# Patient Record
Sex: FEMALE | Race: WHITE | NOT HISPANIC OR LATINO | Employment: OTHER | URBAN - METROPOLITAN AREA
[De-identification: names, ages, dates, MRNs, and addresses within clinical notes are randomized per-mention and may not be internally consistent; named-entity substitution may affect disease eponyms.]

---

## 2017-06-05 ENCOUNTER — PATIENT OUTREACH (OUTPATIENT)
Dept: HEALTH INFORMATION MANAGEMENT | Facility: OTHER | Age: 82
End: 2017-06-05

## 2017-06-05 NOTE — PROGRESS NOTES
Outcome: Left Message    WebIZ Checked & Epic Updated:  yes    [facility] Verified: yes    Attempt # 1ST

## 2017-06-20 NOTE — PROGRESS NOTES
Outcome: Left Message    WebIZ Checked & Epic Updated:  yes    [facility] Verified: yes    Attempt # 2ND

## 2017-06-23 NOTE — PROGRESS NOTES
Outcome: Left Message    WebIZ Checked & Epic Updated:  yes    [facility] Verified: yes    Attempt # 3RD

## 2017-06-29 NOTE — PROGRESS NOTES
Outcome: Left Message    WebIZ Checked & Epic Updated:  yes    [facility] Verified: yes    Attempt # FINAL

## 2017-09-01 ENCOUNTER — APPOINTMENT (OUTPATIENT)
Dept: RADIOLOGY | Facility: MEDICAL CENTER | Age: 82
DRG: 378 | End: 2017-09-01
Payer: MEDICARE

## 2017-09-01 ENCOUNTER — APPOINTMENT (OUTPATIENT)
Dept: RADIOLOGY | Facility: MEDICAL CENTER | Age: 82
DRG: 378 | End: 2017-09-01
Attending: EMERGENCY MEDICINE
Payer: MEDICARE

## 2017-09-01 ENCOUNTER — HOSPITAL ENCOUNTER (INPATIENT)
Facility: MEDICAL CENTER | Age: 82
LOS: 4 days | DRG: 378 | End: 2017-09-05
Attending: EMERGENCY MEDICINE | Admitting: HOSPITALIST
Payer: MEDICARE

## 2017-09-01 ENCOUNTER — RESOLUTE PROFESSIONAL BILLING HOSPITAL PROF FEE (OUTPATIENT)
Dept: HOSPITALIST | Facility: MEDICAL CENTER | Age: 82
End: 2017-09-01
Payer: MEDICARE

## 2017-09-01 DIAGNOSIS — R07.89 OTHER CHEST PAIN: ICD-10-CM

## 2017-09-01 DIAGNOSIS — R10.9 ABDOMINAL PAIN, UNSPECIFIED LOCATION: ICD-10-CM

## 2017-09-01 DIAGNOSIS — M54.9 PAIN, UPPER BACK: ICD-10-CM

## 2017-09-01 DIAGNOSIS — D64.9 ANEMIA, UNSPECIFIED TYPE: ICD-10-CM

## 2017-09-01 PROBLEM — E87.6 HYPOKALEMIA: Status: ACTIVE | Noted: 2017-09-01

## 2017-09-01 LAB
ABO GROUP BLD: NORMAL
ABO GROUP BLD: NORMAL
ALBUMIN SERPL BCP-MCNC: 4.4 G/DL (ref 3.2–4.9)
ALBUMIN/GLOB SERPL: 1.6 G/DL
ALP SERPL-CCNC: 56 U/L (ref 30–99)
ALT SERPL-CCNC: 6 U/L (ref 2–50)
ANION GAP SERPL CALC-SCNC: 13 MMOL/L (ref 0–11.9)
ANISOCYTOSIS BLD QL SMEAR: ABNORMAL
APTT PPP: 28.7 SEC (ref 24.7–36)
AST SERPL-CCNC: 12 U/L (ref 12–45)
BARCODED ABORH UBTYP: 600
BARCODED ABORH UBTYP: 6200
BARCODED PRD CODE UBPRD: NORMAL
BARCODED UNIT NUM UBUNT: NORMAL
BASOPHILS # BLD AUTO: 0.2 % (ref 0–1.8)
BASOPHILS # BLD: 0.01 K/UL (ref 0–0.12)
BILIRUB SERPL-MCNC: 0.3 MG/DL (ref 0.1–1.5)
BLD GP AB SCN SERPL QL: NORMAL
BNP SERPL-MCNC: 187 PG/ML (ref 0–100)
BUN SERPL-MCNC: 16 MG/DL (ref 8–22)
CALCIUM SERPL-MCNC: 9.4 MG/DL (ref 8.5–10.5)
CHLORIDE SERPL-SCNC: 106 MMOL/L (ref 96–112)
CO2 SERPL-SCNC: 19 MMOL/L (ref 20–33)
COMMENT 1642: NORMAL
COMPONENT R 8504R: NORMAL
CREAT SERPL-MCNC: 0.76 MG/DL (ref 0.5–1.4)
DACRYOCYTES BLD QL SMEAR: NORMAL
EKG IMPRESSION: NORMAL
EOSINOPHIL # BLD AUTO: 0.02 K/UL (ref 0–0.51)
EOSINOPHIL NFR BLD: 0.4 % (ref 0–6.9)
ERYTHROCYTE [DISTWIDTH] IN BLOOD BY AUTOMATED COUNT: 46.1 FL (ref 35.9–50)
ERYTHROCYTE [DISTWIDTH] IN BLOOD BY AUTOMATED COUNT: 47.2 FL (ref 35.9–50)
GFR SERPL CREATININE-BSD FRML MDRD: >60 ML/MIN/1.73 M 2
GLOBULIN SER CALC-MCNC: 2.7 G/DL (ref 1.9–3.5)
GLUCOSE SERPL-MCNC: 91 MG/DL (ref 65–99)
HCT VFR BLD AUTO: 16.7 % (ref 37–47)
HCT VFR BLD AUTO: 16.8 % (ref 37–47)
HGB BLD-MCNC: 4.4 G/DL (ref 12–16)
HGB BLD-MCNC: 4.7 G/DL (ref 12–16)
HYPOCHROMIA BLD QL SMEAR: ABNORMAL
IMM GRANULOCYTES # BLD AUTO: 0.03 K/UL (ref 0–0.11)
IMM GRANULOCYTES NFR BLD AUTO: 0.6 % (ref 0–0.9)
INR PPP: 1.09 (ref 0.87–1.13)
LIPASE SERPL-CCNC: 3 U/L (ref 11–82)
LYMPHOCYTES # BLD AUTO: 0.74 K/UL (ref 1–4.8)
LYMPHOCYTES NFR BLD: 15.1 % (ref 22–41)
MCH RBC QN AUTO: 17.3 PG (ref 27–33)
MCH RBC QN AUTO: 18.5 PG (ref 27–33)
MCHC RBC AUTO-ENTMCNC: 26.3 G/DL (ref 33.6–35)
MCHC RBC AUTO-ENTMCNC: 28 G/DL (ref 33.6–35)
MCV RBC AUTO: 65.5 FL (ref 81.4–97.8)
MCV RBC AUTO: 66.1 FL (ref 81.4–97.8)
MICROCYTES BLD QL SMEAR: ABNORMAL
MONOCYTES # BLD AUTO: 0.37 K/UL (ref 0–0.85)
MONOCYTES NFR BLD AUTO: 7.5 % (ref 0–13.4)
MORPHOLOGY BLD-IMP: NORMAL
MORPHOLOGY BLD-IMP: NORMAL
NEUTROPHILS # BLD AUTO: 3.74 K/UL (ref 2–7.15)
NEUTROPHILS NFR BLD: 76.2 % (ref 44–72)
NRBC # BLD AUTO: 0 K/UL
NRBC BLD AUTO-RTO: 0 /100 WBC
OVALOCYTES BLD QL SMEAR: NORMAL
PLATELET # BLD AUTO: 380 K/UL (ref 164–446)
PLATELET # BLD AUTO: 463 K/UL (ref 164–446)
PLATELET BLD QL SMEAR: NORMAL
PMV BLD AUTO: 9 FL (ref 9–12.9)
PMV BLD AUTO: 9.2 FL (ref 9–12.9)
POIKILOCYTOSIS BLD QL SMEAR: NORMAL
POLYCHROMASIA BLD QL SMEAR: NORMAL
POTASSIUM SERPL-SCNC: 3.3 MMOL/L (ref 3.6–5.5)
PRODUCT TYPE UPROD: NORMAL
PROT SERPL-MCNC: 7.1 G/DL (ref 6–8.2)
PROTHROMBIN TIME: 14.4 SEC (ref 12–14.6)
RBC # BLD AUTO: 2.54 M/UL (ref 4.2–5.4)
RBC # BLD AUTO: 2.55 M/UL (ref 4.2–5.4)
RBC BLD AUTO: PRESENT
RH BLD: NORMAL
SCHISTOCYTES BLD QL SMEAR: NORMAL
SODIUM SERPL-SCNC: 138 MMOL/L (ref 135–145)
TROPONIN I SERPL-MCNC: 0.01 NG/ML (ref 0–0.04)
UNIT STATUS USTAT: NORMAL
WBC # BLD AUTO: 4.9 K/UL (ref 4.8–10.8)
WBC # BLD AUTO: 5.9 K/UL (ref 4.8–10.8)

## 2017-09-01 PROCEDURE — 86900 BLOOD TYPING SEROLOGIC ABO: CPT

## 2017-09-01 PROCEDURE — 71010 DX-CHEST-LIMITED (1 VIEW): CPT

## 2017-09-01 PROCEDURE — 93005 ELECTROCARDIOGRAM TRACING: CPT

## 2017-09-01 PROCEDURE — 36430 TRANSFUSION BLD/BLD COMPNT: CPT

## 2017-09-01 PROCEDURE — 74175 CTA ABDOMEN W/CONTRAST: CPT

## 2017-09-01 PROCEDURE — 86923 COMPATIBILITY TEST ELECTRIC: CPT | Mod: 91

## 2017-09-01 PROCEDURE — P9016 RBC LEUKOCYTES REDUCED: HCPCS | Mod: 91

## 2017-09-01 PROCEDURE — 770020 HCHG ROOM/CARE - TELE (206)

## 2017-09-01 PROCEDURE — 86901 BLOOD TYPING SEROLOGIC RH(D): CPT

## 2017-09-01 PROCEDURE — 700105 HCHG RX REV CODE 258: Performed by: HOSPITALIST

## 2017-09-01 PROCEDURE — 85027 COMPLETE CBC AUTOMATED: CPT

## 2017-09-01 PROCEDURE — 86850 RBC ANTIBODY SCREEN: CPT

## 2017-09-01 PROCEDURE — 85730 THROMBOPLASTIN TIME PARTIAL: CPT

## 2017-09-01 PROCEDURE — 83690 ASSAY OF LIPASE: CPT

## 2017-09-01 PROCEDURE — 700105 HCHG RX REV CODE 258: Performed by: EMERGENCY MEDICINE

## 2017-09-01 PROCEDURE — 96374 THER/PROPH/DIAG INJ IV PUSH: CPT

## 2017-09-01 PROCEDURE — 700111 HCHG RX REV CODE 636 W/ 250 OVERRIDE (IP): Performed by: HOSPITALIST

## 2017-09-01 PROCEDURE — 99285 EMERGENCY DEPT VISIT HI MDM: CPT

## 2017-09-01 PROCEDURE — 93005 ELECTROCARDIOGRAM TRACING: CPT | Performed by: EMERGENCY MEDICINE

## 2017-09-01 PROCEDURE — 700117 HCHG RX CONTRAST REV CODE 255: Performed by: EMERGENCY MEDICINE

## 2017-09-01 PROCEDURE — 85610 PROTHROMBIN TIME: CPT

## 2017-09-01 PROCEDURE — 84484 ASSAY OF TROPONIN QUANT: CPT

## 2017-09-01 PROCEDURE — 99223 1ST HOSP IP/OBS HIGH 75: CPT | Performed by: HOSPITALIST

## 2017-09-01 PROCEDURE — 83880 ASSAY OF NATRIURETIC PEPTIDE: CPT

## 2017-09-01 PROCEDURE — C9113 INJ PANTOPRAZOLE SODIUM, VIA: HCPCS | Performed by: HOSPITALIST

## 2017-09-01 PROCEDURE — 85025 COMPLETE CBC W/AUTO DIFF WBC: CPT

## 2017-09-01 PROCEDURE — 80053 COMPREHEN METABOLIC PANEL: CPT

## 2017-09-01 RX ORDER — POTASSIUM CHLORIDE 7.45 MG/ML
10 INJECTION INTRAVENOUS
Status: COMPLETED | OUTPATIENT
Start: 2017-09-01 | End: 2017-09-02

## 2017-09-01 RX ORDER — SODIUM CHLORIDE 9 MG/ML
INJECTION, SOLUTION INTRAVENOUS CONTINUOUS
Status: DISCONTINUED | OUTPATIENT
Start: 2017-09-01 | End: 2017-09-02

## 2017-09-01 RX ORDER — PANTOPRAZOLE SODIUM 40 MG/10ML
40 INJECTION, POWDER, LYOPHILIZED, FOR SOLUTION INTRAVENOUS 2 TIMES DAILY
Status: DISCONTINUED | OUTPATIENT
Start: 2017-09-01 | End: 2017-09-03

## 2017-09-01 RX ORDER — POLYETHYLENE GLYCOL 3350 17 G/17G
1 POWDER, FOR SOLUTION ORAL
Status: DISCONTINUED | OUTPATIENT
Start: 2017-09-01 | End: 2017-09-05 | Stop reason: HOSPADM

## 2017-09-01 RX ORDER — BISACODYL 10 MG
10 SUPPOSITORY, RECTAL RECTAL
Status: DISCONTINUED | OUTPATIENT
Start: 2017-09-01 | End: 2017-09-05 | Stop reason: HOSPADM

## 2017-09-01 RX ORDER — ASPIRIN 81 MG/1
81 TABLET, CHEWABLE ORAL DAILY
COMMUNITY
End: 2017-09-01

## 2017-09-01 RX ORDER — NAPROXEN SODIUM 220 MG
220 TABLET ORAL EVERY 8 HOURS PRN
Status: ON HOLD | COMMUNITY
End: 2017-09-05

## 2017-09-01 RX ORDER — AMOXICILLIN 250 MG
2 CAPSULE ORAL 2 TIMES DAILY
Status: DISCONTINUED | OUTPATIENT
Start: 2017-09-01 | End: 2017-09-05 | Stop reason: HOSPADM

## 2017-09-01 RX ADMIN — SODIUM CHLORIDE: 9 INJECTION, SOLUTION INTRAVENOUS at 23:31

## 2017-09-01 RX ADMIN — IOHEXOL 100 ML: 350 INJECTION, SOLUTION INTRAVENOUS at 15:45

## 2017-09-01 RX ADMIN — SODIUM CHLORIDE 1000 ML: 9 INJECTION, SOLUTION INTRAVENOUS at 15:35

## 2017-09-01 RX ADMIN — POTASSIUM CHLORIDE 10 MEQ: 7.46 INJECTION, SOLUTION INTRAVENOUS at 23:31

## 2017-09-01 RX ADMIN — PANTOPRAZOLE SODIUM 40 MG: 40 INJECTION, POWDER, FOR SOLUTION INTRAVENOUS at 22:04

## 2017-09-01 ASSESSMENT — COGNITIVE AND FUNCTIONAL STATUS - GENERAL
SUGGESTED CMS G CODE MODIFIER MOBILITY: CJ
SUGGESTED CMS G CODE MODIFIER DAILY ACTIVITY: CJ
DAILY ACTIVITIY SCORE: 21
MOVING FROM LYING ON BACK TO SITTING ON SIDE OF FLAT BED: A LITTLE
MOBILITY SCORE: 20
WALKING IN HOSPITAL ROOM: A LITTLE
CLIMB 3 TO 5 STEPS WITH RAILING: A LITTLE
HELP NEEDED FOR BATHING: A LITTLE
STANDING UP FROM CHAIR USING ARMS: A LITTLE
DRESSING REGULAR UPPER BODY CLOTHING: A LITTLE
TOILETING: A LITTLE

## 2017-09-01 ASSESSMENT — ENCOUNTER SYMPTOMS
COUGH: 0
NAUSEA: 0
WEAKNESS: 1
ABDOMINAL PAIN: 0
SHORTNESS OF BREATH: 0
BACK PAIN: 1
VOMITING: 0
HEADACHES: 1
WEIGHT LOSS: 0
PALPITATIONS: 0
CHILLS: 0
BLURRED VISION: 0
BLOOD IN STOOL: 0
FEVER: 0

## 2017-09-01 ASSESSMENT — PATIENT HEALTH QUESTIONNAIRE - PHQ9
SUM OF ALL RESPONSES TO PHQ QUESTIONS 1-9: 0
1. LITTLE INTEREST OR PLEASURE IN DOING THINGS: NOT AT ALL
SUM OF ALL RESPONSES TO PHQ9 QUESTIONS 1 AND 2: 0
2. FEELING DOWN, DEPRESSED, IRRITABLE, OR HOPELESS: NOT AT ALL

## 2017-09-01 ASSESSMENT — LIFESTYLE VARIABLES
ALCOHOL_USE: NO
DO YOU DRINK ALCOHOL: NO
EVER_SMOKED: NEVER

## 2017-09-01 ASSESSMENT — PAIN SCALES - GENERAL
PAINLEVEL_OUTOF10: 2
PAINLEVEL_OUTOF10: 10
PAINLEVEL_OUTOF10: 0

## 2017-09-01 NOTE — ED NOTES
Pt to triage .  Chief Complaint   Patient presents with   • Abdominal Pain   • Weight Loss   • Body Aches   • Back Pain

## 2017-09-01 NOTE — ED NOTES
Med rec complete per pt and family at bedside  Pt does not take any medications regularly, no prescription medications  Allergies reviewed - NKDA  No ABX in last month

## 2017-09-01 NOTE — ED PROVIDER NOTES
ED Provider Note    CHIEF COMPLAINT  Chief Complaint   Patient presents with   • Abdominal Pain   • Weight Loss   • Body Aches   • Back Pain       HPI  Lamar Dangelo is a 88 y.o. female who presentsFor evaluation of chest pain abdominal pain and back pain, she is here with a sister and a friend, they report that she is a history of dementia and is not an accurate historian. The patient told me over the past week or so she's been experiencing chest pain and back pain and upper abdominal pain, she felt nauseated at times but has not vomited, has been no blood in her stool. The patient apparently has not seen a physician in many years, she does not take any medicine on a regular basis. She tells me at this time her pain has resolved. History is limited by apparent baseline dementia and confusion. Triage blood work reveals a hemoglobin of 4.4 so the patient was brought back and I evaluated her immediately upon her arrival in the room    REVIEW OF SYSTEMS  Negative for fever, rash, vomiting, diarrhea, headache, focal weakness, focal numbness, focal tingling. All other systems are negative.     PAST MEDICAL HISTORY  Past Medical History:   Diagnosis Date   • Hypothyroid 7/2/2010   • Hyperlipidemia 7/2/2010       FAMILY HISTORY  Family History   Problem Relation Age of Onset   • Heart Disease Father    • Hypertension Sister        SOCIAL HISTORY  Social History   Substance Use Topics   • Smoking status: Never Smoker   • Smokeless tobacco: Never Used   • Alcohol use No       SURGICAL HISTORY  Past Surgical History:   Procedure Laterality Date   • APPENDECTOMY     • TONSILLECTOMY         CURRENT MEDICATIONS  I personally reviewed the medication list in the charting documentation.     ALLERGIES  Allergies   Allergen Reactions   • Nkda [No Known Drug Allergy]        MEDICAL RECORD  I have reviewed patient's medical record and pertinent results are listed above.      PHYSICAL EXAM  VITAL SIGNS: /60   Pulse 82   Temp  "37.1 °C (98.8 °F) (Temporal)   Resp 15   Ht 1.6 m (5' 3\")   Wt 48 kg (105 lb 13.1 oz)   SpO2 100%   BMI 18.75 kg/m²    Constitutional:Elderly and frail but in no acute distress.  HENT: Mucus membranes moist.    Eyes: No scleral icterus. Normal conjunctiva   Neck: Supple, comfortable, nonpainful range of motion.   Cardiovascular: Regular heart rate and rhythm.   Thorax & Lungs: Chest is nontender.  Lungs are clear to auscultation with good air movement bilaterally.  No wheeze, rhonchi, nor rales.   Abdomen: Soft, no appreciable tenderness, there is no pulsatile mass  Rectal: No hemorrhoids, brown heme-negative stool.  Skin: Warm, dry. No rash appreciated  Extremities/Musculoskeletal: No sign of trauma. No asymmetric calf tenderness, erythema or edema. Normal range of motion   Neurologic: Alert & confused requiring frequent reorientation, and normal symmetric upper and lower extremity motor and sensory function bilaterally  Psychiatric: Normal affect appropriate for the clinical situation.    DIAGNOSTIC STUDIES / PROCEDURES    EKG  12 Lead EKG interpreted by me to show:    Rate 83  Rhythm: Normal sinus rhythm  Axis: Normal  ID and QRS Intervals: Normal  T waves: No acute changes  ST segments: Nonspecific morphology V3 V4 and V5  Ectopy: None.  QTc: 532    My impression of this EKG: Concerning for ischemia with a prolonged QT interval      LABS  Results for orders placed or performed during the hospital encounter of 09/01/17   Troponin   Result Value Ref Range    Troponin I 0.01 0.00 - 0.04 ng/mL   Btype Natriuretic Peptide   Result Value Ref Range    B Natriuretic Peptide 187 (H) 0 - 100 pg/mL   CBC with Differential   Result Value Ref Range    WBC 4.9 4.8 - 10.8 K/uL    RBC 2.55 (L) 4.20 - 5.40 M/uL    Hemoglobin 4.4 (LL) 12.0 - 16.0 g/dL    Hematocrit 16.7 (LL) 37.0 - 47.0 %    MCV 65.5 (L) 81.4 - 97.8 fL    MCH 17.3 (L) 27.0 - 33.0 pg    MCHC 26.3 (L) 33.6 - 35.0 g/dL    RDW 47.2 35.9 - 50.0 fL    Platelet " Count 463 (H) 164 - 446 K/uL    MPV 9.0 9.0 - 12.9 fL    Nucleated RBC 0.00 /100 WBC    NRBC (Absolute) 0.00 K/uL    Neutrophils-Polys 76.20 (H) 44.00 - 72.00 %    Lymphocytes 15.10 (L) 22.00 - 41.00 %    Monocytes 7.50 0.00 - 13.40 %    Eosinophils 0.40 0.00 - 6.90 %    Basophils 0.20 0.00 - 1.80 %    Immature Granulocytes 0.60 0.00 - 0.90 %    Lymphs (Absolute) 0.74 (L) 1.00 - 4.80 K/uL    Monos (Absolute) 0.37 0.00 - 0.85 K/uL    Eos (Absolute) 0.02 0.00 - 0.51 K/uL    Baso (Absolute) 0.01 0.00 - 0.12 K/uL    Immature Granulocytes (abs) 0.03 0.00 - 0.11 K/uL    Neutrophils (Absolute) 3.74 2.00 - 7.15 K/uL    Hypochromia 3+     Anisocytosis 3+     Microcytosis 3+    Complete Metabolic Panel (CMP)   Result Value Ref Range    Sodium 138 135 - 145 mmol/L    Potassium 3.3 (L) 3.6 - 5.5 mmol/L    Chloride 106 96 - 112 mmol/L    Co2 19 (L) 20 - 33 mmol/L    Anion Gap 13.0 (H) 0.0 - 11.9    Glucose 91 65 - 99 mg/dL    Bun 16 8 - 22 mg/dL    Creatinine 0.76 0.50 - 1.40 mg/dL    Calcium 9.4 8.5 - 10.5 mg/dL    AST(SGOT) 12 12 - 45 U/L    ALT(SGPT) 6 2 - 50 U/L    Alkaline Phosphatase 56 30 - 99 U/L    Total Bilirubin 0.3 0.1 - 1.5 mg/dL    Albumin 4.4 3.2 - 4.9 g/dL    Total Protein 7.1 6.0 - 8.2 g/dL    Globulin 2.7 1.9 - 3.5 g/dL    A-G Ratio 1.6 g/dL   Prothrombin Time   Result Value Ref Range    PT 14.4 12.0 - 14.6 sec    INR 1.09 0.87 - 1.13   APTT   Result Value Ref Range    APTT 28.7 24.7 - 36.0 sec   Lipase   Result Value Ref Range    Lipase 3 (L) 11 - 82 U/L   ESTIMATED GFR   Result Value Ref Range    GFR If African American >60 >60 mL/min/1.73 m 2    GFR If Non African American >60 >60 mL/min/1.73 m 2   PERIPHERAL SMEAR REVIEW   Result Value Ref Range    Peripheral Smear Review see below    PLATELET ESTIMATE   Result Value Ref Range    Plt Estimation Increased    MORPHOLOGY   Result Value Ref Range    RBC Morphology Present     Polychromia 1+     Poikilocytosis 2+     Ovalocytes 1+     Schistocytes 1+     Tear  Drop Cells 1+    DIFFERENTIAL COMMENT   Result Value Ref Range    Comments-Diff see below    EKG (ER)   Result Value Ref Range    Report       [facility]    Test Date:  2017  Pt Name:    NIYAH BANUELOS              Department: ER  MRN:        [id number]                      Room:  Gender:     F                            Technician: [id number]  :        [id number]                   Requested By:ER TRIAGE PROTOCOL  Order #:    [id number]                    Reading MD:    Measurements  Intervals                                Axis  Rate:       83                           P:          69  NC:         204                          QRS:        -2  QRSD:       84                           T:          20  QT:         452  QTc:        532    Interpretive Statements  SINUS RHYTHM  BORDERLINE REPOLARIZATION ABNORMALITY  PROLONGED QT INTERVAL  No previous ECG available for comparison           RADIOLOGY  CT-CTA COMPLETE THORACOABDOMINAL AORTA   Final Result         Limited evaluation due to motion artifact.      1. No evidence of aortic dissection or aneurysm. Moderate atherosclerotic disease.      2. Low-attenuation lesion in the right hepatic lobe is incompletely evaluated but could be a cyst or hemangioma.      3. Mild thickening of the pylorus could relate to gastritis.      DX-CHEST-LIMITED (1 VIEW)   Final Result      No consolidation.            COURSE & MEDICAL DECISION MAKING  I have reviewed any medical record information, laboratory studies and radiographic results as noted above.  Differential diagnoses includes: ACS, AAA, aortic dissection, retroperitoneal hematoma, GI bleeding     Encounter Summary: This is a 88 y.o. female with, abdominal and back pain, found to have a hemoglobin of 4, hemodynamically stable however and has a history of dementia which really complicates the history here. Most of the history is provided by family at the bedside. She has a benign abdomen without  evidence of peritonitis, heme-negative stool. Will initiate blood transfusion, CT scan of the aorta, was closely here in the emergency department as she is at high chance of acute decompensation. We'll continue to monitor vital signs, troponin negative, EKG is suggestive of ischemia. ------- CT scan is largely unremarkable except for some mild thickening of the pylorus which could be related to gastritis, this could be a GI bleed but she was heme-negative which is suspect. I I admitted the patient to Dr. Cardoso, the internal medicine specialist who requested I consult GI so I did speak to Dr. Murcia regarding the case. Admitted in guarded condition after blood transfusion has been initiated    CRITICAL CARE  The very real possibilty of a deterioration of this patient's condition required the highest level of my preparedness for sudden, emergent intervention.  I provided critical care services, which included medication orders, frequent reevaluations of the patient's condition and response to treatment, ordering and reviewing test results, and discussing the case with various consultants.  The critical care time associated with the care of the patient was 42 minutes. Review chart for interventions. This time is exclusive of any other billable procedures.       DISPOSITION: Admitted in guarded condition      FINAL IMPRESSION  1. Anemia, unspecified type    2. Other chest pain    3. Abdominal pain, unspecified location    4. Pain, upper back           This dictation was created using voice recognition software. The accuracy of the dictation is limited to the abilities of the software. I expect there may be some errors of grammar and possibly content. The nursing notes were reviewed and certain aspects of this information were incorporated into this note.    Electronically signed by: Humberto Connell, 9/1/2017 2:11 PM

## 2017-09-02 PROBLEM — D62 ANEMIA ASSOCIATED WITH ACUTE BLOOD LOSS: Status: ACTIVE | Noted: 2017-09-02

## 2017-09-02 PROBLEM — K92.2 ACUTE GI BLEEDING: Status: ACTIVE | Noted: 2017-09-02

## 2017-09-02 LAB
ANION GAP SERPL CALC-SCNC: 9 MMOL/L (ref 0–11.9)
ANISOCYTOSIS BLD QL SMEAR: ABNORMAL
BASOPHILS # BLD AUTO: 0 % (ref 0–1.8)
BASOPHILS # BLD: 0 K/UL (ref 0–0.12)
BUN SERPL-MCNC: 11 MG/DL (ref 8–22)
CALCIUM SERPL-MCNC: 9.3 MG/DL (ref 8.5–10.5)
CFT BLD TEG: 3.5 MIN (ref 5–10)
CHLORIDE SERPL-SCNC: 108 MMOL/L (ref 96–112)
CLOT ANGLE BLD TEG: 76.3 DEGREES (ref 53–72)
CLOT LYSIS 30M P MA LENFR BLD TEG: 0.7 % (ref 0–8)
CO2 SERPL-SCNC: 19 MMOL/L (ref 20–33)
CREAT SERPL-MCNC: 0.62 MG/DL (ref 0.5–1.4)
CT.EXTRINSIC BLD ROTEM: 0.9 MIN (ref 1–3)
EOSINOPHIL # BLD AUTO: 0 K/UL (ref 0–0.51)
EOSINOPHIL NFR BLD: 0 % (ref 0–6.9)
ERYTHROCYTE [DISTWIDTH] IN BLOOD BY AUTOMATED COUNT: 54.7 FL (ref 35.9–50)
FERRITIN SERPL-MCNC: 7.9 NG/ML (ref 10–291)
FOLATE SERPL-MCNC: 19.3 NG/ML
GFR SERPL CREATININE-BSD FRML MDRD: >60 ML/MIN/1.73 M 2
GLUCOSE SERPL-MCNC: 80 MG/DL (ref 65–99)
HCT VFR BLD AUTO: 20.7 % (ref 37–47)
HCT VFR BLD AUTO: 29.1 % (ref 37–47)
HCT VFR BLD AUTO: 31.3 % (ref 37–47)
HGB BLD-MCNC: 6.2 G/DL (ref 12–16)
HGB BLD-MCNC: 9 G/DL (ref 12–16)
HGB BLD-MCNC: 9.5 G/DL (ref 12–16)
HGB RETIC QN AUTO: 20.8 PG/CELL (ref 29–35)
HYPOCHROMIA BLD QL SMEAR: ABNORMAL
IMM RETICS NFR: 39.8 % (ref 9.3–17.4)
IRON SATN MFR SERPL: 55 % (ref 15–55)
IRON SERPL-MCNC: 290 UG/DL (ref 40–170)
LYMPHOCYTES # BLD AUTO: 0.18 K/UL (ref 1–4.8)
LYMPHOCYTES NFR BLD: 3.7 % (ref 22–41)
MACROCYTES BLD QL SMEAR: ABNORMAL
MAGNESIUM SERPL-MCNC: 2 MG/DL (ref 1.5–2.5)
MANUAL DIFF BLD: NORMAL
MCF BLD TEG: 72.1 MM (ref 50–70)
MCH RBC QN AUTO: 20.7 PG (ref 27–33)
MCHC RBC AUTO-ENTMCNC: 29.7 G/DL (ref 33.6–35)
MCV RBC AUTO: 69.9 FL (ref 81.4–97.8)
MICROCYTES BLD QL SMEAR: ABNORMAL
MONOCYTES # BLD AUTO: 0.18 K/UL (ref 0–0.85)
MONOCYTES NFR BLD AUTO: 3.6 % (ref 0–13.4)
MORPHOLOGY BLD-IMP: NORMAL
NEUTROPHILS # BLD AUTO: 4.54 K/UL (ref 2–7.15)
NEUTROPHILS NFR BLD: 92.7 % (ref 44–72)
NRBC # BLD AUTO: 0.03 K/UL
NRBC BLD AUTO-RTO: 0.6 /100 WBC
OVALOCYTES BLD QL SMEAR: NORMAL
PLATELET # BLD AUTO: 311 K/UL (ref 164–446)
PLATELET BLD QL SMEAR: NORMAL
PMV BLD AUTO: 8.5 FL (ref 9–12.9)
POIKILOCYTOSIS BLD QL SMEAR: NORMAL
POLYCHROMASIA BLD QL SMEAR: NORMAL
POTASSIUM SERPL-SCNC: 3.5 MMOL/L (ref 3.6–5.5)
RBC # BLD AUTO: 2.99 M/UL (ref 4.2–5.4)
RBC BLD AUTO: PRESENT
RETICS # AUTO: 0.05 M/UL (ref 0.04–0.06)
RETICS/RBC NFR: 1.2 % (ref 0.8–2.1)
SODIUM SERPL-SCNC: 136 MMOL/L (ref 135–145)
TEG ALGORITHM TGALG: ABNORMAL
TIBC SERPL-MCNC: 531 UG/DL (ref 250–450)
VIT B12 SERPL-MCNC: 211 PG/ML (ref 211–911)
WBC # BLD AUTO: 4.9 K/UL (ref 4.8–10.8)

## 2017-09-02 PROCEDURE — 86923 COMPATIBILITY TEST ELECTRIC: CPT

## 2017-09-02 PROCEDURE — 85046 RETICYTE/HGB CONCENTRATE: CPT

## 2017-09-02 PROCEDURE — 85384 FIBRINOGEN ACTIVITY: CPT

## 2017-09-02 PROCEDURE — A9270 NON-COVERED ITEM OR SERVICE: HCPCS | Performed by: INTERNAL MEDICINE

## 2017-09-02 PROCEDURE — 80048 BASIC METABOLIC PNL TOTAL CA: CPT

## 2017-09-02 PROCEDURE — 700105 HCHG RX REV CODE 258

## 2017-09-02 PROCEDURE — 85007 BL SMEAR W/DIFF WBC COUNT: CPT

## 2017-09-02 PROCEDURE — 36430 TRANSFUSION BLD/BLD COMPNT: CPT

## 2017-09-02 PROCEDURE — 82607 VITAMIN B-12: CPT

## 2017-09-02 PROCEDURE — 770020 HCHG ROOM/CARE - TELE (206)

## 2017-09-02 PROCEDURE — 700102 HCHG RX REV CODE 250 W/ 637 OVERRIDE(OP): Performed by: INTERNAL MEDICINE

## 2017-09-02 PROCEDURE — 82746 ASSAY OF FOLIC ACID SERUM: CPT

## 2017-09-02 PROCEDURE — P9016 RBC LEUKOCYTES REDUCED: HCPCS

## 2017-09-02 PROCEDURE — 82728 ASSAY OF FERRITIN: CPT

## 2017-09-02 PROCEDURE — C9113 INJ PANTOPRAZOLE SODIUM, VIA: HCPCS | Performed by: HOSPITALIST

## 2017-09-02 PROCEDURE — 99233 SBSQ HOSP IP/OBS HIGH 50: CPT | Performed by: INTERNAL MEDICINE

## 2017-09-02 PROCEDURE — 700111 HCHG RX REV CODE 636 W/ 250 OVERRIDE (IP): Performed by: HOSPITALIST

## 2017-09-02 PROCEDURE — 85014 HEMATOCRIT: CPT

## 2017-09-02 PROCEDURE — 85018 HEMOGLOBIN: CPT | Mod: 91

## 2017-09-02 PROCEDURE — 85027 COMPLETE CBC AUTOMATED: CPT

## 2017-09-02 PROCEDURE — 700102 HCHG RX REV CODE 250 W/ 637 OVERRIDE(OP): Performed by: HOSPITALIST

## 2017-09-02 PROCEDURE — 700101 HCHG RX REV CODE 250: Performed by: INTERNAL MEDICINE

## 2017-09-02 PROCEDURE — 85576 BLOOD PLATELET AGGREGATION: CPT

## 2017-09-02 PROCEDURE — 83550 IRON BINDING TEST: CPT

## 2017-09-02 PROCEDURE — 85347 COAGULATION TIME ACTIVATED: CPT

## 2017-09-02 PROCEDURE — A9270 NON-COVERED ITEM OR SERVICE: HCPCS | Performed by: HOSPITALIST

## 2017-09-02 PROCEDURE — 36415 COLL VENOUS BLD VENIPUNCTURE: CPT

## 2017-09-02 PROCEDURE — 83735 ASSAY OF MAGNESIUM: CPT

## 2017-09-02 PROCEDURE — 83540 ASSAY OF IRON: CPT

## 2017-09-02 PROCEDURE — 700101 HCHG RX REV CODE 250: Performed by: NURSE PRACTITIONER

## 2017-09-02 RX ORDER — LABETALOL HYDROCHLORIDE 5 MG/ML
10 INJECTION, SOLUTION INTRAVENOUS EVERY 4 HOURS PRN
Status: DISCONTINUED | OUTPATIENT
Start: 2017-09-02 | End: 2017-09-05 | Stop reason: HOSPADM

## 2017-09-02 RX ORDER — PEG-3350, SODIUM SULFATE, SODIUM CHLORIDE, POTASSIUM CHLORIDE, SODIUM ASCORBATE AND ASCORBIC ACID 7.5-2.691G
100 KIT ORAL 2 TIMES DAILY
Status: COMPLETED | OUTPATIENT
Start: 2017-09-02 | End: 2017-09-03

## 2017-09-02 RX ORDER — HALOPERIDOL 5 MG/ML
.5-1 INJECTION INTRAMUSCULAR
Status: DISCONTINUED | OUTPATIENT
Start: 2017-09-02 | End: 2017-09-02

## 2017-09-02 RX ORDER — SODIUM CHLORIDE 9 MG/ML
INJECTION, SOLUTION INTRAVENOUS
Status: COMPLETED
Start: 2017-09-02 | End: 2017-09-02

## 2017-09-02 RX ORDER — SODIUM CHLORIDE AND POTASSIUM CHLORIDE 150; 900 MG/100ML; MG/100ML
INJECTION, SOLUTION INTRAVENOUS CONTINUOUS
Status: DISCONTINUED | OUTPATIENT
Start: 2017-09-02 | End: 2017-09-05 | Stop reason: HOSPADM

## 2017-09-02 RX ADMIN — PANTOPRAZOLE SODIUM 40 MG: 40 INJECTION, POWDER, FOR SOLUTION INTRAVENOUS at 11:23

## 2017-09-02 RX ADMIN — PANTOPRAZOLE SODIUM 40 MG: 40 INJECTION, POWDER, FOR SOLUTION INTRAVENOUS at 22:48

## 2017-09-02 RX ADMIN — SODIUM CHLORIDE: 9 INJECTION, SOLUTION INTRAVENOUS at 03:30

## 2017-09-02 RX ADMIN — LABETALOL HYDROCHLORIDE 10 MG: 5 INJECTION INTRAVENOUS at 20:55

## 2017-09-02 RX ADMIN — POTASSIUM CHLORIDE 10 MEQ: 7.46 INJECTION, SOLUTION INTRAVENOUS at 00:36

## 2017-09-02 RX ADMIN — POTASSIUM CHLORIDE AND SODIUM CHLORIDE 1 ML: 900; 150 INJECTION, SOLUTION INTRAVENOUS at 12:54

## 2017-09-02 RX ADMIN — STANDARDIZED SENNA CONCENTRATE AND DOCUSATE SODIUM 2 TABLET: 8.6; 5 TABLET, FILM COATED ORAL at 08:24

## 2017-09-02 RX ADMIN — POLYETHYLENE GLYCOL 3350, SODIUM SULFATE, SODIUM CHLORIDE, POTASSIUM CHLORIDE, ASCORBIC ACID, SODIUM ASCORBATE 100 G: KIT at 18:01

## 2017-09-02 ASSESSMENT — PAIN SCALES - GENERAL
PAINLEVEL_OUTOF10: 0

## 2017-09-02 NOTE — ASSESSMENT & PLAN NOTE
- GI bleeder treated.   - Hgb still dropped, s/p 1 unit pRBC today. Will continue to monitor. H&H q8 H, tranfuse for Hgb<7.

## 2017-09-02 NOTE — CARE PLAN
Problem: Safety  Goal: Will remain free from injury    Intervention: Collaborate with Interdisciplinary Team for safe transfer and mobilization techniques  Call for assistance, call light within reach, bed alarm in use

## 2017-09-02 NOTE — H&P
" Hospital Medicine History and Physical    Date of Service  9/1/2017    Chief Complaint  Chief Complaint   Patient presents with   • Abdominal Pain   • Weight Loss   • Body Aches   • Back Pain       History of Presenting Illness  88 y.o. female who presented 9/1/2017 with chest, back and head pain who was found to have hemoglobin of 4.4. She states that the pain has been progressively worsening for the last 3 days, she is unable to describe the pain and just says it's \"just pain\". During this 3 day. She also endorses malaise and decreased energy. She said the only thing that made the pain better was naproxen and moving around. She is never extends anything like this before. No recent trauma, recent illnesses, changes in bowel movement or urination.    She describes herself as very active and spends time gardening. She has no complaints of changes in her appetite or weight.    Primary Care Physician  RAH Pino.    Consultants  [facility]    Code Status  Full Code    Review of Systems  Review of Systems   Constitutional: Positive for malaise/fatigue. Negative for chills, fever and weight loss.   HENT: Negative for congestion.    Eyes: Negative for blurred vision.   Respiratory: Negative for cough and shortness of breath.    Cardiovascular: Negative for chest pain, palpitations and leg swelling.   Gastrointestinal: Negative for abdominal pain, blood in stool, melena, nausea and vomiting.   Genitourinary: Negative for dysuria.   Musculoskeletal: Positive for back pain.   Neurological: Positive for weakness and headaches.        Past Medical History  Past Medical History:   Diagnosis Date   • Hypothyroid 7/2/2010   • Hyperlipidemia 7/2/2010       Surgical History  Past Surgical History:   Procedure Laterality Date   • APPENDECTOMY     • TONSILLECTOMY         Medications  No current facility-administered medications on file prior to encounter.      No current outpatient prescriptions on " file prior to encounter.   Naproxen 200mg PRN pain    Family History  Family History   Problem Relation Age of Onset   • Heart Disease Father    • Hypertension Sister        Social History  Social History   Substance Use Topics   • Smoking status: Never Smoker   • Smokeless tobacco: Never Used   • Alcohol use No   She is a retired  and currently lives with her sister.    Allergies  Allergies   Allergen Reactions   • Nkda [No Known Drug Allergy]         Physical Exam  Laboratory   Hemodynamics  Temp (24hrs), Av.1 °C (98.8 °F), Min:36.9 °C (98.4 °F), Max:37.3 °C (99.1 °F)   Temperature: 36.9 °C (98.4 °F)  Pulse  Av.5  Min: 78  Max: 100 Heart Rate (Monitored): 87  Blood Pressure : 159/52, NIBP: (!) 173/67      Respiratory      Respiration: (!) 21, Pulse Oximetry: 97 %             Physical Exam   Constitutional: She is oriented to person, place, and time. She appears well-developed. No distress.   HENT:   Head: Normocephalic and atraumatic.   Mucosa tachy   Eyes: Pupils are equal, round, and reactive to light.   Pale conjunctiva   Neck: Neck supple.   Cardiovascular: Normal rate and regular rhythm.    Pulmonary/Chest: Breath sounds normal. No respiratory distress.   Abdominal: Soft. Bowel sounds are normal. She exhibits no distension. There is no tenderness.   Musculoskeletal: She exhibits no edema.   Lymphadenopathy:     She has no cervical adenopathy.   Neurological: She is alert and oriented to person, place, and time.   Skin: Skin is warm and dry.   Psychiatric: She has a normal mood and affect.       Recent Labs      17   1246  17   1946   WBC  4.9  5.9   RBC  2.55*  2.54*   HEMOGLOBIN  4.4*  4.7*   HEMATOCRIT  16.7*  16.8*   MCV  65.5*  66.1*   MCH  17.3*  18.5*   MCHC  26.3*  28.0*   RDW  47.2  46.1   PLATELETCT  463*  380   MPV  9.0  9.2     Recent Labs      17   1246   SODIUM  138   POTASSIUM  3.3*   CHLORIDE  106   CO2  19*   GLUCOSE  91   BUN  16   CREATININE  0.76   CALCIUM   9.4     Recent Labs      09/01/17   1246   ALTSGPT  6   ASTSGOT  12   ALKPHOSPHAT  56   TBILIRUBIN  0.3   LIPASE  3*   GLUCOSE  91     Recent Labs      09/01/17   1246   APTT  28.7   INR  1.09     Recent Labs      09/01/17   1246   BNPBTYPENAT  187*         Lab Results   Component Value Date    TROPONINI 0.01 09/01/2017     Urinalysis:  No results found for: SPECGRAVITY, GLUCOSEUR, KETONES, NITRITE, WBCURINE, RBCURINE, BACTERIA, EPITHELCELL     Imaging  CT-CTA COMPLETE THORACOABDOMINAL AORTA   Final Result         Limited evaluation due to motion artifact.      1. No evidence of aortic dissection or aneurysm. Moderate atherosclerotic disease.      2. Low-attenuation lesion in the right hepatic lobe is incompletely evaluated but could be a cyst or hemangioma.      3. Mild thickening of the pylorus could relate to gastritis.      DX-CHEST-LIMITED (1 VIEW)   Final Result      No consolidation.      EKG: Sinus rhythm    Assessment/Plan     I anticipate this patient will require at least two midnights for appropriate medical management, necessitating inpatient admission.    * Symptomatic anemia- (present on admission)   Assessment & Plan    Found to have hemoglobin 4.4 on presentation. Stool guaiac was negative and there is no clear source of bleeding. CT abdomen shows possible evidence of gastritis. Given the low blood volume and her very stable appearance/vital signs this must have been a chronic slow bleed.  - telemetry  - transfuse 2 U PRBCs  - unable to do anemia work up at this time since she has already received blood products and that will skew iron results  - GI consulted, f/u with recs in the AM        Pain, upper back- (present on admission)   Assessment & Plan    Patient with upper back pain, chest pain, abdominal pain intermittently for 3 days. She said it mostly happens in the mornings and evenings. It is not associated with any exertion, diaphoresis, or radiation. No significant findings on EKG. Therefore,  this seems to be more likely secondary to stress response from low blood volume and oxygen carrying capacity.  - CTA negative for dissection  - trop negative  - Continue to monitor clinically        Hypokalemia- (present on admission)   Assessment & Plan    K 3.3 on admission.  - replete as needed        Hypothyroid- (present on admission)   Assessment & Plan    Patient with history of hypothyroidism but not currently on any medications.  - TSH pending            VTE prophylaxis: SCDs only.

## 2017-09-02 NOTE — PROGRESS NOTES
GI consults  Re; severe anemia    Impression:  1.  Dementia.  Poor historian and suspect she has been taking NSAIDs but does not remember  2.  Severe microcytic anemia  3.  Epigastric pain.  Unable to get more details      Recs;  1.  Cl liq diet  2.  EGD/colon in am  3.  Agree with PPI for now  4.  For sedation purposes, would like to see her hgb >7.  Please transfuse.

## 2017-09-02 NOTE — PROGRESS NOTES
Patient arrived on floor via gurney with transport. Patient placed on monitor. Monitor room notified. Patient is SR on the monitor. Patient A&O X 3 - patient stated did not know the date because she is retired and no longer pays attention to the date. Patient declines any pain at this time. Strip alarm in place. Bed locked and in lowest position with call light within reach.

## 2017-09-02 NOTE — CARE PLAN
Problem: Communication  Goal: The ability to communicate needs accurately and effectively will improve    Intervention: Educate patient and significant other/support system about the plan of care, procedures, treatments, medications and allow for questions  Patient verbalized understanding of plan of care, medications, and orient to nursing staff

## 2017-09-02 NOTE — CARE PLAN
Problem: Safety  Goal: Will remain free from injury  Outcome: PROGRESSING AS EXPECTED  Discussed with patient the importance of calling for assistance prior to getting out of bed in order to help prevent falls. Patient very accepting of the information but reinforcement may be needed. All questions answered at this time.     Problem: Infection  Goal: Will remain free from infection  Outcome: PROGRESSING AS EXPECTED  Discussed with patient the importance of washing hands before and after eating or using the restroom in order to help prevent infection. Patient very accepting of the information but reinforcement may be needed. All questions answered at this time.

## 2017-09-02 NOTE — CONSULTS
"DATE OF SERVICE:  09/02/2017    REFERRING PHYSICIAN:  Dr. Connell.    REASON FOR THE CONSULT:  Anemia.    HISTORY OF PRESENT ILLNESS:  Patient is an 88-year-old female who lives with   her sister.  She has family friends here at the bedside.  Patient spends most   of her day gardening even in 100-degree weather.  She is either be stooped   over or sit on the ground weeding.  She has some back pain, but truly cannot   remember how often she takes a big large pill like \"aspirin.\"  She cannot   recall the last time she had epigastric pain, but she thinks she has had 2   episodes.  She cannot recall any further details regarding that.  She denies   dysphagia and denies symptoms of reflux.  She does not take any Tums, Rolaids,   histamine blockers or proton pump inhibitors.  She has never been told she   had peptic ulcer disease.  She has a good appetite and she is not aware of any   weight loss.  She denies any change in her bowel habits.  She denies melena   and hematochezia.  She has never had a colonoscopy.    PAST MEDICAL HISTORY:  ALLERGIES:  None.    MEDICATIONS:  She states she does not take any except for an occasional large   pill like aspirin.    SURGERIES:  Appendectomy, tonsillectomy.    MEDICAL ILLNESSES:  Hypothyroidism, hyperlipidemia.    SOCIAL HISTORY:  She does not smoke, does not drink.    FAMILY HISTORY:  Noncontributory.    REVIEW OF SYSTEMS:  Unable to obtain accurately.    PHYSICAL EXAMINATION:  VITAL SIGNS:  Temperature is 37.1, pulse 75, respirations 16, blood pressure   144/51.  GENERAL:  This appears to be a healthy 88-year-old female.  HEENT:  Pupils are round.  Sclerae are anicteric.  I did not appreciate oral   lesions.  NECK:  Soft.  No adenopathy.  LUNGS:  Clear.  CARDIOVASCULAR:  S1, S2, without murmur, gallop or rub.  ABDOMEN:  Bowel sounds are positive, soft, nontender.  No palpable masses.  EXTREMITIES:  No clubbing, cyanosis or peripheral edema.  SKIN:  Smooth, moist, and " warm.  NEUROLOGIC:  She does not know the name of the hospital, does not know the   month, does not know the name of the President.    LABORATORY DATA:  On presentation, hemoglobin 4.7, hematocrit 16.8.  On   September 2nd, hemoglobin 6.2, hematocrit 20.7, MCV 69.9.  WBC 4.9, platelets   311.  Ferritin 7.9, TIBC 531.  Albumin 4.4, potassium 3.3, otherwise normal   CMP.  INR 1.09.    IMPRESSION:  1.  Dementia.  2.  Severe microcytic anemia.  3.  Epigastric pain.  4.  Hyperlipidemia per chart.  5.  Hypothyroidism per chart.  6.  Incomplete database.    RECOMMENDATIONS:  1.  She may have a clear liquid diet today.  2.  I will start colonoscopy prep this afternoon.  3.  She will have upper endoscopy and colonoscopy with moderate sedation   tomorrow.  4.  I agree with proton pump inhibitor until endoscopy completed.  5.  For sedation purposes, this 88-year-old lady would tolerate sedation   better if her hemoglobin was 7, so I would appreciate hospitalist's service   assisting us with transfusion.    Thank you for involving us in the care of the patient.       ____________________________________     MD JOAQUIN ORO / RAEGAN    DD:  09/02/2017 09:56:04  DT:  09/02/2017 10:20:33    D#:  [id number]  Job#:  [id number]

## 2017-09-02 NOTE — ASSESSMENT & PLAN NOTE
- likely slow bleed over time, with ASA and NSAIDs intake.   - pending ulcer biopsy. AVM treated with APC.   - continue PO prilosec BID x 4 weeks then daily thereafter.   - continue to hold antiplatelets, NSAIDs and pharmacologic DVT prophylaxis. Continue to monitor for recurrent bleed. Continue to monitor H/H closely.

## 2017-09-02 NOTE — ASSESSMENT & PLAN NOTE
Found to have hemoglobin 4.4 on presentation. Stool guaiac was negative and there is no clear source of bleeding. CT abdomen shows possible evidence of gastritis. Given the low blood volume and her very stable appearance/vital signs this must have been a chronic slow bleed.  - telemetry  - transfuse 2 U PRBCs  - unable to do anemia work up at this time since she has already received blood products and that will skew iron results  - GI consulted, f/u with recs in the AM

## 2017-09-02 NOTE — ED NOTES
Repeat hgb 4.7 after 1 unit PRBC's, admitting MD Ruiz aware. Per bed control pt will have to be admitted to ICU unless hgb is 6.0 or greater. Pt remains stable at this time, VS stable, no signs of active bleeding. Discussed with admitting MD and bed control, pt will receive 2nd unit PRBC's in ED, re-check hgb/hct and re-assess. Blood bank called for 2nd unit of blood.

## 2017-09-02 NOTE — ASSESSMENT & PLAN NOTE
Patient with upper back pain, chest pain, abdominal pain intermittently for 3 days. She said it mostly happens in the mornings and evenings. It is not associated with any exertion, diaphoresis, or radiation. No significant findings on EKG. Therefore, this seems to be more likely secondary to stress response from low blood volume and oxygen carrying capacity.  - CTA negative for dissection  - trop negative  - Continue to monitor clinically

## 2017-09-02 NOTE — PROGRESS NOTES
0700 Upon rounds, No acute distress. Aaox4. Resp even and nonlabored. Bed in lowest and locked position. Side rails up x2. Call light within reach.     1100 No acute distress. Aaox4. Resp even and nonlabored. Bed in lowest and locked position. Side rails up x2. Call light within reach.     1600 Patient out of bed w/o calling for assistance. Bed alarm in use and active. Reinforced to pt to call for assistance before getting out of bed several times. Will continue to monitor patient.     1800 Educated pt to drink moviprep for procedure tomorrow. No acute distress. Aaox4. Resp even and nonlabored. Bed in lowest and locked position. Side rails up x2. Call light within reach.

## 2017-09-02 NOTE — PROGRESS NOTES
"[facility] Hospitalist Progress Note    Date of Service: 2017    Chief Complaint  88/F with HLD and hypothyroidism, admitted for abd pain, weight loss, malaise/fatigue, and diffuse body pains and aches. Has been taking naproxen. Found to have Hgb 4.4. K 3.3. LFT WNL. Crea 0.76. INR 1.09. Troponin negative. CTA A/P showed mild thickening of pylorus, no other signs of bleeding, no dissection. CXR showed no consolidation. Stool guiac was negative. EKG NSR. GIC consulted. Transfused 3 units pRBC. K repleted. Started on IV protonix.        Interval Problem Update  2017 - no overnight events. Remains hemodynamically stable and afebrile. Stable on RA.  Hgb 6.2 K 3.5.      > Seen and examined. In good spirits. No complaints. Feeling better. No CP, SOB, nausea, vomiting. States she takes ASA and NSAIDs but \"not too much\".     Consultants/Specialty  GIC    Disposition  Monitor on telemetry        ROS     Pertinent positives/negatives as mentioned above.     A complete review of systems was done. All other systems were negative.      Physical Exam  Laboratory/Imaging   Hemodynamics  Temp (24hrs), Av.9 °C (98.5 °F), Min:36.7 °C (98.1 °F), Max:37.3 °C (99.1 °F)   Temperature: 37.1 °C (98.7 °F)  Pulse  Av.3  Min: 67  Max: 100 Heart Rate (Monitored): 87  Blood Pressure : 144/51, NIBP: (!) 173/67      Respiratory      Respiration: 16, Pulse Oximetry: 97 %             Fluids    Intake/Output Summary (Last 24 hours) at 17 1139  Last data filed at 17 0644   Gross per 24 hour   Intake             1014 ml   Output                0 ml   Net             1014 ml       Nutrition  Orders Placed This Encounter   Procedures   • DIET ORDER     Standing Status:   Standing     Number of Occurrences:   1     Order Specific Question:   Diet:     Answer:   Clear Liquid [10]     Physical Exam   Constitutional: She is oriented to person, place, and time. She appears well-developed and well-nourished. No distress.   HENT: "   Head: Normocephalic and atraumatic.   Mouth/Throat: No oropharyngeal exudate.   Sl pale conjuctivae   Eyes: Conjunctivae are normal. Pupils are equal, round, and reactive to light. No scleral icterus.   Neck: Normal range of motion. Neck supple.   Cardiovascular: Normal rate and regular rhythm.  Exam reveals no gallop and no friction rub.    No murmur heard.  Pulmonary/Chest: Effort normal and breath sounds normal. No respiratory distress. She has no wheezes. She has no rales. She exhibits no tenderness.   Abdominal: Soft. Bowel sounds are normal. She exhibits no distension. There is no tenderness. There is no rebound and no guarding.   Musculoskeletal: Normal range of motion. She exhibits no tenderness.   Lymphadenopathy:     She has no cervical adenopathy.   Neurological: She is alert and oriented to person, place, and time. No cranial nerve deficit. Coordination normal.   Skin: Skin is warm and dry. No rash noted. No erythema. No pallor.   Psychiatric: She has a normal mood and affect. Her behavior is normal. Judgment and thought content normal.   Nursing note and vitals reviewed.      Recent Labs      09/01/17   1246  09/01/17   1946  09/02/17   0013  09/02/17   0818   WBC  4.9  5.9  4.9   --    RBC  2.55*  2.54*  2.99*   --    HEMOGLOBIN  4.4*  4.7*  6.2*  9.0*   HEMATOCRIT  16.7*  16.8*  20.7*  29.1*   MCV  65.5*  66.1*  69.9*   --    MCH  17.3*  18.5*  20.7*   --    MCHC  26.3*  28.0*  29.7*   --    RDW  47.2  46.1  54.7*   --    PLATELETCT  463*  380  311   --    MPV  9.0  9.2  8.5*   --      Recent Labs      09/01/17   1246  09/02/17   0818   SODIUM  138  136   POTASSIUM  3.3*  3.5*   CHLORIDE  106  108   CO2  19*  19*   GLUCOSE  91  80   BUN  16  11   CREATININE  0.76  0.62   CALCIUM  9.4  9.3     Recent Labs      09/01/17   1246   APTT  28.7   INR  1.09     Recent Labs      09/01/17   1246   BNPBTYPENAT  187*              Assessment/Plan     Acute on possible GI bleeding- (present on admission)    Assessment & Plan    - likely slow bleed over time. Pt has been taking ASA and NSAIDs as well.   - GI on-board, plan for EGD and colonoscopy tomorrow. CLD for now.   - continue IV protonix BID. Continue to monitor Hgb closely, and transfuse as necessary if <7.   - hold antiplatelets, NSAIDs and pharmacologic DVT prophylaxis.         Anemia associated with acute blood loss from GI bleed- (present on admission)   Assessment & Plan    - Hgb much improved after 3 units pRBC transfusion.   - continue to monitor. Transfuse for Hgb<7.    - check iron panel, folat, B12, and retic count.         Hypokalemia- (present on admission)   Assessment & Plan    - replaced. Start IVF NS+20Meq KCl at 83cc/hr. Check Mg level.         Hyperlipidemia- (present on admission)   Assessment & Plan    - not on meds. Diet controlled. Check lipid profile.         Hypothyroidism- (present on admission)   Assessment & Plan    - check TSH. Continue synthroid.             Reviewed items::  Labs reviewed, Radiology images reviewed and Medications reviewed  Smith catheter::  No Smith  DVT prophylaxis pharmacological::  Contraindicated - Anemia requiring blood transfusion  DVT prophylaxis - mechanical:  SCDs  Ulcer Prophylaxis::  Yes

## 2017-09-03 PROBLEM — F05 SUNDOWNING: Status: ACTIVE | Noted: 2017-09-03

## 2017-09-03 LAB
ANION GAP SERPL CALC-SCNC: 11 MMOL/L (ref 0–11.9)
BUN SERPL-MCNC: 11 MG/DL (ref 8–22)
CALCIUM SERPL-MCNC: 8.7 MG/DL (ref 8.5–10.5)
CHLORIDE SERPL-SCNC: 112 MMOL/L (ref 96–112)
CHOLEST SERPL-MCNC: 119 MG/DL (ref 100–199)
CO2 SERPL-SCNC: 17 MMOL/L (ref 20–33)
CREAT SERPL-MCNC: 0.55 MG/DL (ref 0.5–1.4)
GFR SERPL CREATININE-BSD FRML MDRD: >60 ML/MIN/1.73 M 2
GLUCOSE SERPL-MCNC: 87 MG/DL (ref 65–99)
HCT VFR BLD AUTO: 24.9 % (ref 37–47)
HCT VFR BLD AUTO: 25.3 % (ref 37–47)
HCT VFR BLD AUTO: 25.5 % (ref 37–47)
HDLC SERPL-MCNC: 46 MG/DL
HGB BLD-MCNC: 7.5 G/DL (ref 12–16)
HGB BLD-MCNC: 7.5 G/DL (ref 12–16)
HGB BLD-MCNC: 7.6 G/DL (ref 12–16)
LDLC SERPL CALC-MCNC: 59 MG/DL
POTASSIUM SERPL-SCNC: 3.4 MMOL/L (ref 3.6–5.5)
SODIUM SERPL-SCNC: 140 MMOL/L (ref 135–145)
TRIGL SERPL-MCNC: 72 MG/DL (ref 0–149)

## 2017-09-03 PROCEDURE — 0DB68ZX EXCISION OF STOMACH, VIA NATURAL OR ARTIFICIAL OPENING ENDOSCOPIC, DIAGNOSTIC: ICD-10-PCS | Performed by: INTERNAL MEDICINE

## 2017-09-03 PROCEDURE — 36415 COLL VENOUS BLD VENIPUNCTURE: CPT

## 2017-09-03 PROCEDURE — 770020 HCHG ROOM/CARE - TELE (206)

## 2017-09-03 PROCEDURE — 700111 HCHG RX REV CODE 636 W/ 250 OVERRIDE (IP): Performed by: HOSPITALIST

## 2017-09-03 PROCEDURE — 160002 HCHG RECOVERY MINUTES (STAT): Performed by: INTERNAL MEDICINE

## 2017-09-03 PROCEDURE — 80061 LIPID PANEL: CPT

## 2017-09-03 PROCEDURE — 99233 SBSQ HOSP IP/OBS HIGH 50: CPT | Performed by: INTERNAL MEDICINE

## 2017-09-03 PROCEDURE — 0DJD8ZZ INSPECTION OF LOWER INTESTINAL TRACT, VIA NATURAL OR ARTIFICIAL OPENING ENDOSCOPIC: ICD-10-PCS | Performed by: INTERNAL MEDICINE

## 2017-09-03 PROCEDURE — 88305 TISSUE EXAM BY PATHOLOGIST: CPT

## 2017-09-03 PROCEDURE — 502240 HCHG MISC OR SUPPLY RC 0272: Performed by: INTERNAL MEDICINE

## 2017-09-03 PROCEDURE — A9270 NON-COVERED ITEM OR SERVICE: HCPCS | Performed by: INTERNAL MEDICINE

## 2017-09-03 PROCEDURE — 80048 BASIC METABOLIC PNL TOTAL CA: CPT

## 2017-09-03 PROCEDURE — 700111 HCHG RX REV CODE 636 W/ 250 OVERRIDE (IP)

## 2017-09-03 PROCEDURE — 85018 HEMOGLOBIN: CPT | Mod: 91

## 2017-09-03 PROCEDURE — 700105 HCHG RX REV CODE 258: Performed by: INTERNAL MEDICINE

## 2017-09-03 PROCEDURE — 160035 HCHG PACU - 1ST 60 MINS PHASE I: Performed by: INTERNAL MEDICINE

## 2017-09-03 PROCEDURE — C9113 INJ PANTOPRAZOLE SODIUM, VIA: HCPCS | Performed by: HOSPITALIST

## 2017-09-03 PROCEDURE — 700101 HCHG RX REV CODE 250: Performed by: INTERNAL MEDICINE

## 2017-09-03 PROCEDURE — 500066 HCHG BITE BLOCK, ECT: Performed by: INTERNAL MEDICINE

## 2017-09-03 PROCEDURE — 99153 MOD SED SAME PHYS/QHP EA: CPT | Performed by: INTERNAL MEDICINE

## 2017-09-03 PROCEDURE — 160208 HCHG ENDO MINUTES - EA ADDL 1 MIN LEVEL 4: Performed by: INTERNAL MEDICINE

## 2017-09-03 PROCEDURE — 85014 HEMATOCRIT: CPT

## 2017-09-03 PROCEDURE — 501159 HCHG PROBE, LAP: Performed by: INTERNAL MEDICINE

## 2017-09-03 PROCEDURE — 700102 HCHG RX REV CODE 250 W/ 637 OVERRIDE(OP): Performed by: INTERNAL MEDICINE

## 2017-09-03 PROCEDURE — 160203 HCHG ENDO MINUTES - 1ST 30 MINS LEVEL 4: Performed by: INTERNAL MEDICINE

## 2017-09-03 PROCEDURE — 99152 MOD SED SAME PHYS/QHP 5/>YRS: CPT | Performed by: INTERNAL MEDICINE

## 2017-09-03 PROCEDURE — 160048 HCHG OR STATISTICAL LEVEL 1-5: Performed by: INTERNAL MEDICINE

## 2017-09-03 PROCEDURE — 0W3P8ZZ CONTROL BLEEDING IN GASTROINTESTINAL TRACT, VIA NATURAL OR ARTIFICIAL OPENING ENDOSCOPIC: ICD-10-PCS | Performed by: INTERNAL MEDICINE

## 2017-09-03 PROCEDURE — 88312 SPECIAL STAINS GROUP 1: CPT

## 2017-09-03 RX ORDER — OMEPRAZOLE 20 MG/1
20 CAPSULE, DELAYED RELEASE ORAL 2 TIMES DAILY
Status: DISCONTINUED | OUTPATIENT
Start: 2017-09-03 | End: 2017-09-05 | Stop reason: HOSPADM

## 2017-09-03 RX ORDER — MIDAZOLAM HYDROCHLORIDE 1 MG/ML
.5-2 INJECTION INTRAMUSCULAR; INTRAVENOUS PRN
Status: ACTIVE | OUTPATIENT
Start: 2017-09-03 | End: 2017-09-03

## 2017-09-03 RX ORDER — SODIUM CHLORIDE 9 MG/ML
500 INJECTION, SOLUTION INTRAVENOUS
Status: DISPENSED | OUTPATIENT
Start: 2017-09-03 | End: 2017-09-03

## 2017-09-03 RX ORDER — MIDAZOLAM HYDROCHLORIDE 1 MG/ML
INJECTION INTRAMUSCULAR; INTRAVENOUS
Status: DISCONTINUED | OUTPATIENT
Start: 2017-09-03 | End: 2017-09-03 | Stop reason: HOSPADM

## 2017-09-03 RX ADMIN — POTASSIUM CHLORIDE AND SODIUM CHLORIDE: 900; 150 INJECTION, SOLUTION INTRAVENOUS at 18:18

## 2017-09-03 RX ADMIN — POLYETHYLENE GLYCOL 3350, SODIUM SULFATE, SODIUM CHLORIDE, POTASSIUM CHLORIDE, ASCORBIC ACID, SODIUM ASCORBATE 100 G: KIT at 04:10

## 2017-09-03 RX ADMIN — PANTOPRAZOLE SODIUM 40 MG: 40 INJECTION, POWDER, FOR SOLUTION INTRAVENOUS at 08:54

## 2017-09-03 RX ADMIN — OMEPRAZOLE 20 MG: 20 CAPSULE, DELAYED RELEASE ORAL at 21:28

## 2017-09-03 RX ADMIN — POTASSIUM CHLORIDE AND SODIUM CHLORIDE: 900; 150 INJECTION, SOLUTION INTRAVENOUS at 01:27

## 2017-09-03 ASSESSMENT — PAIN SCALES - GENERAL
PAINLEVEL_OUTOF10: 0

## 2017-09-03 NOTE — DIETARY
"Nutrition Services: Low BMI  88 year old female with admit dx of symptomatic anemia  Past Pertinent Med Hx: hypothyroid, hyperlipidemia    Attempted to visit pt, pt was not in room. Per chart pt PO < 25% 1 meal recorded on a clear liquid diet. Pt just started regular diet. Per H&P \"She has no complaints of changes in her appetite or weight.\"    Ht: 162.6 cm  Wt 9/2: 49.4 kg via bed scale  BMI: 18.68  Diet: Regular  Pertinent Labs: K 3.5  Pertinent Meds: protonix, pericolace  Fluids: 0.9% NaCl with KCl 20 mEq infusion @ 83 ml/hr  Skin: No skin breakdown noted in chart  GI: Last BM 9/3    PLAN/RECOMMEND -   1) Nutrition rep to see patient daily for meal and snack preferences.  2) Encourage PO  3) Weekly weights to monitor fluid and nutrition status  4) Obtain supplement order per RD as needed.    RD following    "

## 2017-09-03 NOTE — PROGRESS NOTES
[facility] Hospitalist Progress Note    Date of Service: 9/3/2017    Chief Complaint  88/F with HLD and hypothyroidism, admitted for abd pain, weight loss, malaise/fatigue, and diffuse body pains and aches. Has been taking naproxen. Found to have Hgb 4.4. K 3.3. LFT WNL. Crea 0.76. INR 1.09. Troponin negative. CTA A/P showed mild thickening of pylorus, no other signs of bleeding, no dissection. CXR showed no consolidation. Stool guiac was negative. EKG NSR. GIC consulted. Transfused 3 units pRBC, with improvement in Hgb. K repleted. Started on IV protonix.        Interval Problem Update  9/3/2017 - uneventful night. VSS. Afebrile. Saturating well on RA.  Hgb dropped to 7.5 this morning, from 9.5. LDL at goal. TEG unimpressive. Ferritin low at 7.9. Iron 290. 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     > Seen and examined. Just arrived from endoscopy, still sedated. Comfortable. Not in pain.      Consultants/Specialty  GIC    Disposition  Monitor on telemetry. PT/OT eval.         ROS     Unable to obtain due to sedation.      Physical Exam  Laboratory/Imaging   Hemodynamics  Temp (24hrs), Av.9 °C (98.5 °F), Min:36.4 °C (97.6 °F), Max:37.4 °C (99.4 °F)   Temperature: 36.4 °C (97.6 °F)  Pulse  Av.9  Min: 67  Max: 100    Blood Pressure : 139/57      Respiratory      Respiration: 14, Pulse Oximetry: 96 %             Fluids    Intake/Output Summary (Last 24 hours) at 17 0709  Last data filed at 17 0000   Gross per 24 hour   Intake              415 ml   Output                0 ml   Net              415 ml       Nutrition  Orders Placed This Encounter   Procedures   • DIET NPO     Standing Status:   Standing     Number of Occurrences:   1     Order Specific Question:   Restrict to:     Answer:   Strict [1]     Physical Exam    Constitutional: She appears well-developed and well-nourished. No distress.   Sedated, arousable   HENT:   Head: Normocephalic and atraumatic.   Mouth/Throat: No oropharyngeal exudate.   Eyes: Conjunctivae are normal. Pupils are equal, round, and reactive to light. No scleral icterus.   Neck: Normal range of motion. Neck supple.   Cardiovascular: Normal rate and regular rhythm.  Exam reveals no gallop and no friction rub.    No murmur heard.  Pulmonary/Chest: Effort normal and breath sounds normal. No respiratory distress. She has no wheezes. She has no rales. She exhibits no tenderness.   Abdominal: Soft. Bowel sounds are normal. She exhibits no distension. There is no tenderness. There is no rebound and no guarding.   Musculoskeletal: She exhibits no edema or tenderness.   Moves all 4 extremities.   Lymphadenopathy:     She has no cervical adenopathy.   Neurological: No cranial nerve deficit.   Sedated   Skin: Skin is warm and dry. No rash noted. No erythema. No pallor.   Psychiatric:   Unable to assess   Nursing note and vitals reviewed.      Recent Labs      09/01/17   1246  09/01/17   1946  09/02/17   0013  09/02/17   0818  09/02/17   1714  09/03/17   0047   WBC  4.9  5.9  4.9   --    --    --    RBC  2.55*  2.54*  2.99*   --    --    --    HEMOGLOBIN  4.4*  4.7*  6.2*  9.0*  9.5*  7.5*   HEMATOCRIT  16.7*  16.8*  20.7*  29.1*  31.3*  24.9*   MCV  65.5*  66.1*  69.9*   --    --    --    MCH  17.3*  18.5*  20.7*   --    --    --    MCHC  26.3*  28.0*  29.7*   --    --    --    RDW  47.2  46.1  54.7*   --    --    --    PLATELETCT  463*  380  311   --    --    --    MPV  9.0  9.2  8.5*   --    --    --      Recent Labs      09/01/17   1246  09/02/17   0818   SODIUM  138  136   POTASSIUM  3.3*  3.5*   CHLORIDE  106  108   CO2  19*  19*   GLUCOSE  91  80   BUN  16  11   CREATININE  0.76  0.62   CALCIUM  9.4  9.3     Recent Labs      09/01/17   1246   APTT  28.7   INR  1.09     Recent Labs      09/01/17   1246    BNPBTYPENAT  187*     Recent Labs      09/03/17   0047   TRIGLYCERIDE  72   HDL  46   LDL  59          Assessment/Plan     Acute on 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   - ulcer biopsied, GI to follow results. AVM treated with APC.   - transition to PO prilosec BID x 4 weeks then daily thereafter.   - continue to hold antiplatelets, NSAIDs and pharmacologic DVT prophylaxis. Continue to monitor for recurrent bleed. Monitor H/H closely.         Sundowning   Assessment & Plan    - apparently, pt does this at home.   - will get PT/OT to evaluate for discharge planning.   - hold off on haldol as QTc prolonged.   - high risk for delirium. Frequent re-orientation, reestablish circadian rhythm, encourage familiar faces/family in room, avoid or minimize narcotics/sedatives.         Anemia associated with acute blood loss from GI bleed- (present on admission)   Assessment & Plan    - GI bleeder treated.   - continue to monitor for Hgb drops, tranfuse for Hgb<7.         Hypokalemia- (present on admission)   Assessment & Plan    - replaced. Continue  IVF NS+20Meq KCl at 83cc/hr. Mg WNL. Repeat BMP.         Hyperlipidemia- (present on admission)   Assessment & Plan    - not on meds. Diet controlled. LDL at goal.         Hypothyroidism- (present on admission)   Assessment & Plan    - pending TSH. Continue synthroid.             Reviewed items::  Labs reviewed, Radiology images reviewed and Medications reviewed  Smith catheter::  No Smith  DVT prophylaxis pharmacological::  Contraindicated - Anemia requiring blood transfusion  DVT prophylaxis - mechanical:  SCDs  Ulcer Prophylaxis::  Yes

## 2017-09-03 NOTE — CARE PLAN
Problem: Safety  Goal: Will remain free from injury    Intervention: Collaborate with Interdisciplinary Team for safe transfer and mobilization techniques  Bed alarm in use, frequent rounds

## 2017-09-03 NOTE — PROGRESS NOTES
Thalia Cali Fall Risk Assessment:     Last Known Fall: No falls  Mobility: Immobilized/requires assist of one person  Medications: No meds  Mental Status/LOC/Awareness: Oriented to person and place  Toileting Needs: Use of assistive device (Bedside commode, bedpan, urinal)  Volume/Electrolyte Status: Use of IV fluids/tube feeds  Communication/Sensory: Visual (Glasses)/hearing deficit  Behavior: Appropriate behavior  Thalia Cali Fall Risk Total: 11  Fall Risk Level: MODERATE RISK    Universal Fall Precautions:  call light/belongings in reach, bed in low position and locked, wheelchairs and assistive devices out of sight, siderails up x 2, use non-slip footwear, adequate lighting, clutter free and spill free environment, educate on level of risk, educate to call for assistance    Fall Risk Level Interventions:    TRIAL (TELE 8, NEURO, MED BRYNN 5) Moderate Fall Risk Interventions  Place yellow fall risk ID band on patient: verified  Provide patient/family education based on risk assessment : completed  Educate patient/family to call staff for assistance when getting out of bed: completed  Place fall precaution signage outside patient door: completed  Utilize bed/chair fall alarm: completed     Patient Specific Interventions:     Medication: review medications with patient and family, assess for medications that can be discontinued or dosage decreased and limit combination of prn medications  Mental Status/LOC/Awareness: reorient patient, reinforce falls education, check on patient hourly, utilize bed/chair fall alarm, reinforce the use of call light, provide activity and consider lap belt  Toileting: provide frquent toileting  Volume/Electrolyte Status: ensure patient remains hydrated, monitor abnormal lab values and ensure IV fluids are appropriate  Communication/Sensory: update plan of care on whiteboard  Behavioral: instruct/reinforce fall program rationale  Mobility: dangle prior to standing, utilize bed/chair  fall alarm, ensure bed is locked and in lowest position and provide appropriate assistive device

## 2017-09-03 NOTE — CARE PLAN
Problem: Communication  Goal: The ability to communicate needs accurately and effectively will improve    Intervention: [place] patient and significant other/support system to call light to alert staff of needs  Patient redirected to unit, staff, and place

## 2017-09-03 NOTE — ASSESSMENT & PLAN NOTE
- await PT/OT for discharge planning.   - continue to hold off on haldol as QTc prolonged.   - high risk for delirium. Frequent re-orientation, reestablish circadian rhythm, encourage familiar faces/family in room, avoid or minimize narcotics/sedatives.

## 2017-09-03 NOTE — PROGRESS NOTES
Received report from day shift RN. Assumed care of patient. Patient is SR on the monitor. Patient is sitting up in bed with no family present at this time. Plan of care discussed with patient but patient is confused at this time. Bed alarm and strip alarm on. Bed locked and in the lowest position with call light within reach.

## 2017-09-03 NOTE — PROGRESS NOTES
0700 Upon rounds, pt attempts to get out of bed. Pt. Redirected x3. No acute distress. Awake, confused and orient to person. Resp even and nonlabored. Bed in lowest and locked position. Side rails up x2. Call light within reach.     0930 Pt assisted to [facility] for scheduled GI procedure. No acute distress.      1045 Pt. Returned from GI procedure. Pt assisted from [facility] to hospital bed. Pt. Asleep; resp even and nonlabored. Sister and neighbor at bedside. Vital signs stable. Bed alarm in use. No acute distress. Bed in lowest and locked position. Side rails up x2. Call light within reach.      1200 No acute distress.Resp even and nonlabored. Bed in lowest and locked position. Side rails up x2. Call light within reach.     1800 Pt. Laying in bed resting quietly. No acute distress. . Resp even and nonlabored. Bed in lowest and locked position. Side rails up x2. Call light within reach.

## 2017-09-03 NOTE — PROCEDURES
DATE OF SERVICE:  09/03/2017    PROCEDURE:  Flexible esophagogastroduodenoscopy with biopsies and argon plasma   coagulation.    PREPROCEDURE DIAGNOSIS:  Severe microcytic anemia.    POSTPROCEDURE DIAGNOSES:  1.  A 1.5-cm nonbleeding, shallow gastric ulcer.  2.  A 2-cm hiatal hernia.  3.  A 4-mm nonbleeding duodenal angioectasia.    Consent was obtained by a 2-physician consent as nursing staff was unable to   reach the patient's sister and the patient has what appears to be dementia.    Endoscopy procedure start time 9:42 and endoscopy procedure end time 10:20.    TOTAL MEDICATIONS:  Fentanyl 75 mcg, Versed 3 mg.    DESCRIPTION OF PROCEDURE:  Patient was turned in the left lateral decubitus   position and monitoring was in place.  The Olympus flexible adult endoscope   was inserted into the esophagus under direct visualization.  It was advanced   slowly to the GE junction.  There were no mucosal abnormalities in the   esophagus.  A small hiatal hernia approximately 2 cm was appreciated.  The   endoscope was advanced into the stomach.  Air was insufflated and initially no   abnormalities noted in the fundus body or antrum.  Retroflexion was performed   and again initially no abnormalities identified.  Retroflexion was taken down   and the endoscope was passed through the pylorus into the first and then   second portions of the duodenum.  Just distal to the major papilla was a 4-mm   nonbleeding angioectasia.  The patient could not provide me any history with.   recent GI bleeding, so I elected to treat the angiectasia with APC.  Settings   were placed on the right colon setting.  There   was no subsequent bleeding.  The endoscope was then slowly withdrawn up   through the duodenum back into the stomach.  The endoscope was retroflexed the   second time and I was then able to see a shallow 1.5-cm ulcer in the fundus.    This was along the greater curvature around 3 o'clock position.  Retroflexion   was taken down.  Of  note, the gastric rugae were almost nonexistent.  Random   biopsies were taken of the antrum, fundus and one at the edge of the ulcer.    These were labeled jar A.  Air was removed and the endoscope was removed.    Patient tolerated the procedure well.  No complications.    COLONOSCOPY:  Patient was in the left lateral decubitus position.  The   pediatric adjustable tip colonoscope was inserted into the rectum under direct   visualization.  It was advanced to the cecum without difficulty.  The   appendiceal orifice was identified.  The preparation of the colon was good.    The colonoscope was slowly withdrawn through the ascending colon across the   hepatic flexure and the transverse colon.  I did not appreciate any mucosal   abnormalities.  The colonoscope was then brought through the splenic flexure,   descending colon down to the sigmoid colon and again no abnormalities.  The   colonoscope was brought to the rectum and retroflexion was performed.  She had   small nonbleeding internal hemorrhoids.  Retroflexion was taken down, air was   removed, and the colonoscope was removed.  Patient tolerated the procedure   well with no complications.       ____________________________________     MD JOAQUIN ORO / RAEGAN    DD:  09/03/2017 10:37:28  DT:  09/03/2017 11:55:05    D#:  [id number]  Job#:  [id number]

## 2017-09-03 NOTE — OR SURGEON
Operative Report    PreOp Diagnosis: severe microcytic anemia    PostOp Diagnosis: 1.5 cm shallow gastric ulcer, 2 cm hiatal hernia, 4mm nonbleeding duodenal angioectasia    Procedure(s):  GASTROSCOPY-ENDO - Wound Class: Clean Contaminated  COLONOSCOPY - ENDO - Wound Class: Clean Contaminated    Surgeon(s):  Randi Sahni M.D.    Anesthesiologist/Type of Anesthesia:  No anesthesia staff entered./Fentanyl 75 mcg + Versed 3 mg IV    Procedure Start Time:  9:42  Procedure End Time: 10:20    Surgical Staff:  Endoscopy Technician: Roberto Lagunas  Sedation/Monitoring Nurse: Macy Min R.N.    Specimens:  A. Gastric biopsies    Estimated Blood Loss: none    Findings:   Esoph:  Normal  Stomach: 2 cm hiatal hernia; 1.5 cm shallow, nonbleeding fundic ulcer, random bx taken including edge of ulcer  Duod: 4mm AVM 2nd portion treated with APC    Colonoscope to cecum, appendiceal orifice noted, good prep  Normal exam except small nonbleeding internal hemorrhoids    Complications: none  Recs:  1.  Regular diet  2.  Omeprazole 20 mg BID x 4 weeks, then once daily  3.  I will follow up gastric biopsies  4.  Suspect patient has been taking an NSAID but with dementia, she cannot recall taking it.  ?safety in the home  5.  Signing off.  Will arrange follow up        9/3/2017 10:25 AM Randi Sahni

## 2017-09-04 LAB
HCT VFR BLD AUTO: 23.4 % (ref 37–47)
HCT VFR BLD AUTO: 29.7 % (ref 37–47)
HGB BLD-MCNC: 6.8 G/DL (ref 12–16)
HGB BLD-MCNC: 8.5 G/DL (ref 12–16)
HGB BLD-MCNC: 9.1 G/DL (ref 12–16)
T4 FREE SERPL-MCNC: 0.65 NG/DL (ref 0.53–1.43)
TSH SERPL DL<=0.005 MIU/L-ACNC: 6.21 UIU/ML (ref 0.3–3.7)

## 2017-09-04 PROCEDURE — 700102 HCHG RX REV CODE 250 W/ 637 OVERRIDE(OP): Performed by: INTERNAL MEDICINE

## 2017-09-04 PROCEDURE — 700102 HCHG RX REV CODE 250 W/ 637 OVERRIDE(OP): Performed by: HOSPITALIST

## 2017-09-04 PROCEDURE — 85018 HEMOGLOBIN: CPT | Mod: 91

## 2017-09-04 PROCEDURE — 97161 PT EVAL LOW COMPLEX 20 MIN: CPT

## 2017-09-04 PROCEDURE — G8979 MOBILITY GOAL STATUS: HCPCS | Mod: CI

## 2017-09-04 PROCEDURE — P9016 RBC LEUKOCYTES REDUCED: HCPCS

## 2017-09-04 PROCEDURE — 700101 HCHG RX REV CODE 250: Performed by: INTERNAL MEDICINE

## 2017-09-04 PROCEDURE — 84439 ASSAY OF FREE THYROXINE: CPT

## 2017-09-04 PROCEDURE — 30233N1 TRANSFUSION OF NONAUTOLOGOUS RED BLOOD CELLS INTO PERIPHERAL VEIN, PERCUTANEOUS APPROACH: ICD-10-PCS | Performed by: HOSPITALIST

## 2017-09-04 PROCEDURE — 99233 SBSQ HOSP IP/OBS HIGH 50: CPT | Performed by: INTERNAL MEDICINE

## 2017-09-04 PROCEDURE — 770020 HCHG ROOM/CARE - TELE (206)

## 2017-09-04 PROCEDURE — G8988 SELF CARE GOAL STATUS: HCPCS | Mod: CI

## 2017-09-04 PROCEDURE — 97165 OT EVAL LOW COMPLEX 30 MIN: CPT

## 2017-09-04 PROCEDURE — 36430 TRANSFUSION BLD/BLD COMPNT: CPT

## 2017-09-04 PROCEDURE — G8989 SELF CARE D/C STATUS: HCPCS | Mod: CI

## 2017-09-04 PROCEDURE — A9270 NON-COVERED ITEM OR SERVICE: HCPCS | Performed by: INTERNAL MEDICINE

## 2017-09-04 PROCEDURE — G8987 SELF CARE CURRENT STATUS: HCPCS | Mod: CI

## 2017-09-04 PROCEDURE — G8978 MOBILITY CURRENT STATUS: HCPCS | Mod: CJ

## 2017-09-04 PROCEDURE — 700105 HCHG RX REV CODE 258

## 2017-09-04 PROCEDURE — 36415 COLL VENOUS BLD VENIPUNCTURE: CPT

## 2017-09-04 PROCEDURE — 85014 HEMATOCRIT: CPT

## 2017-09-04 PROCEDURE — A9270 NON-COVERED ITEM OR SERVICE: HCPCS | Performed by: HOSPITALIST

## 2017-09-04 PROCEDURE — 84443 ASSAY THYROID STIM HORMONE: CPT

## 2017-09-04 PROCEDURE — 86923 COMPATIBILITY TEST ELECTRIC: CPT

## 2017-09-04 RX ORDER — SODIUM CHLORIDE 9 MG/ML
INJECTION, SOLUTION INTRAVENOUS
Status: COMPLETED
Start: 2017-09-04 | End: 2017-09-04

## 2017-09-04 RX ADMIN — STANDARDIZED SENNA CONCENTRATE AND DOCUSATE SODIUM 2 TABLET: 8.6; 5 TABLET, FILM COATED ORAL at 20:05

## 2017-09-04 RX ADMIN — POTASSIUM CHLORIDE AND SODIUM CHLORIDE: 900; 150 INJECTION, SOLUTION INTRAVENOUS at 09:04

## 2017-09-04 RX ADMIN — OMEPRAZOLE 20 MG: 20 CAPSULE, DELAYED RELEASE ORAL at 20:05

## 2017-09-04 RX ADMIN — SODIUM CHLORIDE: 9 INJECTION, SOLUTION INTRAVENOUS at 03:15

## 2017-09-04 RX ADMIN — LABETALOL HYDROCHLORIDE 10 MG: 5 INJECTION INTRAVENOUS at 05:55

## 2017-09-04 RX ADMIN — OMEPRAZOLE 20 MG: 20 CAPSULE, DELAYED RELEASE ORAL at 09:01

## 2017-09-04 ASSESSMENT — PAIN SCALES - GENERAL
PAINLEVEL_OUTOF10: 0

## 2017-09-04 ASSESSMENT — COGNITIVE AND FUNCTIONAL STATUS - GENERAL
WALKING IN HOSPITAL ROOM: A LITTLE
CLIMB 3 TO 5 STEPS WITH RAILING: A LITTLE
MOBILITY SCORE: 20
SUGGESTED CMS G CODE MODIFIER MOBILITY: CJ
MOVING FROM LYING ON BACK TO SITTING ON SIDE OF FLAT BED: A LITTLE
HELP NEEDED FOR BATHING: A LITTLE
STANDING UP FROM CHAIR USING ARMS: A LITTLE
SUGGESTED CMS G CODE MODIFIER DAILY ACTIVITY: CI
DAILY ACTIVITIY SCORE: 23

## 2017-09-04 ASSESSMENT — GAIT ASSESSMENTS
ASSISTIVE DEVICE: FRONT WHEEL WALKER
DISTANCE (FEET): 150
DEVIATION: ATAXIC;DECREASED BASE OF SUPPORT
GAIT LEVEL OF ASSIST: CONTACT GUARD ASSIST

## 2017-09-04 ASSESSMENT — ACTIVITIES OF DAILY LIVING (ADL): TOILETING: INDEPENDENT

## 2017-09-04 NOTE — PROGRESS NOTES
"Report received. Assessment complete. Pt A&O x 2 to self and place. Pt states she is here regarding \"somathing about my blood.\" Pt reoriented. No C/O pain. POC discussed. Call light & belongings in reach. Bed locked and low. Alarm on & fall precautions in place.   "

## 2017-09-04 NOTE — PROGRESS NOTES
Thalia Cali Fall Risk Assessment:     Last Known Fall: No falls  Mobility: Dizziness/generalized weakness  Medications: No meds  Mental Status/LOC/Awareness: Oriented to person and place  Toileting Needs: Use of assistive device (Bedside commode, bedpan, urinal)  Volume/Electrolyte Status: Use of IV fluids/tube feeds  Communication/Sensory: Visual (Glasses)/hearing deficit  Behavior: Impulsiveness  Thalia Cali Fall Risk Total: 14  Fall Risk Level: MODERATE RISK    Universal Fall Precautions:  call light/belongings in reach, bed in low position and locked, use non-slip footwear, adequate lighting, clutter free and spill free environment, siderails up x 2, wheelchairs and assistive devices out of sight, educate on level of risk, educate to call for assistance    Fall Risk Level Interventions:    TRIAL ([place] 8, NEURO, MED BRYNN 5) Moderate Fall Risk Interventions  Place yellow fall risk ID band on patient: verified  Provide patient/family education based on risk assessment : completed  Educate patient/family to call staff for assistance when getting out of bed: completed  Place fall precaution signage outside patient door: verified  Utilize bed/chair fall alarm: completedTRIAL ([place] 8, NEURO, [employer] BRYNN 5) High Fall Risk Interventions  Place yellow fall risk ID band on patient: verified  Provide patient/family education based on risk assessment: completed  Educate patient/family to call staff for assistance when getting out of bed: completed  Place fall precaution signage outside patient door: completed  Place patient in room close to nursing station: completed  Utilize bed/chair fall alarm: completed  Notify charge of high risk for huddle: completed    Patient Specific Interventions:     Medication: review medications with patient and family  Mental Status/LOC/Awareness: reorient patient, reinforce falls education, check on patient hourly, utilize bed/chair fall alarm, reinforce the use of call light, provide  activity and consider lap belt  Toileting: provide frquent toileting and instruct patient/family on the need to call for assistance when toileting  Volume/Electrolyte Status: ensure patient remains hydrated, monitor abnormal lab values and ensure IV fluids are appropriate  Communication/Sensory: update plan of care on whiteboard  Behavioral: engage patient in daily activities  Mobility: schedule physical activity throughout the day, utilize bed/chair fall alarm, ensure bed is locked and in lowest position and collaborate with doctor for possible PT/OT consult

## 2017-09-04 NOTE — THERAPY
"Physical Therapy Evaluation completed.   Bed Mobility:  Supine to Sit: Stand by Assist  Transfers: Sit to Stand: Contact Guard Assist  Gait: Level Of Assist: Contact Guard Assist with Front-Wheel Walker       Plan of Care: Will benefit from Physical Therapy 3 times per week  Discharge Recommendations: Equipment: Front-Wheel Walker. Post-acute therapy Discharge to home with outpatient or home health for additional skilled therapy services.    See \"Rehab Therapy-Acute\" Patient Summary Report for complete documentation.     "

## 2017-09-04 NOTE — THERAPY
"Occupational Therapy Evaluation completed.   Functional Status: Supv supine > , EOB, supv/SBA transfers without AD, supv LB dressing, supv toileting  Plan of Care: Patient with no further skilled OT needs in the acute care setting at this time  Discharge Recommendations:  Equipment: Front-Wheel Walker. Post-acute therapy Discharge to home with outpatient or home health for additional skilled therapy services.    See \"Rehab Therapy-Acute\" Patient Summary Report for complete documentation.    88 y.o. female with h/o dementia admitted for chest, back and abdmoninal pain. Dx with anemia. Pt presents with disorientation, impaired standing balance, but able to complete basic ADL and transfers with no more than SBA. Pt appears at or near baseline from OT standpoint. Acute PT to follow for gait/balance. No further acute OT needs, but pt requires 24/7 supv for safety. Recommend HH assessment to assess bathroom safety, I-ADL as appropriate.     "

## 2017-09-04 NOTE — PROGRESS NOTES
Monitor Summary  SR with prolonged ID and QT interval, no ectopy. Drop in HR down to 46 unsustained  Rate 60-76  .24/.08/.36

## 2017-09-04 NOTE — PROGRESS NOTES
[facility] Hospitalist Progress Note    Date of Service: 2017    Chief Complaint  88/F with HLD and hypothyroidism, admitted for abd pain, weight loss, malaise/fatigue, and diffuse body pains and aches. Has been taking naproxen. Found to have Hgb 4.4. K 3.3. LFT WNL. Crea 0.76. INR 1.09. Troponin negative. CTA A/P showed mild thickening of pylorus, no other signs of bleeding, no dissection. CXR showed no consolidation. Stool guiac was negative. EKG NSR. GIC consulted. Transfused 3 units pRBC, with improvement in Hgb. K repleted. Started on IV protonix. LDL at goal. TEG unimpressive. Ferritin low at 7.9. Iron 290. 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 Changed to PO prilosec.       Interval Problem Update  2017 - no overnight events. Remains hemodynamically stable and afebrile. Stable on RA. Hgb 6.8. Transfusing 1 unit pRBC. Pending PT/OT eval.     > Seen and examined. In good spirits. No CP, SOB, nausea, vomiting, abd pain, diarrhea. AOx3, conversant.         Consultants/Specialty  GIC    Disposition  Monitor on telemetry. Await PT/OT eval.         ROS     Unable to obtain due to sedation.      Physical Exam  Laboratory/Imaging   Hemodynamics  Temp (24hrs), Av.6 °C (97.9 °F), Min:36.2 °C (97.2 °F), Max:37 °C (98.6 °F)   Temperature: 37 °C (98.6 °F)  Pulse  Av.8  Min: 61  Max: 100 Heart Rate (Monitored): 67  Blood Pressure : (!) 186/59, NIBP: 141/59      Respiratory      Respiration: 15, Pulse Oximetry: 96 %             Fluids    Intake/Output Summary (Last 24 hours) at 17 0709  Last data filed at 17 0549   Gross per 24 hour   Intake          1151.25 ml   Output                0 ml   Net          1151.25 ml       Nutrition  Orders Placed This Encounter   Procedures   • DIET ORDER     Standing Status:   Standing     Number  of Occurrences:   1     Order Specific Question:   Diet:     Answer:   Regular [1]     Physical Exam   Constitutional: She is oriented to person, place, and time. She appears well-developed and well-nourished. No distress.   HENT:   Head: Normocephalic and atraumatic.   Mouth/Throat: No oropharyngeal exudate.   Eyes: Conjunctivae are normal. Pupils are equal, round, and reactive to light. No scleral icterus.   Neck: Normal range of motion. Neck supple.   Cardiovascular: Normal rate and regular rhythm.  Exam reveals no gallop and no friction rub.    No murmur heard.  Pulmonary/Chest: Effort normal and breath sounds normal. No respiratory distress. She has no wheezes. She has no rales. She exhibits no tenderness.   Abdominal: Soft. Bowel sounds are normal. She exhibits no distension. There is no tenderness. There is no rebound and no guarding.   Musculoskeletal: Normal range of motion. She exhibits no edema or tenderness.   Lymphadenopathy:     She has no cervical adenopathy.   Neurological: She is alert and oriented to person, place, and time. No cranial nerve deficit.   Skin: Skin is warm and dry. No rash noted. No erythema. No pallor.   Psychiatric: She has a normal mood and affect. Her behavior is normal. Judgment and thought content normal.   Nursing note and vitals reviewed.      Recent Labs      09/01/17   1246  09/01/17   1946  09/02/17   0013   09/03/17   0904  09/03/17   1710  09/04/17   0053   WBC  4.9  5.9  4.9   --    --    --    --    RBC  2.55*  2.54*  2.99*   --    --    --    --    HEMOGLOBIN  4.4*  4.7*  6.2*   < >  7.6*  7.5*  6.8*   HEMATOCRIT  16.7*  16.8*  20.7*   < >  25.3*  25.5*  23.4*   MCV  65.5*  66.1*  69.9*   --    --    --    --    MCH  17.3*  18.5*  20.7*   --    --    --    --    MCHC  26.3*  28.0*  29.7*   --    --    --    --    RDW  47.2  46.1  54.7*   --    --    --    --    PLATELETCT  463*  380  311   --    --    --    --    MPV  9.0  9.2  8.5*   --    --    --    --     < > =  values in this interval not displayed.     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   - pending ulcer biopsy. AVM treated with APC.   - continue PO prilosec BID x 4 weeks then daily thereafter.   - continue to hold antiplatelets, NSAIDs and pharmacologic DVT prophylaxis. Continue to monitor for recurrent bleed. Continue to monitor H/H closely.         Sundowning   Assessment & Plan    - await PT/OT for discharge planning.   - continue to hold off on haldol as QTc prolonged.   - high risk for delirium. Frequent re-orientation, reestablish circadian rhythm, encourage familiar faces/family in room, avoid or minimize narcotics/sedatives.         Anemia associated with acute blood loss from GI bleed- (present on admission)   Assessment & Plan    - GI bleeder treated.   - Hgb still dropped, s/p 1 unit pRBC today. Will continue to monitor. H&H q8 H, tranfuse for Hgb<7.         Hypokalemia- (present on admission)   Assessment & Plan    - Continue  IVF NS+20Meq KCl at 83cc/hr. BMP in AM.        Hyperlipidemia- (present on admission)   Assessment & Plan    - not on meds. Diet controlled. LDL at goal.         Hypothyroidism- (present on admission)   Assessment & Plan    - checking TSH. Continue synthroid.             Reviewed items::  Labs reviewed, Radiology images reviewed and Medications reviewed  Sarah  catheter::  No Smith  DVT prophylaxis pharmacological::  Contraindicated - Anemia requiring blood transfusion  DVT prophylaxis - mechanical:  SCDs  Ulcer Prophylaxis::  Yes

## 2017-09-04 NOTE — PROGRESS NOTES
Received report from day shift RN. Assumed care of patient. Patient is SR on the monitor. Patient is sleeping in bed at this time with no family present at bedside. Bed locked and in the lowest position with call light within reach.

## 2017-09-04 NOTE — CARE PLAN
Problem: Knowledge Deficit  Goal: Knowledge of disease process/condition, treatment plan, diagnostic tests, and medications will improve    Intervention: Explain information regarding disease process/condition, treatment plan, diagnostic tests, and medications and document in education  POC discussed with pt. Pt confused and needs reminders. Family at bedside and also updated on POC and plan for DC. All questions answered at this time.

## 2017-09-04 NOTE — CARE PLAN
Problem: Safety  Goal: Will remain free from injury  Outcome: PROGRESSING AS EXPECTED  Discussed with patient the importance of calling for assistance prior to getting out of bed in order to help prevent falls. Patient accepting of the information but reinforcement will be needed. All questions answered at this time.

## 2017-09-04 NOTE — PROGRESS NOTES
Thalia Cali Fall Risk Assessment:     Last Known Fall: No falls  Mobility: Dizziness/generalized weakness  Medications: No meds  Mental Status/LOC/Awareness: Lethargic/oriented to person only  Toileting Needs: Use of assistive device (Bedside commode, bedpan, urinal)  Volume/Electrolyte Status: Use of IV fluids/tube feeds  Communication/Sensory: Visual (Glasses)/hearing deficit  Behavior: Impulsiveness  Thalia Cali Fall Risk Total: 15  Fall Risk Level: HIGH RISK    Universal Fall Precautions:  call light/belongings in reach, wheelchairs and assistive devices out of sight, bed in low position and locked, siderails up x 2, use non-slip footwear, adequate lighting, clutter free and spill free environment, educate on level of risk, educate to call for assistance    Fall Risk Level Interventions:    TRIAL ([employer] 8, NEURO, MED BRYNN 5) Moderate Fall Risk Interventions  Place yellow fall risk ID band on patient: completed  Provide patient/family education based on risk assessment : completed  Educate patient/family to call staff for assistance when getting out of bed: completed  Place fall precaution signage outside patient door: completed  Utilize bed/chair fall alarm: completedTRIAL ([employer] 8, NEURO, [employer] BRYNN 5) High Fall Risk Interventions  Place yellow fall risk ID band on patient: verified  Provide patient/family education based on risk assessment: completed  Educate patient/family to call staff for assistance when getting out of bed: completed  Place fall precaution signage outside patient door: completed  Place patient in room close to nursing station: completed  Utilize bed/chair fall alarm: completed  Notify charge of high risk for huddle: completed    Patient Specific Interventions:     Medication: review medications with patient and family, assess for medications that can be discontinued or dosage decreased and limit combination of prn medications  Mental Status/LOC/Awareness: reorient patient, reinforce falls  education, check on patient hourly, utilize bed/chair fall alarm, reinforce the use of call light and provide activity  Toileting: provide frquent toileting  Volume/Electrolyte Status: ensure patient remains hydrated, monitor abnormal lab values and ensure IV fluids are appropriate  Communication/Sensory: update plan of care on whiteboard  Behavioral: administer medication as ordered  Mobility: dangle prior to standing, utilize bed/chair fall alarm, ensure bed is locked and in lowest position, provide appropriate assistive device and instruct patient to exit bed on their strongest side

## 2017-09-05 VITALS
OXYGEN SATURATION: 92 % | BODY MASS INDEX: 18.44 KG/M2 | SYSTOLIC BLOOD PRESSURE: 173 MMHG | RESPIRATION RATE: 16 BRPM | DIASTOLIC BLOOD PRESSURE: 84 MMHG | HEART RATE: 68 BPM | TEMPERATURE: 98.2 F | WEIGHT: 108.03 LBS | HEIGHT: 64 IN

## 2017-09-05 PROBLEM — E87.6 HYPOKALEMIA: Status: RESOLVED | Noted: 2017-09-01 | Resolved: 2017-09-05

## 2017-09-05 LAB
ANION GAP SERPL CALC-SCNC: 6 MMOL/L (ref 0–11.9)
BUN SERPL-MCNC: 11 MG/DL (ref 8–22)
CALCIUM SERPL-MCNC: 9.1 MG/DL (ref 8.5–10.5)
CHLORIDE SERPL-SCNC: 108 MMOL/L (ref 96–112)
CO2 SERPL-SCNC: 22 MMOL/L (ref 20–33)
CREAT SERPL-MCNC: 0.62 MG/DL (ref 0.5–1.4)
GFR SERPL CREATININE-BSD FRML MDRD: >60 ML/MIN/1.73 M 2
GLUCOSE SERPL-MCNC: 105 MG/DL (ref 65–99)
HCT VFR BLD AUTO: 30.9 % (ref 37–47)
HCT VFR BLD AUTO: 33.9 % (ref 37–47)
HGB BLD-MCNC: 10.4 G/DL (ref 12–16)
HGB BLD-MCNC: 9.1 G/DL (ref 12–16)
POTASSIUM SERPL-SCNC: 3.4 MMOL/L (ref 3.6–5.5)
SODIUM SERPL-SCNC: 136 MMOL/L (ref 135–145)

## 2017-09-05 PROCEDURE — 36415 COLL VENOUS BLD VENIPUNCTURE: CPT

## 2017-09-05 PROCEDURE — 85014 HEMATOCRIT: CPT | Mod: 91

## 2017-09-05 PROCEDURE — 85018 HEMOGLOBIN: CPT

## 2017-09-05 PROCEDURE — 700102 HCHG RX REV CODE 250 W/ 637 OVERRIDE(OP): Performed by: INTERNAL MEDICINE

## 2017-09-05 PROCEDURE — 99239 HOSP IP/OBS DSCHRG MGMT >30: CPT | Performed by: HOSPITALIST

## 2017-09-05 PROCEDURE — A9270 NON-COVERED ITEM OR SERVICE: HCPCS | Performed by: HOSPITALIST

## 2017-09-05 PROCEDURE — A9270 NON-COVERED ITEM OR SERVICE: HCPCS | Performed by: INTERNAL MEDICINE

## 2017-09-05 PROCEDURE — 80048 BASIC METABOLIC PNL TOTAL CA: CPT

## 2017-09-05 PROCEDURE — 700101 HCHG RX REV CODE 250: Performed by: INTERNAL MEDICINE

## 2017-09-05 PROCEDURE — 700102 HCHG RX REV CODE 250 W/ 637 OVERRIDE(OP): Performed by: HOSPITALIST

## 2017-09-05 RX ORDER — OMEPRAZOLE 20 MG/1
20 CAPSULE, DELAYED RELEASE ORAL DAILY
Qty: 30 CAP | Refills: 0 | Status: SHIPPED | OUTPATIENT
Start: 2017-09-05 | End: 2017-09-11

## 2017-09-05 RX ORDER — OMEPRAZOLE 20 MG/1
20 CAPSULE, DELAYED RELEASE ORAL 2 TIMES DAILY
Qty: 56 CAP | Refills: 0 | Status: SHIPPED | OUTPATIENT
Start: 2017-09-05 | End: 2017-10-03

## 2017-09-05 RX ADMIN — STANDARDIZED SENNA CONCENTRATE AND DOCUSATE SODIUM 2 TABLET: 8.6; 5 TABLET, FILM COATED ORAL at 08:10

## 2017-09-05 RX ADMIN — OMEPRAZOLE 20 MG: 20 CAPSULE, DELAYED RELEASE ORAL at 08:10

## 2017-09-05 RX ADMIN — POTASSIUM CHLORIDE AND SODIUM CHLORIDE: 900; 150 INJECTION, SOLUTION INTRAVENOUS at 03:48

## 2017-09-05 ASSESSMENT — PAIN SCALES - GENERAL
PAINLEVEL_OUTOF10: 0

## 2017-09-05 NOTE — PROGRESS NOTES
Thalia Cali Fall Risk Assessment:     Last Known Fall: No falls  Mobility: Immobilized/requires assist of one person  Medications: No meds  Mental Status/LOC/Awareness: Oriented to person and place  Toileting Needs: Use of assistive device (Bedside commode, bedpan, urinal)  Volume/Electrolyte Status: Use of IV fluids/tube feeds  Communication/Sensory: No deficits  Behavior: Impulsiveness  Thalia Cali Fall Risk Total: 14  Fall Risk Level: MODERATE RISK    Universal Fall Precautions:  call light/belongings in reach, bed in low position and locked, wheelchairs and assistive devices out of sight, siderails up x 2, use non-slip footwear, adequate lighting, clutter free and spill free environment, educate on level of risk, educate to call for assistance    Fall Risk Level Interventions:    TRIAL ([email or web address] 8, NEURO, MED BRYNN 5) Moderate Fall Risk Interventions  Place yellow fall risk ID band on patient: verified  Provide patient/family education based on risk assessment : completed  Educate patient/family to call staff for assistance when getting out of bed: completed  Place fall precaution signage outside patient door: completed  Utilize bed/chair fall alarm: completedTRIAL (TELE 8, NEURO, [employer] BRYNN 5) High Fall Risk Interventions  Place yellow fall risk ID band on patient: verified  Provide patient/family education based on risk assessment: completed  Educate patient/family to call staff for assistance when getting out of bed: completed  Place fall precaution signage outside patient door: completed  Place patient in room close to nursing station: completed  Utilize bed/chair fall alarm: completed  Notify charge of high risk for huddle: completed    Patient Specific Interventions:     Medication: review medications with patient and family, assess for medications that can be discontinued or dosage decreased and limit combination of prn medications  Mental Status/LOC/Awareness: reorient patient, reinforce falls education,  check on patient hourly, utilize bed/chair fall alarm, reinforce the use of call light, provide activity and consider lap belt  Toileting: consider obtaining elevated toilet seat or bedside commode (BSC), provide frquent toileting and do not leave patient unattended in bathroom/refer to toileting scripting  Volume/Electrolyte Status: ensure patient remains hydrated, monitor abnormal lab values and ensure IV fluids are appropriate  Communication/Sensory: update plan of care on whiteboard  Behavioral: instruct/reinforce fall program rationale and consider lap belts  Mobility: dangle prior to standing, utilize bed/chair fall alarm, ensure bed is locked and in lowest position, provide appropriate assistive device and instruct patient to exit bed on their strongest side

## 2017-09-05 NOTE — DISCHARGE PLANNING
Care Transition Team Assessment    IHD met with pt and family at bedside. Pt currently lives at home with her sister, who will be providing transportation upon D/C. Per sister, pt does not use DME, home O2, or home health services at this time.     Information Source  Orientation : Disoriented to Event  Information Given By: Other (Comments) (Sister)  Informant's Name: Ellie Chao         Elopement Risk  Legal Hold: No  Ambulatory or Self Mobile in Wheelchair: Yes  Disoriented: Time-At Risk for Elopement  Psychiatric Symptoms: Delusions-at Risk for Elopement  History of Wandering: No  Elopement this Admit: No  Vocalizing Wanting to Leave: No  Displays Behaviors, Body Language Wanting to Leave: No-Not at Risk for Elopement  Elopement Risk: Not at Risk for Elopement  Wanderguard On: Unavailable  Personal Belongings: Hospital Clothing Only  Environmental Precautions: Sharp or Dangerous Items Removed  Picture of Patient on Inside Chart Front Cover: No (See Comments)  Purple Armband on Patient: No (See Comments)    Interdisciplinary Discharge Planning  Does Admitting Nurse Feel This Could be a Complex Discharge?: No  Primary Care Physician: none  Lives with - Patient's Self Care Capacity: Other (Comments)  Patient or legal guardian wants to designate a caregiver (see row info): No  Support Systems: Family Member(s)  Housing / Facility: 1 Story House  Do You Take your Prescribed Medications Regularly:  (does not take any medication)  Able to Return to Previous ADL's: Yes  Mobility Issues: No  Prior Services: None  Patient Expects to be Discharged to:: home  Assistance Needed: No  Durable Medical Equipment: Not Applicable    Discharge Preparedness  What is your plan after discharge?: Home with help  What are your discharge supports?: Sibling, Other (comment) (Friends/Neighbors)  Prior Functional Level: Ambulatory, Drives Self, Independent with Activities of Daily Living, Independent with Medication  Management  Difficulity with ADLs: None  Difficulity with IADLs: None    Functional Assesment  Prior Functional Level: Ambulatory, Drives Self, Independent with Activities of Daily Living, Independent with Medication Management    Finances  Financial Barriers to Discharge: No  Prescription Coverage: Yes ([employer]az[employer])    Vision / Hearing Impairment  Vision Impairment : No  Hearing Impairment : No    Values / Beliefs / Concerns  Values / Beliefs Concerns : No         Domestic Abuse  Have you ever been the victim of abuse or violence?: No  Physical Abuse or Sexual Abuse: No  Verbal Abuse or Emotional Abuse: No  Possible Abuse Reported to:: Not Applicable    Psychological Assessment  History of Substance Abuse: None  History of Psychiatric Problems: No  Non-compliant with Treatment: No    Discharge Risks or Barriers  Discharge risks or barriers?: No    Anticipated Discharge Information  Anticipated discharge disposition: Home  Discharge Address: [address] [place] [address]  Discharge Contact Phone Number: [phone]

## 2017-09-05 NOTE — PROGRESS NOTES
Bed alarm on, strip alarm on, lap belt on, 3 side rails up, Pt re-oriented, she is very concerned about her sister and will not sit still, CNA aware of unsteady gait

## 2017-09-05 NOTE — PROGRESS NOTES
Received report from day shift RN. Assumed care of patient. Patient is SR on the monitor. Patient is A&O to self, event, and place. Patient is laying down in bed with no family present at this time. Patient declines any needs at this time. Plan of care discussed with patient. Patient needed reassurance that her sister knows that she is here. Bed alarm and strip alarm on. Bed locked and in the lowest position with call light within reach. Patient educated to call prior to getting out of bed in order to help prevent any falls.

## 2017-09-05 NOTE — DISCHARGE INSTRUCTIONS
Discharge Instructions    Discharged to home by car with relative. Discharged via walking, hospital escort: Refused.  Special equipment needed: Not Applicable    Be sure to schedule a follow-up appointment with your primary care doctor or any specialists as instructed.     Discharge Plan:   Diet Plan: Discussed  Activity Level: Discussed  Confirmed Follow up Appointment: Patient to Call and Schedule Appointment  Confirmed Symptoms Management: Discussed  Medication Reconciliation Updated: Yes  Influenza Vaccine Indication: Patient Refuses    I understand that a diet low in cholesterol, fat, and sodium is recommended for good health. Unless I have been given specific instructions below for another diet, I accept this instruction as my diet prescription.   Other diet: regular    Special Instructions: None    · Is patient discharged on Warfarin / Coumadin?   No     · Is patient Post Blood Transfusion?  No    Depression / Suicide Risk    As you are discharged from this [facility] facility, it is important to learn how to keep safe from harming yourself.    Recognize the warning signs:  · Abrupt changes in personality, positive or negative- including increase in energy   · Giving away possessions  · Change in eating patterns- significant weight changes-  positive or negative  · Change in sleeping patterns- unable to sleep or sleeping all the time   · Unwillingness or inability to communicate  · Depression  · Unusual sadness, discouragement and loneliness  · Talk of wanting to die  · Neglect of personal appearance   · Rebelliousness- reckless behavior  · Withdrawal from people/activities they love  · Confusion- inability to concentrate     If you or a loved one observes any of these behaviors or has concerns about self-harm, here's what you can do:  · Talk about it- your feelings and reasons for harming yourself  · Remove any means that you might use to hurt yourself (examples: pills, rope, extension cords,  firearm)  · Get professional help from the community (Mental Health, Substance Abuse, psychological counseling)  · Do not be alone:Call your Safe Contact- someone whom you trust who will be there for you.  · Call your local CRISIS HOTLINE 929-2421 or 256-697-3549  · Call your local Children's Mobile Crisis Response Team [place] (123) 784-5994 or www.Stepcase  · Call the toll free National Suicide Prevention Hotlines   · National Suicide Prevention Lifeline 876-871-AVEE (1664)  · National Hope Line Network 800-SUICIDE (065-9360)

## 2017-09-05 NOTE — PROGRESS NOTES
IV removed, tele box removed and returned, family in room, spoke with doctor, family helped pt get dressed, will go over medication changes with family and discharge paperwork

## 2017-09-05 NOTE — CARE PLAN
Problem: Safety  Goal: Will remain free from injury  Outcome: PROGRESSING SLOWER THAN EXPECTED  Discussed with patient the importance of calling for assistance prior to getting out of bed in order to help prevent falls. Patient accepting of the information but reinforcement will be needed. All questions answered at this time.

## 2017-09-06 NOTE — DISCHARGE SUMMARY
"CHIEF COMPLAINT ON ADMISSION  Chief Complaint   Patient presents with   • Abdominal Pain   • Weight Loss   • Body Aches   • Back Pain       CODE STATUS  Prior    HPI & HOSPITAL COURSE  88 y.o. female who presented 9/1/2017 with chest, back and head pain who was found to have hemoglobin of 4.4. She states that the pain has been progressively worsening for the last 3 days, she is unable to describe the pain and just says it's \"just pain\". During this 3 day. She also endorses malaise and decreased energy. She said the only thing that made the pain better was naproxen and moving around. She is never extends anything like this before. No recent trauma, recent illnesses, changes in bowel movement or urination.  She was admitted to the telemetry unit. She is found to have a hemoglobin that dropped to 7.5 from 9.5. For this gastroenterology was consulted and the patient was taken for an upper endoscopy. 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 The ulcers were attribute it to the patient accidentally taking more Aleve than she should have. It appears that she has dementia and some confusion at times. And it is completely feasible that she may have taken more medications. Nonetheless she was put on a PPI twice a day. Her H&H were monitored closely and remained stable. There is no other events on telemetry. She did have sundowning and some confusion but this is likely being in a new environment. I had a discussion with the patient's sisters and with the patient and advised against NSAID use. He needs follow-up with her PCP and gastroenterology upon discharge. Continue PPI twice a day for 30 days followed by PPI daily afterwards.    Therefore, she is discharged in good and stable condition with close outpatient follow-up.    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  BELLA Pino  [address] [facility]   [facility] [id number] [phone]    Schedule an appointment as soon as possible for a visit in 1 week  Follow up appointment    Randi Sahni M.D.  [address] [facility]  [address]  [facility] [address]  [phone]    Schedule an appointment as soon as possible for a visit in 2 weeks  Follow up appointment      MEDICATIONS ON DISCHARGE   Lamar Dangelo   Home Medication Instructions DANII:[id number]    Printed on:09/06/17 1005   Medication Information                      omeprazole (PRILOSEC) 20 MG delayed-release capsule  Take 1 Cap by mouth 2 Times a Day for 28 days.             omeprazole (PRILOSEC) 20 MG delayed-release capsule  Take 1 Cap by mouth every day.                 DIET  No orders of the defined types were placed in this encounter.      ACTIVITY  As tolerated.  Weight bearing as tolerated      CONSULTATIONS  Gastroenterology    PROCEDURES  Endoscopy    LABORATORY  Lab Results   Component Value Date/Time    SODIUM 136 09/05/2017 12:27 AM    POTASSIUM 3.4 (L) 09/05/2017 12:27 AM    CHLORIDE 108 09/05/2017 12:27 AM    CO2 22 09/05/2017 12:27 AM    GLUCOSE 105 (H) 09/05/2017 12:27 AM    BUN 11 09/05/2017 12:27 AM    CREATININE 0.62 09/05/2017 12:27 AM        Lab Results   Component Value Date/Time    WBC 4.9 09/02/2017 12:13 AM    HEMOGLOBIN 10.4 (L) 09/05/2017 09:10 AM    HEMATOCRIT 33.9 (L) 09/05/2017 09:10 AM    PLATELETCT 311 09/02/2017 12:13 AM        Total time of the discharge process exceeds 45 minutes

## 2017-09-07 ENCOUNTER — RESOLUTE PROFESSIONAL BILLING HOSPITAL PROF FEE (OUTPATIENT)
Dept: HOSPITALIST | Facility: MEDICAL CENTER | Age: 82
End: 2017-09-07
Payer: MEDICARE

## 2017-09-10 ENCOUNTER — TELEPHONE (OUTPATIENT)
Dept: MEDICAL GROUP | Facility: MEDICAL CENTER | Age: 82
End: 2017-09-10

## 2017-09-10 NOTE — TELEPHONE ENCOUNTER
"After hours phone call  Call on 9/10/2017 at 11:18 AM from Blas who is the neighbor and friend of Lamar Dangelo [phone] (home)  who reports PCP: BELLA Pino.    Patient's neighbor reports: patient was recently released from the hospital for anemia. Since getting out, the patient is not sleeping and behaving strangely. She is emaciated and eating very little. The patient is not agitated, angry or difficult to control but the patient's neighbor and sister are having difficulty taking care of her because she is \"all over the place\".     Plan: I recommended that the patient's neighbor and sister take her to the emergency department to rule out a medical cause and determine a safe disposition. If the patient cannot be cared for at home she may need to be admitted to the hospital or a nursing home. Blas agrees with this plan and will take the patient to the hospital.     Ismael Webber M.D.      .  "

## 2017-09-11 ENCOUNTER — HOSPITAL ENCOUNTER (OUTPATIENT)
Facility: MEDICAL CENTER | Age: 82
End: 2017-09-15
Attending: EMERGENCY MEDICINE | Admitting: HOSPITALIST
Payer: MEDICARE

## 2017-09-11 ENCOUNTER — APPOINTMENT (OUTPATIENT)
Dept: RADIOLOGY | Facility: MEDICAL CENTER | Age: 82
End: 2017-09-11
Attending: EMERGENCY MEDICINE
Payer: MEDICARE

## 2017-09-11 ENCOUNTER — RESOLUTE PROFESSIONAL BILLING HOSPITAL PROF FEE (OUTPATIENT)
Dept: HOSPITALIST | Facility: MEDICAL CENTER | Age: 82
End: 2017-09-11
Payer: MEDICARE

## 2017-09-11 DIAGNOSIS — E87.6 HYPOKALEMIA: ICD-10-CM

## 2017-09-11 DIAGNOSIS — F05 SUNDOWNING: ICD-10-CM

## 2017-09-11 DIAGNOSIS — F03.90 DEMENTIA WITHOUT BEHAVIORAL DISTURBANCE, UNSPECIFIED DEMENTIA TYPE: ICD-10-CM

## 2017-09-11 DIAGNOSIS — R41.0 CONFUSION: ICD-10-CM

## 2017-09-11 LAB
ALBUMIN SERPL BCP-MCNC: 4.2 G/DL (ref 3.2–4.9)
ALBUMIN/GLOB SERPL: 1.7 G/DL
ALP SERPL-CCNC: 53 U/L (ref 30–99)
ALT SERPL-CCNC: 13 U/L (ref 2–50)
ANION GAP SERPL CALC-SCNC: 13 MMOL/L (ref 0–11.9)
ANISOCYTOSIS BLD QL SMEAR: ABNORMAL
APPEARANCE UR: CLEAR
APTT PPP: 30.9 SEC (ref 24.7–36)
AST SERPL-CCNC: 28 U/L (ref 12–45)
BACTERIA #/AREA URNS HPF: NEGATIVE /HPF
BASOPHILS # BLD AUTO: 0.5 % (ref 0–1.8)
BASOPHILS # BLD: 0.03 K/UL (ref 0–0.12)
BILIRUB SERPL-MCNC: 0.9 MG/DL (ref 0.1–1.5)
BILIRUB UR QL STRIP.AUTO: NEGATIVE
BNP SERPL-MCNC: 222 PG/ML (ref 0–100)
BUN SERPL-MCNC: 23 MG/DL (ref 8–22)
CALCIUM SERPL-MCNC: 9.9 MG/DL (ref 8.5–10.5)
CHLORIDE SERPL-SCNC: 102 MMOL/L (ref 96–112)
CO2 SERPL-SCNC: 26 MMOL/L (ref 20–33)
COLOR UR: YELLOW
COMMENT 1642: NORMAL
CREAT SERPL-MCNC: 0.55 MG/DL (ref 0.5–1.4)
CULTURE IF INDICATED INDCX: NO UA CULTURE
EOSINOPHIL # BLD AUTO: 0.05 K/UL (ref 0–0.51)
EOSINOPHIL NFR BLD: 0.9 % (ref 0–6.9)
EPI CELLS #/AREA URNS HPF: ABNORMAL /HPF
ERYTHROCYTE [DISTWIDTH] IN BLOOD BY AUTOMATED COUNT: 77.6 FL (ref 35.9–50)
GFR SERPL CREATININE-BSD FRML MDRD: >60 ML/MIN/1.73 M 2
GLOBULIN SER CALC-MCNC: 2.5 G/DL (ref 1.9–3.5)
GLUCOSE SERPL-MCNC: 91 MG/DL (ref 65–99)
GLUCOSE UR STRIP.AUTO-MCNC: NEGATIVE MG/DL
HCT VFR BLD AUTO: 34.2 % (ref 37–47)
HGB BLD-MCNC: 10.2 G/DL (ref 12–16)
HYALINE CASTS #/AREA URNS LPF: ABNORMAL /LPF
HYPOCHROMIA BLD QL SMEAR: ABNORMAL
IMM GRANULOCYTES # BLD AUTO: 0.03 K/UL (ref 0–0.11)
IMM GRANULOCYTES NFR BLD AUTO: 0.5 % (ref 0–0.9)
INR PPP: 1.05 (ref 0.87–1.13)
KETONES UR STRIP.AUTO-MCNC: 40 MG/DL
LEUKOCYTE ESTERASE UR QL STRIP.AUTO: NEGATIVE
LV EJECT FRACT  99904: 60
LV EJECT FRACT MOD 2C 99903: 54.75
LV EJECT FRACT MOD 4C 99902: 68.89
LV EJECT FRACT MOD BP 99901: 59.54
LYMPHOCYTES # BLD AUTO: 0.67 K/UL (ref 1–4.8)
LYMPHOCYTES NFR BLD: 11.5 % (ref 22–41)
MAGNESIUM SERPL-MCNC: 1.7 MG/DL (ref 1.5–2.5)
MCH RBC QN AUTO: 23.2 PG (ref 27–33)
MCHC RBC AUTO-ENTMCNC: 29.8 G/DL (ref 33.6–35)
MCV RBC AUTO: 77.9 FL (ref 81.4–97.8)
MICRO URNS: ABNORMAL
MICROCYTES BLD QL SMEAR: ABNORMAL
MONOCYTES # BLD AUTO: 0.45 K/UL (ref 0–0.85)
MONOCYTES NFR BLD AUTO: 7.7 % (ref 0–13.4)
MORPHOLOGY BLD-IMP: NORMAL
NEUTROPHILS # BLD AUTO: 4.58 K/UL (ref 2–7.15)
NEUTROPHILS NFR BLD: 78.9 % (ref 44–72)
NITRITE UR QL STRIP.AUTO: NEGATIVE
NRBC # BLD AUTO: 0 K/UL
NRBC BLD AUTO-RTO: 0 /100 WBC
OVALOCYTES BLD QL SMEAR: NORMAL
PH UR STRIP.AUTO: 6 [PH]
PLATELET # BLD AUTO: 277 K/UL (ref 164–446)
PLATELET BLD QL SMEAR: NORMAL
PMV BLD AUTO: 8.7 FL (ref 9–12.9)
POIKILOCYTOSIS BLD QL SMEAR: NORMAL
POLYCHROMASIA BLD QL SMEAR: NORMAL
POTASSIUM SERPL-SCNC: 2.6 MMOL/L (ref 3.6–5.5)
PROT SERPL-MCNC: 6.7 G/DL (ref 6–8.2)
PROT UR QL STRIP: 30 MG/DL
PROTHROMBIN TIME: 14 SEC (ref 12–14.6)
RBC # BLD AUTO: 4.39 M/UL (ref 4.2–5.4)
RBC # URNS HPF: ABNORMAL /HPF
RBC BLD AUTO: PRESENT
RBC UR QL AUTO: NEGATIVE
SCHISTOCYTES BLD QL SMEAR: NORMAL
SODIUM SERPL-SCNC: 141 MMOL/L (ref 135–145)
SP GR UR STRIP.AUTO: 1.02
TROPONIN I SERPL-MCNC: 0.06 NG/ML (ref 0–0.04)
TROPONIN I SERPL-MCNC: 0.08 NG/ML (ref 0–0.04)
UROBILINOGEN UR STRIP.AUTO-MCNC: 1 MG/DL
WBC # BLD AUTO: 5.8 K/UL (ref 4.8–10.8)
WBC #/AREA URNS HPF: ABNORMAL /HPF

## 2017-09-11 PROCEDURE — 700101 HCHG RX REV CODE 250: Performed by: HOSPITALIST

## 2017-09-11 PROCEDURE — 99220 PR INITIAL OBSERVATION CARE,LEVL III: CPT | Performed by: HOSPITALIST

## 2017-09-11 PROCEDURE — 80048 BASIC METABOLIC PNL TOTAL CA: CPT

## 2017-09-11 PROCEDURE — 70450 CT HEAD/BRAIN W/O DYE: CPT

## 2017-09-11 PROCEDURE — 700102 HCHG RX REV CODE 250 W/ 637 OVERRIDE(OP): Performed by: HOSPITALIST

## 2017-09-11 PROCEDURE — 700111 HCHG RX REV CODE 636 W/ 250 OVERRIDE (IP): Performed by: EMERGENCY MEDICINE

## 2017-09-11 PROCEDURE — A9270 NON-COVERED ITEM OR SERVICE: HCPCS | Performed by: EMERGENCY MEDICINE

## 2017-09-11 PROCEDURE — 93306 TTE W/DOPPLER COMPLETE: CPT

## 2017-09-11 PROCEDURE — 94760 N-INVAS EAR/PLS OXIMETRY 1: CPT

## 2017-09-11 PROCEDURE — 81001 URINALYSIS AUTO W/SCOPE: CPT

## 2017-09-11 PROCEDURE — 83880 ASSAY OF NATRIURETIC PEPTIDE: CPT

## 2017-09-11 PROCEDURE — 99285 EMERGENCY DEPT VISIT HI MDM: CPT

## 2017-09-11 PROCEDURE — 36415 COLL VENOUS BLD VENIPUNCTURE: CPT

## 2017-09-11 PROCEDURE — 83735 ASSAY OF MAGNESIUM: CPT

## 2017-09-11 PROCEDURE — 85730 THROMBOPLASTIN TIME PARTIAL: CPT

## 2017-09-11 PROCEDURE — 85025 COMPLETE CBC W/AUTO DIFF WBC: CPT

## 2017-09-11 PROCEDURE — 80053 COMPREHEN METABOLIC PANEL: CPT

## 2017-09-11 PROCEDURE — 96365 THER/PROPH/DIAG IV INF INIT: CPT | Mod: XU

## 2017-09-11 PROCEDURE — 84484 ASSAY OF TROPONIN QUANT: CPT | Mod: 91

## 2017-09-11 PROCEDURE — 85610 PROTHROMBIN TIME: CPT

## 2017-09-11 PROCEDURE — 700102 HCHG RX REV CODE 250 W/ 637 OVERRIDE(OP): Performed by: EMERGENCY MEDICINE

## 2017-09-11 PROCEDURE — G0378 HOSPITAL OBSERVATION PER HR: HCPCS

## 2017-09-11 PROCEDURE — 93306 TTE W/DOPPLER COMPLETE: CPT | Mod: 26 | Performed by: INTERNAL MEDICINE

## 2017-09-11 PROCEDURE — 71010 DX-CHEST-PORTABLE (1 VIEW): CPT

## 2017-09-11 PROCEDURE — 96366 THER/PROPH/DIAG IV INF ADDON: CPT

## 2017-09-11 PROCEDURE — A9270 NON-COVERED ITEM OR SERVICE: HCPCS | Performed by: HOSPITALIST

## 2017-09-11 RX ORDER — ACETAMINOPHEN 325 MG/1
650 TABLET ORAL EVERY 6 HOURS PRN
Status: DISCONTINUED | OUTPATIENT
Start: 2017-09-11 | End: 2017-09-15 | Stop reason: HOSPADM

## 2017-09-11 RX ORDER — OMEPRAZOLE 20 MG/1
20 CAPSULE, DELAYED RELEASE ORAL 2 TIMES DAILY
Status: DISCONTINUED | OUTPATIENT
Start: 2017-09-11 | End: 2017-09-15 | Stop reason: HOSPADM

## 2017-09-11 RX ORDER — AMOXICILLIN 250 MG
2 CAPSULE ORAL 2 TIMES DAILY
Status: DISCONTINUED | OUTPATIENT
Start: 2017-09-11 | End: 2017-09-15 | Stop reason: HOSPADM

## 2017-09-11 RX ORDER — ONDANSETRON 4 MG/1
4 TABLET, ORALLY DISINTEGRATING ORAL EVERY 4 HOURS PRN
Status: DISCONTINUED | OUTPATIENT
Start: 2017-09-11 | End: 2017-09-15 | Stop reason: HOSPADM

## 2017-09-11 RX ORDER — POTASSIUM CHLORIDE 7.45 MG/ML
10 INJECTION INTRAVENOUS
Status: COMPLETED | OUTPATIENT
Start: 2017-09-11 | End: 2017-09-11

## 2017-09-11 RX ORDER — AMLODIPINE BESYLATE 5 MG/1
5 TABLET ORAL
Status: DISCONTINUED | OUTPATIENT
Start: 2017-09-11 | End: 2017-09-13

## 2017-09-11 RX ORDER — ONDANSETRON 2 MG/ML
4 INJECTION INTRAMUSCULAR; INTRAVENOUS EVERY 4 HOURS PRN
Status: DISCONTINUED | OUTPATIENT
Start: 2017-09-11 | End: 2017-09-15 | Stop reason: HOSPADM

## 2017-09-11 RX ORDER — POTASSIUM CHLORIDE 20 MEQ/1
40 TABLET, EXTENDED RELEASE ORAL ONCE
Status: COMPLETED | OUTPATIENT
Start: 2017-09-11 | End: 2017-09-11

## 2017-09-11 RX ORDER — POLYETHYLENE GLYCOL 3350 17 G/17G
1 POWDER, FOR SOLUTION ORAL
Status: DISCONTINUED | OUTPATIENT
Start: 2017-09-11 | End: 2017-09-15 | Stop reason: HOSPADM

## 2017-09-11 RX ORDER — BISACODYL 10 MG
10 SUPPOSITORY, RECTAL RECTAL
Status: DISCONTINUED | OUTPATIENT
Start: 2017-09-11 | End: 2017-09-15 | Stop reason: HOSPADM

## 2017-09-11 RX ORDER — SODIUM CHLORIDE AND POTASSIUM CHLORIDE 150; 900 MG/100ML; MG/100ML
INJECTION, SOLUTION INTRAVENOUS CONTINUOUS
Status: DISCONTINUED | OUTPATIENT
Start: 2017-09-11 | End: 2017-09-15

## 2017-09-11 RX ORDER — POTASSIUM CHLORIDE 20 MEQ/1
40 TABLET, EXTENDED RELEASE ORAL 2 TIMES DAILY
Status: COMPLETED | OUTPATIENT
Start: 2017-09-11 | End: 2017-09-11

## 2017-09-11 RX ADMIN — STANDARDIZED SENNA CONCENTRATE AND DOCUSATE SODIUM 2 TABLET: 8.6; 5 TABLET, FILM COATED ORAL at 16:01

## 2017-09-11 RX ADMIN — POTASSIUM CHLORIDE AND SODIUM CHLORIDE: 900; 150 INJECTION, SOLUTION INTRAVENOUS at 17:10

## 2017-09-11 RX ADMIN — STANDARDIZED SENNA CONCENTRATE AND DOCUSATE SODIUM 2 TABLET: 8.6; 5 TABLET, FILM COATED ORAL at 20:17

## 2017-09-11 RX ADMIN — POTASSIUM CHLORIDE 10 MEQ: 7.46 INJECTION, SOLUTION INTRAVENOUS at 13:00

## 2017-09-11 RX ADMIN — POTASSIUM CHLORIDE 10 MEQ: 7.46 INJECTION, SOLUTION INTRAVENOUS at 14:12

## 2017-09-11 RX ADMIN — OMEPRAZOLE 20 MG: 20 CAPSULE, DELAYED RELEASE ORAL at 16:02

## 2017-09-11 RX ADMIN — ACETAMINOPHEN 650 MG: 325 TABLET, FILM COATED ORAL at 22:28

## 2017-09-11 RX ADMIN — POTASSIUM CHLORIDE 40 MEQ: 1500 TABLET, EXTENDED RELEASE ORAL at 16:01

## 2017-09-11 RX ADMIN — POTASSIUM CHLORIDE 10 MEQ: 7.46 INJECTION, SOLUTION INTRAVENOUS at 16:00

## 2017-09-11 RX ADMIN — OMEPRAZOLE 20 MG: 20 CAPSULE, DELAYED RELEASE ORAL at 20:17

## 2017-09-11 RX ADMIN — ACETAMINOPHEN 650 MG: 325 TABLET, FILM COATED ORAL at 16:02

## 2017-09-11 RX ADMIN — AMLODIPINE BESYLATE 5 MG: 5 TABLET ORAL at 16:02

## 2017-09-11 RX ADMIN — POTASSIUM CHLORIDE 40 MEQ: 1500 TABLET, EXTENDED RELEASE ORAL at 12:59

## 2017-09-11 RX ADMIN — POTASSIUM CHLORIDE 40 MEQ: 1500 TABLET, EXTENDED RELEASE ORAL at 20:17

## 2017-09-11 ASSESSMENT — LIFESTYLE VARIABLES
DO YOU DRINK ALCOHOL: NO
ALCOHOL_USE: NO
DO YOU DRINK ALCOHOL: NO
EVER_SMOKED: NEVER

## 2017-09-11 ASSESSMENT — COPD QUESTIONNAIRES
DO YOU EVER COUGH UP ANY MUCUS OR PHLEGM?: NO/ONLY WITH OCCASIONAL COLDS OR INFECTIONS
DURING THE PAST 4 WEEKS HOW MUCH DID YOU FEEL SHORT OF BREATH: NONE/LITTLE OF THE TIME
HAVE YOU SMOKED AT LEAST 100 CIGARETTES IN YOUR ENTIRE LIFE: NO/DON'T KNOW
COPD SCREENING SCORE: 2

## 2017-09-11 ASSESSMENT — PAIN SCALES - GENERAL
PAINLEVEL_OUTOF10: 10
PAINLEVEL_OUTOF10: 10

## 2017-09-11 ASSESSMENT — PATIENT HEALTH QUESTIONNAIRE - PHQ9
2. FEELING DOWN, DEPRESSED, IRRITABLE, OR HOPELESS: NOT AT ALL
SUM OF ALL RESPONSES TO PHQ9 QUESTIONS 1 AND 2: 0
SUM OF ALL RESPONSES TO PHQ QUESTIONS 1-9: 0
1. LITTLE INTEREST OR PLEASURE IN DOING THINGS: NOT AT ALL

## 2017-09-11 NOTE — LETTER
[facility] ([facility]) - [address]  EHR eReferral Notification Requirements    To be sent by secure email to [email or web address] or   by fax to [phone]       FIELDS ARE REQUIRED TO BE COMPLETED     Patient Name:  Lamar Dangelo  MRN:  [id number]   Account Number: [id number]                YOB: 1929    Date Roomed in ED:    9/11/2017   10:39 AM  Date First Observation Order Placed: 9/11/2017   2:02 PM  Date First Inpatient Order Placed:        Date of Previous Admission (Needed For Readmission Reviews Only):     Discharge Date and Time (if applicable): No discharge date for patient encounter.     PLEASE CHECK OFF TYPE OF REVIEW & CURRENT ADMISSION STATUS FROM LISTS BELOW  Type of Review:  Admission review    Dates to be Reviewed: 9/11-9/13        Current Admission Status:  Observation-Outpatient    / Contact Number for EHR outcome/recommendation call:    Meseret Martin [phone]     Attending Physician/ Contact Number (if not the same as in electronic record):  Dr. Thor Malin [phone]     Comments:  Please review per the Medicare 2MN rule      Additional Information being Emailed or Faxed:  no       Fax Handwritten Supporting Documents to EHR at [phone]      [address] [address]  [phone]  [email or web address]    Updated December 17, 2014

## 2017-09-11 NOTE — TELEPHONE ENCOUNTER
Please follow up with pt's daughter or emergency contact regarding this call yesterday.  Please find out if she went to the hospital.  Otherwise, she will need a follow up here in office as she has not been seen in 3 years approximately.

## 2017-09-11 NOTE — ED NOTES
Pt denies taking medications.  However, pt did fill and   A prescription for omeprazole.  Medication left on med rec.  Med rec updated and complete.  Allergies reviewed.  No antibiotics noted on file.

## 2017-09-11 NOTE — ED PROVIDER NOTES
"ED Provider Note    Scribed for Con Abraham M.D. by Sterling Holliday. 9/11/2017  11:33 AM    Primary care provider: BELLA Pino  Means of arrival: EMS  History obtained from: Patient, friend, family  History limited by: Altered level of consciousness    CHIEF COMPLAINT  Chief Complaint   Patient presents with   • ALOC     per EMS, pt more confused than normal   • Other     per EMS, sister unable to take care of pt       HPI  Lamar Dangelo is a 88 y.o. female who presents to the Emergency Department for evaluation of altered level consciousness since getting out of the hospital 6 days ago. Patient was recently admitted to the hospital for GI bleeding. Per friend and family member, patient has been having trouble getting up. She recently fell on the stairs and hurt her bilateral arms. Family member notes patient having associated bruising to her left arm. Patient denies hitting her head or losing consciousness. Friend states the patient has been acting differently lately. She states she would \"make a game\" out of the floor tiles. Friend notes having difficulty getting patient in bed. She has been mentally agitated. Patient also reports having associated increased generalized weakness, mouth dryness, decreased loss of appetite, abdominal pain more prominently to the left side. She denies any melena, hematochezia, or dysuria.    History is limited secondary to altered level of consciousness.    REVIEW OF SYSTEMS  Pertinent positives include altered level of consciousness, left arm bruising, agitation,  increased generalized weakness, mouth dryness, decreased loss of appetite, abdominal pain . Pertinent negatives include no melena, hematochezia, dysuria.    C  ROS is limited secondary to altered level of consciousness.    PAST MEDICAL HISTORY   has a past medical history of Hyperlipidemia (7/2/2010) and Hypothyroid (7/2/2010).    SURGICAL HISTORY   has a past surgical history that includes " tonsillectomy; appendectomy; colonoscopy - endo (N/A, 9/3/2017); and gastroscopy-endo (N/A, 9/3/2017).    SOCIAL HISTORY  Social History   Substance Use Topics   • Smoking status: Never Smoker   • Smokeless tobacco: Never Used   • Alcohol use No      History   Drug Use No       FAMILY HISTORY  Family History   Problem Relation Age of Onset   • Heart Disease Father    • Hypertension Sister        CURRENT MEDICATIONS  Home Medications     Reviewed by Crystal Blunt R.N. (Registered Nurse) on 09/11/17 at 1536  Med List Status: Complete   Medication Last Dose Status   omeprazole (PRILOSEC) 20 MG delayed-release capsule unknown Active                ALLERGIES  Allergies   Allergen Reactions   • Nkda [No Known Drug Allergy]        PHYSICAL EXAM  VITAL SIGNS: BP (!) 193/77   Pulse 67   Temp 36.7 °C (98.1 °F)   Resp 18   Wt 45.4 kg (100 lb)   SpO2 93%   BMI 17.16 kg/m²   Constitutional: Elderly, frail. Well developed, no distress, Non-toxic appearance.   HENT: Normocephalic, Atraumatic, Bilateral external ears normal, Oropharynx moist, No oral exudates.   Eyes: PERRLA, EOMI, Conjunctiva normal, No discharge.   Neck: No tenderness, Supple, No stridor.   Lymphatic: No lymphadenopathy noted.   Cardiovascular: Normal heart rate, Normal rhythm.   Thorax & Lungs: Clear to auscultation bilaterally, No respiratory distress, No wheezing, No crackles.   Abdomen: Soft, slight tenderness throughout left sided abdomen, no rebound, no peritoneal signs, No masses, No pulsatile masses.   Skin: Warm, Dry, No erythema, No rash.   Extremities:, No edema No cyanosis.   Musculoskeletal: No tenderness to palpation or major deformities noted.  Intact distal pulses  Neurologic: Awake, alert but slightly confused. Moves all extremities spontaneously. Mild diffuse weakness.  Psychiatric: Affect normal, Slightly confused, Mood normal.     LABS  Labs Reviewed   URINALYSIS,CULTURE IF INDICATED - Abnormal; Notable for the following:        Result  Value    Ketones 40 (*)     Protein 30 (*)     All other components within normal limits   CBC WITH DIFFERENTIAL - Abnormal; Notable for the following:     Hemoglobin 10.2 (*)     Hematocrit 34.2 (*)     MCV 77.9 (*)     MCH 23.2 (*)     MCHC 29.8 (*)     RDW 77.6 (*)     MPV 8.7 (*)     Neutrophils-Polys 78.90 (*)     Lymphocytes 11.50 (*)     Lymphs (Absolute) 0.67 (*)     All other components within normal limits    Narrative:     Indicate which anticoagulants the patient is on:->UNKNOWN   COMP METABOLIC PANEL - Abnormal; Notable for the following:     Potassium 2.6 (*)     Anion Gap 13.0 (*)     Bun 23 (*)     All other components within normal limits    Narrative:     Indicate which anticoagulants the patient is on:->UNKNOWN   TROPONIN - Abnormal; Notable for the following:     Troponin I 0.06 (*)     All other components within normal limits    Narrative:     Indicate which anticoagulants the patient is on:->UNKNOWN   BTYPE NATRIURETIC PEPTIDE - Abnormal; Notable for the following:     B Natriuretic Peptide 222 (*)     All other components within normal limits    Narrative:     Indicate which anticoagulants the patient is on:->UNKNOWN   URINE MICROSCOPIC (W/UA) - Abnormal; Notable for the following:     Hyaline Cast 6-10 (*)     All other components within normal limits   PROTHROMBIN TIME    Narrative:     Indicate which anticoagulants the patient is on:->UNKNOWN   APTT    Narrative:     Indicate which anticoagulants the patient is on:->UNKNOWN   ESTIMATED GFR    Narrative:     Indicate which anticoagulants the patient is on:->UNKNOWN   PERIPHERAL SMEAR REVIEW    Narrative:     Indicate which anticoagulants the patient is on:->UNKNOWN   PLATELET ESTIMATE    Narrative:     Indicate which anticoagulants the patient is on:->UNKNOWN   MORPHOLOGY    Narrative:     Indicate which anticoagulants the patient is on:->UNKNOWN   DIFFERENTIAL COMMENT    Narrative:     Indicate which anticoagulants the patient is  on:->UNKNOWN   TROPONIN   MAGNESIUM     All labs reviewed by me.    RADIOLOGY  ECHOCARDIOGRAM COMP W/O CONT         CT-HEAD W/O   Final Result      1.  Cerebral atrophy.      2.  White matter lucencies most consistent with small vessel ischemic change versus demyelination or gliosis.      3.  Otherwise, Head CT without contrast with no acute findings. No evidence of acute cerebral infarction, hemorrhage or mass lesion.      DX-CHEST-PORTABLE (1 VIEW)   Final Result      1.  Mild hypoinflation without other evidence for acute cardiopulmonary disease.   2.  Minimal LEFT pleural fluid collection versus pleural reactive change, stable.        The radiologist's interpretation of all radiological studies have been reviewed by me.    COURSE & MEDICAL DECISION MAKING  Pertinent Labs & Imaging studies reviewed. (See chart for details)    I reviewed the patient's medical records which showed patient was recently admitted to the hospital for GI bleeding. She was found to have hemoglobin of 4.7. Patient was transfused. She had an upper endoscopy that showed an ulcer and AVM. Patient was discharged home 6 days ago.    11:33 AM - Patient seen and examined at bedside. Ordered DX chest, CT head, CBC, CMP, PT, APTT, troponin, BNP, urinalysis culture, estimated GFR, peripheral smear review, platelet estimate, morphology, differential comment, urine microscopic to evaluate her symptoms. The differential diagnoses include but are not limited to: intracranial abnormality, delirium, dementia, infection    12:50 PM Paged hospitalist    1:03 PM - I discussed the patient's case and the above findings with Dr. Soriano (hospitalist) who will admit the patient.     Decision Making:  Patient with altered mental status, was recently here with bleeding, potassium shows 2.6, CT scan of the head was negative, potassium was replaced, discussed the case with hospitalist for admission to hospital.    DISPOSITION:  Patient will be admitted to   Bo in guarded condition.     FINAL IMPRESSION  1. Confusion    2. Hypokalemia          I, Sterling Holliday (Scribe), am scribing for, and in the presence of, Con Abraham M.D..    Electronically signed by: Sterling Holliday (Scribe), 9/11/2017    ICon M.D. personally performed the services described in this documentation, as scribed by Sterling Holliday in my presence, and it is both accurate and complete.    The note accurately reflects work and decisions made by me.  Con Abraham  9/11/2017  5:46 PM

## 2017-09-11 NOTE — ED NOTES
"Pt bib REM from sister's house (where pt lives) with c/c ALOC.  Pt EMS, family reports pt being more confused than normal & increasingly having trouble walking.  Pt recently admitted for \"bleeding ulcers.\"  Pt reports pain \"everywhere.\"  Bruising noted to right upper arm.  Pt a&ox2.      Per EMS, sister requesting EMS to take pt to a care home as she feels she can't take care of her anymore.    IV established, blood drawn & sent to lab.    On cardiac monitor.    Chart up for ERP evaluation.   "

## 2017-09-12 PROBLEM — G93.41 ACUTE METABOLIC ENCEPHALOPATHY: Status: ACTIVE | Noted: 2017-09-12

## 2017-09-12 PROBLEM — F03.90 DEMENTIA (HCC): Status: ACTIVE | Noted: 2017-09-12

## 2017-09-12 PROBLEM — R79.89 ELEVATED TROPONIN: Status: ACTIVE | Noted: 2017-09-12

## 2017-09-12 PROBLEM — I10 ACCELERATED HYPERTENSION: Status: ACTIVE | Noted: 2017-09-12

## 2017-09-12 LAB
ANION GAP SERPL CALC-SCNC: 10 MMOL/L (ref 0–11.9)
BUN SERPL-MCNC: 15 MG/DL (ref 8–22)
CALCIUM SERPL-MCNC: 9 MG/DL (ref 8.5–10.5)
CHLORIDE SERPL-SCNC: 103 MMOL/L (ref 96–112)
CO2 SERPL-SCNC: 24 MMOL/L (ref 20–33)
CREAT SERPL-MCNC: 0.49 MG/DL (ref 0.5–1.4)
GFR SERPL CREATININE-BSD FRML MDRD: >60 ML/MIN/1.73 M 2
GLUCOSE SERPL-MCNC: 84 MG/DL (ref 65–99)
POTASSIUM SERPL-SCNC: 3 MMOL/L (ref 3.6–5.5)
POTASSIUM SERPL-SCNC: 3.7 MMOL/L (ref 3.6–5.5)
SODIUM SERPL-SCNC: 137 MMOL/L (ref 135–145)
TROPONIN I SERPL-MCNC: 0.07 NG/ML (ref 0–0.04)

## 2017-09-12 PROCEDURE — G8978 MOBILITY CURRENT STATUS: HCPCS | Mod: CJ

## 2017-09-12 PROCEDURE — 700102 HCHG RX REV CODE 250 W/ 637 OVERRIDE(OP): Performed by: NURSE PRACTITIONER

## 2017-09-12 PROCEDURE — 99226 PR SUBSEQUENT OBSERVATION CARE,LEVEL III: CPT | Performed by: INTERNAL MEDICINE

## 2017-09-12 PROCEDURE — A9270 NON-COVERED ITEM OR SERVICE: HCPCS | Performed by: NURSE PRACTITIONER

## 2017-09-12 PROCEDURE — 97162 PT EVAL MOD COMPLEX 30 MIN: CPT

## 2017-09-12 PROCEDURE — G8979 MOBILITY GOAL STATUS: HCPCS | Mod: CI

## 2017-09-12 PROCEDURE — 84132 ASSAY OF SERUM POTASSIUM: CPT

## 2017-09-12 PROCEDURE — 700101 HCHG RX REV CODE 250: Performed by: HOSPITALIST

## 2017-09-12 PROCEDURE — G0378 HOSPITAL OBSERVATION PER HR: HCPCS

## 2017-09-12 PROCEDURE — A9270 NON-COVERED ITEM OR SERVICE: HCPCS | Performed by: HOSPITALIST

## 2017-09-12 PROCEDURE — 36415 COLL VENOUS BLD VENIPUNCTURE: CPT

## 2017-09-12 PROCEDURE — 700102 HCHG RX REV CODE 250 W/ 637 OVERRIDE(OP): Performed by: HOSPITALIST

## 2017-09-12 RX ORDER — POTASSIUM CHLORIDE 20 MEQ/1
40 TABLET, EXTENDED RELEASE ORAL ONCE
Status: COMPLETED | OUTPATIENT
Start: 2017-09-12 | End: 2017-09-12

## 2017-09-12 RX ORDER — LEVOTHYROXINE SODIUM 0.1 MG/1
100 TABLET ORAL
Status: DISCONTINUED | OUTPATIENT
Start: 2017-09-13 | End: 2017-09-15

## 2017-09-12 RX ADMIN — OMEPRAZOLE 20 MG: 20 CAPSULE, DELAYED RELEASE ORAL at 09:23

## 2017-09-12 RX ADMIN — OMEPRAZOLE 20 MG: 20 CAPSULE, DELAYED RELEASE ORAL at 21:37

## 2017-09-12 RX ADMIN — STANDARDIZED SENNA CONCENTRATE AND DOCUSATE SODIUM 2 TABLET: 8.6; 5 TABLET, FILM COATED ORAL at 21:37

## 2017-09-12 RX ADMIN — POTASSIUM CHLORIDE AND SODIUM CHLORIDE: 900; 150 INJECTION, SOLUTION INTRAVENOUS at 12:12

## 2017-09-12 RX ADMIN — AMLODIPINE BESYLATE 5 MG: 5 TABLET ORAL at 09:23

## 2017-09-12 RX ADMIN — STANDARDIZED SENNA CONCENTRATE AND DOCUSATE SODIUM 2 TABLET: 8.6; 5 TABLET, FILM COATED ORAL at 09:23

## 2017-09-12 RX ADMIN — POTASSIUM CHLORIDE 40 MEQ: 1500 TABLET, EXTENDED RELEASE ORAL at 02:15

## 2017-09-12 ASSESSMENT — COGNITIVE AND FUNCTIONAL STATUS - GENERAL
MOBILITY SCORE: 21
SUGGESTED CMS G CODE MODIFIER MOBILITY: CK
EATING MEALS: A LITTLE
MOVING TO AND FROM BED TO CHAIR: A LITTLE
CLIMB 3 TO 5 STEPS WITH RAILING: A LOT
MOVING FROM LYING ON BACK TO SITTING ON SIDE OF FLAT BED: A LITTLE
WALKING IN HOSPITAL ROOM: A LITTLE
TOILETING: A LOT
DAILY ACTIVITIY SCORE: 13
PERSONAL GROOMING: A LOT
TURNING FROM BACK TO SIDE WHILE IN FLAT BAD: A LITTLE
CLIMB 3 TO 5 STEPS WITH RAILING: A LITTLE
DRESSING REGULAR LOWER BODY CLOTHING: A LOT
MOBILITY SCORE: 18
SUGGESTED CMS G CODE MODIFIER DAILY ACTIVITY: CL
HELP NEEDED FOR BATHING: A LOT
DRESSING REGULAR UPPER BODY CLOTHING: A LOT
SUGGESTED CMS G CODE MODIFIER MOBILITY: CJ
WALKING IN HOSPITAL ROOM: A LITTLE
STANDING UP FROM CHAIR USING ARMS: A LITTLE

## 2017-09-12 ASSESSMENT — PAIN SCALES - GENERAL
PAINLEVEL_OUTOF10: 0
PAINLEVEL_OUTOF10: 10

## 2017-09-12 ASSESSMENT — GAIT ASSESSMENTS
DISTANCE (FEET): 200
ASSISTIVE DEVICE: FRONT WHEEL WALKER
GAIT LEVEL OF ASSIST: CONTACT GUARD ASSIST

## 2017-09-12 NOTE — CONSULTS
"Reason for PC Consult: Advance Care Planning    Consulted by: Rene Gibbs MD    Assessment:  General: 87yo lady BIB EMS to ED 9/11, OBS pt hypoK (3.0, now 3.7), AMS  PMH:9/5 d/c s/p upper GI (4mm AVM, healed peptic ulcer), DLD, suspected dementia    Dyspnea: No-    Last BM: 09/12/17-    Pain: No-    Depression: Unable to determine-      Spiritual:  Is Pentecostalism or spirituality important for coping with this illness? No-    Has a  or spiritual provider visit been requested? No    Palliative Performance Scale: 60    Advanced Directive: None-    DPOA:  -    POLST:No-      Code Status: Full-      Outcome:  Introduced self and role of Palliative Care.  Pt oriented to self and hospital but not date.  Assessed pt's understanding of her current medical status, overall health picture, and options for future care.  Throughout this encounter, pt repeatedly circled back to her concerns about not peeing but then peeing but not pooping but now pooping, but still not eating.  Pt denies falling.  Pt displays poor insight into any medical problem.    Attempted to explore pt's values, beliefs, and preferences in order to identify GOC.  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  Pt enjoys eating at [employer], her neighbors, being outside and having her big dog Tootsie.      Attempted to address GOC, code status and EoL wishes.  Pt repeated, \"Whatever it is, the neighbors will help us.\"  Pt stated her deference to her sister for making any medical decisions and gave permission to speak with Ellie Pengerton.    Active listening, reflection, reminiscing, and empathic support utilized throughout this encounter.  All questions answered.  PC contact information " "given.     Phoned pt's sister Ellie ([phone]).  Ellie stated that pt's POA is their neighbors Migue Krishnan and that the neighbors have the paperwork.  Ellie stated that the pt's mentation was fine until her admission 9/1-5 for severe anemia -- while in hospital, pt became \"mentally out of order\" and sister was/is hopeful this would clear but it worsened so that pt was \"putting her meds down the home vents, laying on the floor and refusing to get up, counting bugs that weren't there.\"  This was frightening and upsetting to her sister \"because I can't lift her off the floor, I can't take care of her.\"      Ellie's goal is for pt to go to rehab and \"hopefully get better so she can come back home.\"  Discussed that pt's mentation may not improve and would possibly need placement in a memory care unit which would be private pay.  Ellie verbalized understanding.  Ellie also stated that pt should be DNR, though she understood that we would need to speak with pt's DPOA-HC about this.    Message left for Mali Marshall ([phone]) to please phone PC office.    Updated: hosp FLORECITA Galaviz    Plan: Discuss GOC/code status with pt's supposed DPOA-HC Mali Marshall once they return phone call.    Thank you for allowing Palliative Care to participate in this patient's care. Please feel free to call [phone] with any questions or concerns.  "

## 2017-09-12 NOTE — ASSESSMENT & PLAN NOTE
- with failure to thrive  - MRI pending; B12 borderline low, starting B12 supplementation  - cont Synthroid  - PT/OT eval appreciated; SNF referral placed  - [facility] Care recs appreciated  - code status switched to DNR

## 2017-09-12 NOTE — DIETARY
"Nutrition Services: BMI - Pt seen for BMI <19 (=17.26), ht=5'4\", wt=45.6 kg. Pt is an 89 yo female with dx of hypokalemia. Pt appears thin and frail. Pt is currently on a Regular diet. Upon visit, pt had consumed ~25% of her breakfast. Per the patient's sister, pt has had poor PO intake for about 1 week with a mental decline over the past 4-5 months. Per sister, pt just plays with her food now rather than eating it. Pt's sister stated she has lost wt over the past 4-5 months. Pt had a previous UBW of 140 lb, pt currently weighs 100 lb (28.5% wt loss, severe wt loss). Discussed supplements, pt states she would like ice cream. Recommend Magic Cups as they provide more calories and protein and have similar consistency.     Pertinent Labs: Creatinine 0.49  Pertinent Medications: Prilosec, Pericolace  Fluids: NaCl with KCl @ 50 ml/hr  GI: Last BM 9/12  Skin: Intact    PLAN/RECOMMEND - Encourage PO intake. Recommend Magic Cups between meals. Nutrition Rep to see patient daily for meal preferences. Please document PO intake as percentage of meals consumed. RD to monitor for PO intake and weight trends.         "

## 2017-09-12 NOTE — ASSESSMENT & PLAN NOTE
- stable now after extensive endoscopic workup on previous admission  - with iron deficiency anemia; starting oral iron supplementation  - Hgb stable

## 2017-09-12 NOTE — H&P
PRIMARY CARE PROVIDER:  CHANEL Pino.    CHIEF COMPLAINT:  Increased confusion and falls.    HISTORY OF PRESENT ILLNESS:  The patient is an 88-year-old female who was   transferred to the emergency room for evaluation of the above chief complaint.    Apparently, she has a history of dementia and was recently hospitalized for   severe anemia.  She was evaluated by gastroenterology and underwent an upper   endoscopy, which were nonbleeding ulcer and a 4 mm AVM.  She was discharged   home on 5th of September on Prilosec.  The patient is a very poor historian   and I suspect she has some underlying dementia.  She has a friend at her   bedside, which was providing most of the history.  The patient currently lives   with her sister.  According to nursing notes, the sister told the paramedics   that she can no longer take care of the patient.  She has been having poor   intake according to her friend who is at her bedside associated with increased   confusion and multiple falls.  The patient is oriented to self and place.    She denies any chest pain or headache.  She denies any abdominal pain.  She   denies any nausea.  She appears to have poor insight into her medical   problems.  She denies any dysuria.  She has not had any documented fever.    REVIEW OF SYSTEMS:  As above, otherwise unable to obtain due to her dementia.    PAST MEDICAL HISTORY:  Significant for dyslipidemia, hypothyroidism, anemia   with recent hospitalization for GI bleed.    PAST SURGICAL HISTORY:  Tonsillectomy, appendectomy, colonoscopy, EGD.    SOCIAL HISTORY:  She lives with her sister.  No alcohol or illicit drug use.    According to her friend, she only has 2 sisters and no other relatives.    FAMILY HISTORY:  Unable to obtain due to her dementia.    CURRENT MEDICATIONS:  Prilosec, but she has not been compliant with this   medication.    ALLERGIES:  No known drug allergies.    PHYSICAL EXAMINATION:  GENERAL:  She is alert to self and  place.  She denies pain.  VITAL SIGNS:  Temperature is 36.7, pulse is 80, respiratory rate is 18, blood   pressure is 193/77, pulse oximetry is 96%.  HEAD AND NECK:  Pupils equal.  Supple neck.  No jugular venous distention.    Oropharynx is clear.  No cervical lymphadenopathy.  HEART:  Regular rate and rhythm.  Normal S1, S2.  She has grade II systolic   ejection murmur.  No rubs or gallops.  LUNGS:  Clear with symmetric air entry bilaterally.  No chest wall tenderness.  ABDOMEN:  Soft, nontender.  Bowel sounds are positive.  No hepatosplenomegaly.  EXTREMITIES:  No edema, no clubbing, no cyanosis.  NEUROLOGIC:  No focal deficits.  SKIN:  No rash.    DIAGNOSTICS:  White blood cell count is 5.8, hemoglobin 10.2, hematocrit 34.2,   platelet count 177.  Sodium 141, potassium 2.6, chloride 102, bicarbonate 26,   glucose 91, BUN 23, creatinine 0.55.  LFTs are normal.  Troponin I 0.06.  BNP   222.  CT of the head without contrast revealed cerebral atrophy, white matter   lucencies most consistent with small vessel ischemic change versus   demyelination or gliosis.  No evidence of acute cerebral infarction,   hemorrhage or mass lesion.  Urinalysis reveals 30 protein, negative nitrites,   negative leukocyte esterase, 0-2 white blood cells.    ASSESSMENT AND PLAN:  1.  Accelerated hypertension.  2.  Peptic ulcer disease with chronic anemia and no signs of active bleeding   at this time.  3.  Suspected dementia.  4.  Hypokalemia.  5.  Failure to thrive.  6.  Elevated troponin, likely related to her hypertension.    PLAN:  The patient will be admitted for close clinical monitoring.  We will   check serial troponin.  We will start her on amlodipine and order p.r.n.   labetalol.    We will replete her potassium intravenously and orally and monitor her   electrolytes and check a magnesium.    We will check an echocardiogram.    We will ask  to assist with discharge planning and also ask for   palliative care  evaluation.    We will use SCDs for DVT prophylaxis given her recent severe anemia and peptic   ulcer disease and AV malformation.       ____________________________________     MD MILAGRO COOPER / RAEGAN    DD:  09/11/2017 17:44:51  DT:  09/11/2017 18:04:07    D#:  [id number]  Job#:  [id number]    cc: BONNIE BUCHANAN

## 2017-09-12 NOTE — CARE PLAN
Problem: Safety  Goal: Will remain free from falls  Outcome: PROGRESSING AS EXPECTED  Patient instructed to use the call bell for assistance, bed alarm activated, needs met    Problem: Pain Management  Goal: Pain level will decrease to patient's comfort goal  Outcome: PROGRESSING AS EXPECTED  Patient given a back rub and Tylenol for back pain with good effect, turned and repositioned for comfort

## 2017-09-12 NOTE — ASSESSMENT & PLAN NOTE
- possibly multifactorial  - hypokalemic with evidence of dehydration, dementia, and FTT  - see mgmt below for details.

## 2017-09-12 NOTE — PROGRESS NOTES
Hospital Medicine Interval Note  Date of Service:  9/12/2017    Chief Complaint  88 y.o.-year-old female admitted 9/11/2017 with AMS and failure to thrive with recurrent GLF.  Found to be hypokalemic with mild Trop elevation.     Interval Problem Update  Patient was c/o constipation which has since resolved this morning with bowel protocol.  Patient still with abnormal mentation.    Consultants/Specialty  Palliative Care     Disposition  TBD     Review of Systems   Unable to perform ROS: Mental acuity      Physical Exam Laboratory/Imaging   Vitals:    09/11/17 1903 09/11/17 2337 09/12/17 0413 09/12/17 0800   BP: 152/65 160/69 154/66 160/65   Pulse: 81 74 76 66   Resp: 16 16 16 15   Temp: 36.8 °C (98.3 °F) 36.7 °C (98 °F) 36.6 °C (97.8 °F) 36.5 °C (97.7 °F)   SpO2: 98% 98% 99% 96%   Weight: 45.6 kg (100 lb 8.5 oz)      Height:         Physical Exam   Constitutional:   frail   HENT:   Head: Normocephalic and atraumatic.   Right Ear: External ear normal.   Left Ear: External ear normal.   Mouth/Throat: No oropharyngeal exudate.   Eyes: Conjunctivae and EOM are normal. Pupils are equal, round, and reactive to light. No scleral icterus.   Neck: Normal range of motion. Neck supple.   Cardiovascular: Exam reveals no gallop and no friction rub.    No murmur heard.  Pulmonary/Chest: Effort normal and breath sounds normal.   Abdominal: Soft. Bowel sounds are normal.   Musculoskeletal: Normal range of motion.   Neurological: She is alert.   Skin: Skin is warm and dry.   Nursing note and vitals reviewed.   Lab Results   Component Value Date/Time    WBC 5.8 09/11/2017 10:47 AM    HEMOGLOBIN 10.2 (L) 09/11/2017 10:47 AM    HEMATOCRIT 34.2 (L) 09/11/2017 10:47 AM    PLATELETCT 277 09/11/2017 10:47 AM     Lab Results   Component Value Date/Time    SODIUM 137 09/11/2017 11:45 PM    POTASSIUM 3.7 09/12/2017 05:53 AM    CHLORIDE 103 09/11/2017 11:45 PM    CO2 24 09/11/2017 11:45 PM    GLUCOSE 84 09/11/2017 11:45 PM    BUN 15  09/11/2017 11:45 PM    CREATININE 0.49 (L) 09/11/2017 11:45 PM      Assessment/Plan    * Acute metabolic encephalopathy   Assessment & Plan    - possibly multifactorial  - found to be hypokalemic with evidence of dehydration, dementia, and FTT  - see mgmt below for details.         

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
## 2017-09-13 LAB
ANION GAP SERPL CALC-SCNC: 8 MMOL/L (ref 0–11.9)
BASOPHILS # BLD AUTO: 0.3 % (ref 0–1.8)
BASOPHILS # BLD: 0.02 K/UL (ref 0–0.12)
BUN SERPL-MCNC: 11 MG/DL (ref 8–22)
CALCIUM SERPL-MCNC: 9.3 MG/DL (ref 8.5–10.5)
CHLORIDE SERPL-SCNC: 103 MMOL/L (ref 96–112)
CO2 SERPL-SCNC: 24 MMOL/L (ref 20–33)
CREAT SERPL-MCNC: 0.52 MG/DL (ref 0.5–1.4)
EOSINOPHIL # BLD AUTO: 0.08 K/UL (ref 0–0.51)
EOSINOPHIL NFR BLD: 1.2 % (ref 0–6.9)
ERYTHROCYTE [DISTWIDTH] IN BLOOD BY AUTOMATED COUNT: 79.8 FL (ref 35.9–50)
GFR SERPL CREATININE-BSD FRML MDRD: >60 ML/MIN/1.73 M 2
GLUCOSE SERPL-MCNC: 110 MG/DL (ref 65–99)
HCT VFR BLD AUTO: 34.1 % (ref 37–47)
HGB BLD-MCNC: 10.2 G/DL (ref 12–16)
IMM GRANULOCYTES # BLD AUTO: 0.04 K/UL (ref 0–0.11)
IMM GRANULOCYTES NFR BLD AUTO: 0.6 % (ref 0–0.9)
LYMPHOCYTES # BLD AUTO: 0.84 K/UL (ref 1–4.8)
LYMPHOCYTES NFR BLD: 12.1 % (ref 22–41)
MCH RBC QN AUTO: 23.8 PG (ref 27–33)
MCHC RBC AUTO-ENTMCNC: 29.9 G/DL (ref 33.6–35)
MCV RBC AUTO: 79.5 FL (ref 81.4–97.8)
MONOCYTES # BLD AUTO: 0.68 K/UL (ref 0–0.85)
MONOCYTES NFR BLD AUTO: 9.8 % (ref 0–13.4)
NEUTROPHILS # BLD AUTO: 5.29 K/UL (ref 2–7.15)
NEUTROPHILS NFR BLD: 76 % (ref 44–72)
NRBC # BLD AUTO: 0 K/UL
NRBC BLD AUTO-RTO: 0 /100 WBC
PLATELET # BLD AUTO: 276 K/UL (ref 164–446)
PMV BLD AUTO: 8.6 FL (ref 9–12.9)
POTASSIUM SERPL-SCNC: 3.5 MMOL/L (ref 3.6–5.5)
RBC # BLD AUTO: 4.29 M/UL (ref 4.2–5.4)
SODIUM SERPL-SCNC: 135 MMOL/L (ref 135–145)
WBC # BLD AUTO: 7 K/UL (ref 4.8–10.8)

## 2017-09-13 PROCEDURE — 99225 PR SUBSEQUENT OBSERVATION CARE,LEVEL II: CPT | Performed by: INTERNAL MEDICINE

## 2017-09-13 PROCEDURE — 97166 OT EVAL MOD COMPLEX 45 MIN: CPT

## 2017-09-13 PROCEDURE — 700101 HCHG RX REV CODE 250: Performed by: HOSPITALIST

## 2017-09-13 PROCEDURE — 700111 HCHG RX REV CODE 636 W/ 250 OVERRIDE (IP): Performed by: INTERNAL MEDICINE

## 2017-09-13 PROCEDURE — A9270 NON-COVERED ITEM OR SERVICE: HCPCS | Performed by: INTERNAL MEDICINE

## 2017-09-13 PROCEDURE — 85025 COMPLETE CBC W/AUTO DIFF WBC: CPT

## 2017-09-13 PROCEDURE — 700102 HCHG RX REV CODE 250 W/ 637 OVERRIDE(OP): Performed by: INTERNAL MEDICINE

## 2017-09-13 PROCEDURE — 700102 HCHG RX REV CODE 250 W/ 637 OVERRIDE(OP): Performed by: HOSPITALIST

## 2017-09-13 PROCEDURE — G0378 HOSPITAL OBSERVATION PER HR: HCPCS

## 2017-09-13 PROCEDURE — G8987 SELF CARE CURRENT STATUS: HCPCS | Mod: CK

## 2017-09-13 PROCEDURE — 80048 BASIC METABOLIC PNL TOTAL CA: CPT

## 2017-09-13 PROCEDURE — A9270 NON-COVERED ITEM OR SERVICE: HCPCS | Performed by: HOSPITALIST

## 2017-09-13 PROCEDURE — G8988 SELF CARE GOAL STATUS: HCPCS | Mod: CJ

## 2017-09-13 PROCEDURE — 36415 COLL VENOUS BLD VENIPUNCTURE: CPT

## 2017-09-13 RX ORDER — CYANOCOBALAMIN 1000 UG/ML
1000 INJECTION, SOLUTION INTRAMUSCULAR; SUBCUTANEOUS
Status: DISCONTINUED | OUTPATIENT
Start: 2017-09-13 | End: 2017-09-15 | Stop reason: HOSPADM

## 2017-09-13 RX ORDER — LISINOPRIL 20 MG/1
40 TABLET ORAL
Status: DISCONTINUED | OUTPATIENT
Start: 2017-09-14 | End: 2017-09-15 | Stop reason: HOSPADM

## 2017-09-13 RX ORDER — DIPHENHYDRAMINE HCL 25 MG
25 TABLET ORAL NIGHTLY PRN
Status: DISCONTINUED | OUTPATIENT
Start: 2017-09-13 | End: 2017-09-15 | Stop reason: HOSPADM

## 2017-09-13 RX ORDER — LABETALOL HYDROCHLORIDE 5 MG/ML
10 INJECTION, SOLUTION INTRAVENOUS EVERY 6 HOURS PRN
Status: DISCONTINUED | OUTPATIENT
Start: 2017-09-13 | End: 2017-09-15 | Stop reason: HOSPADM

## 2017-09-13 RX ORDER — AMLODIPINE BESYLATE 10 MG/1
10 TABLET ORAL
Status: DISCONTINUED | OUTPATIENT
Start: 2017-09-14 | End: 2017-09-15 | Stop reason: HOSPADM

## 2017-09-13 RX ORDER — FERROUS SULFATE 325(65) MG
325 TABLET ORAL 2 TIMES DAILY WITH MEALS
Status: DISCONTINUED | OUTPATIENT
Start: 2017-09-13 | End: 2017-09-15 | Stop reason: HOSPADM

## 2017-09-13 RX ORDER — LISINOPRIL 20 MG/1
20 TABLET ORAL
Status: DISCONTINUED | OUTPATIENT
Start: 2017-09-13 | End: 2017-09-13

## 2017-09-13 RX ADMIN — ACETAMINOPHEN 650 MG: 325 TABLET, FILM COATED ORAL at 16:53

## 2017-09-13 RX ADMIN — STANDARDIZED SENNA CONCENTRATE AND DOCUSATE SODIUM 2 TABLET: 8.6; 5 TABLET, FILM COATED ORAL at 08:23

## 2017-09-13 RX ADMIN — LEVOTHYROXINE SODIUM 100 MCG: 100 TABLET ORAL at 06:21

## 2017-09-13 RX ADMIN — CYANOCOBALAMIN 1000 MCG: 1000 INJECTION, SOLUTION INTRAMUSCULAR; SUBCUTANEOUS at 17:24

## 2017-09-13 RX ADMIN — AMLODIPINE BESYLATE 5 MG: 5 TABLET ORAL at 08:23

## 2017-09-13 RX ADMIN — LISINOPRIL 20 MG: 20 TABLET ORAL at 12:07

## 2017-09-13 RX ADMIN — Medication 325 MG: at 16:56

## 2017-09-13 RX ADMIN — ACETAMINOPHEN 650 MG: 325 TABLET, FILM COATED ORAL at 07:56

## 2017-09-13 RX ADMIN — POTASSIUM CHLORIDE AND SODIUM CHLORIDE: 900; 150 INJECTION, SOLUTION INTRAVENOUS at 12:07

## 2017-09-13 RX ADMIN — OMEPRAZOLE 20 MG: 20 CAPSULE, DELAYED RELEASE ORAL at 08:23

## 2017-09-13 ASSESSMENT — COGNITIVE AND FUNCTIONAL STATUS - GENERAL
MOBILITY SCORE: 21
CLIMB 3 TO 5 STEPS WITH RAILING: A LOT
TOILETING: A LOT
DAILY ACTIVITIY SCORE: 13
DRESSING REGULAR UPPER BODY CLOTHING: A LOT
EATING MEALS: A LITTLE
TOILETING: A LOT
SUGGESTED CMS G CODE MODIFIER DAILY ACTIVITY: CL
HELP NEEDED FOR BATHING: A LOT
SUGGESTED CMS G CODE MODIFIER MOBILITY: CJ
DRESSING REGULAR UPPER BODY CLOTHING: A LOT
WALKING IN HOSPITAL ROOM: A LITTLE
DRESSING REGULAR LOWER BODY CLOTHING: A LOT
DRESSING REGULAR LOWER BODY CLOTHING: A LOT
EATING MEALS: A LITTLE
HELP NEEDED FOR BATHING: A LOT
PERSONAL GROOMING: A LOT
PERSONAL GROOMING: A LOT
SUGGESTED CMS G CODE MODIFIER DAILY ACTIVITY: CL
DAILY ACTIVITIY SCORE: 13

## 2017-09-13 ASSESSMENT — PAIN SCALES - GENERAL
PAINLEVEL_OUTOF10: 0
PAINLEVEL_OUTOF10: 0

## 2017-09-13 ASSESSMENT — ACTIVITIES OF DAILY LIVING (ADL): TOILETING: REQUIRES ASSIST

## 2017-09-13 ASSESSMENT — LIFESTYLE VARIABLES: DO YOU DRINK ALCOHOL: NO

## 2017-09-13 NOTE — CARE PLAN
Problem: Safety  Goal: Will remain free from falls    Intervention: Implement fall precautions   09/12/17 2749   OTHER   Environmental Precautions Treaded Slipper Socks on Patient   IV Pole on Same Side of Bed as Bathroom Yes

## 2017-09-13 NOTE — CARE PLAN
Problem: Communication  Goal: The ability to communicate needs accurately and effectively will improve    Intervention: Educate patient and significant other/support system about the plan of care, procedures, treatments, medications and allow for questions   09/13/17 1538   OTHER   Pt & Family Have Been Educated on Methods Available to Report Concerns Related to Care, Treatment, Services, and Patient Safety Issues Yes         Problem: Safety  Goal: Will remain free from injury    Intervention: Provide assistance with mobility   09/13/17 1538   OTHER   Assistance / Tolerance * * Assistance;Assistance of One;General Weakness;Tolerates Poorly

## 2017-09-13 NOTE — CARE PLAN
Problem: Communication  Goal: The ability to communicate needs accurately and effectively will improve    Intervention: [place] patient and significant other/support system to call light to alert staff of needs   09/12/17 9236   OTHER   Oriented to: All of the Following : Location of Bathroom, Visiting Policy, Unit Routine, Call Light and Bedside Controls, Bedside Rail Policy, Smoking Policy, Rights and Responsibilities, Bedside Report, and Patient Education Notebook

## 2017-09-13 NOTE — PROGRESS NOTES
Report received from day shift RN and care assumed.  Pt in no acute distress with no complaints.  Denies pain.  Head to toe assessment done.  Bed is in lowest position, non-skid socks on, side rails up x 2, and call light is in reach. Hourly rounding.  Will continue to monitor

## 2017-09-13 NOTE — THERAPY
"Occupational Therapy Evaluation completed.   Functional Status:  Mod A  Plan of Care: Will benefit from Occupational Therapy 3 times per week  Discharge Recommendations:  Equipment: Will Continue to Assess for Equipment Needs. Post-acute therapy Discharge to a transitional care facility for continued skilled therapy services.    Patient presents hypokalemia, AMS, and recent falls complicated by h/o dementia. Patient, per chart, lives with sister who is also FT caregiver. At this time, sister -per chart- declines FT caregiver role upon DC. Patient, PLOF, A with ADLs and S with mobility. Patient, upon eval, presents pleasantly confused and alert, decreased balance, endurance, tolerance, and ADL participation necessitating Mod A ADLs and min A mobility with FWW. Patient would benefit from skilled OT in this setting followed by rehab prior to DC with 24 hr s/a.     See \"Rehab Therapy-Acute\" Patient Summary Report for complete documentation.    "

## 2017-09-13 NOTE — DISCHARGE PLANNING
Transitional Care Navigator:    Met with pt and pt's sister at bedside to discuss transitional care services. Pt and sister are agreeable to SNF placement. Per sister, pt needs to get stronger to be back home. Choice is [facility] as FIRST choice and [facility] as SECOND choice. Choice form completed and faxed to [facility]. TCN to follow as needed.

## 2017-09-13 NOTE — PROGRESS NOTES
Hospital Medicine Interval Note  Date of Service:  9/13/2017    Chief Complaint/Summary  88 y.o.-year-old female admitted 9/11/2017 with AMS and failure to thrive with recurrent GLF.  Found to be hypokalemic with mild Trop elevation.  Patient was treated with IVFs while neurological workup was performed.  On review, patient had borderline Vit B12 deficiency and subclinical hypothyroidism and therefore given dose of B12 injection and started on oral Synthroid.  Patient's potassium is being corrected orally.  CT head revealed chronic ischemic changes and MRI is currently pending.  Patient also with accelerated hypertension but controlled with oral meds Lisinopril and Amlodipine.  Patient most likely has dementia.  Sister states that she is unable to take care of patient.  SNF referral has been sent.  Palliative Care consult has been placed.    Interval Problem Update  Pt family at bedside and stated that patient's mentation has not returned to baseline.  Patient c/o back pain from sitting in chair.    Consultants/Specialty  Palliative Care     Disposition  TBD     Review of Systems   Unable to perform ROS: Mental acuity      Physical Exam Laboratory/Imaging   Vitals:    09/13/17 0210 09/13/17 0400 09/13/17 0800 09/13/17 1139   BP: 154/89 (!) 190/82 (!) 201/91 (!) 194/78   Pulse:  83 76 71   Resp:  19 16 16   Temp:  36.7 °C (98.1 °F) 36.9 °C (98.4 °F) 36.6 °C (97.9 °F)   SpO2:  95% 96% 93%   Weight:    46.9 kg (103 lb 6.3 oz)   Height:         Physical Exam   Constitutional:   frail   HENT:   Head: Normocephalic and atraumatic.   Right Ear: External ear normal.   Left Ear: External ear normal.   Mouth/Throat: No oropharyngeal exudate.   Eyes: Conjunctivae and EOM are normal. Pupils are equal, round, and reactive to light. No scleral icterus.   Neck: Normal range of motion. Neck supple.   Cardiovascular: Exam reveals no gallop and no friction rub.    No murmur heard.  Pulmonary/Chest: Effort normal and breath sounds  normal.   Abdominal: Soft. Bowel sounds are normal.   Musculoskeletal: Normal range of motion.   Neurological: She is alert.   Skin: Skin is warm and dry.   Nursing note and vitals reviewed.   Lab Results   Component Value Date/Time    WBC 7.0 09/13/2017 12:33 AM    HEMOGLOBIN 10.2 (L) 09/13/2017 12:33 AM    HEMATOCRIT 34.1 (L) 09/13/2017 12:33 AM    PLATELETCT 276 09/13/2017 12:33 AM     Lab Results   Component Value Date/Time    SODIUM 135 09/13/2017 12:33 AM    POTASSIUM 3.5 (L) 09/13/2017 12:33 AM    CHLORIDE 103 09/13/2017 12:33 AM    CO2 24 09/13/2017 12:33 AM    GLUCOSE 110 (H) 09/13/2017 12:33 AM    BUN 11 09/13/2017 12:33 AM    CREATININE 0.52 09/13/2017 12:33 AM      Assessment/Plan    * Acute metabolic encephalopathy   Assessment & Plan    - possibly multifactorial  - hypokalemic with evidence of dehydration, dementia, and FTT  - see mgmt below for details.         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)   Assessment & Plan    - stable now after extensive endoscopic workup on previous admission  - with iron deficiency anemia; starting oral iron supplementation  - Hgb stable        Elevated troponin   Assessment & Plan    - no EKG changes suggestive  - trop stable now  - holding antiplt therapy due to hx of GIB        Accelerated hypertension   Assessment & Plan    - still significantly elevated on Amlodipine  - increased dose of Amlodipine and adding Lisinopril  - cont monitoring        Dementia   Assessment & Plan    - with failure to thrive  - MRI pending; B12 borderline low, starting B12 supplementation  - cont Synthroid  - PT/OT eval appreciated; SNF referral placed  - Pall Care recs appreciated  - code status switched to DNR        Hypothyroidism- (present on admission)   Assessment & Plan    - cont Synthroid           Quality-Core Measures

## 2017-09-13 NOTE — DISCHARGE PLANNING
Received call from Cheri with [facility] accepting pending bed and auth.  Notified FLORECITA Louis via phone.

## 2017-09-13 NOTE — PALLIATIVE CARE
PALLIATIVE CARE FOLLOW-UP:    Spoke via [facility] with both pt DPOA-HC - Migue and Ariella Rider (best # is Ariella's cell [phone]).  Migue and Ariella agree to pt d/c to SNF for rehab.  They are hopeful that this somewhat acute (past month) change in pt's mentation will improve, but verbalized understanding that if placement to a memory care facility would be private pay.     Ariella will email pt's AD paperwork.  She believes it states pt is a DNR but she also agrees with pt's sister that pt would want to be DNR.    Thank you for allowing Palliative Care to support this pt and her DPOA/family.  Contact [phone] for additional assistance, questions or concerns.

## 2017-09-13 NOTE — PALLIATIVE CARE
PALLIATIVE CARE FOLLOW-UP:    Again attempted to call pt's DPOA-HC, per pt's sister Ellie, Migue Rider ([phone], [phone]) -- left messages to contact PC office.  Also left message on Ellie's phone for her to reach out to Migue to return our call.    Plan:  Discuss POC with DPOA-HC    Thank you for allowing Palliative Care to support this pt.  Contact [phone] for additional assistance, questions or concerns.

## 2017-09-13 NOTE — CARE PLAN
Problem: Communication  Goal: The ability to communicate needs accurately and effectively will improve    Intervention: Reorient patient to environment as needed   09/13/17 0458   OTHER   Oriented to: All of the Following : Location of Bathroom, Visiting Policy, Unit Routine, Call Light and Bedside Controls, Bedside Rail Policy, Smoking Policy, Rights and Responsibilities, Bedside Report, and Patient Education Notebook

## 2017-09-14 ENCOUNTER — APPOINTMENT (OUTPATIENT)
Dept: RADIOLOGY | Facility: MEDICAL CENTER | Age: 82
End: 2017-09-14
Attending: INTERNAL MEDICINE
Payer: MEDICARE

## 2017-09-14 LAB
ANION GAP SERPL CALC-SCNC: 8 MMOL/L (ref 0–11.9)
BASOPHILS # BLD AUTO: 0.4 % (ref 0–1.8)
BASOPHILS # BLD: 0.02 K/UL (ref 0–0.12)
BUN SERPL-MCNC: 8 MG/DL (ref 8–22)
CALCIUM SERPL-MCNC: 9.1 MG/DL (ref 8.5–10.5)
CHLORIDE SERPL-SCNC: 104 MMOL/L (ref 96–112)
CO2 SERPL-SCNC: 24 MMOL/L (ref 20–33)
CREAT SERPL-MCNC: 0.46 MG/DL (ref 0.5–1.4)
EOSINOPHIL # BLD AUTO: 0.08 K/UL (ref 0–0.51)
EOSINOPHIL NFR BLD: 1.8 % (ref 0–6.9)
ERYTHROCYTE [DISTWIDTH] IN BLOOD BY AUTOMATED COUNT: 79.2 FL (ref 35.9–50)
GFR SERPL CREATININE-BSD FRML MDRD: >60 ML/MIN/1.73 M 2
GLUCOSE SERPL-MCNC: 89 MG/DL (ref 65–99)
HCT VFR BLD AUTO: 34.5 % (ref 37–47)
HGB BLD-MCNC: 10.5 G/DL (ref 12–16)
IMM GRANULOCYTES # BLD AUTO: 0.02 K/UL (ref 0–0.11)
IMM GRANULOCYTES NFR BLD AUTO: 0.4 % (ref 0–0.9)
LYMPHOCYTES # BLD AUTO: 0.8 K/UL (ref 1–4.8)
LYMPHOCYTES NFR BLD: 17.7 % (ref 22–41)
MCH RBC QN AUTO: 24 PG (ref 27–33)
MCHC RBC AUTO-ENTMCNC: 30.4 G/DL (ref 33.6–35)
MCV RBC AUTO: 78.9 FL (ref 81.4–97.8)
MONOCYTES # BLD AUTO: 0.46 K/UL (ref 0–0.85)
MONOCYTES NFR BLD AUTO: 10.2 % (ref 0–13.4)
NEUTROPHILS # BLD AUTO: 3.13 K/UL (ref 2–7.15)
NEUTROPHILS NFR BLD: 69.5 % (ref 44–72)
NRBC # BLD AUTO: 0 K/UL
NRBC BLD AUTO-RTO: 0 /100 WBC
PLATELET # BLD AUTO: 303 K/UL (ref 164–446)
PMV BLD AUTO: 9.1 FL (ref 9–12.9)
POTASSIUM SERPL-SCNC: 3.4 MMOL/L (ref 3.6–5.5)
RBC # BLD AUTO: 4.37 M/UL (ref 4.2–5.4)
SODIUM SERPL-SCNC: 136 MMOL/L (ref 135–145)
WBC # BLD AUTO: 4.5 K/UL (ref 4.8–10.8)

## 2017-09-14 PROCEDURE — 96375 TX/PRO/DX INJ NEW DRUG ADDON: CPT

## 2017-09-14 PROCEDURE — 99225 PR SUBSEQUENT OBSERVATION CARE,LEVEL II: CPT | Performed by: INTERNAL MEDICINE

## 2017-09-14 PROCEDURE — 36415 COLL VENOUS BLD VENIPUNCTURE: CPT

## 2017-09-14 PROCEDURE — 70551 MRI BRAIN STEM W/O DYE: CPT

## 2017-09-14 PROCEDURE — 700102 HCHG RX REV CODE 250 W/ 637 OVERRIDE(OP): Performed by: INTERNAL MEDICINE

## 2017-09-14 PROCEDURE — 700111 HCHG RX REV CODE 636 W/ 250 OVERRIDE (IP): Performed by: INTERNAL MEDICINE

## 2017-09-14 PROCEDURE — G0378 HOSPITAL OBSERVATION PER HR: HCPCS

## 2017-09-14 PROCEDURE — 700101 HCHG RX REV CODE 250: Performed by: NURSE PRACTITIONER

## 2017-09-14 PROCEDURE — A9270 NON-COVERED ITEM OR SERVICE: HCPCS | Performed by: INTERNAL MEDICINE

## 2017-09-14 PROCEDURE — 700102 HCHG RX REV CODE 250 W/ 637 OVERRIDE(OP): Performed by: HOSPITALIST

## 2017-09-14 PROCEDURE — 700101 HCHG RX REV CODE 250: Performed by: HOSPITALIST

## 2017-09-14 PROCEDURE — A9270 NON-COVERED ITEM OR SERVICE: HCPCS | Performed by: HOSPITALIST

## 2017-09-14 PROCEDURE — 80048 BASIC METABOLIC PNL TOTAL CA: CPT

## 2017-09-14 PROCEDURE — 85025 COMPLETE CBC W/AUTO DIFF WBC: CPT

## 2017-09-14 RX ORDER — HYDROCHLOROTHIAZIDE 25 MG/1
25 TABLET ORAL
Status: DISCONTINUED | OUTPATIENT
Start: 2017-09-14 | End: 2017-09-15 | Stop reason: HOSPADM

## 2017-09-14 RX ORDER — LORAZEPAM 2 MG/ML
1 INJECTION INTRAMUSCULAR ONCE
Status: DISCONTINUED | OUTPATIENT
Start: 2017-09-14 | End: 2017-09-14

## 2017-09-14 RX ORDER — HYDRALAZINE HYDROCHLORIDE 20 MG/ML
20 INJECTION INTRAMUSCULAR; INTRAVENOUS EVERY 4 HOURS PRN
Status: DISCONTINUED | OUTPATIENT
Start: 2017-09-14 | End: 2017-09-15 | Stop reason: HOSPADM

## 2017-09-14 RX ORDER — LORAZEPAM 2 MG/ML
1 INJECTION INTRAMUSCULAR
Status: COMPLETED | OUTPATIENT
Start: 2017-09-14 | End: 2017-09-14

## 2017-09-14 RX ADMIN — LORAZEPAM 1 MG: 2 INJECTION INTRAMUSCULAR; INTRAVENOUS at 18:25

## 2017-09-14 RX ADMIN — STANDARDIZED SENNA CONCENTRATE AND DOCUSATE SODIUM 2 TABLET: 8.6; 5 TABLET, FILM COATED ORAL at 08:29

## 2017-09-14 RX ADMIN — HYDROCHLOROTHIAZIDE 25 MG: 25 TABLET ORAL at 12:04

## 2017-09-14 RX ADMIN — AMLODIPINE BESYLATE 10 MG: 10 TABLET ORAL at 08:30

## 2017-09-14 RX ADMIN — POTASSIUM CHLORIDE AND SODIUM CHLORIDE: 900; 150 INJECTION, SOLUTION INTRAVENOUS at 21:25

## 2017-09-14 RX ADMIN — LEVOTHYROXINE SODIUM 100 MCG: 100 TABLET ORAL at 07:29

## 2017-09-14 RX ADMIN — LABETALOL HYDROCHLORIDE 10 MG: 5 INJECTION, SOLUTION INTRAVENOUS at 03:44

## 2017-09-14 RX ADMIN — LISINOPRIL 40 MG: 20 TABLET ORAL at 08:30

## 2017-09-14 RX ADMIN — POTASSIUM CHLORIDE AND SODIUM CHLORIDE: 900; 150 INJECTION, SOLUTION INTRAVENOUS at 07:29

## 2017-09-14 RX ADMIN — STANDARDIZED SENNA CONCENTRATE AND DOCUSATE SODIUM 2 TABLET: 8.6; 5 TABLET, FILM COATED ORAL at 21:30

## 2017-09-14 RX ADMIN — Medication 325 MG: at 17:14

## 2017-09-14 RX ADMIN — OMEPRAZOLE 20 MG: 20 CAPSULE, DELAYED RELEASE ORAL at 08:29

## 2017-09-14 RX ADMIN — OMEPRAZOLE 20 MG: 20 CAPSULE, DELAYED RELEASE ORAL at 21:30

## 2017-09-14 RX ADMIN — Medication 325 MG: at 07:29

## 2017-09-14 ASSESSMENT — LIFESTYLE VARIABLES: DO YOU DRINK ALCOHOL: NO

## 2017-09-14 ASSESSMENT — PAIN SCALES - GENERAL
PAINLEVEL_OUTOF10: 0

## 2017-09-14 NOTE — PROGRESS NOTES
Received report and assumed care of patient. Patient is alert and oriented to self only requesting to go home. Patient is in no signs of distress. Sitting up eating dinner.Patient was updated on the plan of care for the day. Call light within reach, bed in low position, 2 side rails up. All fall precautions in place. Will continue to monitor.

## 2017-09-14 NOTE — PROGRESS NOTES
Bedside report received, pt laying in bed, alert and awake, AMS, call light within reach, pt is medical so no heart monitor, bed alarm intact, no signs of distress, IV fluids infusing as ordered, denies needs.

## 2017-09-14 NOTE — PROGRESS NOTES
Pt sitting up in chair, alert and awake, no signs of distress, IV fluids infusing as ordered, pt denies needs, pain scale as charted, pt is medical so no heart monitor in place, call light within reach, chair alarm intact.

## 2017-09-14 NOTE — CARE PLAN
Problem: Nutritional:  Goal: Achieve adequate nutritional intake  Patient will consume 50% of meals   Outcome: NOT MET  Pt would like to try boost in addition to her magic cups. RD following

## 2017-09-14 NOTE — PROGRESS NOTES
Hospital Medicine Interval Note  Date of Service:  9/14/2017    Chief Complaint/Summary  88 y.o.-year-old female admitted 9/11/2017 with AMS and failure to thrive with recurrent GLF.  Found to be hypokalemic with mild Trop elevation.  Patient was treated with IVFs while neurological workup was performed.  On review, patient had borderline Vit B12 deficiency and subclinical hypothyroidism and therefore given dose of B12 injection and started on oral Synthroid.  Patient's potassium is being corrected orally.  CT head revealed chronic ischemic changes and MRI is currently pending.  Patient also with accelerated hypertension but controlled with oral meds Lisinopril and Amlodipine.  Patient most likely has dementia.  Sister states that she is unable to take care of patient.  SNF referral has been sent.  Palliative Care consult has been placed.    Interval Problem Update  Pt mentation unclear/altered.    Consultants/Specialty  Palliative Care     Disposition  TBD     Review of Systems   Unable to perform ROS: Mental acuity      Physical Exam Laboratory/Imaging   Vitals:    09/13/17 2000 09/14/17 0400 09/14/17 0800 09/14/17 1117   BP: (!) 187/98 (!) 191/85 160/59 122/63   Pulse: 83 75 68 72   Resp: 18 18 15 18   Temp: 36.4 °C (97.6 °F) 36.7 °C (98 °F) 36.2 °C (97.2 °F) 36.3 °C (97.4 °F)   SpO2: 92% 96% 92% 97%   Weight: 46.3 kg (102 lb 1.2 oz)      Height:         Physical Exam   Constitutional:   frail   HENT:   Head: Normocephalic and atraumatic.   Right Ear: External ear normal.   Left Ear: External ear normal.   Mouth/Throat: No oropharyngeal exudate.   Eyes: Conjunctivae and EOM are normal. Pupils are equal, round, and reactive to light. No scleral icterus.   Neck: Normal range of motion. Neck supple.   Cardiovascular: Exam reveals no gallop and no friction rub.    No murmur heard.  Pulmonary/Chest: Effort normal and breath sounds normal.   Abdominal: Soft. Bowel sounds are normal.   Musculoskeletal: Normal range of  motion.   Neurological: She is alert.   Skin: Skin is warm and dry.   Nursing note and vitals reviewed.   Lab Results   Component Value Date/Time    WBC 4.5 (L) 09/14/2017 02:23 AM    HEMOGLOBIN 10.5 (L) 09/14/2017 02:23 AM    HEMATOCRIT 34.5 (L) 09/14/2017 02:23 AM    PLATELETCT 303 09/14/2017 02:23 AM     Lab Results   Component Value Date/Time    SODIUM 136 09/14/2017 02:22 AM    POTASSIUM 3.4 (L) 09/14/2017 02:22 AM    CHLORIDE 104 09/14/2017 02:22 AM    CO2 24 09/14/2017 02:22 AM    GLUCOSE 89 09/14/2017 02:22 AM    BUN 8 09/14/2017 02:22 AM    CREATININE 0.46 (L) 09/14/2017 02:22 AM      Assessment/Plan    * Acute metabolic encephalopathy   Assessment & Plan    - possibly multifactorial  - hypokalemic with evidence of dehydration, dementia, and FTT  - see mgmt below for details.         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)   Assessment & Plan    - stable now after extensive endoscopic workup on previous admission  - with iron deficiency anemia; starting oral iron supplementation  - Hgb stable        Elevated troponin   Assessment & Plan    - no EKG changes suggestive  - trop stable now  - holding antiplt therapy due to hx of GIB        Accelerated hypertension   Assessment & Plan    - still significantly elevated on Amlodipine and Lisinopril  - adding HCTZ  - cont monitoring        Dementia   Assessment & Plan    - with failure to thrive  - MRI pending; B12 borderline low, starting B12 supplementation  - cont Synthroid  - PT/OT eval appreciated; SNF referral placed  - Pall Care recs appreciated  - code status switched to DNR        Hypothyroidism- (present on admission)   Assessment & Plan    - cont Synthroid           Quality-Core Measures

## 2017-09-14 NOTE — PROGRESS NOTES
Pt alert and awake sitting up in chair, chair alarm intact, pt is medical, call light within reach, no signs of distress, denies needs, increasingly AMS.

## 2017-09-15 VITALS
TEMPERATURE: 97.2 F | HEART RATE: 78 BPM | OXYGEN SATURATION: 97 % | HEIGHT: 64 IN | DIASTOLIC BLOOD PRESSURE: 57 MMHG | SYSTOLIC BLOOD PRESSURE: 146 MMHG | WEIGHT: 102.07 LBS | BODY MASS INDEX: 17.43 KG/M2 | RESPIRATION RATE: 17 BRPM

## 2017-09-15 LAB
ANION GAP SERPL CALC-SCNC: 7 MMOL/L (ref 0–11.9)
BASOPHILS # BLD AUTO: 0.5 % (ref 0–1.8)
BASOPHILS # BLD: 0.02 K/UL (ref 0–0.12)
BUN SERPL-MCNC: 9 MG/DL (ref 8–22)
CALCIUM SERPL-MCNC: 9.5 MG/DL (ref 8.5–10.5)
CHLORIDE SERPL-SCNC: 102 MMOL/L (ref 96–112)
CO2 SERPL-SCNC: 26 MMOL/L (ref 20–33)
CREAT SERPL-MCNC: 0.51 MG/DL (ref 0.5–1.4)
EOSINOPHIL # BLD AUTO: 0.08 K/UL (ref 0–0.51)
EOSINOPHIL NFR BLD: 2 % (ref 0–6.9)
ERYTHROCYTE [DISTWIDTH] IN BLOOD BY AUTOMATED COUNT: 81.5 FL (ref 35.9–50)
GFR SERPL CREATININE-BSD FRML MDRD: >60 ML/MIN/1.73 M 2
GLUCOSE SERPL-MCNC: 84 MG/DL (ref 65–99)
HCT VFR BLD AUTO: 35.4 % (ref 37–47)
HGB BLD-MCNC: 10.8 G/DL (ref 12–16)
IMM GRANULOCYTES # BLD AUTO: 0.02 K/UL (ref 0–0.11)
IMM GRANULOCYTES NFR BLD AUTO: 0.5 % (ref 0–0.9)
LYMPHOCYTES # BLD AUTO: 0.94 K/UL (ref 1–4.8)
LYMPHOCYTES NFR BLD: 23.6 % (ref 22–41)
MCH RBC QN AUTO: 24.2 PG (ref 27–33)
MCHC RBC AUTO-ENTMCNC: 30.5 G/DL (ref 33.6–35)
MCV RBC AUTO: 79.4 FL (ref 81.4–97.8)
MONOCYTES # BLD AUTO: 0.47 K/UL (ref 0–0.85)
MONOCYTES NFR BLD AUTO: 11.8 % (ref 0–13.4)
NEUTROPHILS # BLD AUTO: 2.46 K/UL (ref 2–7.15)
NEUTROPHILS NFR BLD: 61.6 % (ref 44–72)
NRBC # BLD AUTO: 0 K/UL
NRBC BLD AUTO-RTO: 0 /100 WBC
PLATELET # BLD AUTO: 312 K/UL (ref 164–446)
PMV BLD AUTO: 8.6 FL (ref 9–12.9)
POTASSIUM SERPL-SCNC: 3.1 MMOL/L (ref 3.6–5.5)
RBC # BLD AUTO: 4.46 M/UL (ref 4.2–5.4)
SODIUM SERPL-SCNC: 135 MMOL/L (ref 135–145)
WBC # BLD AUTO: 4 K/UL (ref 4.8–10.8)

## 2017-09-15 PROCEDURE — 90471 IMMUNIZATION ADMIN: CPT

## 2017-09-15 PROCEDURE — A9270 NON-COVERED ITEM OR SERVICE: HCPCS | Performed by: HOSPITALIST

## 2017-09-15 PROCEDURE — A9270 NON-COVERED ITEM OR SERVICE: HCPCS | Performed by: INTERNAL MEDICINE

## 2017-09-15 PROCEDURE — 90662 IIV NO PRSV INCREASED AG IM: CPT | Performed by: HOSPITALIST

## 2017-09-15 PROCEDURE — 700111 HCHG RX REV CODE 636 W/ 250 OVERRIDE (IP): Performed by: HOSPITALIST

## 2017-09-15 PROCEDURE — 700111 HCHG RX REV CODE 636 W/ 250 OVERRIDE (IP): Performed by: INTERNAL MEDICINE

## 2017-09-15 PROCEDURE — 99217 PR OBSERVATION CARE DISCHARGE: CPT | Performed by: INTERNAL MEDICINE

## 2017-09-15 PROCEDURE — 85025 COMPLETE CBC W/AUTO DIFF WBC: CPT

## 2017-09-15 PROCEDURE — 700102 HCHG RX REV CODE 250 W/ 637 OVERRIDE(OP): Performed by: INTERNAL MEDICINE

## 2017-09-15 PROCEDURE — 96376 TX/PRO/DX INJ SAME DRUG ADON: CPT

## 2017-09-15 PROCEDURE — 700101 HCHG RX REV CODE 250: Performed by: NURSE PRACTITIONER

## 2017-09-15 PROCEDURE — G0378 HOSPITAL OBSERVATION PER HR: HCPCS

## 2017-09-15 PROCEDURE — 700102 HCHG RX REV CODE 250 W/ 637 OVERRIDE(OP): Performed by: HOSPITALIST

## 2017-09-15 PROCEDURE — 90670 PCV13 VACCINE IM: CPT | Performed by: INTERNAL MEDICINE

## 2017-09-15 PROCEDURE — 36415 COLL VENOUS BLD VENIPUNCTURE: CPT

## 2017-09-15 PROCEDURE — 80048 BASIC METABOLIC PNL TOTAL CA: CPT

## 2017-09-15 PROCEDURE — 700101 HCHG RX REV CODE 250: Performed by: INTERNAL MEDICINE

## 2017-09-15 RX ORDER — LIDOCAINE 50 MG/G
1 PATCH TOPICAL EVERY 24 HOURS
Status: DISCONTINUED | OUTPATIENT
Start: 2017-09-15 | End: 2017-09-15 | Stop reason: HOSPADM

## 2017-09-15 RX ORDER — CARVEDILOL 12.5 MG/1
12.5 TABLET ORAL 2 TIMES DAILY WITH MEALS
Status: DISCONTINUED | OUTPATIENT
Start: 2017-09-15 | End: 2017-09-15 | Stop reason: HOSPADM

## 2017-09-15 RX ORDER — CARVEDILOL 12.5 MG/1
12.5 TABLET ORAL 2 TIMES DAILY WITH MEALS
Qty: 60 TAB | Refills: 1
Start: 2017-09-15 | End: 2019-07-01 | Stop reason: SDUPTHER

## 2017-09-15 RX ORDER — LISINOPRIL 40 MG/1
40 TABLET ORAL DAILY
Qty: 30 TAB | Refills: 1
Start: 2017-09-15 | End: 2018-03-07

## 2017-09-15 RX ORDER — HYDROCHLOROTHIAZIDE 25 MG/1
25 TABLET ORAL DAILY
Qty: 30 TAB | Refills: 1
Start: 2017-09-15 | End: 2018-03-07

## 2017-09-15 RX ORDER — AMLODIPINE BESYLATE 10 MG/1
10 TABLET ORAL DAILY
Qty: 30 TAB | Refills: 1
Start: 2017-09-15 | End: 2018-03-07

## 2017-09-15 RX ORDER — FERROUS SULFATE 325(65) MG
325 TABLET ORAL 2 TIMES DAILY WITH MEALS
Qty: 30 TAB | Refills: 1
Start: 2017-09-15 | End: 2018-03-07

## 2017-09-15 RX ORDER — LIDOCAINE 50 MG/G
1 PATCH TOPICAL EVERY 24 HOURS
Qty: 10 PATCH
Start: 2017-09-15 | End: 2018-03-07

## 2017-09-15 RX ADMIN — INFLUENZA A VIRUSA/MICHIGAN/45/2015 X-275 (H1N1) ANTIGEN (FORMALDEHYDE INACTIVATED), INFLUENZA A VIRUS A/HONG KONG/4801/2014 X-263B (H3N2) ANTIGEN (FORMALDEHYDE INACTIVATED), AND INFLUENZA B VIRUS B/BRISBANE/60/2008 ANTIGEN (FORMALDEHYDE INACTIVATED) 0.5 ML: 60; 60; 60 INJECTION, SUSPENSION INTRAMUSCULAR at 14:47

## 2017-09-15 RX ADMIN — PNEUMOCOCCAL 13-VALENT CONJUGATE VACCINE 0.5 ML: 2.2; 2.2; 2.2; 2.2; 2.2; 4.4; 2.2; 2.2; 2.2; 2.2; 2.2; 2.2; 2.2 INJECTION, SUSPENSION INTRAMUSCULAR at 14:50

## 2017-09-15 RX ADMIN — LIDOCAINE 1 PATCH: 50 PATCH TOPICAL at 12:09

## 2017-09-15 RX ADMIN — LEVOTHYROXINE SODIUM 100 MCG: 100 TABLET ORAL at 05:32

## 2017-09-15 RX ADMIN — LABETALOL HYDROCHLORIDE 10 MG: 5 INJECTION, SOLUTION INTRAVENOUS at 10:41

## 2017-09-15 RX ADMIN — HYDROCHLOROTHIAZIDE 25 MG: 25 TABLET ORAL at 09:00

## 2017-09-15 RX ADMIN — LISINOPRIL 40 MG: 20 TABLET ORAL at 08:13

## 2017-09-15 RX ADMIN — Medication 325 MG: at 08:13

## 2017-09-15 RX ADMIN — AMLODIPINE BESYLATE 10 MG: 10 TABLET ORAL at 08:13

## 2017-09-15 RX ADMIN — OMEPRAZOLE 20 MG: 20 CAPSULE, DELAYED RELEASE ORAL at 08:13

## 2017-09-15 RX ADMIN — CARVEDILOL 12.5 MG: 12.5 TABLET, FILM COATED ORAL at 12:09

## 2017-09-15 ASSESSMENT — PAIN SCALES - GENERAL: PAINLEVEL_OUTOF10: ASSUMED PAIN PRESENT

## 2017-09-15 NOTE — THERAPY
Orders received and verified with RN. Per RN patient to discharge to SNF this PM and cognitive evaluation does not appear warranted given reported baseline history of dementia. RN also reporting no indication of need for swallow evaluation at this time. RN to page SLP if there is a change and need for evaluation is indicated. Thank you.

## 2017-09-15 NOTE — PROGRESS NOTES
2 person skin assessment done by RN and CRN, pt skin clean dry and intact, fragile and noted large bruising on back on right arm and also under arm on her back. Will take photo and consult wound care.

## 2017-09-15 NOTE — PALLIATIVE CARE
PALLIATIVE CARE FOLLOW UP:  Received call from Ariella asking if the SNF patient is going to is on [address] and if there was an estimated time of departure. All questions answered.     Thank you for allowing Palliative Care to follow this patient. Please contact us at  with any questions.

## 2017-09-15 NOTE — PROGRESS NOTES
Report to day shift RN to follow up with wound care consult if need, pt does not have wound and bruising only, pt left in stable condition, all safety measures in place.

## 2017-09-15 NOTE — CARE PLAN
Problem: Safety  Goal: Will remain free from injury  Outcome: PROGRESSING AS EXPECTED  Pt stable, all safety measures in place during PM shift, no injuries or falls on night shift, pt has bed alarm on, care released to day shift staff

## 2017-09-15 NOTE — PALLIATIVE CARE
Palliative Care follow-up  Received call from Ariella asking about discharge, PC RN will have FLORECITA call her once planning is complete. PC RN asked about AD, Ariella emailed a copy to Arianna Thursday morning.      Updated:   Left msg for Milton BERNABE to return call.     Plan:   Discharge to [facility] once accepted.     Thank you for allowing Palliative Care to participate in this patient's care. Please feel free to call [phone] with any questions or concerns.

## 2017-09-15 NOTE — CARE PLAN
Problem: Safety  Goal: Will remain free from falls  Outcome: PROGRESSING AS EXPECTED  Pt. With hx of fall. Bed alarm ON, bed kept low and locked, call light within reach, assisted as necessary.    Problem: Skin Integrity  Goal: Risk for impaired skin integrity will decrease  Outcome: PROGRESSING AS EXPECTED  Pt. Incontinent. Skin kept clean and dry at all times, barrier cream in use.

## 2017-09-15 NOTE — DISCHARGE PLANNING
Transport arranged with Lorenzo. Patient will be leaving today @ 1630 via the [facility] van going to [facility]. FLORECITA Rose notified.

## 2017-09-15 NOTE — PROGRESS NOTES
Pt back from MRI , stable, confused, inconvenient, bedding changed, POC discussed, no learning evident althought pt is pleasant and cooperative, denies pain or problems, no distress noted, safety measures in place including bed alarm, stable, hourly rounding, care assumed.

## 2017-09-15 NOTE — DISCHARGE SUMMARY
CHIEF COMPLAINT ON ADMISSION  Chief Complaint   Patient presents with   • ALOC     per EMS, pt more confused than normal   • Other     per EMS, sister unable to take care of pt       CODE STATUS  DNR    HPI & HOSPITAL COURSE  This is a 88 y.o. female With a history of advanced dementia, physical debility, history of gastric AVMs and a history of anemia, on Prilosec hypertension here with increased confusion and falls. She lives with her sister however has been having increasing needs that are too much for her. She has not been eating or drinking well and has been seemingly unsteady with her gait. The patient was complaining of right rib cage pain and was noted to have an ecchymosis on this area however no rib fractures were identified on chest x-ray. She underwent an MRI of the brain that was unremarkable aside from atrophy, no evidence of acute CVA.  She had an echocardiogram that was done that revealed a normal EF and only grade 1 diastolic dysfunction. She was noted to have an elevated RVSP of 65, however the patient was not requiring oxygen. Her blood pressure was elevated and she was resumed on her previous home medications with only moderate control. An additional blood pressure medication was added and her pressures remained elevated however she was not symptomatic.    She was evaluated for skilled nursing facility in order to have her outpatient needs met more safely and was accepted to [facility]. Her blood pressure was still not at goal however was safe to be titrated further as an outpatient. She had no evidence of GI bleeding throughout her hospital stay she was continued on oral iron replacement and Prilosec.    She had mild hypokalemia that was replaced. Her mood remains stable and she did remain a poor historian due to dementia however had no evidence of agitation.    Therefore, she is discharged in fair and stable condition with close outpatient follow-up.    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 *      FOLLOW UP  Future Appointments  Date Time Provider Department Center   9/16/2017 8:00 AM LAB [facility] None     No follow-up provider specified.    MEDICATIONS ON DISCHARGE   Lamar Dangelo   Home Medication Instructions DANII:[id number]    Printed on:09/15/17 2051   Medication Information                      amlodipine (NORVASC) 10 MG Tab  Take 1 Tab by mouth every day.             carvedilol (COREG) 12.5 MG Tab  Take 1 Tab by mouth 2 times a day, with meals.             ferrous sulfate 325 (65 Fe) MG tablet  Take 1 Tab by mouth 2 times a day, with meals.             hydrochlorothiazide (HYDRODIURIL) 25 MG Tab  Take 1 Tab by mouth every day.             lidocaine (LIDODERM) 5 % Patch  Apply 1 Patch to skin as directed every 24 hours.             lisinopril (PRINIVIL, ZESTRIL) 40 MG tablet  Take 1 Tab by mouth every day.             omeprazole (PRILOSEC) 20 MG delayed-release capsule  Take 1 Cap by mouth 2 Times a Day for 28 days.             potassium bicarbonate (KLYTE) 25 MEQ tablet  Take 1 Tab by mouth every day.                 DIET  Orders Placed This Encounter   Procedures   • Diet Order     Standing Status:   Standing     Number of Occurrences:   1     Order Specific Question:   Diet:     Answer:   Regular [1]       ACTIVITY  As tolerated.  Weight bearing as tolerated      CONSULTATIONS  None    PROCEDURES  None    LABORATORY  Lab Results   Component Value Date/Time    SODIUM 135 09/15/2017 03:46 AM    POTASSIUM 3.1 (L) 09/15/2017 03:46 AM    CHLORIDE 102 09/15/2017 03:46 AM    CO2  26 09/15/2017 03:46 AM    GLUCOSE 84 09/15/2017 03:46 AM    BUN 9 09/15/2017 03:46 AM    CREATININE 0.51 09/15/2017 03:46 AM        Lab Results   Component Value Date/Time    WBC 4.0 (L) 09/15/2017 03:46 AM    HEMOGLOBIN 10.8 (L) 09/15/2017 03:46 AM    HEMATOCRIT 35.4 (L) 09/15/2017 03:46 AM    PLATELETCT 312 09/15/2017 03:46 AM        Total time of the discharge process exceeds 38 minutes

## 2017-09-15 NOTE — DISCHARGE INSTRUCTIONS
Discharge Instructions    Discharged to other by [facility] with escort. Discharged via wheelchair, hospital escort: Yes.  Special equipment needed: Not Applicable    Be sure to schedule a follow-up appointment with your primary care doctor or any specialists as instructed.     Discharge Plan:   Diet Plan: Discussed (Regular)  Activity Level: Discussed (Activity as Tolerated)  Confirmed Follow up Appointment: No (Comments) (D/C to SNF)  Confirmed Symptoms Management: Discussed  Medication Reconciliation Updated: Yes  Pneumococcal Vaccine Given - only chart on this line when given: Given (See MAR)  Influenza Vaccine Indication: Indicated: 65 years and older  Influenza Vaccine Given - only chart on this line when given: Influenza Vaccine Given (See MAR)    I understand that a diet low in cholesterol, fat, and sodium is recommended for good health. Unless I have been given specific instructions below for another diet, I accept this instruction as my diet prescription.   Other diet: Regular    Special Instructions: None    · Is patient discharged on Warfarin / Coumadin?   No     · Is patient Post Blood Transfusion?  No      Depression / Suicide Risk    As you are discharged from this [facility] facility, it is important to learn how to keep safe from harming yourself.    Recognize the warning signs:  · Abrupt changes in personality, positive or negative- including increase in energy   · Giving away possessions  · Change in eating patterns- significant weight changes-  positive or negative  · Change in sleeping patterns- unable to sleep or sleeping all the time   · Unwillingness or inability to communicate  · Depression  · Unusual sadness, discouragement and loneliness  · Talk of wanting to die  · Neglect of personal appearance   · Rebelliousness- reckless behavior  · Withdrawal from people/activities they love  · Confusion- inability to concentrate     If you or a loved one observes any of these behaviors or has  concerns about self-harm, here's what you can do:  · Talk about it- your feelings and reasons for harming yourself  · Remove any means that you might use to hurt yourself (examples: pills, rope, extension cords, firearm)  · Get professional help from the community (Mental Health, Substance Abuse, psychological counseling)  · Do not be alone:Call your Safe Contact- someone whom you trust who will be there for you.  · Call your local CRISIS HOTLINE 499-8302 or 682-029-4941  · Call your local Children's Mobile Crisis Response Team [place] (015) 099-0684 or www.Immigreat Now  · Call the toll free National Suicide Prevention Hotlines   · National Suicide Prevention Lifeline 467-754-MFQW (6098)  · Pet Insurance Quotes Line Network 800-SUICIDE (313-2715)    Hypokalemia  Hypokalemia means that the amount of potassium in the blood is lower than normal. Potassium is a chemical, called an electrolyte, that helps regulate the amount of fluid in the body. It also stimulates muscle contraction and helps nerves function properly. Most of the body's potassium is inside of cells, and only a very small amount is in the blood. Because the amount in the blood is so small, minor changes can be life-threatening.  CAUSES  · Antibiotics.  · Diarrhea or vomiting.  · Using laxatives too much, which can cause diarrhea.  · Chronic kidney disease.  · Water pills (diuretics).  · Eating disorders (bulimia).  · Low magnesium level.  · Sweating a lot.  SIGNS AND SYMPTOMS  · Weakness.  · Constipation.  · Fatigue.  · Muscle cramps.  · Mental confusion.  · Skipped heartbeats or irregular heartbeat (palpitations).  · Tingling or numbness.  DIAGNOSIS   Your health care provider can diagnose hypokalemia with blood tests. In addition to checking your potassium level, your health care provider may also check other lab tests.  TREATMENT  Hypokalemia can be treated with potassium supplements taken by mouth or adjustments in your current medicines. If your  potassium level is very low, you may need to get potassium through a vein (IV) and be monitored in the hospital. A diet high in potassium is also helpful. Foods high in potassium are:  · Nuts, such as peanuts and pistachios.  · Seeds, such as sunflower seeds and pumpkin seeds.  · Peas, lentils, and lima beans.  · Whole grain and bran cereals and breads.  · Fresh fruit and vegetables, such as apricots, avocado, bananas, cantaloupe, kiwi, oranges, tomatoes, asparagus, and potatoes.  · Orange and tomato juices.  · Red meats.  · Fruit yogurt.  HOME CARE INSTRUCTIONS  · Take all medicines as prescribed by your health care provider.  · Maintain a healthy diet by including nutritious food, such as fruits, vegetables, nuts, whole grains, and lean meats.  · If you are taking a laxative, be sure to follow the directions on the label.  SEEK MEDICAL CARE IF:  · Your weakness gets worse.  · You feel your heart pounding or racing.  · You are vomiting or having diarrhea.  · You are diabetic and having trouble keeping your blood glucose in the normal range.  SEEK IMMEDIATE MEDICAL CARE IF:  · You have chest pain, shortness of breath, or dizziness.  · You are vomiting or having diarrhea for more than 2 days.  · You faint.  MAKE SURE YOU:   · Understand these instructions.  · Will watch your condition.  · Will get help right away if you are not doing well or get worse.     This information is not intended to replace advice given to you by your health care provider. Make sure you discuss any questions you have with your health care provider.     Document Released: 12/18/2006 Document Revised: 01/08/2016 Document Reviewed: 06/20/2014  [employer] Interactive Patient Education ©2016 [employer]

## 2017-09-15 NOTE — PROGRESS NOTES
Received report from night shift RN, assumed care. Pt. Is awake, on bed. A&Ox1, oriented to self. x1 assist with turning and repositioning. Pt. With complaints of L upper back pain, refused pain medication, repositioned and provided adequate rest. Due medications given, no s/s of aspiration noted, pending SLP for swallow and cognitive eval. Plan of care was safety, comfort and rest. Discussed plan of care. Bed alarm in use, call light and personal belongings within reach, bed kept low, treaded socks on. Assisted as necessary. Kept rested and comfortable at all times.

## 2017-09-15 NOTE — PROGRESS NOTES
Attempted to call report to RN on [address] floor for transfer to room 631 bed 1, RN requested I call back in ten min. RN on [place] [id number] aware that transport is here to get the pt at this time. Will call back in ten min as requested. Pt with no signs of distress at time of transport to [place] [address]. Accompanied by transporter via w/c transport to floor.

## 2017-09-16 ENCOUNTER — HOSPITAL ENCOUNTER (OUTPATIENT)
Dept: LAB | Facility: MEDICAL CENTER | Age: 82
End: 2017-09-16
Attending: INTERNAL MEDICINE
Payer: COMMERCIAL

## 2017-09-16 LAB
ANION GAP SERPL CALC-SCNC: 8 MMOL/L (ref 0–11.9)
BASOPHILS # BLD AUTO: 0.6 % (ref 0–1.8)
BASOPHILS # BLD: 0.04 K/UL (ref 0–0.12)
BUN SERPL-MCNC: 12 MG/DL (ref 8–22)
CALCIUM SERPL-MCNC: 9.5 MG/DL (ref 8.5–10.5)
CHLORIDE SERPL-SCNC: 98 MMOL/L (ref 96–112)
CO2 SERPL-SCNC: 27 MMOL/L (ref 20–33)
CREAT SERPL-MCNC: 0.57 MG/DL (ref 0.5–1.4)
EOSINOPHIL # BLD AUTO: 0.1 K/UL (ref 0–0.51)
EOSINOPHIL NFR BLD: 1.4 % (ref 0–6.9)
ERYTHROCYTE [DISTWIDTH] IN BLOOD BY AUTOMATED COUNT: 79.5 FL (ref 35.9–50)
GFR SERPL CREATININE-BSD FRML MDRD: >60 ML/MIN/1.73 M 2
GLUCOSE SERPL-MCNC: 101 MG/DL (ref 65–99)
HCT VFR BLD AUTO: 38.6 % (ref 37–47)
HGB BLD-MCNC: 11.8 G/DL (ref 12–16)
IMM GRANULOCYTES # BLD AUTO: 0.05 K/UL (ref 0–0.11)
IMM GRANULOCYTES NFR BLD AUTO: 0.7 % (ref 0–0.9)
LYMPHOCYTES # BLD AUTO: 0.91 K/UL (ref 1–4.8)
LYMPHOCYTES NFR BLD: 12.7 % (ref 22–41)
MCH RBC QN AUTO: 23.7 PG (ref 27–33)
MCHC RBC AUTO-ENTMCNC: 30.6 G/DL (ref 33.6–35)
MCV RBC AUTO: 77.5 FL (ref 81.4–97.8)
MONOCYTES # BLD AUTO: 0.68 K/UL (ref 0–0.85)
MONOCYTES NFR BLD AUTO: 9.5 % (ref 0–13.4)
NEUTROPHILS # BLD AUTO: 5.39 K/UL (ref 2–7.15)
NEUTROPHILS NFR BLD: 75.1 % (ref 44–72)
NRBC # BLD AUTO: 0 K/UL
NRBC BLD AUTO-RTO: 0 /100 WBC
PLATELET # BLD AUTO: 357 K/UL (ref 164–446)
PMV BLD AUTO: 8.8 FL (ref 9–12.9)
POTASSIUM SERPL-SCNC: 3.2 MMOL/L (ref 3.6–5.5)
RBC # BLD AUTO: 4.98 M/UL (ref 4.2–5.4)
SODIUM SERPL-SCNC: 133 MMOL/L (ref 135–145)
WBC # BLD AUTO: 7.2 K/UL (ref 4.8–10.8)

## 2017-09-16 NOTE — PROGRESS NOTES
Pt. With D/C order. D/C instructions discussed with pt, no evidence of learning noted. pt. A&OX1. Pt. Was transported by [facility] transport, ID verified with [facility] card. Administered flu and pneumonia vaccine per MAR, provided paperwork, no questions per pt.     Report given to Yuliet at [facility].

## 2017-09-17 LAB
ALBUMIN SERPL BCP-MCNC: 3.7 G/DL (ref 3.2–4.9)
ALBUMIN/GLOB SERPL: 1.5 G/DL
ALP SERPL-CCNC: 75 U/L (ref 30–99)
ALT SERPL-CCNC: 9 U/L (ref 2–50)
ANION GAP SERPL CALC-SCNC: 9 MMOL/L (ref 0–11.9)
AST SERPL-CCNC: 13 U/L (ref 12–45)
BILIRUB SERPL-MCNC: 0.6 MG/DL (ref 0.1–1.5)
BUN SERPL-MCNC: 24 MG/DL (ref 8–22)
CALCIUM SERPL-MCNC: 9.7 MG/DL (ref 8.5–10.5)
CHLORIDE SERPL-SCNC: 98 MMOL/L (ref 96–112)
CO2 SERPL-SCNC: 27 MMOL/L (ref 20–33)
CREAT SERPL-MCNC: 0.91 MG/DL (ref 0.5–1.4)
GFR SERPL CREATININE-BSD FRML MDRD: 58 ML/MIN/1.73 M 2
GLOBULIN SER CALC-MCNC: 2.5 G/DL (ref 1.9–3.5)
GLUCOSE SERPL-MCNC: 102 MG/DL (ref 65–99)
MAGNESIUM SERPL-MCNC: 1.8 MG/DL (ref 1.5–2.5)
POTASSIUM SERPL-SCNC: 3.8 MMOL/L (ref 3.6–5.5)
PROT SERPL-MCNC: 6.2 G/DL (ref 6–8.2)
SODIUM SERPL-SCNC: 134 MMOL/L (ref 135–145)

## 2017-09-19 LAB
T4 SERPL-MCNC: 7.2 UG/DL (ref 4–12)
TSH SERPL DL<=0.005 MIU/L-ACNC: 4.31 UIU/ML (ref 0.3–3.7)
VIT B12 SERPL-MCNC: 583 PG/ML (ref 211–911)

## 2017-09-20 LAB
ANION GAP SERPL CALC-SCNC: 6 MMOL/L (ref 0–11.9)
ANISOCYTOSIS BLD QL SMEAR: ABNORMAL
BASOPHILS # BLD AUTO: 0 % (ref 0–1.8)
BASOPHILS # BLD: 0 K/UL (ref 0–0.12)
BUN SERPL-MCNC: 42 MG/DL (ref 8–22)
BURR CELLS BLD QL SMEAR: NORMAL
CALCIUM SERPL-MCNC: 9.9 MG/DL (ref 8.5–10.5)
CHLORIDE SERPL-SCNC: 101 MMOL/L (ref 96–112)
CO2 SERPL-SCNC: 26 MMOL/L (ref 20–33)
CREAT SERPL-MCNC: 1.03 MG/DL (ref 0.5–1.4)
EOSINOPHIL # BLD AUTO: 0.09 K/UL (ref 0–0.51)
EOSINOPHIL NFR BLD: 1.8 % (ref 0–6.9)
ERYTHROCYTE [DISTWIDTH] IN BLOOD BY AUTOMATED COUNT: 83.7 FL (ref 35.9–50)
GFR SERPL CREATININE-BSD FRML MDRD: 51 ML/MIN/1.73 M 2
GLUCOSE SERPL-MCNC: 76 MG/DL (ref 65–99)
HCT VFR BLD AUTO: 32.7 % (ref 37–47)
HGB BLD-MCNC: 10.2 G/DL (ref 12–16)
HYPOCHROMIA BLD QL SMEAR: ABNORMAL
LYMPHOCYTES # BLD AUTO: 0.97 K/UL (ref 1–4.8)
LYMPHOCYTES NFR BLD: 19.3 % (ref 22–41)
MANUAL DIFF BLD: NORMAL
MCH RBC QN AUTO: 24.6 PG (ref 27–33)
MCHC RBC AUTO-ENTMCNC: 31.2 G/DL (ref 33.6–35)
MCV RBC AUTO: 78.8 FL (ref 81.4–97.8)
MICROCYTES BLD QL SMEAR: ABNORMAL
MONOCYTES # BLD AUTO: 0.35 K/UL (ref 0–0.85)
MONOCYTES NFR BLD AUTO: 7 % (ref 0–13.4)
MORPHOLOGY BLD-IMP: NORMAL
NEUTROPHILS # BLD AUTO: 3.6 K/UL (ref 2–7.15)
NEUTROPHILS NFR BLD: 71.9 % (ref 44–72)
NRBC # BLD AUTO: 0 K/UL
NRBC BLD AUTO-RTO: 0 /100 WBC
OVALOCYTES BLD QL SMEAR: NORMAL
PLATELET # BLD AUTO: 322 K/UL (ref 164–446)
PLATELET BLD QL SMEAR: NORMAL
PMV BLD AUTO: 8.9 FL (ref 9–12.9)
POIKILOCYTOSIS BLD QL SMEAR: NORMAL
POTASSIUM SERPL-SCNC: 4.9 MMOL/L (ref 3.6–5.5)
RBC # BLD AUTO: 4.15 M/UL (ref 4.2–5.4)
RBC BLD AUTO: PRESENT
SCHISTOCYTES BLD QL SMEAR: NORMAL
SODIUM SERPL-SCNC: 133 MMOL/L (ref 135–145)
WBC # BLD AUTO: 5 K/UL (ref 4.8–10.8)

## 2017-09-22 LAB
ANION GAP SERPL CALC-SCNC: 13 MMOL/L (ref 0–11.9)
BUN SERPL-MCNC: 41 MG/DL (ref 8–22)
CALCIUM SERPL-MCNC: 9.8 MG/DL (ref 8.5–10.5)
CHLORIDE SERPL-SCNC: 101 MMOL/L (ref 96–112)
CO2 SERPL-SCNC: 19 MMOL/L (ref 20–33)
CREAT SERPL-MCNC: 0.77 MG/DL (ref 0.5–1.4)
GFR SERPL CREATININE-BSD FRML MDRD: >60 ML/MIN/1.73 M 2
GLUCOSE SERPL-MCNC: 71 MG/DL (ref 65–99)
POTASSIUM SERPL-SCNC: 4.7 MMOL/L (ref 3.6–5.5)
SODIUM SERPL-SCNC: 133 MMOL/L (ref 135–145)

## 2017-09-24 ENCOUNTER — HOSPITAL ENCOUNTER (OUTPATIENT)
Dept: LAB | Facility: MEDICAL CENTER | Age: 82
End: 2017-09-24
Attending: INTERNAL MEDICINE
Payer: COMMERCIAL

## 2017-09-25 LAB
ANION GAP SERPL CALC-SCNC: 8 MMOL/L (ref 0–11.9)
BUN SERPL-MCNC: 27 MG/DL (ref 8–22)
CALCIUM SERPL-MCNC: 9.2 MG/DL (ref 8.5–10.5)
CHLORIDE SERPL-SCNC: 100 MMOL/L (ref 96–112)
CO2 SERPL-SCNC: 24 MMOL/L (ref 20–33)
CREAT SERPL-MCNC: 0.74 MG/DL (ref 0.5–1.4)
GFR SERPL CREATININE-BSD FRML MDRD: >60 ML/MIN/1.73 M 2
GLUCOSE SERPL-MCNC: 81 MG/DL (ref 65–99)
POTASSIUM SERPL-SCNC: 3.8 MMOL/L (ref 3.6–5.5)
SODIUM SERPL-SCNC: 132 MMOL/L (ref 135–145)

## 2017-09-30 LAB
ANION GAP SERPL CALC-SCNC: 7 MMOL/L (ref 0–11.9)
BUN SERPL-MCNC: 26 MG/DL (ref 8–22)
CALCIUM SERPL-MCNC: 9.2 MG/DL (ref 8.5–10.5)
CHLORIDE SERPL-SCNC: 102 MMOL/L (ref 96–112)
CO2 SERPL-SCNC: 25 MMOL/L (ref 20–33)
CREAT SERPL-MCNC: 0.73 MG/DL (ref 0.5–1.4)
GFR SERPL CREATININE-BSD FRML MDRD: >60 ML/MIN/1.73 M 2
GLUCOSE SERPL-MCNC: 96 MG/DL (ref 65–99)
MAGNESIUM SERPL-MCNC: 2.1 MG/DL (ref 1.5–2.5)
POTASSIUM SERPL-SCNC: 3.6 MMOL/L (ref 3.6–5.5)
SODIUM SERPL-SCNC: 134 MMOL/L (ref 135–145)

## 2017-10-01 ENCOUNTER — HOSPITAL ENCOUNTER (OUTPATIENT)
Dept: LAB | Facility: MEDICAL CENTER | Age: 82
End: 2017-10-01
Attending: INTERNAL MEDICINE
Payer: MEDICARE

## 2017-11-27 ENCOUNTER — HOSPITAL ENCOUNTER (OUTPATIENT)
Facility: MEDICAL CENTER | Age: 82
End: 2017-11-27
Attending: PHYSICIAN ASSISTANT
Payer: MEDICARE

## 2017-11-27 LAB
ALBUMIN SERPL BCP-MCNC: 4.3 G/DL (ref 3.2–4.9)
ALBUMIN/GLOB SERPL: 1.5 G/DL
ALP SERPL-CCNC: 99 U/L (ref 30–99)
ALT SERPL-CCNC: 11 U/L (ref 2–50)
ANION GAP SERPL CALC-SCNC: 9 MMOL/L (ref 0–11.9)
AST SERPL-CCNC: 20 U/L (ref 12–45)
BILIRUB SERPL-MCNC: 0.4 MG/DL (ref 0.1–1.5)
BUN SERPL-MCNC: 29 MG/DL (ref 8–22)
CALCIUM SERPL-MCNC: 9.9 MG/DL (ref 8.5–10.5)
CHLORIDE SERPL-SCNC: 97 MMOL/L (ref 96–112)
CO2 SERPL-SCNC: 24 MMOL/L (ref 20–33)
CREAT SERPL-MCNC: 1.01 MG/DL (ref 0.5–1.4)
GFR SERPL CREATININE-BSD FRML MDRD: 52 ML/MIN/1.73 M 2
GLOBULIN SER CALC-MCNC: 2.8 G/DL (ref 1.9–3.5)
GLUCOSE SERPL-MCNC: 130 MG/DL (ref 65–99)
POTASSIUM SERPL-SCNC: 4.8 MMOL/L (ref 3.6–5.5)
PROT SERPL-MCNC: 7.1 G/DL (ref 6–8.2)
SODIUM SERPL-SCNC: 130 MMOL/L (ref 135–145)
T4 FREE SERPL-MCNC: 0.68 NG/DL (ref 0.53–1.43)
TSH SERPL DL<=0.005 MIU/L-ACNC: 4.42 UIU/ML (ref 0.3–3.7)

## 2017-11-27 PROCEDURE — 80053 COMPREHEN METABOLIC PANEL: CPT

## 2017-11-27 PROCEDURE — 84439 ASSAY OF FREE THYROXINE: CPT

## 2017-11-27 PROCEDURE — 84443 ASSAY THYROID STIM HORMONE: CPT

## 2017-11-27 PROCEDURE — 85025 COMPLETE CBC W/AUTO DIFF WBC: CPT

## 2017-11-28 LAB
BASOPHILS # BLD AUTO: 0.3 % (ref 0–1.8)
BASOPHILS # BLD: 0.02 K/UL (ref 0–0.12)
EOSINOPHIL # BLD AUTO: 0.15 K/UL (ref 0–0.51)
EOSINOPHIL NFR BLD: 1.9 % (ref 0–6.9)
ERYTHROCYTE [DISTWIDTH] IN BLOOD BY AUTOMATED COUNT: 56.4 FL (ref 35.9–50)
HCT VFR BLD AUTO: 39.1 % (ref 37–47)
HGB BLD-MCNC: 12.1 G/DL (ref 12–16)
IMM GRANULOCYTES # BLD AUTO: 0.03 K/UL (ref 0–0.11)
IMM GRANULOCYTES NFR BLD AUTO: 0.4 % (ref 0–0.9)
LYMPHOCYTES # BLD AUTO: 1.72 K/UL (ref 1–4.8)
LYMPHOCYTES NFR BLD: 21.6 % (ref 22–41)
MCH RBC QN AUTO: 31.6 PG (ref 27–33)
MCHC RBC AUTO-ENTMCNC: 30.9 G/DL (ref 33.6–35)
MCV RBC AUTO: 102.1 FL (ref 81.4–97.8)
MONOCYTES # BLD AUTO: 0.67 K/UL (ref 0–0.85)
MONOCYTES NFR BLD AUTO: 8.4 % (ref 0–13.4)
NEUTROPHILS # BLD AUTO: 5.39 K/UL (ref 2–7.15)
NEUTROPHILS NFR BLD: 67.4 % (ref 44–72)
NRBC # BLD AUTO: 0 K/UL
NRBC BLD AUTO-RTO: 0 /100 WBC
PLATELET # BLD AUTO: 283 K/UL (ref 164–446)
PMV BLD AUTO: 9.7 FL (ref 9–12.9)
RBC # BLD AUTO: 3.83 M/UL (ref 4.2–5.4)
WBC # BLD AUTO: 8 K/UL (ref 4.8–10.8)

## 2018-03-07 ENCOUNTER — RESOLUTE PROFESSIONAL BILLING HOSPITAL PROF FEE (OUTPATIENT)
Dept: HOSPITALIST | Facility: MEDICAL CENTER | Age: 83
End: 2018-03-07
Payer: MEDICARE

## 2018-03-07 ENCOUNTER — HOSPITAL ENCOUNTER (INPATIENT)
Facility: MEDICAL CENTER | Age: 83
LOS: 3 days | DRG: 683 | End: 2018-03-11
Attending: EMERGENCY MEDICINE | Admitting: HOSPITALIST
Payer: MEDICARE

## 2018-03-07 DIAGNOSIS — D64.9 ANEMIA, UNSPECIFIED TYPE: ICD-10-CM

## 2018-03-07 DIAGNOSIS — E86.0 DEHYDRATION: ICD-10-CM

## 2018-03-07 DIAGNOSIS — R42 LIGHTHEADEDNESS: ICD-10-CM

## 2018-03-07 DIAGNOSIS — I10 ACCELERATED HYPERTENSION: ICD-10-CM

## 2018-03-07 DIAGNOSIS — R79.89 AZOTEMIA: ICD-10-CM

## 2018-03-07 DIAGNOSIS — F03.90 DEMENTIA WITHOUT BEHAVIORAL DISTURBANCE, UNSPECIFIED DEMENTIA TYPE: ICD-10-CM

## 2018-03-07 DIAGNOSIS — K92.2 ACUTE GI BLEEDING: ICD-10-CM

## 2018-03-07 DIAGNOSIS — F05 SUNDOWNING: ICD-10-CM

## 2018-03-07 DIAGNOSIS — D62 ANEMIA ASSOCIATED WITH ACUTE BLOOD LOSS: ICD-10-CM

## 2018-03-07 PROBLEM — R55 NEAR SYNCOPE: Status: ACTIVE | Noted: 2018-03-07

## 2018-03-07 PROBLEM — N17.9 AKI (ACUTE KIDNEY INJURY) (HCC): Status: ACTIVE | Noted: 2018-03-07

## 2018-03-07 PROBLEM — N39.0 UTI (URINARY TRACT INFECTION): Status: ACTIVE | Noted: 2018-03-07

## 2018-03-07 LAB
ALBUMIN SERPL BCP-MCNC: 3.6 G/DL (ref 3.2–4.9)
ALBUMIN/GLOB SERPL: 1.8 G/DL
ALP SERPL-CCNC: 44 U/L (ref 30–99)
ALT SERPL-CCNC: 6 U/L (ref 2–50)
ANION GAP SERPL CALC-SCNC: 4 MMOL/L (ref 0–11.9)
APPEARANCE UR: CLEAR
AST SERPL-CCNC: 11 U/L (ref 12–45)
BACTERIA #/AREA URNS HPF: ABNORMAL /HPF
BASOPHILS # BLD AUTO: 0.3 % (ref 0–1.8)
BASOPHILS # BLD: 0.01 K/UL (ref 0–0.12)
BILIRUB SERPL-MCNC: 0.4 MG/DL (ref 0.1–1.5)
BILIRUB UR QL STRIP.AUTO: NEGATIVE
BUN SERPL-MCNC: 33 MG/DL (ref 8–22)
CALCIUM SERPL-MCNC: 8.7 MG/DL (ref 8.5–10.5)
CHLORIDE SERPL-SCNC: 114 MMOL/L (ref 96–112)
CO2 SERPL-SCNC: 17 MMOL/L (ref 20–33)
COLOR UR: YELLOW
CREAT SERPL-MCNC: 1.84 MG/DL (ref 0.5–1.4)
CULTURE IF INDICATED INDCX: YES UA CULTURE
EKG IMPRESSION: NORMAL
EOSINOPHIL # BLD AUTO: 0.19 K/UL (ref 0–0.51)
EOSINOPHIL NFR BLD: 5.1 % (ref 0–6.9)
EPI CELLS #/AREA URNS HPF: ABNORMAL /HPF
ERYTHROCYTE [DISTWIDTH] IN BLOOD BY AUTOMATED COUNT: 50.6 FL (ref 35.9–50)
FERRITIN SERPL-MCNC: 248.2 NG/ML (ref 10–291)
FOLATE SERPL-MCNC: >23.5 NG/ML
GLOBULIN SER CALC-MCNC: 2 G/DL (ref 1.9–3.5)
GLUCOSE SERPL-MCNC: 89 MG/DL (ref 65–99)
GLUCOSE UR STRIP.AUTO-MCNC: NEGATIVE MG/DL
HCT VFR BLD AUTO: 24.5 % (ref 37–47)
HGB BLD-MCNC: 8.1 G/DL (ref 12–16)
HGB RETIC QN AUTO: 37.4 PG/CELL (ref 29–35)
HYALINE CASTS #/AREA URNS LPF: ABNORMAL /LPF
IMM GRANULOCYTES # BLD AUTO: 0.04 K/UL (ref 0–0.11)
IMM GRANULOCYTES NFR BLD AUTO: 1.1 % (ref 0–0.9)
IMM RETICS NFR: 11.8 % (ref 9.3–17.4)
IRON SATN MFR SERPL: 50 % (ref 15–55)
IRON SERPL-MCNC: 136 UG/DL (ref 40–170)
KETONES UR STRIP.AUTO-MCNC: NEGATIVE MG/DL
LDH SERPL L TO P-CCNC: 134 U/L (ref 107–266)
LEUKOCYTE ESTERASE UR QL STRIP.AUTO: ABNORMAL
LYMPHOCYTES # BLD AUTO: 1.2 K/UL (ref 1–4.8)
LYMPHOCYTES NFR BLD: 32.3 % (ref 22–41)
MCH RBC QN AUTO: 33.6 PG (ref 27–33)
MCHC RBC AUTO-ENTMCNC: 33.1 G/DL (ref 33.6–35)
MCV RBC AUTO: 101.7 FL (ref 81.4–97.8)
MICRO URNS: ABNORMAL
MONOCYTES # BLD AUTO: 0.45 K/UL (ref 0–0.85)
MONOCYTES NFR BLD AUTO: 12.1 % (ref 0–13.4)
NEUTROPHILS # BLD AUTO: 1.82 K/UL (ref 2–7.15)
NEUTROPHILS NFR BLD: 49.1 % (ref 44–72)
NITRITE UR QL STRIP.AUTO: POSITIVE
NRBC # BLD AUTO: 0 K/UL
NRBC BLD-RTO: 0 /100 WBC
PH UR STRIP.AUTO: 5 [PH]
PLATELET # BLD AUTO: 163 K/UL (ref 164–446)
PMV BLD AUTO: 9.4 FL (ref 9–12.9)
POTASSIUM SERPL-SCNC: 5 MMOL/L (ref 3.6–5.5)
PROT SERPL-MCNC: 5.6 G/DL (ref 6–8.2)
PROT UR QL STRIP: NEGATIVE MG/DL
RBC # BLD AUTO: 2.41 M/UL (ref 4.2–5.4)
RBC # URNS HPF: ABNORMAL /HPF
RBC UR QL AUTO: ABNORMAL
RETICS # AUTO: 0.03 M/UL (ref 0.04–0.06)
RETICS/RBC NFR: 1 % (ref 0.8–2.1)
SODIUM SERPL-SCNC: 135 MMOL/L (ref 135–145)
SP GR UR STRIP.AUTO: 1.01
TIBC SERPL-MCNC: 274 UG/DL (ref 250–450)
TROPONIN I SERPL-MCNC: <0.01 NG/ML (ref 0–0.04)
UROBILINOGEN UR STRIP.AUTO-MCNC: 0.2 MG/DL
VIT B12 SERPL-MCNC: 226 PG/ML (ref 211–911)
WBC # BLD AUTO: 3.7 K/UL (ref 4.8–10.8)
WBC #/AREA URNS HPF: ABNORMAL /HPF

## 2018-03-07 PROCEDURE — 700105 HCHG RX REV CODE 258: Performed by: HOSPITALIST

## 2018-03-07 PROCEDURE — 82607 VITAMIN B-12: CPT

## 2018-03-07 PROCEDURE — 84484 ASSAY OF TROPONIN QUANT: CPT

## 2018-03-07 PROCEDURE — 83010 ASSAY OF HAPTOGLOBIN QUANT: CPT

## 2018-03-07 PROCEDURE — 700111 HCHG RX REV CODE 636 W/ 250 OVERRIDE (IP): Performed by: HOSPITALIST

## 2018-03-07 PROCEDURE — 85025 COMPLETE CBC W/AUTO DIFF WBC: CPT

## 2018-03-07 PROCEDURE — 94760 N-INVAS EAR/PLS OXIMETRY 1: CPT

## 2018-03-07 PROCEDURE — 96374 THER/PROPH/DIAG INJ IV PUSH: CPT

## 2018-03-07 PROCEDURE — A9270 NON-COVERED ITEM OR SERVICE: HCPCS | Performed by: HOSPITALIST

## 2018-03-07 PROCEDURE — G0378 HOSPITAL OBSERVATION PER HR: HCPCS

## 2018-03-07 PROCEDURE — 36415 COLL VENOUS BLD VENIPUNCTURE: CPT

## 2018-03-07 PROCEDURE — 82746 ASSAY OF FOLIC ACID SERUM: CPT

## 2018-03-07 PROCEDURE — 83540 ASSAY OF IRON: CPT

## 2018-03-07 PROCEDURE — 700102 HCHG RX REV CODE 250 W/ 637 OVERRIDE(OP): Performed by: HOSPITALIST

## 2018-03-07 PROCEDURE — 87186 SC STD MICRODIL/AGAR DIL: CPT

## 2018-03-07 PROCEDURE — 700105 HCHG RX REV CODE 258: Performed by: EMERGENCY MEDICINE

## 2018-03-07 PROCEDURE — 99285 EMERGENCY DEPT VISIT HI MDM: CPT

## 2018-03-07 PROCEDURE — 99220 PR INITIAL OBSERVATION CARE,LEVL III: CPT | Performed by: HOSPITALIST

## 2018-03-07 PROCEDURE — 80053 COMPREHEN METABOLIC PANEL: CPT

## 2018-03-07 PROCEDURE — 82728 ASSAY OF FERRITIN: CPT

## 2018-03-07 PROCEDURE — 93005 ELECTROCARDIOGRAM TRACING: CPT | Performed by: EMERGENCY MEDICINE

## 2018-03-07 PROCEDURE — 83550 IRON BINDING TEST: CPT

## 2018-03-07 PROCEDURE — 85046 RETICYTE/HGB CONCENTRATE: CPT

## 2018-03-07 PROCEDURE — 87077 CULTURE AEROBIC IDENTIFY: CPT

## 2018-03-07 PROCEDURE — 83615 LACTATE (LD) (LDH) ENZYME: CPT

## 2018-03-07 PROCEDURE — 87086 URINE CULTURE/COLONY COUNT: CPT

## 2018-03-07 PROCEDURE — 81001 URINALYSIS AUTO W/SCOPE: CPT

## 2018-03-07 RX ORDER — CARVEDILOL 12.5 MG/1
12.5 TABLET ORAL 2 TIMES DAILY WITH MEALS
Status: DISCONTINUED | OUTPATIENT
Start: 2018-03-07 | End: 2018-03-11 | Stop reason: HOSPADM

## 2018-03-07 RX ORDER — POLYETHYLENE GLYCOL 3350 17 G/17G
1 POWDER, FOR SOLUTION ORAL
Status: DISCONTINUED | OUTPATIENT
Start: 2018-03-07 | End: 2018-03-11 | Stop reason: HOSPADM

## 2018-03-07 RX ORDER — SODIUM CHLORIDE 9 MG/ML
INJECTION, SOLUTION INTRAVENOUS CONTINUOUS
Status: DISCONTINUED | OUTPATIENT
Start: 2018-03-07 | End: 2018-03-11 | Stop reason: HOSPADM

## 2018-03-07 RX ORDER — OMEPRAZOLE 20 MG/1
20 CAPSULE, DELAYED RELEASE ORAL 2 TIMES DAILY
Status: DISCONTINUED | OUTPATIENT
Start: 2018-03-07 | End: 2018-03-11 | Stop reason: HOSPADM

## 2018-03-07 RX ORDER — LISINOPRIL 40 MG/1
40 TABLET ORAL 2 TIMES DAILY
Status: ON HOLD | COMMUNITY
End: 2018-03-11

## 2018-03-07 RX ORDER — BISACODYL 10 MG
10 SUPPOSITORY, RECTAL RECTAL
Status: DISCONTINUED | OUTPATIENT
Start: 2018-03-07 | End: 2018-03-11 | Stop reason: HOSPADM

## 2018-03-07 RX ORDER — HEPARIN SODIUM 5000 [USP'U]/ML
5000 INJECTION, SOLUTION INTRAVENOUS; SUBCUTANEOUS EVERY 8 HOURS
Status: DISCONTINUED | OUTPATIENT
Start: 2018-03-07 | End: 2018-03-08

## 2018-03-07 RX ORDER — POTASSIUM CHLORIDE 20 MEQ/1
20 TABLET, EXTENDED RELEASE ORAL 2 TIMES DAILY
Status: ON HOLD | COMMUNITY
End: 2018-03-28

## 2018-03-07 RX ORDER — HYDROCHLOROTHIAZIDE 12.5 MG/1
12.5 TABLET ORAL DAILY
Status: ON HOLD | COMMUNITY
End: 2018-03-11

## 2018-03-07 RX ORDER — ONDANSETRON 2 MG/ML
4 INJECTION INTRAMUSCULAR; INTRAVENOUS EVERY 4 HOURS PRN
Status: DISCONTINUED | OUTPATIENT
Start: 2018-03-07 | End: 2018-03-11 | Stop reason: HOSPADM

## 2018-03-07 RX ORDER — FERROUS SULFATE 325(65) MG
325 TABLET ORAL DAILY
Status: DISCONTINUED | OUTPATIENT
Start: 2018-03-08 | End: 2018-03-11 | Stop reason: HOSPADM

## 2018-03-07 RX ORDER — ACETAMINOPHEN 325 MG/1
650 TABLET ORAL EVERY 6 HOURS PRN
Status: DISCONTINUED | OUTPATIENT
Start: 2018-03-07 | End: 2018-03-11 | Stop reason: HOSPADM

## 2018-03-07 RX ORDER — OMEPRAZOLE 20 MG/1
20 CAPSULE, DELAYED RELEASE ORAL 2 TIMES DAILY
COMMUNITY
Start: 2018-03-31 | End: 2019-07-01 | Stop reason: SDUPTHER

## 2018-03-07 RX ORDER — AMOXICILLIN 250 MG
2 CAPSULE ORAL 2 TIMES DAILY
Status: DISCONTINUED | OUTPATIENT
Start: 2018-03-07 | End: 2018-03-11 | Stop reason: HOSPADM

## 2018-03-07 RX ORDER — SODIUM CHLORIDE 9 MG/ML
1000 INJECTION, SOLUTION INTRAVENOUS ONCE
Status: COMPLETED | OUTPATIENT
Start: 2018-03-07 | End: 2018-03-07

## 2018-03-07 RX ORDER — ONDANSETRON 4 MG/1
4 TABLET, ORALLY DISINTEGRATING ORAL EVERY 4 HOURS PRN
Status: DISCONTINUED | OUTPATIENT
Start: 2018-03-07 | End: 2018-03-11 | Stop reason: HOSPADM

## 2018-03-07 RX ORDER — LEVOTHYROXINE SODIUM 0.05 MG/1
50 TABLET ORAL
COMMUNITY
Start: 2018-03-31 | End: 2019-07-01 | Stop reason: SDUPTHER

## 2018-03-07 RX ORDER — LEVOTHYROXINE SODIUM 0.03 MG/1
50 TABLET ORAL
Status: DISCONTINUED | OUTPATIENT
Start: 2018-03-08 | End: 2018-03-11 | Stop reason: HOSPADM

## 2018-03-07 RX ORDER — FERROUS SULFATE 325(65) MG
325 TABLET ORAL DAILY
COMMUNITY
Start: 2018-03-31 | End: 2019-07-01 | Stop reason: SDUPTHER

## 2018-03-07 RX ADMIN — SODIUM CHLORIDE: 9 INJECTION, SOLUTION INTRAVENOUS at 16:14

## 2018-03-07 RX ADMIN — CEFTRIAXONE 2 G: 2 INJECTION, POWDER, FOR SOLUTION INTRAMUSCULAR; INTRAVENOUS at 16:14

## 2018-03-07 RX ADMIN — SODIUM CHLORIDE 1000 ML: 9 INJECTION, SOLUTION INTRAVENOUS at 12:43

## 2018-03-07 RX ADMIN — OMEPRAZOLE 20 MG: 20 CAPSULE, DELAYED RELEASE ORAL at 20:13

## 2018-03-07 RX ADMIN — CARVEDILOL 12.5 MG: 12.5 TABLET, FILM COATED ORAL at 19:33

## 2018-03-07 ASSESSMENT — LIFESTYLE VARIABLES
DO YOU DRINK ALCOHOL: NO
EVER_SMOKED: NEVER

## 2018-03-07 ASSESSMENT — COPD QUESTIONNAIRES
DO YOU EVER COUGH UP ANY MUCUS OR PHLEGM?: NO/ONLY WITH OCCASIONAL COLDS OR INFECTIONS
DURING THE PAST 4 WEEKS HOW MUCH DID YOU FEEL SHORT OF BREATH: NONE/LITTLE OF THE TIME
COPD SCREENING SCORE: 2
HAVE YOU SMOKED AT LEAST 100 CIGARETTES IN YOUR ENTIRE LIFE: NO/DON'T KNOW

## 2018-03-07 ASSESSMENT — PAIN SCALES - GENERAL
PAINLEVEL_OUTOF10: 0

## 2018-03-07 NOTE — ED TRIAGE NOTES
Chief Complaint   Patient presents with   • Dizziness     pt felt dizzy [place] in [place].  She laid down on floor and EMS was called.       MALIA brought in pt.  SHe is oriented to person and situation, this is her baseline.  Denies dizziness at this time.  She says it improved once she laid down at [facility] and has not returned.

## 2018-03-07 NOTE — PROGRESS NOTES
Pt transferred to T203 via wheelchair with ACLS RN.   Report received from ER RN, assumed patient care.  Pt A&OX4.  Assessment completed.  Call light within reach, personal belongings available, bed in lowest position, pt calling for assistance.  Pt reports no pain.  -n/t, - weakness.  Communication board updated, POC discussed.  Monitors applied, VSS.  No additional needs at this time.

## 2018-03-07 NOTE — ED NOTES
Med rec complete per Pt, family at bedside and Flying [phone]   Allergies reviewed  No ABX in last 30 days

## 2018-03-07 NOTE — ED PROVIDER NOTES
ED Provider Note    CHIEF COMPLAINT  Chief Complaint   Patient presents with   • Dizziness     pt felt dizzy while sitting in [facility].  She laid down on floor and EMS was called.       HPI  Lamar Dangelo is a 88 y.o. female who presents for evaluation of a resolved episode of lightheadedness, [place] had the acute onset of lightheadedness and she promptly laid herself on the floor, and no point did she experience chest pain or shortness of breath, she did not injure herself, she has not had any abdominal pain, no vomiting or diarrhea. She doesn't history of gastrointestinal bleeding and secondary anemia but denies having any blood in her stool and has not been vomiting. At this time she states she feels well and would like to go home.    REVIEW OF SYSTEMS  Negative for fever, rash, chest pain, dyspnea, abdominal pain, nausea, vomiting, diarrhea, headache, focal weakness, focal numbness, focal tingling. All other systems are negative.     PAST MEDICAL HISTORY  Past Medical History:   Diagnosis Date   • Hyperlipidemia 7/2/2010   • Hypothyroid 7/2/2010       FAMILY HISTORY  Family History   Problem Relation Age of Onset   • Heart Disease Father    • Hypertension Sister        SOCIAL HISTORY  Social History   Substance Use Topics   • Smoking status: Never Smoker   • Smokeless tobacco: Never Used   • Alcohol use No       SURGICAL HISTORY  Past Surgical History:   Procedure Laterality Date   • COLONOSCOPY - ENDO N/A 9/3/2017    Procedure: COLONOSCOPY - ENDO;  Surgeon: Randi Sahni M.D.;  Location: ENDOSCOPY [facility];  Service: Gastroenterology   • GASTROSCOPY-ENDO N/A 9/3/2017    Procedure: GASTROSCOPY-ENDO;  Surgeon: Randi Sahni M.D.;  Location: ENDOSCOPY [facility];  Service: Gastroenterology   • APPENDECTOMY     • TONSILLECTOMY         CURRENT MEDICATIONS  I personally reviewed the medication list in the charting documentation.     ALLERGIES  Allergies   Allergen  "Reactions   • Nkda [No Known Drug Allergy]        MEDICAL RECORD  I have reviewed patient's medical record and pertinent results are listed above.      PHYSICAL EXAM  VITAL SIGNS: /53   Temp 36.2 °C (97.2 °F)   Resp 16   Ht 1.575 m (5' 2\")   Wt 46.3 kg (102 lb)   SpO2 92%   BMI 18.66 kg/m²    Constitutional: Elderly and frail-appearing however no acute distress and does not appear toxically ill   Eyes: No scleral icterus. Normal conjunctiva   Neck: Supple, comfortable, nonpainful range of motion.   Cardiovascular: Regular heart rate and rhythm.   Thorax & Lungs: Chest is nontender.  Lungs are clear to auscultation with good air movement bilaterally.  No wheeze, rhonchi, nor rales.   Abdomen: Soft, with no tenderness, rebound nor guarding.  No mass or pulsatile mass appreciated.  Rectal exam: Brown stool that is heme-negative  Skin: Warm, dry. No rash appreciated  Extremities/Musculoskeletal: No sign of trauma. No asymmetric calf tenderness, erythema or edema. Normal range of motion   Neurologic: Alert & oriented to person and place, slightly confused to time. No obvious focal deficits are appreciated.  Psychiatric: Normal affect appropriate for the clinical situation.    DIAGNOSTIC STUDIES / PROCEDURES    EKG  12 Lead EKG interpreted by me to show:    Rate 57  Rhythm: Normal sinus rhythm  Axis: Normal  VA and QRS Intervals: Normal  T waves: No acute changes  ST segments: No acute changes  Ectopy: None.    My impression of this EKG: Does not indicate ischemia or arrythmia at this time.      LABS  Results for orders placed or performed during the hospital encounter of 03/07/18   CBC w/ Differential   Result Value Ref Range    WBC 3.7 (L) 4.8 - 10.8 K/uL    RBC 2.41 (L) 4.20 - 5.40 M/uL    Hemoglobin 8.1 (L) 12.0 - 16.0 g/dL    Hematocrit 24.5 (L) 37.0 - 47.0 %    .7 (H) 81.4 - 97.8 fL    MCH 33.6 (H) 27.0 - 33.0 pg    MCHC 33.1 (L) 33.6 - 35.0 g/dL    RDW 50.6 (H) 35.9 - 50.0 fL    Platelet Count " 163 (L) 164 - 446 K/uL    MPV 9.4 9.0 - 12.9 fL    Neutrophils-Polys 49.10 44.00 - 72.00 %    Lymphocytes 32.30 22.00 - 41.00 %    Monocytes 12.10 0.00 - 13.40 %    Eosinophils 5.10 0.00 - 6.90 %    Basophils 0.30 0.00 - 1.80 %    Immature Granulocytes 1.10 (H) 0.00 - 0.90 %    Nucleated RBC 0.00 /100 WBC    Neutrophils (Absolute) 1.82 (L) 2.00 - 7.15 K/uL    Lymphs (Absolute) 1.20 1.00 - 4.80 K/uL    Monos (Absolute) 0.45 0.00 - 0.85 K/uL    Eos (Absolute) 0.19 0.00 - 0.51 K/uL    Baso (Absolute) 0.01 0.00 - 0.12 K/uL    Immature Granulocytes (abs) 0.04 0.00 - 0.11 K/uL    NRBC (Absolute) 0.00 K/uL   Complete Metabolic Panel (CMP)   Result Value Ref Range    Sodium 135 135 - 145 mmol/L    Potassium 5.0 3.6 - 5.5 mmol/L    Chloride 114 (H) 96 - 112 mmol/L    Co2 17 (L) 20 - 33 mmol/L    Anion Gap 4.0 0.0 - 11.9    Glucose 89 65 - 99 mg/dL    Bun 33 (H) 8 - 22 mg/dL    Creatinine 1.84 (H) 0.50 - 1.40 mg/dL    Calcium 8.7 8.5 - 10.5 mg/dL    AST(SGOT) 11 (L) 12 - 45 U/L    ALT(SGPT) 6 2 - 50 U/L    Alkaline Phosphatase 44 30 - 99 U/L    Total Bilirubin 0.4 0.1 - 1.5 mg/dL    Albumin 3.6 3.2 - 4.9 g/dL    Total Protein 5.6 (L) 6.0 - 8.2 g/dL    Globulin 2.0 1.9 - 3.5 g/dL    A-G Ratio 1.8 g/dL   Troponin STAT   Result Value Ref Range    Troponin I <0.01 0.00 - 0.04 ng/mL   URINALYSIS,CULTURE IF INDICATED   Result Value Ref Range    Color Yellow     Character Clear     Specific Gravity 1.011 <1.035    Ph 5.0 5.0 - 8.0    Glucose Negative Negative mg/dL    Ketones Negative Negative mg/dL    Protein Negative Negative mg/dL    Bilirubin Negative Negative    Urobilinogen, Urine 0.2 Negative    Nitrite Positive (A) Negative    Leukocyte Esterase Trace (A) Negative    Occult Blood Trace (A) Negative    Micro Urine Req Microscopic     Culture Indicated Yes UA Culture   ESTIMATED GFR   Result Value Ref Range    GFR If  31 (A) >60 mL/min/1.73 m 2    GFR If Non African American 26 (A) >60 mL/min/1.73 m 2        COURSE & MEDICAL DECISION MAKING  I have reviewed any medical record information, laboratory studies and radiographic results as noted above.  Differential diagnoses includes:, Dehydration, electrolyte abnormalities, renal failure, ACS, arrhythmia    Encounter Summary: This is a 88 y.o. female with a now resolved episode of lightheadedness, has no complaints at this time, no focal neurologic complaints or findings at this time, does not appear toxically ill, has essentially normal vital signs and her exam is otherwise unremarkable. History of GI bleeding and secondary anemia, denies any evidence of GI bleeding at this time. We'll check blood work and an EKG and reevaluate -------  blood work is concerning for dehydration, anemia is also identified which is worse than the most recent values, heme negative rectal exam. The urinalysis results after admission the hospital and does seem to be consistent with a urinary tract infection. Patient has been admitted for further evaluation      DISPOSITION: Admitted in guarded condition      FINAL IMPRESSION  1. Dehydration    2. Azotemia    3. Anemia, unspecified type    4. Lightheadedness           This dictation was created using voice recognition software. The accuracy of the dictation is limited to the abilities of the software. I expect there may be some errors of grammar and possibly content. The nursing notes were reviewed and certain aspects of this information were incorporated into this note.    Electronically signed by: Humberto Connell, 3/7/2018 12:16 PM

## 2018-03-08 PROBLEM — E86.0 DEHYDRATION: Status: ACTIVE | Noted: 2018-03-08

## 2018-03-08 PROBLEM — N17.9 ACUTE RENAL FAILURE (HCC): Status: ACTIVE | Noted: 2018-03-08

## 2018-03-08 LAB
ANION GAP SERPL CALC-SCNC: 7 MMOL/L (ref 0–11.9)
BASOPHILS # BLD AUTO: 0.2 % (ref 0–1.8)
BASOPHILS # BLD: 0.01 K/UL (ref 0–0.12)
BUN SERPL-MCNC: 28 MG/DL (ref 8–22)
CALCIUM SERPL-MCNC: 9 MG/DL (ref 8.5–10.5)
CHLORIDE SERPL-SCNC: 113 MMOL/L (ref 96–112)
CO2 SERPL-SCNC: 18 MMOL/L (ref 20–33)
CREAT SERPL-MCNC: 1.74 MG/DL (ref 0.5–1.4)
EKG IMPRESSION: NORMAL
EKG IMPRESSION: NORMAL
EOSINOPHIL # BLD AUTO: 0.18 K/UL (ref 0–0.51)
EOSINOPHIL NFR BLD: 3.6 % (ref 0–6.9)
ERYTHROCYTE [DISTWIDTH] IN BLOOD BY AUTOMATED COUNT: 48.7 FL (ref 35.9–50)
GLUCOSE SERPL-MCNC: 100 MG/DL (ref 65–99)
HAPTOGLOB SERPL-MCNC: 83 MG/DL (ref 30–200)
HCT VFR BLD AUTO: 24.3 % (ref 37–47)
HGB BLD-MCNC: 7.9 G/DL (ref 12–16)
IMM GRANULOCYTES # BLD AUTO: 0.03 K/UL (ref 0–0.11)
IMM GRANULOCYTES NFR BLD AUTO: 0.6 % (ref 0–0.9)
IRON SATN MFR SERPL: 23 % (ref 15–55)
IRON SERPL-MCNC: 66 UG/DL (ref 40–170)
LYMPHOCYTES # BLD AUTO: 1.18 K/UL (ref 1–4.8)
LYMPHOCYTES NFR BLD: 23.3 % (ref 22–41)
MCH RBC QN AUTO: 32.4 PG (ref 27–33)
MCHC RBC AUTO-ENTMCNC: 32.5 G/DL (ref 33.6–35)
MCV RBC AUTO: 99.6 FL (ref 81.4–97.8)
MONOCYTES # BLD AUTO: 0.58 K/UL (ref 0–0.85)
MONOCYTES NFR BLD AUTO: 11.5 % (ref 0–13.4)
NEUTROPHILS # BLD AUTO: 3.08 K/UL (ref 2–7.15)
NEUTROPHILS NFR BLD: 60.8 % (ref 44–72)
NRBC # BLD AUTO: 0 K/UL
NRBC BLD-RTO: 0 /100 WBC
PLATELET # BLD AUTO: 180 K/UL (ref 164–446)
PMV BLD AUTO: 9 FL (ref 9–12.9)
POTASSIUM SERPL-SCNC: 4.4 MMOL/L (ref 3.6–5.5)
RBC # BLD AUTO: 2.44 M/UL (ref 4.2–5.4)
SODIUM SERPL-SCNC: 138 MMOL/L (ref 135–145)
TIBC SERPL-MCNC: 281 UG/DL (ref 250–450)
TROPONIN I SERPL-MCNC: <0.01 NG/ML (ref 0–0.04)
WBC # BLD AUTO: 5.1 K/UL (ref 4.8–10.8)

## 2018-03-08 PROCEDURE — G8987 SELF CARE CURRENT STATUS: HCPCS | Mod: CI

## 2018-03-08 PROCEDURE — 93880 EXTRACRANIAL BILAT STUDY: CPT

## 2018-03-08 PROCEDURE — 97161 PT EVAL LOW COMPLEX 20 MIN: CPT

## 2018-03-08 PROCEDURE — G8989 SELF CARE D/C STATUS: HCPCS | Mod: CI

## 2018-03-08 PROCEDURE — 93005 ELECTROCARDIOGRAM TRACING: CPT | Performed by: NURSE PRACTITIONER

## 2018-03-08 PROCEDURE — 83550 IRON BINDING TEST: CPT

## 2018-03-08 PROCEDURE — G8980 MOBILITY D/C STATUS: HCPCS | Mod: CH

## 2018-03-08 PROCEDURE — 93010 ELECTROCARDIOGRAM REPORT: CPT | Mod: 77 | Performed by: INTERNAL MEDICINE

## 2018-03-08 PROCEDURE — G8988 SELF CARE GOAL STATUS: HCPCS | Mod: CI

## 2018-03-08 PROCEDURE — A9270 NON-COVERED ITEM OR SERVICE: HCPCS | Performed by: HOSPITALIST

## 2018-03-08 PROCEDURE — 83540 ASSAY OF IRON: CPT

## 2018-03-08 PROCEDURE — 700105 HCHG RX REV CODE 258: Performed by: HOSPITALIST

## 2018-03-08 PROCEDURE — 700102 HCHG RX REV CODE 250 W/ 637 OVERRIDE(OP): Performed by: HOSPITALIST

## 2018-03-08 PROCEDURE — 96376 TX/PRO/DX INJ SAME DRUG ADON: CPT

## 2018-03-08 PROCEDURE — 85025 COMPLETE CBC W/AUTO DIFF WBC: CPT

## 2018-03-08 PROCEDURE — G8978 MOBILITY CURRENT STATUS: HCPCS | Mod: CH

## 2018-03-08 PROCEDURE — 97165 OT EVAL LOW COMPLEX 30 MIN: CPT

## 2018-03-08 PROCEDURE — 93005 ELECTROCARDIOGRAM TRACING: CPT | Performed by: HOSPITALIST

## 2018-03-08 PROCEDURE — 93010 ELECTROCARDIOGRAM REPORT: CPT | Performed by: INTERNAL MEDICINE

## 2018-03-08 PROCEDURE — 700111 HCHG RX REV CODE 636 W/ 250 OVERRIDE (IP): Performed by: HOSPITALIST

## 2018-03-08 PROCEDURE — 84484 ASSAY OF TROPONIN QUANT: CPT

## 2018-03-08 PROCEDURE — G8979 MOBILITY GOAL STATUS: HCPCS | Mod: CH

## 2018-03-08 PROCEDURE — 770006 HCHG ROOM/CARE - MED/SURG/GYN SEMI*

## 2018-03-08 PROCEDURE — 36415 COLL VENOUS BLD VENIPUNCTURE: CPT

## 2018-03-08 PROCEDURE — 80048 BASIC METABOLIC PNL TOTAL CA: CPT

## 2018-03-08 PROCEDURE — 99233 SBSQ HOSP IP/OBS HIGH 50: CPT | Performed by: HOSPITALIST

## 2018-03-08 PROCEDURE — 87040 BLOOD CULTURE FOR BACTERIA: CPT | Mod: 91

## 2018-03-08 RX ADMIN — CEFTRIAXONE 2 G: 2 INJECTION, POWDER, FOR SOLUTION INTRAMUSCULAR; INTRAVENOUS at 09:49

## 2018-03-08 RX ADMIN — SODIUM CHLORIDE: 9 INJECTION, SOLUTION INTRAVENOUS at 06:21

## 2018-03-08 RX ADMIN — STANDARDIZED SENNA CONCENTRATE AND DOCUSATE SODIUM 2 TABLET: 8.6; 5 TABLET, FILM COATED ORAL at 19:55

## 2018-03-08 RX ADMIN — Medication 325 MG: at 09:49

## 2018-03-08 RX ADMIN — CARVEDILOL 12.5 MG: 12.5 TABLET, FILM COATED ORAL at 17:32

## 2018-03-08 RX ADMIN — OMEPRAZOLE 20 MG: 20 CAPSULE, DELAYED RELEASE ORAL at 09:49

## 2018-03-08 RX ADMIN — OMEPRAZOLE 20 MG: 20 CAPSULE, DELAYED RELEASE ORAL at 19:55

## 2018-03-08 RX ADMIN — CARVEDILOL 12.5 MG: 12.5 TABLET, FILM COATED ORAL at 09:49

## 2018-03-08 RX ADMIN — LEVOTHYROXINE SODIUM 50 MCG: 50 TABLET ORAL at 06:33

## 2018-03-08 RX ADMIN — STANDARDIZED SENNA CONCENTRATE AND DOCUSATE SODIUM 2 TABLET: 8.6; 5 TABLET, FILM COATED ORAL at 09:49

## 2018-03-08 ASSESSMENT — COGNITIVE AND FUNCTIONAL STATUS - GENERAL
MOBILITY SCORE: 24
SUGGESTED CMS G CODE MODIFIER MOBILITY: CH
DAILY ACTIVITIY SCORE: 24
SUGGESTED CMS G CODE MODIFIER MOBILITY: CH
SUGGESTED CMS G CODE MODIFIER DAILY ACTIVITY: CH
MOBILITY SCORE: 24

## 2018-03-08 ASSESSMENT — PAIN SCALES - GENERAL
PAINLEVEL_OUTOF10: 0
PAINLEVEL_OUTOF10: 0

## 2018-03-08 ASSESSMENT — GAIT ASSESSMENTS
GAIT LEVEL OF ASSIST: SUPERVISED
DEVIATION: BRADYKINETIC

## 2018-03-08 ASSESSMENT — ENCOUNTER SYMPTOMS
WEAKNESS: 1
DIZZINESS: 1
NAUSEA: 0
VOMITING: 0
FALLS: 1
MEMORY LOSS: 1
SHORTNESS OF BREATH: 0

## 2018-03-08 ASSESSMENT — ACTIVITIES OF DAILY LIVING (ADL): TOILETING: INDEPENDENT

## 2018-03-08 NOTE — PROGRESS NOTES
Received report from Luisa TURNER. Assume Pt care. Pt is sitting up on bed, eating dinner. Instructed to call for assistance. Will continue to monitor

## 2018-03-08 NOTE — ASSESSMENT & PLAN NOTE
- unknown etiology at this time  - Hgb has dropped to 7.9 this admission from 12.1 4 months ago  - (-) guaiac   - Continue to trend

## 2018-03-08 NOTE — DISCHARGE PLANNING
Care Transition Team Assessment    Information Source  Orientation : Disoriented to Time  Who is responsible for making decisions for patient? : Patient         Elopement Risk  Legal Hold: No  Ambulatory or Self Mobile in Wheelchair: Yes  Disoriented: No  Psychiatric Symptoms: None  History of Wandering: No  Elopement this Admit: No  Vocalizing Wanting to Leave: No  Displays Behaviors, Body Language Wanting to Leave: No-Not at Risk for Elopement  Elopement Risk: Not at Risk for Elopement    Interdisciplinary Discharge Planning  Primary Care Physician: Stephanie Savage  Lives with - Patient's Self Care Capacity: Other (Comments) (Ellie . sister)  Able to Return to Previous ADL's: Yes  Mobility Issues: Yes  Prior Services: None  Assistance Needed: Unknown at this Time  Durable Medical Equipment: Not Applicable              Finances  Financial Barriers to Discharge: No  Prescription Coverage: Yes                                  Anticipated Discharge Information  Discharge Contact Phone Number: per pt Ellie sister will take her back home

## 2018-03-08 NOTE — THERAPY
"Physical Therapy Evaluation completed.   Bed Mobility:  Supine to Sit: Supervised  Transfers: Sit to Stand: Supervised  Gait: Level Of Assist: Supervised with No Equipment Needed       Plan of Care: Patient with no further skilled PT needs in the acute care setting at this time  Discharge Recommendations: Equipment: No Equipment Needed. Post-acute therapy Discharge to home with outpatient or home health for additional skilled therapy services.    See \"Rehab Therapy-Acute\" Patient Summary Report for complete documentation.       Pt. demonstrates ambulation, bed mobility, and transfers without AD or physical assist.  Pt. appears to ambulate safely and very motivated to complete activity. Pt reports she feels close to her functional mobility baseline and feels safe to go home.  Pt. with no further skilled acute PT services at this time.   "

## 2018-03-08 NOTE — PROGRESS NOTES
"Pt is hallucinating, states, \"Look at those bugs on the ground and up there.\"  MD and APRN updated.  "

## 2018-03-08 NOTE — PROGRESS NOTES
Ambulated pt to bathroom, Stand by assist, steady. VS stable, SR on 60. No other needs at this time. Will continue to monitor

## 2018-03-08 NOTE — PROGRESS NOTES
RN into pt room, pt has pulled out both PIV's.  New PIV established, wrapped with coban.  Pt reoriented and educated to not remove lines. Pt agrees.

## 2018-03-08 NOTE — ASSESSMENT & PLAN NOTE
- likely due to dehydration versus UTI versus anemia  - No ectopy seen on telemetry  - 12-lead EKG showed NSR rates 60's  - (+) orthostatics initially

## 2018-03-08 NOTE — H&P
PRIMARY CARE PROVIDER:  CHANEL Pino    CHIEF COMPLAINT:  Dizziness.    HISTORY OF PRESENT ILLNESS:  The patient is an 88-year-old female who reports   that earlier today she felt lightheaded and dizzy while sitting in a casino   gambling.  This was associated with some blurring of her vision.  She got   concerned that she may pass out and lay down on the floor.  The paramedics   were called and she was transferred to the emergency room.  Her symptoms have   since resolved.  She denies any chest pain.  She denies any palpitations.  She   denies any loss of consciousness.  She denies any melena or rectal bleeding.    In the emergency room, she had a Hemoccult test which was negative.  She has   a previous history of anemia.  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  She has been maintained on   Prilosec and iron supplements.  She denies any abdominal pain, nausea or   vomiting.  She denies any dysuria.  No hematuria.    REVIEW OF SYSTEMS:  As above, otherwise reviewed and negative.    PAST MEDICAL HISTORY:  Significant for hypothyroidism, dyslipidemia, and   anemia.    PAST SURGICAL HISTORY:  Tonsillectomy, appendectomy, colonoscopy, and EGD.    SOCIAL HISTORY:  She does not smoke.  No alcohol or illicit drug use.  She   lives with her sister.    HOME MEDICATIONS:  Carvedilol 12.5 mg b.i.d., iron sulfate 325 mg daily,   hydrochlorothiazide 12.5 mg daily, levothyroxine 50 mcg daily, lisinopril 40   mg b.i.d., Omeprazole 20 mg b.i.d., and potassium chloride 20 mEq b.i.d.    FAMILY HISTORY:  Reviewed, not pertinent to the presenting problem.    PHYSICAL EXAMINATION:  GENERAL:  She is alert, oriented x3.  VITAL SIGNS:  Temperature is 36.9, pulse 66, respiratory rate is 18, blood   pressure on arrival was 102/53, repeat blood pressure is 129/59, and pulse   oximetry is 97% on room air.  HEAD AND NECK:  Pupils equal.  Supple neck.  No jugular venous  distention.    Oropharynx is clear.  No cervical lymphadenopathy.  HEART:  Regular rate and rhythm.  Normal S1, S2.  No murmurs, rubs or gallops.  LUNGS:  Clear with symmetric air entry bilaterally.  No chest wall tenderness.  ABDOMEN:  Soft, nontender.  Bowel sounds are positive.  No hepatosplenomegaly.  EXTREMITIES:  No edema, no clubbing, no cyanosis.  NEUROLOGIC:  No focal deficits.  SKIN:  No rash.    DIAGNOSTICS:  White blood cell count is 3.7, hemoglobin 8.1, hematocrit 24.5,   MCV is 101.7, platelet count 163.  She has immature granulocytes 1.1.  Sodium   is 135, potassium 5.0, chloride 114, bicarbonate is 17, glucose 89, BUN 33,   creatinine 1.84, AST 11, ALT 6, alkaline phosphatase 44, troponin I is less   than 0.01.  Echocardiogram on 09/11/2017 revealed no prior studies available   for comparison.  Left ventricular ejection fraction is visually estimated to   be 60%, grade I diastolic dysfunction, estimated right ventricular systolic   pressure of 65 mmHg.    ASSESSMENT:  1.  Near-syncope.  2.  Acute kidney injury, likely secondary to dehydration.  3.  Urinary tract infection.  4.  Chronic anemia.  5.  Hypertension.  6.  Hypothyroidism.    PLAN:  1.  The patient will be admitted and monitored on telemetry.  2.  She had a recent echocardiogram.  I do not see the need to repeat that   test.  We will check a carotid duplex.  3.  We will check orthostatic vitals and start her on IV fluids for hydration.  4.  We will hold her hydrochlorothiazide and lisinopril.  We will continue   with carvedilol with holding parameters.  5.  We will continue with her iron supplements and we will check B12, folic   acid, and retic levels given her elevated MCV.  We will also check iron panel   to assess the need for continued iron supplementation.  6.  We will start her on heparin for deep venous thrombosis prophylaxis.  7.  We will monitor renal function; if not improved with hydration and holding   her ACE inhibitor and  hydrochlorothiazide, we will consider further workup.  8.  We will follow up on her urine culture and deescalate the antibiotics   accordingly.    Plan of care reviewed with the patient and her sister and their questions   answered.    Code status discussed.  Patient has an advanced directive and her wishes are   to be DNR.  This was confirmed with her.       ____________________________________     MD MILAGRO COOPER / RAEGAN    DD:  03/07/2018 17:35:54  DT:  03/07/2018 21:00:30    D#:  [id number]  Job#:  [id number]

## 2018-03-08 NOTE — ASSESSMENT & PLAN NOTE
- likely due to UTI and dehydration  - IVF at 100 mL/hr  - holding Lisinopril and HCTZ  - avoid nephrotoxins

## 2018-03-08 NOTE — PROGRESS NOTES
Seen pt, AOx 3. On cardiac monitor with SR w 1AVB. Denies any pain/dizzinness. Fluids on NS at 75 ml/hr. Plan of care discussed includes Safety, Labs, Carotid Dup, PT/OT, and pt understands.

## 2018-03-09 PROBLEM — E53.8 VITAMIN B12 DEFICIENCY: Status: ACTIVE | Noted: 2018-03-09

## 2018-03-09 LAB
BACTERIA UR CULT: ABNORMAL
BACTERIA UR CULT: ABNORMAL
SIGNIFICANT IND 70042: ABNORMAL
SITE SITE: ABNORMAL
SOURCE SOURCE: ABNORMAL

## 2018-03-09 PROCEDURE — 99233 SBSQ HOSP IP/OBS HIGH 50: CPT | Performed by: HOSPITALIST

## 2018-03-09 PROCEDURE — A9270 NON-COVERED ITEM OR SERVICE: HCPCS | Performed by: HOSPITALIST

## 2018-03-09 PROCEDURE — 770006 HCHG ROOM/CARE - MED/SURG/GYN SEMI*

## 2018-03-09 PROCEDURE — 700102 HCHG RX REV CODE 250 W/ 637 OVERRIDE(OP): Performed by: HOSPITALIST

## 2018-03-09 PROCEDURE — 700111 HCHG RX REV CODE 636 W/ 250 OVERRIDE (IP): Performed by: HOSPITALIST

## 2018-03-09 PROCEDURE — 700105 HCHG RX REV CODE 258: Performed by: HOSPITALIST

## 2018-03-09 PROCEDURE — 700111 HCHG RX REV CODE 636 W/ 250 OVERRIDE (IP): Performed by: FAMILY MEDICINE

## 2018-03-09 RX ORDER — MORPHINE SULFATE 4 MG/ML
2 INJECTION, SOLUTION INTRAMUSCULAR; INTRAVENOUS ONCE
Status: COMPLETED | OUTPATIENT
Start: 2018-03-09 | End: 2018-03-09

## 2018-03-09 RX ORDER — CYANOCOBALAMIN 1000 UG/ML
1000 INJECTION, SOLUTION INTRAMUSCULAR; SUBCUTANEOUS ONCE
Status: COMPLETED | OUTPATIENT
Start: 2018-03-09 | End: 2018-03-09

## 2018-03-09 RX ORDER — CHOLECALCIFEROL (VITAMIN D3) 125 MCG
1000 CAPSULE ORAL DAILY
Status: DISCONTINUED | OUTPATIENT
Start: 2018-03-10 | End: 2018-03-11 | Stop reason: HOSPADM

## 2018-03-09 RX ORDER — HEPARIN SODIUM 5000 [USP'U]/ML
5000 INJECTION, SOLUTION INTRAVENOUS; SUBCUTANEOUS EVERY 8 HOURS
Status: DISCONTINUED | OUTPATIENT
Start: 2018-03-09 | End: 2018-03-11 | Stop reason: HOSPADM

## 2018-03-09 RX ADMIN — MORPHINE SULFATE 2 MG: 4 INJECTION INTRAVENOUS at 20:06

## 2018-03-09 RX ADMIN — CARVEDILOL 12.5 MG: 12.5 TABLET, FILM COATED ORAL at 07:49

## 2018-03-09 RX ADMIN — CEFTRIAXONE 2 G: 2 INJECTION, POWDER, FOR SOLUTION INTRAMUSCULAR; INTRAVENOUS at 10:29

## 2018-03-09 RX ADMIN — Medication 325 MG: at 07:49

## 2018-03-09 RX ADMIN — LEVOTHYROXINE SODIUM 50 MCG: 50 TABLET ORAL at 05:50

## 2018-03-09 RX ADMIN — CARVEDILOL 12.5 MG: 12.5 TABLET, FILM COATED ORAL at 17:33

## 2018-03-09 RX ADMIN — CYANOCOBALAMIN 1000 MCG: 1000 INJECTION, SOLUTION INTRAMUSCULAR; SUBCUTANEOUS at 10:29

## 2018-03-09 RX ADMIN — OMEPRAZOLE 20 MG: 20 CAPSULE, DELAYED RELEASE ORAL at 07:49

## 2018-03-09 RX ADMIN — STANDARDIZED SENNA CONCENTRATE AND DOCUSATE SODIUM 2 TABLET: 8.6; 5 TABLET, FILM COATED ORAL at 07:49

## 2018-03-09 RX ADMIN — SODIUM CHLORIDE: 9 INJECTION, SOLUTION INTRAVENOUS at 18:52

## 2018-03-09 RX ADMIN — ACETAMINOPHEN 650 MG: 325 TABLET, FILM COATED ORAL at 20:07

## 2018-03-09 RX ADMIN — SODIUM CHLORIDE: 9 INJECTION, SOLUTION INTRAVENOUS at 05:52

## 2018-03-09 RX ADMIN — STANDARDIZED SENNA CONCENTRATE AND DOCUSATE SODIUM 2 TABLET: 8.6; 5 TABLET, FILM COATED ORAL at 20:34

## 2018-03-09 RX ADMIN — HEPARIN SODIUM 5000 UNITS: 5000 INJECTION, SOLUTION INTRAVENOUS; SUBCUTANEOUS at 20:34

## 2018-03-09 RX ADMIN — OMEPRAZOLE 20 MG: 20 CAPSULE, DELAYED RELEASE ORAL at 20:34

## 2018-03-09 ASSESSMENT — PAIN SCALES - GENERAL
PAINLEVEL_OUTOF10: 10
PAINLEVEL_OUTOF10: 0

## 2018-03-09 ASSESSMENT — ENCOUNTER SYMPTOMS
DIZZINESS: 1
FEVER: 0
VOMITING: 0
NAUSEA: 0
FALLS: 1
MEMORY LOSS: 1
WEAKNESS: 1
SHORTNESS OF BREATH: 0
CHILLS: 0

## 2018-03-09 ASSESSMENT — LIFESTYLE VARIABLES: DO YOU DRINK ALCOHOL: NO

## 2018-03-09 NOTE — DISCHARGE PLANNING
"S: Pt B[place].  B: Pt has a POA on file, neighbor Ariella. Has been to [facility] in the past 6 months.   A: Pt lives with sister Ellie in a home in [place], [place]. Per Ellie, pt is independent with ADLs and IADLs and does not use DME or in home services. Ellie reports pt \"has never been good with remembering addresses or phone numbers\". Ellie is hopeful pt will return home.  R: Await PT/OT recommendations. Pt may be able to return home with HH if mentation improves given previous baseline.   "

## 2018-03-09 NOTE — PROGRESS NOTES
[facility] Hospitalist Progress Note    Date of Service: 3/8/2018    Chief Complaint  88 y.o. female admitted 3/7/2018 with lightheaded and dizziness.  Her Hgb was 12 in November, now 7.9 (-) guaiac    Interval Problem Update  - confused. Hallucinating at times. Pulled out her IV's today. No behavior disturbance.  - she recalls the events leading up to hospitalization.  - (+) orthostatics  - c/o chest pain prior to transfer to [place] [id number] - no EKG changes  - urine growing lactose fermenting gram (-) rods - culture pending  - no acute distress    Consultants/Specialty  NA    Disposition  Undetermined at this time.         Review of Systems   Reason unable to perform ROS: Limited due to disorientation.   Respiratory: Negative for shortness of breath.    Cardiovascular: Negative for chest pain.   Gastrointestinal: Negative for nausea and vomiting.   Genitourinary: Positive for frequency and urgency.   Musculoskeletal: Positive for falls.   Neurological: Positive for dizziness and weakness.   Psychiatric/Behavioral: Positive for memory loss.      Physical Exam  Laboratory/Imaging   Hemodynamics  Temp (24hrs), Av.5 °C (97.7 °F), Min:36.1 °C (97 °F), Max:36.8 °C (98.3 °F)   Temperature: 36.8 °C (98.3 °F)  Pulse  Av.7  Min: 58  Max: 112    Blood Pressure : 158/67      Respiratory      Respiration: 18, Pulse Oximetry: 97 %        RUL Breath Sounds: Clear, RML Breath Sounds: Clear, RLL Breath Sounds: Diminished, JUNE Breath Sounds: Clear, LLL Breath Sounds: Diminished    Fluids    Intake/Output Summary (Last 24 hours) at 18 1654  Last data filed at 18 2013   Gross per 24 hour   Intake              120 ml   Output                0 ml   Net              120 ml       Nutrition  Orders Placed This Encounter   Procedures   • Diet Order     Standing Status:   Standing     Number of Occurrences:   1     Order Specific Question:   Diet:     Answer:   Regular [1]     Physical Exam   Constitutional: Vital signs are  normal. She appears well-developed. She is uncooperative. She appears ill. No distress.   Takes off her telemetry.  Pulled out IV's   HENT:   Head: Normocephalic.   Right Ear: Hearing normal.   Left Ear: Hearing normal.   Nose: Nose normal.   Mouth/Throat: Oropharynx is clear and moist and mucous membranes are normal.   Eyes: Conjunctivae and lids are normal. No scleral icterus.   Neck: Phonation normal. No JVD present.   Cardiovascular: Regular rhythm, normal heart sounds and intact distal pulses.    Pulmonary/Chest: Effort normal and breath sounds normal. No respiratory distress. She has no wheezes.   Abdominal: Soft. Normal appearance and bowel sounds are normal. There is no tenderness.   Neurological: She is alert. She is disoriented. GCS eye subscore is 4. GCS verbal subscore is 4. GCS motor subscore is 6.   Skin: Skin is warm and dry. No rash noted.   Psychiatric: Her speech is normal. Judgment and thought content normal. She is agitated and actively hallucinating. Cognition and memory are impaired. She exhibits abnormal recent memory and abnormal remote memory.   Nursing note and vitals reviewed.      Recent Labs      03/07/18   1200  03/08/18   0245   WBC  3.7*  5.1   RBC  2.41*  2.44*   HEMOGLOBIN  8.1*  7.9*   HEMATOCRIT  24.5*  24.3*   MCV  101.7*  99.6*   MCH  33.6*  32.4   MCHC  33.1*  32.5*   RDW  50.6*  48.7   PLATELETCT  163*  180   MPV  9.4  9.0     Recent Labs      03/07/18   1200  03/08/18   0245   SODIUM  135  138   POTASSIUM  5.0  4.4   CHLORIDE  114*  113*   CO2  17*  18*   GLUCOSE  89  100*   BUN  33*  28*   CREATININE  1.84*  1.74*   CALCIUM  8.7  9.0                      Assessment/Plan     Acute renal failure (CMS-[facility])   Assessment & Plan    - likely due to UTI and dehydration  - IVF at 100 mL/hr  - holding Lisinopril and HCTZ  - avoid nephrotoxins        FLOYD (acute kidney injury) (CMS-[facility])   Assessment & Plan    - likely due to UTI and dehydration  - IVF at 100 mL/hr  - holding Lisinopril  and HCTZ  - avoid nephrotoxins        Dehydration   Assessment & Plan    - as above  - NS /hr        UTI (urinary tract infection)   Assessment & Plan    - preliminary urine culture Lactose fermenting Gram negative camron >100,000 cfu/mL   - IV Rocephin        Anemia   Assessment & Plan    - unknown etiology at this time  - Hgb has dropped to 7.9 today from 12.1 4 months ago  - (-) guaiac         Near syncope   Assessment & Plan    - likely due to dehydration versus UTI versus anemia  - currently on telemetry showing NSR 65-80 without ectopy  - 12-lead EKG showed NSR rates 60's  - (+) orthostatics        Hypothyroidism- (present on admission)   Assessment & Plan    - chronic on daily levothyroxine            Quality-Core Measures   Reviewed items::  EKG reviewed, Labs reviewed, Medications reviewed and Radiology images reviewed  Smith catheter::  No Smith  DVT prophylaxis pharmacological::  Contraindicated - High bleeding risk  DVT prophylaxis - mechanical:  SCDs  Ulcer Prophylaxis::  Yes  Antibiotics:  Treating active infection/contamination beyond 24 hours perioperative coverage      BERYL García.

## 2018-03-09 NOTE — CARE PLAN
Problem: Safety  Goal: Will remain free from falls  Outcome: PROGRESSING AS EXPECTED  HD Fall Risk complete. Proper patient identifiers in place. Patient educated on fall risk and instructed to call for assistance.     Problem: Bowel/Gastric:  Goal: Normal bowel function is maintained or improved  Outcome: PROGRESSING AS EXPECTED  Bowel protocol ordered for patient and receiving medications as per MAR. Patient having BMs with ease.

## 2018-03-09 NOTE — PROGRESS NOTES
"Assumed care at 1900. Received report from RN. Patient is AOx1 to self. Patient is sitting up in bed and appears calm and in no distress. Patient is confused and believes she is here for surgery. Reassurance and comfort given. Assessment complete. Labs reviewed. Patient and RN discussed plan of care. Patient questions answered. Patient needs are met at this time. Bed in lowest and locked position. Call light is within reach. Hourly rounding in place. /56   Pulse 67   Temp 36.9 °C (98.5 °F)   Resp 16   Ht 1.575 m (5' 2\")   Wt 46.2 kg (101 lb 13.6 oz)   SpO2 96%   BMI 18.63 kg/m²       "

## 2018-03-09 NOTE — PROGRESS NOTES
Pt transferred to [facility].  Receiving RN updated before transfer that pt was c/o chest pain.  STAT EKG ordered, results are not concerning.  Pt ok to trnasfer to new room.  All belongings, no meds, and chart sent with pt.

## 2018-03-09 NOTE — DISCHARGE PLANNING
note:  Rounding done. RN verbalized that pt will need placement. Pt was apparently [place]. Pt will need PT/OT eval and treat.    Addendum:  PT cleared pt on 03/08/18 but today pt is more confused and hallucinating. PT will follow up when cognition is better.

## 2018-03-09 NOTE — PROGRESS NOTES
Received bedside report from NOC RN. Assumed care at 0700. Patient resting comfortable in bed and able to make needs known. Patient is confused, x1 to self. Patient denies needs at this time and does not show any s/s of distress. Hourly rounding in place.

## 2018-03-09 NOTE — PROGRESS NOTES
Assumed care of this patient @ 9126. Patient is very confused. Bed alarm placed. Patient is here for a UTI. IV fluids @ 75. Bed in low position. Call light within reach. Neuro checks Q4.

## 2018-03-09 NOTE — CARE PLAN
Problem: Safety  Goal: Will remain free from injury  Outcome: PROGRESSING AS EXPECTED  Bed alarms on. Bed on lowest position, call light within reach, patient educated about use of call light and safety precautions.     Problem: Infection  Goal: Will remain free from infection  Outcome: PROGRESSING AS EXPECTED    Intervention: Assess signs and symptoms of infection  Patient still confused. No other signs or symptoms of confusion noted. Hydrating patient with NS at 75ml/hr.

## 2018-03-10 LAB
ALBUMIN SERPL BCP-MCNC: 3 G/DL (ref 3.2–4.9)
BUN SERPL-MCNC: 19 MG/DL (ref 8–22)
CALCIUM SERPL-MCNC: 8.5 MG/DL (ref 8.5–10.5)
CHLORIDE SERPL-SCNC: 114 MMOL/L (ref 96–112)
CO2 SERPL-SCNC: 19 MMOL/L (ref 20–33)
CREAT SERPL-MCNC: 1.32 MG/DL (ref 0.5–1.4)
ERYTHROCYTE [DISTWIDTH] IN BLOOD BY AUTOMATED COUNT: 49.1 FL (ref 35.9–50)
GLUCOSE SERPL-MCNC: 98 MG/DL (ref 65–99)
HCT VFR BLD AUTO: 23 % (ref 37–47)
HGB BLD-MCNC: 7.7 G/DL (ref 12–16)
MCH RBC QN AUTO: 32.8 PG (ref 27–33)
MCHC RBC AUTO-ENTMCNC: 33.5 G/DL (ref 33.6–35)
MCV RBC AUTO: 97.9 FL (ref 81.4–97.8)
PHOSPHATE SERPL-MCNC: 2.7 MG/DL (ref 2.5–4.5)
PLATELET # BLD AUTO: 167 K/UL (ref 164–446)
PMV BLD AUTO: 9.1 FL (ref 9–12.9)
POTASSIUM SERPL-SCNC: 3.8 MMOL/L (ref 3.6–5.5)
RBC # BLD AUTO: 2.35 M/UL (ref 4.2–5.4)
SODIUM SERPL-SCNC: 137 MMOL/L (ref 135–145)
WBC # BLD AUTO: 4.4 K/UL (ref 4.8–10.8)

## 2018-03-10 PROCEDURE — 36415 COLL VENOUS BLD VENIPUNCTURE: CPT

## 2018-03-10 PROCEDURE — 99232 SBSQ HOSP IP/OBS MODERATE 35: CPT | Performed by: HOSPITALIST

## 2018-03-10 PROCEDURE — A9270 NON-COVERED ITEM OR SERVICE: HCPCS | Performed by: HOSPITALIST

## 2018-03-10 PROCEDURE — 700105 HCHG RX REV CODE 258: Performed by: HOSPITALIST

## 2018-03-10 PROCEDURE — 700111 HCHG RX REV CODE 636 W/ 250 OVERRIDE (IP): Performed by: HOSPITALIST

## 2018-03-10 PROCEDURE — 700102 HCHG RX REV CODE 250 W/ 637 OVERRIDE(OP): Performed by: HOSPITALIST

## 2018-03-10 PROCEDURE — 80069 RENAL FUNCTION PANEL: CPT

## 2018-03-10 PROCEDURE — 85027 COMPLETE CBC AUTOMATED: CPT

## 2018-03-10 PROCEDURE — 770006 HCHG ROOM/CARE - MED/SURG/GYN SEMI*

## 2018-03-10 RX ORDER — CIPROFLOXACIN 500 MG/1
500 TABLET, FILM COATED ORAL EVERY 24 HOURS
Status: COMPLETED | OUTPATIENT
Start: 2018-03-10 | End: 2018-03-11

## 2018-03-10 RX ORDER — HALOPERIDOL 2 MG/1
1-2 TABLET ORAL EVERY 6 HOURS PRN
Status: DISCONTINUED | OUTPATIENT
Start: 2018-03-10 | End: 2018-03-11 | Stop reason: HOSPADM

## 2018-03-10 RX ADMIN — OMEPRAZOLE 20 MG: 20 CAPSULE, DELAYED RELEASE ORAL at 20:43

## 2018-03-10 RX ADMIN — CARVEDILOL 12.5 MG: 12.5 TABLET, FILM COATED ORAL at 09:31

## 2018-03-10 RX ADMIN — CYANOCOBALAMIN TAB 500 MCG 1000 MCG: 500 TAB at 09:32

## 2018-03-10 RX ADMIN — Medication 325 MG: at 09:32

## 2018-03-10 RX ADMIN — SODIUM CHLORIDE: 9 INJECTION, SOLUTION INTRAVENOUS at 09:33

## 2018-03-10 RX ADMIN — CARVEDILOL 12.5 MG: 12.5 TABLET, FILM COATED ORAL at 17:39

## 2018-03-10 RX ADMIN — OMEPRAZOLE 20 MG: 20 CAPSULE, DELAYED RELEASE ORAL at 09:32

## 2018-03-10 RX ADMIN — HALOPERIDOL 2 MG: 2 TABLET ORAL at 17:39

## 2018-03-10 RX ADMIN — HEPARIN SODIUM 5000 UNITS: 5000 INJECTION, SOLUTION INTRAVENOUS; SUBCUTANEOUS at 15:29

## 2018-03-10 RX ADMIN — CIPROFLOXACIN HYDROCHLORIDE 500 MG: 500 TABLET, FILM COATED ORAL at 10:58

## 2018-03-10 RX ADMIN — HEPARIN SODIUM 5000 UNITS: 5000 INJECTION, SOLUTION INTRAVENOUS; SUBCUTANEOUS at 20:44

## 2018-03-10 RX ADMIN — STANDARDIZED SENNA CONCENTRATE AND DOCUSATE SODIUM 2 TABLET: 8.6; 5 TABLET, FILM COATED ORAL at 09:32

## 2018-03-10 ASSESSMENT — ENCOUNTER SYMPTOMS
MEMORY LOSS: 1
NAUSEA: 0
SHORTNESS OF BREATH: 0
WEAKNESS: 1
CHILLS: 0
DIZZINESS: 1
FEVER: 0
BLOOD IN STOOL: 0
VOMITING: 0
FALLS: 1

## 2018-03-10 ASSESSMENT — PAIN SCALES - GENERAL
PAINLEVEL_OUTOF10: 2
PAINLEVEL_OUTOF10: 1
PAINLEVEL_OUTOF10: 0
PAINLEVEL_OUTOF10: 0

## 2018-03-10 ASSESSMENT — LIFESTYLE VARIABLES: DO YOU DRINK ALCOHOL: NO

## 2018-03-10 NOTE — PROGRESS NOTES
[facility] Hospitalist Progress Note    Date of Service: 3/9/2018    Chief Complaint  88 y.o. female admitted 3/7/2018 with lightheaded and dizziness.  Her Hgb was 12 in November, now 7.9 (-) guaiac    Interval Problem Update  3/8: confused. Hallucinating at times. Pulled out her IV's today. No behavior disturbance.  3/9: Used to going LFGNR. Ordered PT OT/SNF. Repleting vitamin B12    Consultants/Specialty  NA    Disposition  Undetermined at this time.         Review of Systems   Reason unable to perform ROS: Limited due to disorientation.   Constitutional: Negative for chills and fever.   Respiratory: Negative for shortness of breath.    Cardiovascular: Negative for chest pain.   Gastrointestinal: Negative for nausea and vomiting.   Genitourinary: Positive for frequency and urgency. Negative for dysuria.   Musculoskeletal: Positive for falls.   Neurological: Positive for dizziness and weakness.   Psychiatric/Behavioral: Positive for memory loss.      Physical Exam  Laboratory/Imaging   Hemodynamics  Temp (24hrs), Av.9 °C (98.4 °F), Min:36.5 °C (97.7 °F), Max:37.2 °C (98.9 °F)   Temperature: 37.2 °C (98.9 °F)  Pulse  Av.4  Min: 58  Max: 112    Blood Pressure : 138/49      Respiratory      Respiration: 16, Pulse Oximetry: 95 %     Work Of Breathing / Effort: Mild  RUL Breath Sounds: Clear, RML Breath Sounds: Clear, RLL Breath Sounds: Diminished, JUNE Breath Sounds: Clear, LLL Breath Sounds: Diminished    Fluids    Intake/Output Summary (Last 24 hours) at 18 1716  Last data filed at 18 0500   Gross per 24 hour   Intake              900 ml   Output                0 ml   Net              900 ml       Nutrition  Orders Placed This Encounter   Procedures   • Diet Order     Standing Status:   Standing     Number of Occurrences:   1     Order Specific Question:   Diet:     Answer:   Regular [1]     Physical Exam   Constitutional: Vital signs are normal. She appears well-developed. She is uncooperative. She  appears ill. No distress.   Takes off her telemetry.  Pulled out IV's   HENT:   Head: Normocephalic.   Right Ear: Hearing normal.   Left Ear: Hearing normal.   Mouth/Throat: Mucous membranes are normal.   Eyes: Conjunctivae and lids are normal.   Neck: Phonation normal.   Cardiovascular: Regular rhythm and normal heart sounds.    Pulmonary/Chest: Effort normal and breath sounds normal. No respiratory distress. She has no wheezes.   Abdominal: Soft. Normal appearance and bowel sounds are normal. There is no tenderness.   Neurological: She is alert. She is disoriented.   Skin: Skin is warm and dry. No rash noted.   Psychiatric: Her speech is normal. She is agitated and actively hallucinating. Cognition and memory are impaired. She exhibits abnormal recent memory and abnormal remote memory.   Nursing note and vitals reviewed.      Recent Labs      03/07/18   1200  03/08/18   0245   WBC  3.7*  5.1   RBC  2.41*  2.44*   HEMOGLOBIN  8.1*  7.9*   HEMATOCRIT  24.5*  24.3*   MCV  101.7*  99.6*   MCH  33.6*  32.4   MCHC  33.1*  32.5*   RDW  50.6*  48.7   PLATELETCT  163*  180   MPV  9.4  9.0     Recent Labs      03/07/18   1200  03/08/18   0245   SODIUM  135  138   POTASSIUM  5.0  4.4   CHLORIDE  114*  113*   CO2  17*  18*   GLUCOSE  89  100*   BUN  33*  28*   CREATININE  1.84*  1.74*   CALCIUM  8.7  9.0                      Assessment/Plan     Acute renal failure (CMS-H[facility])   Assessment & Plan    - likely due to UTI and dehydration  - IVF at 100 mL/hr  - holding Lisinopril and HCTZ  - avoid nephrotoxins        FLOYD (acute kidney injury) (CMS-HCC)   Assessment & Plan    - likely due to UTI and dehydration  - IVF at 100 mL/hr  - holding Lisinopril and HCTZ  - avoid nephrotoxins        Vitamin B12 deficiency- (present on admission)   Assessment & Plan    - Lab low normal  - Repleting with IM then oral afterwards        Dehydration   Assessment & Plan    - as above  - NS /hr        UTI (urinary tract infection)   Assessment  & Plan    - preliminary urine culture Lactose fermenting Gram negative camron >100,000 cfu/mL   - IV Rocephin        Anemia   Assessment & Plan    - unknown etiology at this time  - Hgb has dropped to 7.9 this admission from 12.1 4 months ago  - (-) guaiac   - Continue to trend        Near syncope   Assessment & Plan    - likely due to dehydration versus UTI versus anemia  - No ectopy seen on telemetry  - 12-lead EKG showed NSR rates 60's  - (+) orthostatics initially        Hypothyroidism- (present on admission)   Assessment & Plan    - chronic on daily levothyroxine            Quality-Core Measures   Reviewed items::  Labs reviewed and Medications reviewed  Smith catheter::  No Smith  DVT prophylaxis pharmacological::  Heparin  DVT prophylaxis - mechanical:  SCDs  Ulcer Prophylaxis::  Yes  Antibiotics:  Treating active infection/contamination beyond 24 hours perioperative coverage      Shelton Justin M.D.

## 2018-03-10 NOTE — PROGRESS NOTES
Bedside report received from NOC RN. Assumed care at 0700. Patient resting comfortably in bed. Patient able to make needs known, and denies any at this time. Patient educated to continue to call for assistance. Hourly rounding in place.

## 2018-03-10 NOTE — DISCHARGE PLANNING
PT/OT notes reviewed, pt is at functional baseline and no further skilled needs.  Plan is for pt to d/c home once medically clear.   I have reviewed and confirmed nurses' notes...

## 2018-03-10 NOTE — DIETARY
"Nutrition services: Day 1 of admit.  Lamar Dangelo is a 88 y.o. female with admitting DX of Dehydration, UTI (urinary tract infection)  Patient stated her appetite is good and would eat any snacks we send her.  She denied any weight loss prior to admission, but nursing reported she is alert and oriented x 1-2.    Assessment:  Height: 157.5 cm (5' 2\")  Weight: 46.2 kg (101 lb 13.6 oz)  Body mass index is 18.63 kg/m².  Diet/Intake: Regular; ate % of lunch.    Evaluation:   1. Weight per chart review was 46.3 at previous admit in Sept 2017.  Weight appears stable.    Recommendations/Plan:  1. Snacks and supplements added.   2. Encourage intake of meals and snacks  3. Document intake of all PO as % taken in ADL's to provide interdisciplinary communication across all shifts.   4. Monitor weight.  5. Nutrition rep will continue to see patient for ongoing meal and snack preferences.     RD following            "

## 2018-03-10 NOTE — CARE PLAN
Problem: Infection  Goal: Will remain free from infection  Outcome: PROGRESSING AS EXPECTED  Patient educated on hand hygiene. Sani-Hands provided.    Problem: Venous Thromboembolism (VTW)/Deep Vein Thrombosis (DVT) Prevention:  Goal: Patient will participate in Venous Thrombosis (VTE)/Deep Vein Thrombosis (DVT)Prevention Measures  Outcome: PROGRESSING AS EXPECTED  Patient educated on VTE prophylaxis. SCD's on patient. Patient mobilized to the bathroom PRN.

## 2018-03-11 ENCOUNTER — PATIENT OUTREACH (OUTPATIENT)
Dept: HEALTH INFORMATION MANAGEMENT | Facility: OTHER | Age: 83
End: 2018-03-11

## 2018-03-11 ENCOUNTER — HOME HEALTH ADMISSION (OUTPATIENT)
Dept: HOME HEALTH SERVICES | Facility: HOME HEALTHCARE | Age: 83
End: 2018-03-11
Payer: MEDICARE

## 2018-03-11 VITALS
TEMPERATURE: 98.8 F | RESPIRATION RATE: 16 BRPM | HEART RATE: 64 BPM | DIASTOLIC BLOOD PRESSURE: 52 MMHG | BODY MASS INDEX: 18.74 KG/M2 | HEIGHT: 62 IN | SYSTOLIC BLOOD PRESSURE: 147 MMHG | WEIGHT: 101.85 LBS | OXYGEN SATURATION: 96 %

## 2018-03-11 LAB
ALBUMIN SERPL BCP-MCNC: 3.1 G/DL (ref 3.2–4.9)
BUN SERPL-MCNC: 16 MG/DL (ref 8–22)
CALCIUM SERPL-MCNC: 8.8 MG/DL (ref 8.5–10.5)
CHLORIDE SERPL-SCNC: 112 MMOL/L (ref 96–112)
CO2 SERPL-SCNC: 20 MMOL/L (ref 20–33)
CREAT SERPL-MCNC: 1.25 MG/DL (ref 0.5–1.4)
ERYTHROCYTE [DISTWIDTH] IN BLOOD BY AUTOMATED COUNT: 49.3 FL (ref 35.9–50)
GLUCOSE SERPL-MCNC: 99 MG/DL (ref 65–99)
HCT VFR BLD AUTO: 23.2 % (ref 37–47)
HGB BLD-MCNC: 7.8 G/DL (ref 12–16)
MCH RBC QN AUTO: 33.2 PG (ref 27–33)
MCHC RBC AUTO-ENTMCNC: 33.6 G/DL (ref 33.6–35)
MCV RBC AUTO: 98.7 FL (ref 81.4–97.8)
PHOSPHATE SERPL-MCNC: 2.3 MG/DL (ref 2.5–4.5)
PLATELET # BLD AUTO: 170 K/UL (ref 164–446)
PMV BLD AUTO: 9.3 FL (ref 9–12.9)
POTASSIUM SERPL-SCNC: 3.7 MMOL/L (ref 3.6–5.5)
RBC # BLD AUTO: 2.35 M/UL (ref 4.2–5.4)
SODIUM SERPL-SCNC: 138 MMOL/L (ref 135–145)
WBC # BLD AUTO: 4.6 K/UL (ref 4.8–10.8)

## 2018-03-11 PROCEDURE — 700105 HCHG RX REV CODE 258: Performed by: HOSPITALIST

## 2018-03-11 PROCEDURE — 700102 HCHG RX REV CODE 250 W/ 637 OVERRIDE(OP): Performed by: HOSPITALIST

## 2018-03-11 PROCEDURE — 85027 COMPLETE CBC AUTOMATED: CPT

## 2018-03-11 PROCEDURE — A9270 NON-COVERED ITEM OR SERVICE: HCPCS | Performed by: HOSPITALIST

## 2018-03-11 PROCEDURE — 80069 RENAL FUNCTION PANEL: CPT

## 2018-03-11 PROCEDURE — 36415 COLL VENOUS BLD VENIPUNCTURE: CPT

## 2018-03-11 PROCEDURE — 99239 HOSP IP/OBS DSCHRG MGMT >30: CPT | Performed by: HOSPITALIST

## 2018-03-11 PROCEDURE — 700111 HCHG RX REV CODE 636 W/ 250 OVERRIDE (IP): Performed by: HOSPITALIST

## 2018-03-11 RX ADMIN — SODIUM CHLORIDE: 9 INJECTION, SOLUTION INTRAVENOUS at 06:12

## 2018-03-11 RX ADMIN — CYANOCOBALAMIN TAB 500 MCG 1000 MCG: 500 TAB at 07:41

## 2018-03-11 RX ADMIN — LEVOTHYROXINE SODIUM 50 MCG: 50 TABLET ORAL at 06:04

## 2018-03-11 RX ADMIN — CARVEDILOL 12.5 MG: 12.5 TABLET, FILM COATED ORAL at 07:41

## 2018-03-11 RX ADMIN — HEPARIN SODIUM 5000 UNITS: 5000 INJECTION, SOLUTION INTRAVENOUS; SUBCUTANEOUS at 06:05

## 2018-03-11 RX ADMIN — Medication 325 MG: at 07:41

## 2018-03-11 RX ADMIN — OMEPRAZOLE 20 MG: 20 CAPSULE, DELAYED RELEASE ORAL at 07:42

## 2018-03-11 RX ADMIN — CIPROFLOXACIN HYDROCHLORIDE 500 MG: 500 TABLET, FILM COATED ORAL at 07:41

## 2018-03-11 ASSESSMENT — PATIENT HEALTH QUESTIONNAIRE - PHQ9
1. LITTLE INTEREST OR PLEASURE IN DOING THINGS: NOT AT ALL
SUM OF ALL RESPONSES TO PHQ QUESTIONS 1-9: 0
SUM OF ALL RESPONSES TO PHQ9 QUESTIONS 1 AND 2: 0
2. FEELING DOWN, DEPRESSED, IRRITABLE, OR HOPELESS: NOT AT ALL

## 2018-03-11 ASSESSMENT — LIFESTYLE VARIABLES
ALCOHOL_USE: NO
EVER_SMOKED: NEVER

## 2018-03-11 ASSESSMENT — PAIN SCALES - GENERAL: PAINLEVEL_OUTOF10: 0

## 2018-03-11 NOTE — CARE PLAN
Problem: Safety  Goal: Will remain free from falls    Intervention: Assess risk factors for falls  Fall measures in place. Bed alarm is on, call light within reach, personal belongings close-by, bed in lowest position, IV pole on same side of bathroom, upper bed rails up, hourly rounding in place. Will continue to monitor pt for safety.       Problem: Pain Management  Goal: Pain level will decrease to patient's comfort goal    Intervention: Follow pain managment plan developed in collaboration with patient and Interdisciplinary Team  Pt denies having any pain at the moment, hourly rounding is in place.

## 2018-03-11 NOTE — DISCHARGE PLANNING
Medical Social Work    Referral: Home health    Intervention:     1230: MSW was paged by bedside RN who was requesting home health to be arranged. MSW explained to bedside RN that this worker can meet with pt around 1330 after rounds are completed.     1345: MSW met with pt and pt's daughter in the family room due to pt already being discharged from [facility]. MSW explained home health to pts daughter and provided choice form. Pt's daughter chose [facility] health. MSW faxed choice form to [facility]. MSW then called the  to see if there was availability for PCP appointment in the next two days per home health policy. Pt's new PCP was booked all week so one time appointment with the discharge clinic was scheduled for 1340 on 3/13/18. MSW called [facility] and notified of doctor appointment with the discharge clinic. [facility] has accepted the pt. Bedside RN notified and MSW also passed on pt's daughter has some discharge medication questions.

## 2018-03-11 NOTE — PROGRESS NOTES
Assumed care of pt after receiving report from dayshift RN. Bedside rounding completed w/ dayshift RN. Pt resting in bed A&OX3, disoriented to time  w/ no complaints of pain or discomfort, pt medicated per MAR. Fall measures in place, call light within reach, personal belongings nearby, bed in lowest position, and hourly rounding in place. Will continue to monitor pt.

## 2018-03-11 NOTE — DISCHARGE INSTRUCTIONS
Discharge Instructions    Discharged to home by car with relative. Discharged via wheelchair, hospital escort: Yes.  Special equipment needed: Not Applicable    Be sure to schedule a follow-up appointment with your primary care doctor or any specialists as instructed.     Discharge Plan:   Influenza Vaccine Indication: Not indicated: Previously immunized this influenza season and > 8 years of age    I understand that a diet low in cholesterol, fat, and sodium is recommended for good health. Unless I have been given specific instructions below for another diet, I accept this instruction as my diet prescription.   Other diet: Regular    Special Instructions: None    · Is patient discharged on Warfarin / Coumadin?   No     Depression / Suicide Risk    As you are discharged from this [facility] facility, it is important to learn how to keep safe from harming yourself.    Recognize the warning signs:  · Abrupt changes in personality, positive or negative- including increase in energy   · Giving away possessions  · Change in eating patterns- significant weight changes-  positive or negative  · Change in sleeping patterns- unable to sleep or sleeping all the time   · Unwillingness or inability to communicate  · Depression  · Unusual sadness, discouragement and loneliness  · Talk of wanting to die  · Neglect of personal appearance   · Rebelliousness- reckless behavior  · Withdrawal from people/activities they love  · Confusion- inability to concentrate     If you or a loved one observes any of these behaviors or has concerns about self-harm, here's what you can do:  · Talk about it- your feelings and reasons for harming yourself  · Remove any means that you might use to hurt yourself (examples: pills, rope, extension cords, firearm)  · Get professional help from the community (Mental Health, Substance Abuse, psychological counseling)  · Do not be alone:Call your Safe Contact- someone whom you trust who will be there for  you.  · Call your local CRISIS HOTLINE 922-7925 or 459-182-5980  · Call your local Children's Mobile Crisis Response Team [place] (514) 582-0418 or www.[employer]  · Call the toll free National Suicide Prevention Hotlines   · National Suicide Prevention Lifeline 712-440-ALNW (4931)  · National Parcel Line Network 800-SUICIDE (559-9112)

## 2018-03-11 NOTE — PROGRESS NOTES
[facility] Hospitalist Progress Note    Date of Service: 3/10/2018    Chief Complaint  88 y.o. female admitted 3/7/2018 with lightheaded and dizziness.  Her Hgb was 12 in November, now 7.9 (-) guaiac    Interval Problem Update  3/8: confused. Hallucinating at times. Pulled out her IV's today. No behavior disturbance.  3/9: UC growing LFGNR. Ordered PT OT/SNF. Repleting vitamin B12  3/10: De-escalated to oral Cipro. Added low-dose Haldol p.r.n.    Consultants/Specialty  NA    Disposition  Home with home health after improvement     Review of Systems   Reason unable to perform ROS: Limited due to disorientation.   Constitutional: Negative for chills and fever.   Respiratory: Negative for shortness of breath.    Cardiovascular: Negative for chest pain.   Gastrointestinal: Negative for blood in stool, melena, nausea and vomiting.   Genitourinary: Positive for dysuria. Negative for frequency and urgency.   Musculoskeletal: Positive for falls.   Neurological: Positive for dizziness and weakness.   Psychiatric/Behavioral: Positive for memory loss.      Physical Exam  Laboratory/Imaging   Hemodynamics  Temp (24hrs), Av.6 °C (97.9 °F), Min:35.9 °C (96.7 °F), Max:36.9 °C (98.5 °F)   Temperature: 36.9 °C (98.4 °F)  Pulse  Av.2  Min: 58  Max: 112    Blood Pressure : 120/76 (taken manually)      Respiratory      Respiration: 18, Pulse Oximetry: 95 %     Work Of Breathing / Effort: Mild  RUL Breath Sounds: Clear, RML Breath Sounds: Clear, RLL Breath Sounds: Diminished, JUNE Breath Sounds: Clear, LLL Breath Sounds: Diminished    Fluids  No intake or output data in the 24 hours ending 03/10/18 1703    Nutrition  Orders Placed This Encounter   Procedures   • Diet Order     Standing Status:   Standing     Number of Occurrences:   1     Order Specific Question:   Diet:     Answer:   Regular [1]     Physical Exam   Constitutional: Vital signs are normal. She appears well-developed. She is uncooperative. She appears ill. No  distress.   Takes off her telemetry.  Pulled out IV's   HENT:   Head: Normocephalic.   Right Ear: Hearing normal.   Left Ear: Hearing normal.   Mouth/Throat: Mucous membranes are normal.   Eyes: Conjunctivae and lids are normal.   Neck: Phonation normal.   Cardiovascular: Regular rhythm and normal heart sounds.    Pulmonary/Chest: Effort normal and breath sounds normal. No respiratory distress. She has no wheezes.   Abdominal: Soft. Normal appearance and bowel sounds are normal. There is no tenderness.   Neurological: She is alert. She is disoriented.   Slightly confused   Skin: Skin is warm and dry. No rash noted. She is not diaphoretic.   Psychiatric: Her speech is normal. She is agitated and actively hallucinating. Cognition and memory are impaired. She exhibits abnormal recent memory and abnormal remote memory.   Nursing note and vitals reviewed.      Recent Labs      03/08/18   0245  03/10/18   0404   WBC  5.1  4.4*   RBC  2.44*  2.35*   HEMOGLOBIN  7.9*  7.7*   HEMATOCRIT  24.3*  23.0*   MCV  99.6*  97.9*   MCH  32.4  32.8   MCHC  32.5*  33.5*   RDW  48.7  49.1   PLATELETCT  180  167   MPV  9.0  9.1     Recent Labs      03/08/18   0245  03/10/18   0404   SODIUM  138  137   POTASSIUM  4.4  3.8   CHLORIDE  113*  114*   CO2  18*  19*   GLUCOSE  100*  98   BUN  28*  19   CREATININE  1.74*  1.32   CALCIUM  9.0  8.5                      Assessment/Plan     Acute renal failure (CMS-HCC)   Assessment & Plan    - likely due to UTI and dehydration  - IVF at 100 mL/hr  - holding Lisinopril and HCTZ  - avoid nephrotoxins        FLOYD (acute kidney injury) (CMS-HCC)   Assessment & Plan    - likely due to UTI and dehydration  - IVF at 100 mL/hr  - holding Lisinopril and HCTZ  - avoid nephrotoxins        Vitamin B12 deficiency- (present on admission)   Assessment & Plan    - Lab low normal  - Repleting with IM then oral afterwards        Dehydration   Assessment & Plan    - as above  - NS /hr        UTI (urinary tract  infection)   Assessment & Plan    - Urine culture grew E. coli  - Changed to oral Cipro from IV ceftriaxone        Anemia   Assessment & Plan    - unknown etiology at this time  - Hgb has dropped to 7.9 this admission from 12.1 4 months ago  - (-) guaiac   - Continue to trend        Near syncope   Assessment & Plan    - likely due to dehydration versus UTI versus anemia  - No ectopy seen on telemetry  - 12-lead EKG showed NSR rates 60's  - (+) orthostatics initially        Hypothyroidism- (present on admission)   Assessment & Plan    - chronic on daily levothyroxine            Quality-Core Measures   Reviewed items::  Labs reviewed and Medications reviewed  Smith catheter::  No Smith  DVT prophylaxis pharmacological::  Heparin  DVT prophylaxis - mechanical:  SCDs  Ulcer Prophylaxis::  Yes  Antibiotics:  Treating active infection/contamination beyond 24 hours perioperative coverage      Shelton Justin M.D.

## 2018-03-11 NOTE — DISCHARGE PLANNING
Received call from Julissa with [facility] patient's PCP appt is for 3/19/18. [facility] will need earlier appt for PCP so orders can be signed. Notified FLORECITA Wilson via voicemail.

## 2018-03-11 NOTE — DISCHARGE PLANNING
Received call and choice form from FLORECITA Reyes for . Referral sent to [facility] at 1410.

## 2018-03-11 NOTE — PROGRESS NOTES
Pt. Discharged home with KAUSHIK Krishnan. Discharge instructions and follow-up appointment discussed with KAUSHIK Krishnan, and verbalized understanding. Ariella asked to talk with .  met with both and answered questions. Copy of Ariella's diver's license for safe discharge for a confused pt. Is in pt. Chart. IV removed, all patient belongings in hand.

## 2018-03-11 NOTE — FACE TO FACE
Face to Face Supporting Documentation - Home Health    The encounter with this patient was in whole or in part the primary reason for home health admission.    Date of encounter:   Patient:                    MRN:                       YOB: 2018  Lamar Dangelo  [id number]  6/5/1929     Home health to see patient for:  Skilled Nursing care for assessment, interventions & education and Physical Therapy evaluation and treatment    Skilled need for:  Exacerbation of Chronic Disease State UTI    Skilled nursing interventions to include:  Comment: chk on pt    Homebound status evidenced by:  Needs the assistance of another person in order to leave the home. Leaving home requires a considerable and taxing effort. There is a normal inability to leave the home.    Community Physician to provide follow up care: BELLA Cross     Optional Interventions? No      I certify the face to face encounter for this home health care referral meets the CMS requirements and the encounter/clinical assessment with the patient was, in whole, or in part, for the medical condition(s) listed above, which is the primary reason for home health care. Based on my clinical findings: the service(s) are medically necessary, support the need for home health care, and the homebound criteria are met.  I certify that this patient has had a face to face encounter by myself.  Shelton Justin M.D. - NPI: [id number]

## 2018-03-11 NOTE — DISCHARGE PLANNING
ATTN: Case Management  RE: Referral for Home Health                We would like to take this opportunity to thank you for submitting a referral for your patient to continue care with [facility]. Our skilled team is dedicated to helping all patients recover and gain independence in the home setting.            As of 3/11/18, we have accepted the above patient into our service. A [facility] clinician will be out to see the patient within 48 hours to conduct our initial visit. If you have any questions or concerns regarding the patient’s transition to Home Health, please do not hesitate to contact us. We are open for referrals 7 days a week from 8AM to 5PM at [phone].      We look forward to collaborating with you,  [facility] Team

## 2018-03-12 ENCOUNTER — PATIENT OUTREACH (OUTPATIENT)
Dept: HEALTH INFORMATION MANAGEMENT | Facility: OTHER | Age: 83
End: 2018-03-12

## 2018-03-12 NOTE — DISCHARGE SUMMARY
Hospital Medicine Discharge Note     Admit Date:  3/7/2018       Discharge Date:   3/11/2018    Attending Physician:  Shelton Justin     Diagnoses (includes active and resolved):   Active Problems:    FLOYD (acute kidney injury) (CMS-[facility]) POA: Unknown    Acute renal failure (CMS-[facility]) POA: Unknown    Hypothyroidism (Chronic) POA: Yes    Near syncope POA: Unknown    Anemia POA: Unknown    UTI (urinary tract infection) POA: Unknown    Dehydration POA: Unknown    Vitamin B12 deficiency POA: Yes  Resolved Problems:    * No resolved hospital problems. *      Hospital Summary (Brief Narrative):       88-year-old female p/w lightheaded and dizzy while sitting in a casino gambling.   She was admitted. She was found to have anemia with no current active bleeding. Hemoglobin was decreased from a previous value but was stable throughout her admission. She had workup including negative carotid ultrasound. She was found to have vitamin B-12 deficiency as well as dehydration. She was also found to have a UTI which eventually grew out pansensitive E. coli. She was treated with antibiotics and was able to discharge home. Because of continued weakness, home health was ordered for further therapy at home. Her orthostatic hypotension was likely caused by her blood pressure medications. Two of her blood pressure medications were discontinued, see below, these were discussed with her neighbor Acacia who is her decision maker. These were also held in the hospital and she had excellent low blood pressures without them.  The patient met 2-midnight criteria for an inpatient stay at the time of discharge.     Consultants:      None    Procedures:        None    Discharge Medications:           Medication List      START taking these medications      Instructions   cyanocobalamin 1000 MCG Tabs  Start taking on:  3/12/2018  Commonly known as:  VITAMIN B12   Take 1 Tab by mouth every day.  Dose:  1000 mcg        CONTINUE taking these medications       Instructions   carvedilol 12.5 MG Tabs  Commonly known as:  COREG   Take 1 Tab by mouth 2 times a day, with meals.  Dose:  12.5 mg     IRON SUPPLEMENT 325 (65 Fe) MG tablet  Generic drug:  ferrous sulfate   Take 325 mg by mouth every day.  Dose:  325 mg     levothyroxine 50 MCG Tabs  Commonly known as:  SYNTHROID   Take 50 mcg by mouth Every morning on an empty stomach.  Dose:  50 mcg     omeprazole 20 MG delayed-release capsule  Commonly known as:  PRILOSEC   Take 20 mg by mouth 2 times a day.  Dose:  20 mg     potassium chloride SA 20 MEQ Tbcr  Commonly known as:  Kdur   Take 20 mEq by mouth 2 times a day.  Dose:  20 mEq        STOP taking these medications    hydroCHLOROthiazide 12.5 MG tablet  Commonly known as:  HYDRODIURIL     lisinopril 40 MG tablet  Commonly known as:  PRINIVIL ZESTRIL          Disposition:   Discharge home    Diet:   Regular    Activity:   As tolerated    Code status:   DNR    Primary Care Provider:    BELLA Cross    Follow up appointment details :      BELLA Cross  [id number] [address] [address]  [phone]    Go on 3/19/2018  For follow-up    [address] [address]  [phone]        Future Appointments  Date Time Provider Department Center   3/13/2018 10:00 AM CARE MANAGER [facility]   3/13/2018 1:40 PM ROSANNE Moore [facility]   3/19/2018 8:20 AM BELLA Cross [facility] None       Pending Studies:        None    Time spent on discharge day patient visit: 42 minutes    #################################################    Interval history/exam for day of discharge:    Vitals:    03/10/18 1626 03/10/18 1920 03/11/18 0425 03/11/18 0800   BP: 120/76 140/51 125/44 147/52   Pulse:  70 69 64   Resp:  16 16 16   Temp:  36.5 °C (97.7 °F) 36 °C (96.8 °F) 37.1 °C (98.8 °F)   SpO2:  98% 96% 96%   Weight:       Height:         Weight/BMI: Body mass index is 18.63 kg/m².  Pulse Oximetry: 96 %,  O2 (LPM): 0, O2 Delivery: None (Room Air)    General:  NAD  CVS:  RRR  PULM:  CTAB, no respiratory distress    Most Recent Labs:    Lab Results   Component Value Date/Time    WBC 4.6 (L) 03/11/2018 04:04 AM    RBC 2.35 (L) 03/11/2018 04:04 AM    HEMOGLOBIN 7.8 (L) 03/11/2018 04:04 AM    HEMATOCRIT 23.2 (L) 03/11/2018 04:04 AM    MCV 98.7 (H) 03/11/2018 04:04 AM    MCH 33.2 (H) 03/11/2018 04:04 AM    MCHC 33.6 03/11/2018 04:04 AM    MPV 9.3 03/11/2018 04:04 AM    NEUTSPOLYS 60.80 03/08/2018 02:45 AM    LYMPHOCYTES 23.30 03/08/2018 02:45 AM    MONOCYTES 11.50 03/08/2018 02:45 AM    EOSINOPHILS 3.60 03/08/2018 02:45 AM    BASOPHILS 0.20 03/08/2018 02:45 AM    HYPOCHROMIA 1+ 09/20/2017 03:46 AM    ANISOCYTOSIS 2+ 09/20/2017 03:46 AM      Lab Results   Component Value Date/Time    SODIUM 138 03/11/2018 04:04 AM    POTASSIUM 3.7 03/11/2018 04:04 AM    CHLORIDE 112 03/11/2018 04:04 AM    CO2 20 03/11/2018 04:04 AM    GLUCOSE 99 03/11/2018 04:04 AM    BUN 16 03/11/2018 04:04 AM    CREATININE 1.25 03/11/2018 04:04 AM      Lab Results   Component Value Date/Time    ALTSGPT 6 03/07/2018 12:00 PM    ASTSGOT 11 (L) 03/07/2018 12:00 PM    ALKPHOSPHAT 44 03/07/2018 12:00 PM    TBILIRUBIN 0.4 03/07/2018 12:00 PM    LIPASE 3 (L) 09/01/2017 12:46 PM    ALBUMIN 3.1 (L) 03/11/2018 04:04 AM    GLOBULIN 2.0 03/07/2018 12:00 PM    INR 1.05 09/11/2017 10:47 AM    MACROCYTOSIS 1+ 09/02/2017 12:13 AM     Lab Results   Component Value Date/Time    PROTHROMBTM 14.0 09/11/2017 10:47 AM    INR 1.05 09/11/2017 10:47 AM        Imaging/ Testing:      CAROTID DUPLEX   Final Result          Instructions:      The patient was instructed to return to the ER in the event of worsening symptoms. I have counseled the patient on the importance of compliance and the patient has agreed to proceed with all medical recommendations and follow up plan indicated above.   The patient understands that all medications come with benefits and risks. Risks may include  permanent injury or death and these risks can be minimized with close reassessment and monitoring.

## 2018-03-13 ENCOUNTER — OFFICE VISIT (OUTPATIENT)
Dept: MEDICAL GROUP | Facility: CLINIC | Age: 83
End: 2018-03-13
Payer: MEDICARE

## 2018-03-13 VITALS
WEIGHT: 100 LBS | OXYGEN SATURATION: 96 % | TEMPERATURE: 98.8 F | DIASTOLIC BLOOD PRESSURE: 64 MMHG | BODY MASS INDEX: 17.72 KG/M2 | RESPIRATION RATE: 16 BRPM | HEART RATE: 74 BPM | SYSTOLIC BLOOD PRESSURE: 130 MMHG | HEIGHT: 63 IN

## 2018-03-13 DIAGNOSIS — D64.9 ANEMIA, UNSPECIFIED TYPE: ICD-10-CM

## 2018-03-13 DIAGNOSIS — Z09 HOSPITAL DISCHARGE FOLLOW-UP: ICD-10-CM

## 2018-03-13 PROBLEM — E87.6 HYPOKALEMIA: Status: RESOLVED | Noted: 2017-09-11 | Resolved: 2018-03-13

## 2018-03-13 PROBLEM — G93.41 ACUTE METABOLIC ENCEPHALOPATHY: Status: RESOLVED | Noted: 2017-09-12 | Resolved: 2018-03-13

## 2018-03-13 PROBLEM — N39.0 UTI (URINARY TRACT INFECTION): Status: RESOLVED | Noted: 2018-03-07 | Resolved: 2018-03-13

## 2018-03-13 PROBLEM — N17.9 AKI (ACUTE KIDNEY INJURY) (HCC): Status: RESOLVED | Noted: 2018-03-07 | Resolved: 2018-03-13

## 2018-03-13 PROBLEM — N17.9 ACUTE RENAL FAILURE (HCC): Status: RESOLVED | Noted: 2018-03-08 | Resolved: 2018-03-13

## 2018-03-13 PROBLEM — D62 ANEMIA ASSOCIATED WITH ACUTE BLOOD LOSS: Status: RESOLVED | Noted: 2017-09-02 | Resolved: 2018-03-13

## 2018-03-13 LAB
BACTERIA BLD CULT: NORMAL
BACTERIA BLD CULT: NORMAL
SIGNIFICANT IND 70042: NORMAL
SIGNIFICANT IND 70042: NORMAL
SITE SITE: NORMAL
SITE SITE: NORMAL
SOURCE SOURCE: NORMAL
SOURCE SOURCE: NORMAL

## 2018-03-13 PROCEDURE — 99213 OFFICE O/P EST LOW 20 MIN: CPT | Performed by: NURSE PRACTITIONER

## 2018-03-13 ASSESSMENT — ENCOUNTER SYMPTOMS
ABDOMINAL PAIN: 0
WHEEZING: 0
VOMITING: 0
HEARTBURN: 0
SHORTNESS OF BREATH: 0
FEVER: 0
FLANK PAIN: 0
DEPRESSION: 0
NAUSEA: 0
FOCAL WEAKNESS: 0
CHILLS: 0
PALPITATIONS: 0
FALLS: 0
NERVOUS/ANXIOUS: 0
HEADACHES: 0
BACK PAIN: 1
COUGH: 0
WEAKNESS: 0
SPUTUM PRODUCTION: 0
MYALGIAS: 0
DIZZINESS: 0

## 2018-03-13 NOTE — PROGRESS NOTES
POST DISCHARGE CALL MADE BY Ángela Fernandez.   Discharge Date:3/11/2018   Date of Outreach Call: 3/12/2018  2:39 PM  Now that you're home, how are you doing? Good  Do you have questions about your medications? No    Did you fill your medications? No    Do you have a follow-up appointment scheduled?Yes  Comment:Post discharge clinic on 3/13/18    Discharging Department: Jillian Lacey    Number of Attempts: 1  Current or previous attempts completed within two business days of discharge? Yes  Provided education regarding treatment plan, medication, self-management, ADLs? Yes  Has patient completed Advance Directive? If yes, advise them to bring to appointment. Yes  Care Manager phone number provided? Yes  Is there anything else I can help you with? No

## 2018-03-13 NOTE — PROGRESS NOTES
Subjective:     Lamar Dangelo is a 88 y.o. female who presents for Hospital Follow-up.  Chart reviewed. Discharge summary available for review: Yes   Date of discharge 3/11/2018.  48- hour post discharge RN call completed on 3/12/2018 and documented in the medical record by Ángela Fernandez.    HPI: Recently hospitalized for lightheaded, dizziness, found to have anemia without sign of bleeding, UTI, dehydration and b12 deficiency. She has completed cipro in the hospital. Her H/H still low 7.8/23 prior to discharge.     Since returning home, patient reports feeling good. She has drink more water and tried to stay hydrated. She has no UTI symptoms. She denied new symptoms. She has no questions regarding hospitalization.      She has hx of GI bleeding. She has no sign of active or gross bleeding at this time. Daughter reports last time she did not have any sign like blood in stool. She only has pain. She reports no abdominal pain at this time. She is taking prilosec. She had both endoscopy and colonoscopy done last year (9/3/2017 by GI Dr. Sahni). She is found to have peptic ulcer and hemorrhoid.     The patient denied weakness; no difficulty taking care of self at home.  Patient reports taking medications as instructed.    PCP follow up on 3/19 but daughter would like to switch to geriatric PCP.     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/02/2017     Priority: Medium   • Hypothyroidism 07/02/2010     Priority: Low   • Hyperlipidemia 07/02/2010     Priority: Low   • Vitamin B12 deficiency 03/09/2018   • Dehydration 03/08/2018   • Near syncope 03/07/2018   • Anemia 03/07/2018   • Dementia 09/12/2017   • Accelerated hypertension 09/12/2017   • Elevated troponin 09/12/2017         Allergies:   Nkda [no known drug allergy]    Social History:  Social History   Substance Use Topics  "  • Smoking status: Never Smoker   • Smokeless tobacco: Never Used   • Alcohol use No        ROS:  Review of Systems   Constitutional: Negative for chills, fever and malaise/fatigue.   Respiratory: Negative for cough, sputum production, shortness of breath and wheezing.    Cardiovascular: Negative for chest pain, palpitations and leg swelling.   Gastrointestinal: Negative for abdominal pain, heartburn, nausea and vomiting.   Genitourinary: Negative for dysuria, flank pain, frequency, hematuria and urgency.   Musculoskeletal: Positive for back pain (chronic). Negative for falls and myalgias.   Skin: Negative for rash.   Neurological: Negative for dizziness, focal weakness, weakness and headaches.   Psychiatric/Behavioral: Negative for depression. The patient is not nervous/anxious.         Objective:     Blood pressure 130/64, pulse 74, temperature 37.1 °C (98.8 °F), resp. rate 16, height 1.6 m (5' 3\"), weight 45.4 kg (100 lb), SpO2 96 %.     Physical Exam:  Physical Exam   Constitutional: She is oriented to person, place, and time and well-developed, well-nourished, and in no distress.   HENT:   Head: Normocephalic and atraumatic.   Eyes: Conjunctivae are normal.   Neck: Neck supple. No JVD present. No thyromegaly present.   Cardiovascular: Normal rate and regular rhythm.    No murmur heard.  Pulmonary/Chest: Effort normal and breath sounds normal. No respiratory distress. She has no wheezes.   Abdominal: Soft. Bowel sounds are normal. She exhibits no distension. There is no tenderness.   Musculoskeletal: Normal range of motion. She exhibits no edema.   Neurological: She is alert and oriented to person, place, and time.   Skin: Skin is warm. No erythema.   Nursing note and vitals reviewed.        Assessment and Plan:        1. Hospital discharge follow-up  Hospitalization and results reviewed with patient. High risk conditions requiring teaching or care coordination were identified and addressed.The patient " demonstrate understanding of admission and underlying conditions. The patient understands discharge instructions and when to seek medical attention. Medications reviewed including instructions regarding high risk medications, dosing and side effects.    The patient is able to safely adhere to ADL/IADL, treatment and medication regimen, self-manage of high-risk conditions? Yes   The patient requires physical therapy/home health/DME referral? No   The patient requires referral to care coordination/behavioral health/social work?  No   Patient requires referral for pharmacy consult? No   Advance directive/POLST on file?  No   Required counseled on advance directive?  No      Scheduled to see Dr. John Garces on 4/13    2. Anemia, unspecified type  - HEMOGLOBIN AND HEMATOCRIT; Future  - continue prilosec and iron supplement    Medication Reconciliation  Medication list at end of encounter:   Current Outpatient Prescriptions   Medication Sig Dispense Refill   • cyanocobalamin (VITAMIN B12) 1000 MCG Tab Take 1 Tab by mouth every day. 30 Tab 2   • ferrous sulfate (IRON SUPPLEMENT) 325 (65 Fe) MG tablet Take 325 mg by mouth every day.     • potassium chloride SA (KDUR) 20 MEQ Tab CR Take 20 mEq by mouth 2 times a day.     • omeprazole (PRILOSEC) 20 MG delayed-release capsule Take 20 mg by mouth 2 times a day.     • levothyroxine (SYNTHROID) 50 MCG Tab Take 50 mcg by mouth Every morning on an empty stomach.     • carvedilol (COREG) 12.5 MG Tab Take 1 Tab by mouth 2 times a day, with meals. 60 Tab 1     No current facility-administered medications for this visit.        Primary care follow-up:  New health conditions identified during hospitalization? Yes   Labs/pathology/imaging requires future PCP follow-up?  Yes H/H  Changes to medications during hospitalization or today? Yes during hospitalization    Recommended followup: No Follow-up on file. with BELLA Cross   Future Appointments       Provider  [facility]    3/15/2018 12:30 PM Josie Hammond R.N. [facility]     3/19/2018 8:20 AM BELLA Cross [facility]           Patient Instruction  Patient offered educational material on discharge diagnosis and management of symptoms/red flags. Patient instructed to keep follow-up appointments and to bring written questions and and actual medications to each office visit. Patient instructed to call PCP/specialist with any problems/questions/concerns. Patient verbalizes understanding and has no further questions at this time.    Face-to-face transitional care management services with moderate complexity medical decision making.

## 2018-03-13 NOTE — PATIENT INSTRUCTIONS
If you need further assistance, or have any questions; concerns or lingering symptoms before seeing your Primary Care Provider or specialist.     Do not hesitate to contact us.     Please contact us at the Post-Hospital Follow Up Program at [phone].   Our offices hours are Monday-Friday 8 am-5 pm.

## 2018-03-15 ENCOUNTER — HOME CARE VISIT (OUTPATIENT)
Dept: HOME HEALTH SERVICES | Facility: HOME HEALTHCARE | Age: 83
End: 2018-03-15

## 2018-03-15 ENCOUNTER — TELEPHONE (OUTPATIENT)
Dept: MEDICAL GROUP | Facility: LAB | Age: 83
End: 2018-03-15

## 2018-03-15 NOTE — TELEPHONE ENCOUNTER
1. Caller Name: Gisell                                       Call Back Number: [phone]      Patient approves a detailed voicemail message: yes    Gisell calling stating pt declined Home services. Pt has a lot of support, lives with sister. She will be sending communication information.

## 2018-03-23 ENCOUNTER — APPOINTMENT (OUTPATIENT)
Dept: RADIOLOGY | Facility: MEDICAL CENTER | Age: 83
DRG: 480 | End: 2018-03-23
Attending: EMERGENCY MEDICINE
Payer: MEDICARE

## 2018-03-23 ENCOUNTER — RESOLUTE PROFESSIONAL BILLING HOSPITAL PROF FEE (OUTPATIENT)
Dept: HOSPITALIST | Facility: MEDICAL CENTER | Age: 83
End: 2018-03-23
Payer: MEDICARE

## 2018-03-23 ENCOUNTER — HOSPITAL ENCOUNTER (INPATIENT)
Facility: MEDICAL CENTER | Age: 83
LOS: 5 days | DRG: 480 | End: 2018-03-28
Attending: EMERGENCY MEDICINE | Admitting: HOSPITALIST
Payer: MEDICARE

## 2018-03-23 DIAGNOSIS — S72.002A CLOSED FRACTURE OF NECK OF LEFT FEMUR, INITIAL ENCOUNTER (HCC): ICD-10-CM

## 2018-03-23 DIAGNOSIS — S72.001A CLOSED FRACTURE OF NECK OF RIGHT FEMUR, INITIAL ENCOUNTER (HCC): ICD-10-CM

## 2018-03-23 PROBLEM — S72.009A FRACTURE OF NECK OF FEMUR (HCC): Status: ACTIVE | Noted: 2018-03-23

## 2018-03-23 LAB
ALBUMIN SERPL BCP-MCNC: 3.8 G/DL (ref 3.2–4.9)
ALBUMIN/GLOB SERPL: 1.5 G/DL
ALP SERPL-CCNC: 97 U/L (ref 30–99)
ALT SERPL-CCNC: 12 U/L (ref 2–50)
ANION GAP SERPL CALC-SCNC: 7 MMOL/L (ref 0–11.9)
AST SERPL-CCNC: 14 U/L (ref 12–45)
BASOPHILS # BLD AUTO: 0.2 % (ref 0–1.8)
BASOPHILS # BLD: 0.02 K/UL (ref 0–0.12)
BILIRUB SERPL-MCNC: 0.5 MG/DL (ref 0.1–1.5)
BUN SERPL-MCNC: 24 MG/DL (ref 8–22)
CALCIUM SERPL-MCNC: 9 MG/DL (ref 8.5–10.5)
CHLORIDE SERPL-SCNC: 105 MMOL/L (ref 96–112)
CO2 SERPL-SCNC: 23 MMOL/L (ref 20–33)
CREAT SERPL-MCNC: 1.18 MG/DL (ref 0.5–1.4)
EKG IMPRESSION: NORMAL
EOSINOPHIL # BLD AUTO: 0.06 K/UL (ref 0–0.51)
EOSINOPHIL NFR BLD: 0.7 % (ref 0–6.9)
ERYTHROCYTE [DISTWIDTH] IN BLOOD BY AUTOMATED COUNT: 52.1 FL (ref 35.9–50)
GLOBULIN SER CALC-MCNC: 2.5 G/DL (ref 1.9–3.5)
GLUCOSE SERPL-MCNC: 119 MG/DL (ref 65–99)
HCT VFR BLD AUTO: 27.4 % (ref 37–47)
HGB BLD-MCNC: 9 G/DL (ref 12–16)
IMM GRANULOCYTES # BLD AUTO: 0.04 K/UL (ref 0–0.11)
IMM GRANULOCYTES NFR BLD AUTO: 0.5 % (ref 0–0.9)
INR PPP: 1.09 (ref 0.87–1.13)
LYMPHOCYTES # BLD AUTO: 0.78 K/UL (ref 1–4.8)
LYMPHOCYTES NFR BLD: 9.1 % (ref 22–41)
MCH RBC QN AUTO: 33.1 PG (ref 27–33)
MCHC RBC AUTO-ENTMCNC: 32.8 G/DL (ref 33.6–35)
MCV RBC AUTO: 100.7 FL (ref 81.4–97.8)
MONOCYTES # BLD AUTO: 0.86 K/UL (ref 0–0.85)
MONOCYTES NFR BLD AUTO: 10 % (ref 0–13.4)
NEUTROPHILS # BLD AUTO: 6.8 K/UL (ref 2–7.15)
NEUTROPHILS NFR BLD: 79.5 % (ref 44–72)
NRBC # BLD AUTO: 0 K/UL
NRBC BLD-RTO: 0 /100 WBC
PLATELET # BLD AUTO: 299 K/UL (ref 164–446)
PMV BLD AUTO: 8.2 FL (ref 9–12.9)
POTASSIUM SERPL-SCNC: 3.7 MMOL/L (ref 3.6–5.5)
PROT SERPL-MCNC: 6.3 G/DL (ref 6–8.2)
PROTHROMBIN TIME: 13.8 SEC (ref 12–14.6)
RBC # BLD AUTO: 2.72 M/UL (ref 4.2–5.4)
SODIUM SERPL-SCNC: 135 MMOL/L (ref 135–145)
TSH SERPL DL<=0.005 MIU/L-ACNC: 1.32 UIU/ML (ref 0.38–5.33)
WBC # BLD AUTO: 8.6 K/UL (ref 4.8–10.8)

## 2018-03-23 PROCEDURE — 73501 X-RAY EXAM HIP UNI 1 VIEW: CPT | Mod: LT

## 2018-03-23 PROCEDURE — 84443 ASSAY THYROID STIM HORMONE: CPT

## 2018-03-23 PROCEDURE — 85610 PROTHROMBIN TIME: CPT

## 2018-03-23 PROCEDURE — 73552 X-RAY EXAM OF FEMUR 2/>: CPT | Mod: LT

## 2018-03-23 PROCEDURE — 96374 THER/PROPH/DIAG INJ IV PUSH: CPT

## 2018-03-23 PROCEDURE — 700111 HCHG RX REV CODE 636 W/ 250 OVERRIDE (IP): Performed by: EMERGENCY MEDICINE

## 2018-03-23 PROCEDURE — 99285 EMERGENCY DEPT VISIT HI MDM: CPT

## 2018-03-23 PROCEDURE — 85025 COMPLETE CBC W/AUTO DIFF WBC: CPT

## 2018-03-23 PROCEDURE — 99222 1ST HOSP IP/OBS MODERATE 55: CPT | Performed by: HOSPITALIST

## 2018-03-23 PROCEDURE — 93005 ELECTROCARDIOGRAM TRACING: CPT | Performed by: EMERGENCY MEDICINE

## 2018-03-23 PROCEDURE — 770006 HCHG ROOM/CARE - MED/SURG/GYN SEMI*

## 2018-03-23 PROCEDURE — 80053 COMPREHEN METABOLIC PANEL: CPT

## 2018-03-23 PROCEDURE — 96375 TX/PRO/DX INJ NEW DRUG ADDON: CPT

## 2018-03-23 PROCEDURE — 71045 X-RAY EXAM CHEST 1 VIEW: CPT

## 2018-03-23 RX ORDER — MORPHINE SULFATE 4 MG/ML
4 INJECTION, SOLUTION INTRAMUSCULAR; INTRAVENOUS ONCE
Status: COMPLETED | OUTPATIENT
Start: 2018-03-23 | End: 2018-03-23

## 2018-03-23 RX ORDER — MORPHINE SULFATE 2 MG/ML
2-5 INJECTION, SOLUTION INTRAMUSCULAR; INTRAVENOUS EVERY 4 HOURS PRN
Status: DISCONTINUED | OUTPATIENT
Start: 2018-03-23 | End: 2018-03-24

## 2018-03-23 RX ORDER — ONDANSETRON 2 MG/ML
4 INJECTION INTRAMUSCULAR; INTRAVENOUS EVERY 4 HOURS PRN
Status: DISCONTINUED | OUTPATIENT
Start: 2018-03-23 | End: 2018-03-28 | Stop reason: HOSPADM

## 2018-03-23 RX ORDER — OXYCODONE HYDROCHLORIDE 5 MG/1
5 TABLET ORAL EVERY 4 HOURS PRN
Status: DISCONTINUED | OUTPATIENT
Start: 2018-03-23 | End: 2018-03-24

## 2018-03-23 RX ORDER — CARVEDILOL 6.25 MG/1
12.5 TABLET ORAL 2 TIMES DAILY WITH MEALS
Status: DISCONTINUED | OUTPATIENT
Start: 2018-03-23 | End: 2018-03-24

## 2018-03-23 RX ORDER — SODIUM CHLORIDE 9 MG/ML
INJECTION, SOLUTION INTRAVENOUS CONTINUOUS
Status: DISCONTINUED | OUTPATIENT
Start: 2018-03-23 | End: 2018-03-24

## 2018-03-23 RX ORDER — FERROUS SULFATE 325(65) MG
325 TABLET ORAL DAILY
Status: DISCONTINUED | OUTPATIENT
Start: 2018-03-23 | End: 2018-03-24

## 2018-03-23 RX ORDER — CHOLECALCIFEROL (VITAMIN D3) 125 MCG
1000 CAPSULE ORAL DAILY
Status: DISCONTINUED | OUTPATIENT
Start: 2018-03-23 | End: 2018-03-28 | Stop reason: HOSPADM

## 2018-03-23 RX ORDER — AMOXICILLIN 250 MG
2 CAPSULE ORAL 2 TIMES DAILY
Status: DISCONTINUED | OUTPATIENT
Start: 2018-03-23 | End: 2018-03-24

## 2018-03-23 RX ORDER — POLYETHYLENE GLYCOL 3350 17 G/17G
1 POWDER, FOR SOLUTION ORAL
Status: DISCONTINUED | OUTPATIENT
Start: 2018-03-23 | End: 2018-03-24

## 2018-03-23 RX ORDER — ACETAMINOPHEN 325 MG/1
650 TABLET ORAL EVERY 6 HOURS PRN
Status: DISCONTINUED | OUTPATIENT
Start: 2018-03-23 | End: 2018-03-24

## 2018-03-23 RX ORDER — ONDANSETRON 2 MG/ML
4 INJECTION INTRAMUSCULAR; INTRAVENOUS ONCE
Status: COMPLETED | OUTPATIENT
Start: 2018-03-23 | End: 2018-03-23

## 2018-03-23 RX ORDER — OMEPRAZOLE 20 MG/1
20 CAPSULE, DELAYED RELEASE ORAL 2 TIMES DAILY
Status: DISCONTINUED | OUTPATIENT
Start: 2018-03-23 | End: 2018-03-24

## 2018-03-23 RX ORDER — HEPARIN SODIUM 5000 [USP'U]/ML
5000 INJECTION, SOLUTION INTRAVENOUS; SUBCUTANEOUS EVERY 8 HOURS
Status: DISCONTINUED | OUTPATIENT
Start: 2018-03-23 | End: 2018-03-28 | Stop reason: HOSPADM

## 2018-03-23 RX ORDER — ASCORBIC ACID 500 MG
500 TABLET ORAL 2 TIMES DAILY
Status: DISCONTINUED | OUTPATIENT
Start: 2018-03-23 | End: 2018-03-24

## 2018-03-23 RX ORDER — LEVOTHYROXINE SODIUM 0.03 MG/1
50 TABLET ORAL
Status: DISCONTINUED | OUTPATIENT
Start: 2018-03-24 | End: 2018-03-28 | Stop reason: HOSPADM

## 2018-03-23 RX ORDER — ONDANSETRON 4 MG/1
4 TABLET, ORALLY DISINTEGRATING ORAL EVERY 4 HOURS PRN
Status: DISCONTINUED | OUTPATIENT
Start: 2018-03-23 | End: 2018-03-28 | Stop reason: HOSPADM

## 2018-03-23 RX ORDER — BISACODYL 10 MG
10 SUPPOSITORY, RECTAL RECTAL
Status: DISCONTINUED | OUTPATIENT
Start: 2018-03-23 | End: 2018-03-24

## 2018-03-23 RX ORDER — POTASSIUM CHLORIDE 20 MEQ/1
20 TABLET, EXTENDED RELEASE ORAL 2 TIMES DAILY
Status: DISCONTINUED | OUTPATIENT
Start: 2018-03-23 | End: 2018-03-27

## 2018-03-23 RX ADMIN — ONDANSETRON HYDROCHLORIDE 4 MG: 2 INJECTION, SOLUTION INTRAMUSCULAR; INTRAVENOUS at 16:49

## 2018-03-23 RX ADMIN — MORPHINE SULFATE 4 MG: 4 INJECTION INTRAVENOUS at 16:49

## 2018-03-23 ASSESSMENT — PAIN SCALES - GENERAL
PAINLEVEL_OUTOF10: 0
PAINLEVEL_OUTOF10: 0
PAINLEVEL_OUTOF10: 1

## 2018-03-23 ASSESSMENT — LIFESTYLE VARIABLES: DO YOU DRINK ALCOHOL: NO

## 2018-03-23 NOTE — ED TRIAGE NOTES
EMS sts, Pt c/o left shoulder and left hip pain secondary to ground level fall. Pt denies LOC. She was given Fentanyl 50 mcg and Zofran 4 mg IV prior to arrival.

## 2018-03-24 ENCOUNTER — APPOINTMENT (OUTPATIENT)
Dept: RADIOLOGY | Facility: MEDICAL CENTER | Age: 83
DRG: 480 | End: 2018-03-24
Attending: ORTHOPAEDIC SURGERY
Payer: MEDICARE

## 2018-03-24 PROBLEM — N18.30 CKD (CHRONIC KIDNEY DISEASE), STAGE III: Status: ACTIVE | Noted: 2018-03-24

## 2018-03-24 PROBLEM — E55.9 VITAMIN D DEFICIENCY: Status: ACTIVE | Noted: 2018-03-24

## 2018-03-24 PROBLEM — R64 CACHEXIA (HCC): Status: ACTIVE | Noted: 2018-03-24

## 2018-03-24 LAB
25(OH)D3 SERPL-MCNC: 16 NG/ML (ref 30–100)
ANION GAP SERPL CALC-SCNC: 6 MMOL/L (ref 0–11.9)
BASOPHILS # BLD AUTO: 0 % (ref 0–1.8)
BASOPHILS # BLD: 0 K/UL (ref 0–0.12)
BUN SERPL-MCNC: 26 MG/DL (ref 8–22)
CALCIUM SERPL-MCNC: 8.8 MG/DL (ref 8.5–10.5)
CHLORIDE SERPL-SCNC: 106 MMOL/L (ref 96–112)
CO2 SERPL-SCNC: 24 MMOL/L (ref 20–33)
CREAT SERPL-MCNC: 1.23 MG/DL (ref 0.5–1.4)
EOSINOPHIL # BLD AUTO: 0.02 K/UL (ref 0–0.51)
EOSINOPHIL NFR BLD: 0.4 % (ref 0–6.9)
ERYTHROCYTE [DISTWIDTH] IN BLOOD BY AUTOMATED COUNT: 53.2 FL (ref 35.9–50)
GLUCOSE SERPL-MCNC: 106 MG/DL (ref 65–99)
HCT VFR BLD AUTO: 25.3 % (ref 37–47)
HGB BLD-MCNC: 8.4 G/DL (ref 12–16)
IMM GRANULOCYTES # BLD AUTO: 0.03 K/UL (ref 0–0.11)
IMM GRANULOCYTES NFR BLD AUTO: 0.6 % (ref 0–0.9)
LYMPHOCYTES # BLD AUTO: 0.84 K/UL (ref 1–4.8)
LYMPHOCYTES NFR BLD: 16.3 % (ref 22–41)
MCH RBC QN AUTO: 33.5 PG (ref 27–33)
MCHC RBC AUTO-ENTMCNC: 33.2 G/DL (ref 33.6–35)
MCV RBC AUTO: 100.8 FL (ref 81.4–97.8)
MONOCYTES # BLD AUTO: 0.65 K/UL (ref 0–0.85)
MONOCYTES NFR BLD AUTO: 12.6 % (ref 0–13.4)
NEUTROPHILS # BLD AUTO: 3.6 K/UL (ref 2–7.15)
NEUTROPHILS NFR BLD: 70.1 % (ref 44–72)
NRBC # BLD AUTO: 0 K/UL
NRBC BLD-RTO: 0 /100 WBC
PLATELET # BLD AUTO: 267 K/UL (ref 164–446)
PMV BLD AUTO: 8.5 FL (ref 9–12.9)
POTASSIUM SERPL-SCNC: 3.8 MMOL/L (ref 3.6–5.5)
RBC # BLD AUTO: 2.51 M/UL (ref 4.2–5.4)
SODIUM SERPL-SCNC: 136 MMOL/L (ref 135–145)
TSH SERPL DL<=0.005 MIU/L-ACNC: 0.84 UIU/ML (ref 0.38–5.33)
WBC # BLD AUTO: 5.1 K/UL (ref 4.8–10.8)

## 2018-03-24 PROCEDURE — 700111 HCHG RX REV CODE 636 W/ 250 OVERRIDE (IP): Performed by: ORTHOPAEDIC SURGERY

## 2018-03-24 PROCEDURE — A9270 NON-COVERED ITEM OR SERVICE: HCPCS | Performed by: INTERNAL MEDICINE

## 2018-03-24 PROCEDURE — 700111 HCHG RX REV CODE 636 W/ 250 OVERRIDE (IP): Performed by: HOSPITALIST

## 2018-03-24 PROCEDURE — 160041 HCHG SURGERY MINUTES - EA ADDL 1 MIN LEVEL 4: Performed by: ORTHOPAEDIC SURGERY

## 2018-03-24 PROCEDURE — 160036 HCHG PACU - EA ADDL 30 MINS PHASE I: Performed by: ORTHOPAEDIC SURGERY

## 2018-03-24 PROCEDURE — 160048 HCHG OR STATISTICAL LEVEL 1-5: Performed by: ORTHOPAEDIC SURGERY

## 2018-03-24 PROCEDURE — 500681: Performed by: ORTHOPAEDIC SURGERY

## 2018-03-24 PROCEDURE — 700111 HCHG RX REV CODE 636 W/ 250 OVERRIDE (IP)

## 2018-03-24 PROCEDURE — 501445 HCHG STAPLER, SKIN DISP: Performed by: ORTHOPAEDIC SURGERY

## 2018-03-24 PROCEDURE — C1713 ANCHOR/SCREW BN/BN,TIS/BN: HCPCS | Performed by: ORTHOPAEDIC SURGERY

## 2018-03-24 PROCEDURE — 500122 HCHG BOVIE, BLADE: Performed by: ORTHOPAEDIC SURGERY

## 2018-03-24 PROCEDURE — 85025 COMPLETE CBC W/AUTO DIFF WBC: CPT

## 2018-03-24 PROCEDURE — 770006 HCHG ROOM/CARE - MED/SURG/GYN SEMI*

## 2018-03-24 PROCEDURE — 99233 SBSQ HOSP IP/OBS HIGH 50: CPT | Performed by: INTERNAL MEDICINE

## 2018-03-24 PROCEDURE — 160002 HCHG RECOVERY MINUTES (STAT): Performed by: ORTHOPAEDIC SURGERY

## 2018-03-24 PROCEDURE — 36415 COLL VENOUS BLD VENIPUNCTURE: CPT

## 2018-03-24 PROCEDURE — 700102 HCHG RX REV CODE 250 W/ 637 OVERRIDE(OP): Performed by: INTERNAL MEDICINE

## 2018-03-24 PROCEDURE — 80048 BASIC METABOLIC PNL TOTAL CA: CPT

## 2018-03-24 PROCEDURE — 0QH734Z INSERTION OF INTERNAL FIXATION DEVICE INTO LEFT UPPER FEMUR, PERCUTANEOUS APPROACH: ICD-10-PCS | Performed by: ORTHOPAEDIC SURGERY

## 2018-03-24 PROCEDURE — 160029 HCHG SURGERY MINUTES - 1ST 30 MINS LEVEL 4: Performed by: ORTHOPAEDIC SURGERY

## 2018-03-24 PROCEDURE — A9270 NON-COVERED ITEM OR SERVICE: HCPCS | Performed by: HOSPITALIST

## 2018-03-24 PROCEDURE — 500424 HCHG DRESSING, AIRSTRIP: Performed by: ORTHOPAEDIC SURGERY

## 2018-03-24 PROCEDURE — 160009 HCHG ANES TIME/MIN: Performed by: ORTHOPAEDIC SURGERY

## 2018-03-24 PROCEDURE — 73502 X-RAY EXAM HIP UNI 2-3 VIEWS: CPT | Mod: LT

## 2018-03-24 PROCEDURE — 700101 HCHG RX REV CODE 250

## 2018-03-24 PROCEDURE — 160035 HCHG PACU - 1ST 60 MINS PHASE I: Performed by: ORTHOPAEDIC SURGERY

## 2018-03-24 PROCEDURE — 700102 HCHG RX REV CODE 250 W/ 637 OVERRIDE(OP): Performed by: HOSPITALIST

## 2018-03-24 PROCEDURE — 84443 ASSAY THYROID STIM HORMONE: CPT

## 2018-03-24 PROCEDURE — 82306 VITAMIN D 25 HYDROXY: CPT

## 2018-03-24 PROCEDURE — 501838 HCHG SUTURE GENERAL: Performed by: ORTHOPAEDIC SURGERY

## 2018-03-24 DEVICE — IMPLANTABLE DEVICE: Type: IMPLANTABLE DEVICE | Status: FUNCTIONAL

## 2018-03-24 RX ORDER — TRAMADOL HYDROCHLORIDE 50 MG/1
50 TABLET ORAL EVERY 6 HOURS PRN
Status: DISCONTINUED | OUTPATIENT
Start: 2018-03-24 | End: 2018-03-26

## 2018-03-24 RX ORDER — FERROUS SULFATE 325(65) MG
325 TABLET ORAL
Status: DISCONTINUED | OUTPATIENT
Start: 2018-03-24 | End: 2018-03-28 | Stop reason: HOSPADM

## 2018-03-24 RX ORDER — ERGOCALCIFEROL 1.25 MG/1
50000 CAPSULE ORAL
Status: DISCONTINUED | OUTPATIENT
Start: 2018-03-24 | End: 2018-03-28 | Stop reason: HOSPADM

## 2018-03-24 RX ORDER — CLINDAMYCIN PHOSPHATE 600 MG/50ML
600 INJECTION, SOLUTION INTRAVENOUS EVERY 8 HOURS
Status: DISCONTINUED | OUTPATIENT
Start: 2018-03-24 | End: 2018-03-24

## 2018-03-24 RX ORDER — ACETAMINOPHEN 500 MG
500 TABLET ORAL EVERY 6 HOURS PRN
Status: DISCONTINUED | OUTPATIENT
Start: 2018-03-24 | End: 2018-03-28 | Stop reason: HOSPADM

## 2018-03-24 RX ORDER — OXYCODONE HYDROCHLORIDE 5 MG/1
2.5 TABLET ORAL EVERY 4 HOURS PRN
Status: DISCONTINUED | OUTPATIENT
Start: 2018-03-24 | End: 2018-03-26

## 2018-03-24 RX ORDER — CARVEDILOL 6.25 MG/1
6.25 TABLET ORAL 2 TIMES DAILY WITH MEALS
Status: DISCONTINUED | OUTPATIENT
Start: 2018-03-25 | End: 2018-03-26

## 2018-03-24 RX ORDER — FOLIC ACID 1 MG/1
1 TABLET ORAL DAILY
Status: DISCONTINUED | OUTPATIENT
Start: 2018-03-24 | End: 2018-03-28 | Stop reason: HOSPADM

## 2018-03-24 RX ORDER — MORPHINE SULFATE 4 MG/ML
1 INJECTION, SOLUTION INTRAMUSCULAR; INTRAVENOUS
Status: DISCONTINUED | OUTPATIENT
Start: 2018-03-24 | End: 2018-03-26

## 2018-03-24 RX ORDER — FAMOTIDINE 20 MG/1
20 TABLET, FILM COATED ORAL EVERY 24 HOURS
Status: DISCONTINUED | OUTPATIENT
Start: 2018-03-24 | End: 2018-03-28 | Stop reason: HOSPADM

## 2018-03-24 RX ORDER — ACETAMINOPHEN 500 MG
500 TABLET ORAL EVERY 6 HOURS
Status: DISCONTINUED | OUTPATIENT
Start: 2018-03-24 | End: 2018-03-28 | Stop reason: HOSPADM

## 2018-03-24 RX ORDER — ASCORBIC ACID 500 MG
500 TABLET ORAL 3 TIMES DAILY
Status: DISCONTINUED | OUTPATIENT
Start: 2018-03-24 | End: 2018-03-28 | Stop reason: HOSPADM

## 2018-03-24 RX ORDER — CIPROFLOXACIN 500 MG/1
500 TABLET, FILM COATED ORAL EVERY 24 HOURS
Status: DISCONTINUED | OUTPATIENT
Start: 2018-03-24 | End: 2018-03-24

## 2018-03-24 RX ORDER — BUPIVACAINE HYDROCHLORIDE AND EPINEPHRINE 5; 5 MG/ML; UG/ML
INJECTION, SOLUTION EPIDURAL; INTRACAUDAL; PERINEURAL
Status: DISCONTINUED | OUTPATIENT
Start: 2018-03-24 | End: 2018-03-24 | Stop reason: HOSPADM

## 2018-03-24 RX ADMIN — POTASSIUM CHLORIDE 20 MEQ: 1500 TABLET, EXTENDED RELEASE ORAL at 20:59

## 2018-03-24 RX ADMIN — ERGOCALCIFEROL 50000 UNITS: 1.25 CAPSULE ORAL at 18:15

## 2018-03-24 RX ADMIN — HEPARIN SODIUM 5000 UNITS: 5000 INJECTION, SOLUTION INTRAVENOUS; SUBCUTANEOUS at 20:59

## 2018-03-24 RX ADMIN — CEFAZOLIN SODIUM 1 G: 1 INJECTION, SOLUTION INTRAVENOUS at 16:00

## 2018-03-24 RX ADMIN — CARVEDILOL 12.5 MG: 6.25 TABLET, FILM COATED ORAL at 16:24

## 2018-03-24 RX ADMIN — FOLIC ACID 1 MG: 1 TABLET ORAL at 18:15

## 2018-03-24 RX ADMIN — FAMOTIDINE 20 MG: 20 TABLET, FILM COATED ORAL at 18:15

## 2018-03-24 RX ADMIN — OXYCODONE HYDROCHLORIDE AND ACETAMINOPHEN 500 MG: 500 TABLET ORAL at 20:59

## 2018-03-24 RX ADMIN — CARVEDILOL 12.5 MG: 6.25 TABLET, FILM COATED ORAL at 04:53

## 2018-03-24 RX ADMIN — HEPARIN SODIUM 5000 UNITS: 5000 INJECTION, SOLUTION INTRAVENOUS; SUBCUTANEOUS at 14:36

## 2018-03-24 RX ADMIN — CEFAZOLIN SODIUM 1 G: 1 INJECTION, SOLUTION INTRAVENOUS at 22:37

## 2018-03-24 RX ADMIN — THERA TABS 1 TABLET: TAB at 18:15

## 2018-03-24 RX ADMIN — Medication 325 MG: at 18:14

## 2018-03-24 RX ADMIN — ACETAMINOPHEN 500 MG: 500 TABLET ORAL at 18:15

## 2018-03-24 ASSESSMENT — PAIN SCALES - GENERAL
PAINLEVEL_OUTOF10: 0

## 2018-03-24 ASSESSMENT — ENCOUNTER SYMPTOMS
SPEECH CHANGE: 0
NAUSEA: 0
SHORTNESS OF BREATH: 0
WEAKNESS: 0
WHEEZING: 0
BACK PAIN: 0
PND: 0
VOMITING: 0
DEPRESSION: 0
BLURRED VISION: 0
FEVER: 0
WEAKNESS: 1
DIARRHEA: 0
SORE THROAT: 0
MYALGIAS: 0
CLAUDICATION: 0
DIAPHORESIS: 0
HEMOPTYSIS: 0
NECK PAIN: 0
PALPITATIONS: 0
EYE PAIN: 0
DIZZINESS: 0
TREMORS: 0
SPUTUM PRODUCTION: 0
SENSORY CHANGE: 0
CHILLS: 0
DOUBLE VISION: 0
BLOOD IN STOOL: 0
COUGH: 0
ABDOMINAL PAIN: 0
FALLS: 1
WEIGHT LOSS: 1
EYE DISCHARGE: 0
CONSTIPATION: 0
FLANK PAIN: 0
TINGLING: 0
HEADACHES: 0
HEARTBURN: 0
SEIZURES: 0
FOCAL WEAKNESS: 0
BRUISES/BLEEDS EASILY: 0
ORTHOPNEA: 0
LOSS OF CONSCIOUSNESS: 0

## 2018-03-24 ASSESSMENT — LIFESTYLE VARIABLES: SUBSTANCE_ABUSE: 0

## 2018-03-24 NOTE — H&P
Hospital Medicine History and Physical    Date of Service  3/24/2018    Chief Complaint  Chief Complaint   Patient presents with   • T-5000 GLF   • Hip Pain     left   • Shoulder Pain     left       History of Presenting Illness  88 y.o. female who presented 3/23/2018 with history of mild dementia, history of bleeding ulcer September 2017, dehydration and UTI 1 month ago for admission, ambulatory without assistive device independently, presents after a ground-level fall this afternoon partly 12 noon onto a cement patio. Patient lives with her sister and states that she has a large dog became running up to her if different into her knocking her to the cement patio salud onto her left hip. She also injured her left shoulder. She did not hit her head or lose consciousness. His sister helped her up from the floor and moved her inside the house she was able to ambulate 30-40 feet and walk upstairs into her bedroom. She laid in bed, however the pain in her left hip increased. She called her neighbor who is also her power of  but then subsequently called the ambulance. X-rays in the ER revealed an impacted left femoral neck fracture.   Primary Care Physician  RAH Cross.    Consultants  Dr. Au    Code Status  Full code, has power of  on file.    Review of Systems  Review of Systems   Constitutional: Negative for chills, diaphoresis, fever and malaise/fatigue.   HENT: Negative for congestion and sore throat.    Eyes: Negative for pain and discharge.   Respiratory: Negative for cough, hemoptysis, sputum production, shortness of breath and wheezing.    Cardiovascular: Negative for chest pain, palpitations, claudication and leg swelling.   Gastrointestinal: Negative for abdominal pain, constipation, diarrhea, melena, nausea and vomiting.   Genitourinary: Negative for dysuria, frequency and urgency.   Musculoskeletal: Positive for joint pain (left hip). Negative for back pain, myalgias and  neck pain.   Skin: Negative for itching and rash.   Neurological: Negative for dizziness, sensory change, speech change, focal weakness, loss of consciousness, weakness and headaches.   Endo/Heme/Allergies: Does not bruise/bleed easily.   Psychiatric/Behavioral: Negative for depression, substance abuse and suicidal ideas.        Past Medical History  Past Medical History:   Diagnosis Date   • Hypothyroid 7/2/2010   • Hyperlipidemia 7/2/2010       Surgical History  Past Surgical History:   Procedure Laterality Date   • COLONOSCOPY - ENDO N/A 9/3/2017    Procedure: COLONOSCOPY - ENDO;  Surgeon: Randi Sahni M.D.;  Location: ENDOSCOPY [facility];  Service: Gastroenterology   • GASTROSCOPY-ENDO N/A 9/3/2017    Procedure: GASTROSCOPY-ENDO;  Surgeon: Randi Sahni M.D.;  Location: ENDOSCOPY [facility];  Service: Gastroenterology   • APPENDECTOMY     • TONSILLECTOMY         Medications  No current facility-administered medications on file prior to encounter.      Current Outpatient Prescriptions on File Prior to Encounter   Medication Sig Dispense Refill   • cyanocobalamin (VITAMIN B12) 1000 MCG Tab Take 1 Tab by mouth every day. 30 Tab 2   • ferrous sulfate (IRON SUPPLEMENT) 325 (65 Fe) MG tablet Take 325 mg by mouth every day.     • potassium chloride SA (KDUR) 20 MEQ Tab CR Take 20 mEq by mouth 2 times a day.     • omeprazole (PRILOSEC) 20 MG delayed-release capsule Take 20 mg by mouth 2 times a day.     • levothyroxine (SYNTHROID) 50 MCG Tab Take 50 mcg by mouth Every morning on an empty stomach.     • carvedilol (COREG) 12.5 MG Tab Take 1 Tab by mouth 2 times a day, with meals. 60 Tab 1       Family History  Family History   Problem Relation Age of Onset   • Heart Disease Father    • Hypertension Sister        Social History  Social History   Substance Use Topics   • Smoking status: Never Smoker   • Smokeless tobacco: Never Used   • Alcohol use No       Allergies  Allergies   Allergen  Reactions   • Nkda [No Known Drug Allergy]         Physical Exam  Laboratory   Hemodynamics  Temp (24hrs), Av.8 °C (98.2 °F), Min:36.6 °C (97.8 °F), Max:36.9 °C (98.5 °F)   Temperature: 36.9 °C (98.5 °F)  Pulse  Av.5  Min: 67  Max: 73    Blood Pressure : 154/61, NIBP: 132/54      Respiratory      Respiration: 16, Pulse Oximetry: 90 %             Physical Exam   Constitutional: She appears well-developed and well-nourished. No distress.   HENT:   Head: Normocephalic and atraumatic.   Nose: Nose normal.   Mouth/Throat: Oropharynx is clear and moist. No oropharyngeal exudate.   Eyes: Conjunctivae and EOM are normal. Pupils are equal, round, and reactive to light. Right eye exhibits no discharge. Left eye exhibits no discharge. No scleral icterus.   Neck: Normal range of motion. Neck supple. No JVD present. No tracheal deviation present. No thyromegaly present.   Cardiovascular: Normal rate, regular rhythm, normal heart sounds and intact distal pulses.  Exam reveals no gallop and no friction rub.    No murmur heard.  Pulmonary/Chest: Effort normal and breath sounds normal. No stridor. No respiratory distress. She has no wheezes. She has no rales. She exhibits no tenderness.   Abdominal: Soft. Bowel sounds are normal. She exhibits no distension and no mass. There is no tenderness. There is no rebound and no guarding.   Musculoskeletal: Normal range of motion. She exhibits no edema or tenderness.   Lymphadenopathy:     She has no cervical adenopathy.   Neurological: She is alert. No cranial nerve deficit. She exhibits normal muscle tone. Coordination normal.   Short-term memory loss noted. Able to state she is at [facility]. In the hospital. Oriented to self. Aware of year.   Skin: Skin is warm and dry. No rash noted. She is not diaphoretic. No erythema.   Psychiatric: She has a normal mood and affect. Her behavior is normal. Judgment and thought content normal.   Nursing note and vitals reviewed.      Recent Labs       03/23/18   1742   WBC  8.6   RBC  2.72*   HEMOGLOBIN  9.0*   HEMATOCRIT  27.4*   MCV  100.7*   MCH  33.1*   MCHC  32.8*   RDW  52.1*   PLATELETCT  299   MPV  8.2*     Recent Labs      03/23/18   1742   SODIUM  135   POTASSIUM  3.7   CHLORIDE  105   CO2  23   GLUCOSE  119*   BUN  24*   CREATININE  1.18   CALCIUM  9.0     Recent Labs      03/23/18   1742   ALTSGPT  12   ASTSGOT  14   ALKPHOSPHAT  97   TBILIRUBIN  0.5   GLUCOSE  119*     Recent Labs      03/23/18   1742   INR  1.09             Lab Results   Component Value Date    TROPONINI <0.01 03/08/2018     Urinalysis:    Lab Results  Component Value Date/Time   SPECGRAVITY 1.011 03/07/2018 1320   GLUCOSEUR Negative 03/07/2018 1320   KETONES Negative 03/07/2018 1320   NITRITE Positive (A) 03/07/2018 1320   WBCURINE 5-10 (A) 03/07/2018 1320   RBCURINE 2-5 (A) 03/07/2018 1320   BACTERIA Many (A) 03/07/2018 1320   EPITHELCELL Few 03/07/2018 1320        Imaging  Left hip imaging with infected femoral neck fracture   Chest x-ray pending.    Assessment/Plan     I anticipate this patient will require at least two midnights for appropriate medical management, necessitating inpatient admission.    * Fracture of neck of femur (CMS-HCC)- (present on admission)   Assessment & Plan    Status post ground-level fall caused by dog running into her.  Previously independent without assistive device.  X-ray with impacted left femoral neck fracture. Dr. Au orthopedic surgeon consulted. To remain nothing by mouth coag studies. Pain medication oxycodone morphine sulfate IV when necessary normal saline at 100 hour. Check vitamin D level.        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 Avoid NSAIDs.  Careful monitoring of hemoglobin daily. Continue Pepcid twice a day.        Anemia- (present on admission)   Assessment & Plan    Prior iron and B12 levels within  normal limits. Continue iron and B12 supplements. Added vitamin C twice a day.        Accelerated hypertension- (present on admission)   Assessment & Plan    Continue home pressure medications.        Dementia- (present on admission)   Assessment & Plan    Baseline short-term memory loss noted on exam. However overall patient is quite independent still able to make medical decision making. Has POA at bedside and on file.        Hypothyroidism- (present on admission)   Assessment & Plan    Continue home dose of Synthroid.            VTE prophylaxis: Heparin 3 times a day .

## 2018-03-24 NOTE — ED PROVIDER NOTES
ED Provider Note    HPI: Patient is an 88-year-old female who presented to the emergency department March 23, 2018 4 7 PM with a chief complaint left hip pain.    Patient was run into by a dog and fell landing on her left hip. The patient initially complained of some left shoulder pain but when I saw her she said she did not have any pain in the shoulder. No head or neck trauma occurred. Patient has had no nausea or vomiting. She denied numbness or tingling of left lower extremity. No other somatic complaints    Review of Systems: Positive left hip pain negative for left shoulder pain numbness tingling left lower extremity nausea vomiting head or neck pain. Review of systems reviewed with patient, all other systems negative    Past medical/surgical history: Hypertension and elevated cholesterol hypothyroid     Medications: potassium chloride Prilosec Synthroid Coreg    Allergies: None    Social History: Patient does not smoke no alcohol use      Physical exam: Constitutional: Elderly female awake alert appeared somewhat uncomfortable  Vital signs:  Temperature 97.8 pulse 71 respirations 16 blood pressure 148/57 pulse oximetry 96%  EYES: PERRL, EOMI, Conjunctivae and sclera normal, eyelids normal bilaterally.  Neck: Trachea midline. No cervical masses seen or palpated. Normal range of motion, supple. No meningeal signs elicited.  Cardiac: Regular rate and rhythm. S1-S2 present. No S3 or S4 present. No murmurs, rubs, or gallops heard. No edema or varicosities were seen.   Lungs: Clear to auscultation with good aeration throughout. No wheezes, rales, or rhonchi heard. Patient's chest wall moved symmetrically with each respiratory effort. Patient was not making use of accessory muscles of respiration in breathing.  Abdomen: Soft nontender to palpation. No rebound or guarding elicited. No organomegaly identified. No pulsatile abdominal masses identified.   Musculoskeletal: Pain with palpation of the left hip area  proximally. I could elicit no pain with palpation of the knee or ankle on the affected extremity. No other pain with palpation or movement of muscle bone or joint. No other musculoskeletal deformities identified.  Neurologic: Decreased range of motion left lower extremity due to pain and hip area. The patient is able to move her toes on the affected extremity with no difficulty. No other focal neurologic abnormalities identified.  Skin: no rash or lesion seen, no palpable dermatologic lesions identified.  Psychiatric: not anxious, delusional, or hallucinating.    Medical decision making:  X-ray left hip obtained, there is an impacted femoral neck fracture and marked osteopenia femur film and chest x-ray obtained;    Laboratory studies obtained (please see lab sheet for all results) significant findings included mild dehydration present with a BUN of 24 coagulation studies normal.    EKG obtained preop (interpretation) 12-lead EKG sinus rhythm. Morphology P waves QRS complexes T waves unremarkable rate 71. No evidence of ST elevation or depression. R-wave progression normal. Interpreted as unremarkable EKG in terms of acute findings    Patient will be admitted by hospitalist service with orthopedics consulting. She has no evidence of a neurovascular injury. Further care and hospital course. Attending physician summary. Impression left femoral neck fracture, closed    Impression left femoral neck fracture, closed

## 2018-03-24 NOTE — CARE PLAN
Problem: Safety  Goal: Will remain free from injury  Outcome: PROGRESSING AS EXPECTED  Bed alarm in use, bed in low position, call light in reach, all needs me, patient calls appropriately

## 2018-03-24 NOTE — CONSULTS
DATE OF SERVICE:  03/24/2018    REQUESTING PHYSICIAN:  Scooter Adkins MD    REASON FOR CONSULTATION EVALUATION:  Left hip fracture.    HISTORY OF PRESENT ILLNESS:  Patient is an 88-year-old female who lives with   her younger sister.  They live independently.  Denies assistive walking   devices.  Dog knocked her over earlier today per report.  She reports to me   after being in her hospital bed that she was knocked over 2 days ago.  Again   per the report, she was able to get up and walk 30-40 feet earlier in the   emergency department, she had power of  and family present.  She is   complaining of left hip pain.  She was admitted to the hospital when she was   found to have a left impacted femoral neck fracture.  Currently, she is in the   hospital bed with pillows under the knee, reports this is comfortable.  She   denies pain otherwise.    PAST MEDICAL HISTORY:  Significant for hypothyroidism, hyperlipidemia and mild   early dementia.    PAST SURGICAL HISTORY:  Includes colonoscopy in 2017, appendectomy, and   tonsillectomy.    MEDICATIONS:  Include vitamin B12, iron sulfate.  Of note, she has also had   history of a bleeding ulcer in 2017.  She is taking iron, Prilosec,   levothyroxine, and Coreg.    ALLERGIES:  No known drug allergies.    SOCIAL HISTORY:  Lives with her sister independent living.  Nonsmoker,   nondrinker.    FAMILY HISTORY:  Significant for heart disease and hypertension.    REVIEW OF SYSTEMS:  Other than mild dementia and musculoskeletal above,   negative.    PHYSICAL EXAMINATION:  VITAL SIGNS:  Temperature of 36.8, pulse 72, blood pressure 136/57, saturating   92% on room air, respirations 14 per minute.  GENERAL:  She is alert.  She is oriented to person and place, not oriented to   time, although it is late at night in the hospital.  She talks appropriately.  HEENT:  Normocephalic, atraumatic.  Cranial nerves II-XII symmetric and   intact.  NECK:  Supple, nontender to palpation  with full range of motion.  CHEST:  Clear to auscultation.  HEART:  Regular rate and rhythm.  ABDOMEN:  Benign.  NEUROLOGIC/PSYCHIATRIC:  She is neurologically appropriate, psychiatrically   appropriate with appropriate judgment.  She does have some mild confusion.  MUSCULOSKELETAL:  Examination of the left hip, she is in a flexed position,   makes more comfortable, passive range of motion causes groin pain.  Axial   loading causes discomfort in the left hip.  She is able to dorsiflex and   plantar flex at the ankle.  Toes are pink and warm with brisk capillary   refill.  She has good pedal pulses.    IMAGING:  My independent review of 2 views of the left hip show valgus   impacted stable femoral neck fracture.    LABORATORY DATA:  Of note, labs, UA is positive for bacteria.  White blood   cells of 8.6, hemoglobin of 9, platelets 299, normal coags.    IMPRESSION:  An 88-year-old female with early mild dementia ground level fall   from a dog knocking her over.  She has a valgus impacted stable left femoral   neck fracture.  She will undergo pinning of the left femoral neck in the   morning with my partner, Dr. Manish Franco.  She does have some mild anemia.    She is on iron replacement.  This will be followed while she is in the   hospital.  Maintain on her maintenance medications for hypertension and   hypothyroidism.  She needs to be started on antibiotics for her possible   urinary tract infection.       ____________________________________     MD PATRICIA Garcia / RAEGAN    DD:  03/24/2018 03:30:54  DT:  03/24/2018 03:52:40    D#:  [id number]  Job#:  [id number]

## 2018-03-24 NOTE — OP REPORT
DATE OF SERVICE:  03/24/2018    PREOPERATIVE DIAGNOSIS:  Left valgus impacted femoral neck fracture.    POSTOPERATIVE DIAGNOSIS:  Left valgus impacted femoral neck fracture.    PROCEDURE PERFORMED:  Percutaneous skeletal fixation, left femoral neck   fracture.    SURGEON:  Manish Franco MD    ANESTHESIOLOGIST:  Clifford Zuniga MD    ANESTHESIA:  General.    ESTIMATED BLOOD LOSS:  Minimal.    IMPLANTS:  Total of 3 Synthes partially threaded cannulated screws, one 7.3 mm   and two 6.5 mm.    INDICATION FOR PROCEDURE:  Patient is an 88-year-old female.  She sustained a   fall and left valgus impacted femoral neck fracture.  My colleague, Dr. Au   evaluated her and asked I would be available for definitive surgical   management, which I was happy to do.  I met the patient and her power of    in the preoperative holding area.  We discussed treatment   recommendations, recommend percutaneous skeletal fixation to prevent   displacement and help early mobilization.  She signed informed consent and   wished to proceed as outlined above.    DESCRIPTION OF PROCEDURE:  Patient was met in the preoperative holding area.    Her surgical site was signed and consent was confirmed to be accurate.  She   was taken back to the operating room and general anesthesia was induced.    Ancef was administered.  She was positioned on the fracture table without   traction applied, just suspended in traction boot.  Fluoroscopic imaging   confirmed stable alignment of the valgus impacted fracture on AP and lateral   imaging.  Left thigh was prepped and draped in the usual sterile fashion.  A   formal time-out was performed to confirm patient's correct name, correct   surgical site, correct procedure and correct laterality.  Lateral incision was   made centered over the greater trochanteric area with a scalpel down through   skin.  Then, guide pins for the 6.5/7.3 mm cannulated screws were placed in an   inverted triangular  fashion.  They are confirmed to be in an acceptably   positioned on AP and lateral fluoroscopic imaging.  I then measured and   inserted a total of 3 cannulated screws, the most inferior posterior was 7.3   mm and the 2 more proximal were 6.5 mm, all had good bony purchase.  The guide   pins were removed.  Final fluoroscopic imaging confirmed overall acceptable   alignment of the fracture and acceptable position of the implants.  The wound   was thoroughly irrigated with normal saline.  A total 20 mL of 0.5% Marcaine   with epinephrine was injected into the incision and lateral femur for postop   analgesia.  The wound was then closed with 0 Vicryl, 2-0 Vicryl, and skin   edges with staples and placed a sterile dressing.  She was then awoken from   anesthesia, transferred on the rney and taken to postanesthesia care unit in   stable condition.  After Dr. Zuniga performed regional anesthetic, my   request to help with postoperative pain control.    PLAN:  1.  Patient will be readmitted to the medical service postop.  2.  She can be weightbearing as tolerated to the left lower extremity.  3.  She will need Ancef for 2 doses postop for routine infection prophylaxis.  4.  She will be restarted on heparin postoperatively and continue sequential   compression devices.  5.  She should work with physical and occupational therapy for mobilization as   soon as possible.       ____________________________________     MD NAVYA Bills / RAEGAN    DD:  03/24/2018 08:35:28  DT:  03/24/2018 08:51:06    D#:  [id number]  Job#:  [id number]

## 2018-03-24 NOTE — OR NURSING
Report to sheryl HAYWOODS/ARACELY. Left leg warm dry, pulses present. Left hip dressing c/d/i/ice bag in place  Denies pain. rec'd block in surgery  Main complaint: pt feels she's spitting a lot. Thick sputum. Removed with green oral sponge. Sips water w/o nausea.

## 2018-03-24 NOTE — OR SURGEON
Immediate Post OP Note    PreOp Diagnosis: Left valgus impacted femoral neck fracture    PostOp Diagnosis: same    Procedure(s):  HIP CANNULATED SCREW - Wound Class: Clean Contaminated    Surgeon(s):  Manish Franco M.D.    Anesthesiologist/Type of Anesthesia:  Anesthesiologist: Clifford Zuniga M.D./General    Surgical Staff:  Circulator: Bibiana Bishop R.N.  Scrub Person: Martinez Ballesteros  Radiology Technologist: Leonel Louis    Specimens removed if any: none    Estimated Blood Loss: minimal    Findings: see dictation    Complications: none known    PLAN:  --readmit medicine postop  --WBAT LLE  --ancef x 2 doses postop  --PT/OT for mobilization ASAP  --okay to start lovenox or equivalent tomorrow, continue SCDs        3/24/2018 8:29 AM Manish Franco M.D.

## 2018-03-24 NOTE — ED NOTES
Med rec completed per pt and family at bedside with list  List reviewed and returned  Allergies reviewed - NKDA  Pt received Rocephin and Cipro during last admit from 3/7/18-3/11/18

## 2018-03-24 NOTE — PROGRESS NOTES
88yoF with left valgus impacted femoral neck fx.    S: NPO awaiting surgery, neighbor (POA) at bedside    O:    Vitals:    03/23/18 2109 03/23/18 2340 03/24/18 0335 03/24/18 0655   BP: 152/66 154/61 133/70 110/47   Pulse: 69 72 69 63   Resp: 18 16 16 16   Temp: 36.8 °C (98.3 °F) 36.9 °C (98.5 °F) 36.7 °C (98.1 °F) 37.3 °C (99.2 °F)   SpO2: 92% 90% 92% 93%   Weight:       Height:         Exam:  General-NAD, alert and following commands  LLE-NVI distally, surgical site marked    A: 88yoF with left valgus impacted femoral neck fx.    Recs:  --We discussed treatment recommendations including percutaneous screw fixation.  We discussed alternatives to surgery.  They wish to have her proceed with surgical fixation  --continue NPO awaiting surgery this am

## 2018-03-25 LAB
ALBUMIN SERPL BCP-MCNC: 3.2 G/DL (ref 3.2–4.9)
ALBUMIN/GLOB SERPL: 1.5 G/DL
ALP SERPL-CCNC: 64 U/L (ref 30–99)
ALT SERPL-CCNC: 6 U/L (ref 2–50)
ANION GAP SERPL CALC-SCNC: 8 MMOL/L (ref 0–11.9)
AST SERPL-CCNC: 9 U/L (ref 12–45)
BILIRUB SERPL-MCNC: 0.3 MG/DL (ref 0.1–1.5)
BUN SERPL-MCNC: 28 MG/DL (ref 8–22)
CALCIUM SERPL-MCNC: 8.9 MG/DL (ref 8.5–10.5)
CHLORIDE SERPL-SCNC: 102 MMOL/L (ref 96–112)
CO2 SERPL-SCNC: 23 MMOL/L (ref 20–33)
CREAT SERPL-MCNC: 1.41 MG/DL (ref 0.5–1.4)
ERYTHROCYTE [DISTWIDTH] IN BLOOD BY AUTOMATED COUNT: 52 FL (ref 35.9–50)
GLOBULIN SER CALC-MCNC: 2.2 G/DL (ref 1.9–3.5)
GLUCOSE SERPL-MCNC: 106 MG/DL (ref 65–99)
HCT VFR BLD AUTO: 24.1 % (ref 37–47)
HGB BLD-MCNC: 8.1 G/DL (ref 12–16)
MAGNESIUM SERPL-MCNC: 2 MG/DL (ref 1.5–2.5)
MCH RBC QN AUTO: 33.6 PG (ref 27–33)
MCHC RBC AUTO-ENTMCNC: 33.6 G/DL (ref 33.6–35)
MCV RBC AUTO: 100 FL (ref 81.4–97.8)
PHOSPHATE SERPL-MCNC: 2.6 MG/DL (ref 2.5–4.5)
PLATELET # BLD AUTO: 264 K/UL (ref 164–446)
PMV BLD AUTO: 8.8 FL (ref 9–12.9)
POTASSIUM SERPL-SCNC: 3.8 MMOL/L (ref 3.6–5.5)
PROT SERPL-MCNC: 5.4 G/DL (ref 6–8.2)
RBC # BLD AUTO: 2.41 M/UL (ref 4.2–5.4)
SODIUM SERPL-SCNC: 133 MMOL/L (ref 135–145)
WBC # BLD AUTO: 6.1 K/UL (ref 4.8–10.8)

## 2018-03-25 PROCEDURE — 85027 COMPLETE CBC AUTOMATED: CPT

## 2018-03-25 PROCEDURE — 700102 HCHG RX REV CODE 250 W/ 637 OVERRIDE(OP): Performed by: HOSPITALIST

## 2018-03-25 PROCEDURE — 700111 HCHG RX REV CODE 636 W/ 250 OVERRIDE (IP): Performed by: HOSPITALIST

## 2018-03-25 PROCEDURE — 83735 ASSAY OF MAGNESIUM: CPT

## 2018-03-25 PROCEDURE — A9270 NON-COVERED ITEM OR SERVICE: HCPCS | Performed by: INTERNAL MEDICINE

## 2018-03-25 PROCEDURE — 700111 HCHG RX REV CODE 636 W/ 250 OVERRIDE (IP): Performed by: INTERNAL MEDICINE

## 2018-03-25 PROCEDURE — G8979 MOBILITY GOAL STATUS: HCPCS | Mod: CI

## 2018-03-25 PROCEDURE — 84100 ASSAY OF PHOSPHORUS: CPT

## 2018-03-25 PROCEDURE — 770006 HCHG ROOM/CARE - MED/SURG/GYN SEMI*

## 2018-03-25 PROCEDURE — 36415 COLL VENOUS BLD VENIPUNCTURE: CPT

## 2018-03-25 PROCEDURE — 700102 HCHG RX REV CODE 250 W/ 637 OVERRIDE(OP): Performed by: INTERNAL MEDICINE

## 2018-03-25 PROCEDURE — 80053 COMPREHEN METABOLIC PANEL: CPT

## 2018-03-25 PROCEDURE — 99232 SBSQ HOSP IP/OBS MODERATE 35: CPT | Performed by: INTERNAL MEDICINE

## 2018-03-25 PROCEDURE — 97161 PT EVAL LOW COMPLEX 20 MIN: CPT

## 2018-03-25 PROCEDURE — A9270 NON-COVERED ITEM OR SERVICE: HCPCS | Performed by: HOSPITALIST

## 2018-03-25 PROCEDURE — G8978 MOBILITY CURRENT STATUS: HCPCS | Mod: CI

## 2018-03-25 RX ORDER — HALOPERIDOL 5 MG/ML
5 INJECTION INTRAMUSCULAR ONCE
Status: COMPLETED | OUTPATIENT
Start: 2018-03-25 | End: 2018-03-25

## 2018-03-25 RX ADMIN — POTASSIUM CHLORIDE 20 MEQ: 1500 TABLET, EXTENDED RELEASE ORAL at 21:24

## 2018-03-25 RX ADMIN — HEPARIN SODIUM 5000 UNITS: 5000 INJECTION, SOLUTION INTRAVENOUS; SUBCUTANEOUS at 21:24

## 2018-03-25 RX ADMIN — CARVEDILOL 6.25 MG: 6.25 TABLET, FILM COATED ORAL at 18:50

## 2018-03-25 RX ADMIN — LEVOTHYROXINE SODIUM 50 MCG: 25 TABLET ORAL at 05:57

## 2018-03-25 RX ADMIN — HALOPERIDOL LACTATE 5 MG: 5 INJECTION, SOLUTION INTRAMUSCULAR at 23:45

## 2018-03-25 ASSESSMENT — ENCOUNTER SYMPTOMS
SPEECH CHANGE: 0
CONSTIPATION: 0
FALLS: 1
TINGLING: 0
HEMOPTYSIS: 0
NAUSEA: 0
BLURRED VISION: 0
WEIGHT LOSS: 1
BACK PAIN: 0
FEVER: 0
DEPRESSION: 0
SEIZURES: 0
FOCAL WEAKNESS: 0
CHILLS: 0
WHEEZING: 0
NECK PAIN: 0
SPUTUM PRODUCTION: 0
ORTHOPNEA: 0
PALPITATIONS: 0
HEADACHES: 0
VOMITING: 0
HEARTBURN: 0
PND: 0
BLOOD IN STOOL: 0
TREMORS: 0
FLANK PAIN: 0
WEAKNESS: 1
DOUBLE VISION: 0
DIZZINESS: 0
DIAPHORESIS: 0
MYALGIAS: 0
SENSORY CHANGE: 0
ABDOMINAL PAIN: 0
LOSS OF CONSCIOUSNESS: 0
SHORTNESS OF BREATH: 0
COUGH: 0
CLAUDICATION: 0
DIARRHEA: 0

## 2018-03-25 ASSESSMENT — COGNITIVE AND FUNCTIONAL STATUS - GENERAL
SUGGESTED CMS G CODE MODIFIER MOBILITY: CI
CLIMB 3 TO 5 STEPS WITH RAILING: A LITTLE
MOBILITY SCORE: 23

## 2018-03-25 ASSESSMENT — GAIT ASSESSMENTS
GAIT LEVEL OF ASSIST: STAND BY ASSIST
ASSISTIVE DEVICE: FRONT WHEEL WALKER
DISTANCE (FEET): 100

## 2018-03-25 NOTE — DIETARY
"Nutrition services: Day 2 of admit.  Lamar Dangelo is a 88 y.o. female with admitting following a GLF, noted with impacted left femoral neck fracture.   Consult received for supplements.  PT also noted with Low BMI <19 (17.0)     Spoke w/pt at bedside, she appears very elderly and frail.  She also appears confused as she was unable to answer all questions appropriately.  Pt was also very focused on leaving the hospital and continued to ask why she couldn't leave despite telling her multiple times.  Pt reports her appetite is very good and she states she is enjoying the food she is receiving.  She denies having issues chewing or swallowing.  Discussed snacks and supplements, pt adamant about not having them as she reports she is going home and does not need as she reports, \"I am healthy and never go to the hospital.\"  Discussed the importance of consuming adequate nutrition to help aid in healing, however pt was not interested.     Attempted to discuss wt changes, pt reports she has not had any recent wt changes and states her clothes con't to fit the same.  She is also unsure of her usual body weight stating \"I was born in 1929.\"     Assessment:  Height: 61\"  Weight: 41 kg via bed scale 3/24   Body mass index is 17.08 kg/m². - underweight   Diet/Intake: Regular - per ADLs, good po intake of 75 - 100% x 1 meal     Evaluation:   1. Per chart review, pt noted with mild dementia.  She is also noted with a recent hospitalization 3/7 - 3/11 for UTI and dehydration.    2. During pt's previous hospitalization she is noted with a wt of 46.2 kg; unsure how wt was obtained. With pt's current wt, she is noted with a severe wt loss of 11% x 1 month   3. Serum Glucose 106, BUN 28, creat 1.4, K/Phos/Mg WNL   4. Receiving Ascorbic Acid TID, Vitamin B-12, Synthroid, MVI and Vitamin D   5. Last BM ?     Pt with severe malnutrition related to mild dementia as evidenced by severe wt loss of 11% x 1 month, likely consuming <75% if " estimated needs; noted with severe muscle loss as evidenced by hollowed temporals, prominent clavicles and squared shoulders; severe fat loss noted with sunken eyes and bony prominences present.       Recommendations/Plan:  1. Encourage intake  2. Document intake of all po intake as % taken in ADL's to provide interdisciplinary communication across all shifts.   3. Monitor weight.  4. Nutrition rep will continue to see patient for ongoing meal and snack preferences.   5. Will send Boost Plus TID w/meals to help provide adequate nutrition despite pt refusing.     RD following

## 2018-03-25 NOTE — PROGRESS NOTES
Pt accepted from PACU, a&ox4, vss, dressing to left hip cdi, tolerating diet, voiding qs, went over POC.

## 2018-03-25 NOTE — CARE PLAN
Problem: Communication  Goal: The ability to communicate needs accurately and effectively will improve  Outcome: PROGRESSING AS EXPECTED      Problem: Pain Management  Goal: Pain level will decrease to patient's comfort goal  Outcome: PROGRESSING AS EXPECTED

## 2018-03-25 NOTE — PROGRESS NOTES
88yoF with left valgus impacted femoral neck fx s/p CRPP 3/24.    S: confused this am    O:    Vitals:    03/24/18 2015 03/25/18 0000 03/25/18 0340 03/25/18 0800   BP: 112/57 135/65 141/67 130/73   Pulse: 71 73 73 79   Resp: 16 16 16 17   Temp: 37.3 °C (99.1 °F) 36.9 °C (98.4 °F) 37.2 °C (99 °F) 36.6 °C (97.9 °F)   SpO2: 98% 93% 94% 93%   Weight:       Height:         Exam:  General-NAD, alert and following commands, confused  LLE-NVI distally, thigh dressing c/d/i, no pain with log roll LLE    Hct: 24.1    A: 88yoF with left valgus impacted femoral neck fx s/p CRPP 3/24.    Recs:  --WBAT LLE  --PT/OT for mobilization   --okay to start lovenox or equivalent, continue SCDs  --fu Hct  --fu 2 weeks postop

## 2018-03-25 NOTE — CARE PLAN
Problem: Safety  Goal: Will remain free from falls    Intervention: Implement fall precautions  Fall precautions in place.

## 2018-03-25 NOTE — CARE PLAN
Problem: Nutritional:  Goal: Achieve adequate nutritional intake  Patient will consume >50% of meals and supplements   Outcome: PROGRESSING SLOWER THAN EXPECTED  Per ADLs, pt consumed % x 1 meal   Intervention: Monitor PO intake, weights, and laboratory values  please record percentage of ALL po intake conusmed in ADLs to help monitor po adequacy     Intervention: Modify distribution, type or amount of food and nutrients within meals or snacks  Sending Boost Plus TID w/meals     Intervention: Collaborate with transitional care team and interdisciplinary team to meet patient's needs  RD following

## 2018-03-25 NOTE — DISCHARGE PLANNING
Referral: SNF    Intervention: Received SNF Order.  Per RN, pt is alert and oriented.  SW met with pt at bedside.  SW explained SNF Referral.  Pt states she does not want to transfer to SNF.  Pt indicates she has improved enough to return home and be assisted by her sister Ellie.      Pt authorized this SW to contact Ellie and verify the information.  SW called Ellie; however, her phone is not taking messages, deferred to unit SW for further follow up.    Plan: As Above.

## 2018-03-25 NOTE — PROGRESS NOTES
[facility]    Date of Service: 3/24/2018    Chief Complaint  88 y.o. female admitted 3/23/2018 with GLF c/b Left femoral neck fracture s/p surgical repair.     Interval Problem Update  Patient seen and evaluated on rounds post operatively  Pain controlled  Pain regimen titrated, age appropriate  No other active complaints  Once anesthesia wears off tomorrow, code status discussion  Discussed with nursing, no other concerns from nursing perspective    Consultants/Specialty  Orthopedics    Disposition  Anticipate post acute placement to SNF        Review of Systems   Constitutional: Positive for malaise/fatigue and weight loss. Negative for chills, diaphoresis and fever.   HENT: Positive for hearing loss. Negative for tinnitus.    Eyes: Negative for blurred vision and double vision.   Respiratory: Negative for cough, hemoptysis, sputum production, shortness of breath and wheezing.    Cardiovascular: Negative for chest pain, palpitations, orthopnea, claudication, leg swelling and PND.   Gastrointestinal: Negative for abdominal pain, blood in stool, constipation, diarrhea, heartburn, melena, nausea and vomiting.   Genitourinary: Negative for dysuria, flank pain, frequency, hematuria and urgency.   Musculoskeletal: Positive for falls and joint pain. Negative for back pain, myalgias and neck pain.   Skin: Negative for itching and rash.   Neurological: Positive for weakness. Negative for dizziness, tingling, tremors, sensory change, speech change, focal weakness, seizures, loss of consciousness and headaches.   Psychiatric/Behavioral: Negative for depression and suicidal ideas.      Physical Exam  Laboratory/Imaging   Hemodynamics  Temp (24hrs), Av.8 °C (98.2 °F), Min:36.4 °C (97.5 °F), Max:37.3 °C (99.2 °F)   Temperature: 36.9 °C (98.4 °F)  Pulse  Av.6  Min: 57  Max: 93 Heart Rate (Monitored): 67  Blood Pressure : 115/57, NIBP: 147/60      Respiratory      Respiration: 16, Pulse Oximetry: 98  %             Fluids    Intake/Output Summary (Last 24 hours) at 03/24/18 1711  Last data filed at 03/24/18 1300   Gross per 24 hour   Intake              120 ml   Output                0 ml   Net              120 ml       Nutrition  Orders Placed This Encounter   Procedures   • Diet Order     Standing Status:   Standing     Number of Occurrences:   1     Order Specific Question:   Diet:     Answer:   Regular [1]     Physical Exam   Constitutional: She is oriented to person, place, and time. She appears well-developed and well-nourished. No distress.   HENT:   Head: Normocephalic.   Mouth/Throat: Oropharynx is clear and moist. No oropharyngeal exudate.   Eyes: Conjunctivae are normal. Pupils are equal, round, and reactive to light. No scleral icterus.   Neck: No JVD present.   Cardiovascular: Normal rate, regular rhythm and normal heart sounds.  Exam reveals no gallop and no friction rub.    No murmur heard.  Pulmonary/Chest: Breath sounds normal. No stridor. No respiratory distress. She has no wheezes. She has no rales.   Abdominal: Soft. Bowel sounds are normal. She exhibits no distension. There is no tenderness. There is no rebound and no guarding.   Musculoskeletal: Normal range of motion. She exhibits no edema, tenderness or deformity.   Neurological: She is alert and oriented to person, place, and time. No cranial nerve deficit.   Skin: Skin is warm and dry. She is not diaphoretic.   Psychiatric: She has a normal mood and affect. Her behavior is normal. Judgment and thought content normal.       Recent Labs      03/23/18   1742 03/24/18   0137   WBC  8.6  5.1   RBC  2.72*  2.51*   HEMOGLOBIN  9.0*  8.4*   HEMATOCRIT  27.4*  25.3*   MCV  100.7*  100.8*   MCH  33.1*  33.5*   MCHC  32.8*  33.2*   RDW  52.1*  53.2*   PLATELETCT  299  267   MPV  8.2*  8.5*     Recent Labs      03/23/18   1742  03/24/18   0137   SODIUM  135  136   POTASSIUM  3.7  3.8   CHLORIDE  105  106   CO2  23  24   GLUCOSE  119*  106*   BUN   "24*  26*   CREATININE  1.18  1.23   CALCIUM  9.0  8.8     Recent Labs      03/23/18   1742   INR  1.09                  Assessment/Plan     * Fracture of neck of femur (CMS-HCC)- (present on admission)   Assessment & Plan    Status post ground-level fall caused by dog running into her.  Left valgus impacted femoral neck fracture.  S/p \"Percutaneous skeletal fixation, left femoral neck fracture\" by Dr Franco 03/24/2018  WBAT   DVT PPx with SCD and SC heparin  Vitamin D replacement  Outpatient DEXA with PCP  PT/OT  Anticipate post acute placement to SNF  Post operative care per orthopedics team        Cachexia (CMS-HCC)- (present on admission)   Assessment & Plan    Body mass index is 17.08 kg/m².   With severe protein calorie malnutrition as evident from SC fat loss  Optimize nutrition        CKD (chronic kidney disease), stage III- (present on admission)   Assessment & Plan    Monitor renal function / Avoid nephrotoxins and dose medications renally        Vitamin D deficiency- (present on admission)   Assessment & Plan    Vitamin D 50,000 units x 8 weeks  Then 2000 units daily  PCP to continue monitoring        Anemia- (present on admission)   Assessment & Plan    Likely AOCD   Iron / MV supplementation  Monitor Hb / Restrictive transfusion strategy        HTN (hypertension)- (present on admission)   Assessment & Plan    Decrease carvedilol to allow for age appropriate HTN control        Dementia- (present on admission)   Assessment & Plan    Avoid sedative, narcotics / BZDs as able        Hypokalemia- (present on admission)   Assessment & Plan    Continue home K supplementation  Continue monitoring        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 Avoid NSAIDs.  Careful monitoring of hemoglobin daily. Continue Pepcid twice a day.        Hypothyroidism- (present on admission)   Assessment & Plan    " Continue synthroid, TSH wnl          Quality-Core Measures   Reviewed items::  Labs reviewed, Medications reviewed and Radiology images reviewed  Smith catheter::  No Smith  DVT prophylaxis pharmacological::  Heparin  DVT prophylaxis - mechanical:  SCDs  Ulcer Prophylaxis::  Not indicated  Assessed for rehabilitation services:  Patient was assess for and/or received rehabilitation services during this hospitalization

## 2018-03-25 NOTE — THERAPY
"Physical Therapy Evaluation completed.   Bed Mobility:  Supine to Sit: Stand by Assist  Transfers: Sit to Stand: Stand by Assist  Gait: Level Of Assist: Stand by Assist with Front-Wheel Walker       Plan of Care: Will benefit from Physical Therapy 3 times per week  Discharge Recommendations: Equipment: Front-Wheel Walker. Post-acute therapy Discharge to home with outpatient or home health for additional skilled therapy services.    See \"Rehab Therapy-Acute\" Patient Summary Report for complete documentation.     "

## 2018-03-26 PROBLEM — Z66 DNR (DO NOT RESUSCITATE): Status: ACTIVE | Noted: 2018-03-26

## 2018-03-26 PROBLEM — G93.40 ENCEPHALOPATHY: Status: ACTIVE | Noted: 2018-03-26

## 2018-03-26 LAB
ANION GAP SERPL CALC-SCNC: 7 MMOL/L (ref 0–11.9)
BUN SERPL-MCNC: 24 MG/DL (ref 8–22)
CALCIUM SERPL-MCNC: 9.2 MG/DL (ref 8.5–10.5)
CHLORIDE SERPL-SCNC: 104 MMOL/L (ref 96–112)
CO2 SERPL-SCNC: 23 MMOL/L (ref 20–33)
CREAT SERPL-MCNC: 1.09 MG/DL (ref 0.5–1.4)
GLUCOSE SERPL-MCNC: 99 MG/DL (ref 65–99)
HCT VFR BLD AUTO: 25.3 % (ref 37–47)
HGB BLD-MCNC: 8.3 G/DL (ref 12–16)
POTASSIUM SERPL-SCNC: 4.1 MMOL/L (ref 3.6–5.5)
SODIUM SERPL-SCNC: 134 MMOL/L (ref 135–145)

## 2018-03-26 PROCEDURE — G8988 SELF CARE GOAL STATUS: HCPCS | Mod: CI

## 2018-03-26 PROCEDURE — 85014 HEMATOCRIT: CPT

## 2018-03-26 PROCEDURE — A9270 NON-COVERED ITEM OR SERVICE: HCPCS | Performed by: HOSPITALIST

## 2018-03-26 PROCEDURE — 80048 BASIC METABOLIC PNL TOTAL CA: CPT

## 2018-03-26 PROCEDURE — 700102 HCHG RX REV CODE 250 W/ 637 OVERRIDE(OP): Performed by: INTERNAL MEDICINE

## 2018-03-26 PROCEDURE — 85018 HEMOGLOBIN: CPT

## 2018-03-26 PROCEDURE — G8987 SELF CARE CURRENT STATUS: HCPCS | Mod: CK

## 2018-03-26 PROCEDURE — 770006 HCHG ROOM/CARE - MED/SURG/GYN SEMI*

## 2018-03-26 PROCEDURE — 700111 HCHG RX REV CODE 636 W/ 250 OVERRIDE (IP): Performed by: HOSPITALIST

## 2018-03-26 PROCEDURE — 97166 OT EVAL MOD COMPLEX 45 MIN: CPT

## 2018-03-26 PROCEDURE — 700102 HCHG RX REV CODE 250 W/ 637 OVERRIDE(OP): Performed by: HOSPITALIST

## 2018-03-26 PROCEDURE — A9270 NON-COVERED ITEM OR SERVICE: HCPCS | Performed by: INTERNAL MEDICINE

## 2018-03-26 PROCEDURE — 99233 SBSQ HOSP IP/OBS HIGH 50: CPT | Performed by: INTERNAL MEDICINE

## 2018-03-26 PROCEDURE — 36415 COLL VENOUS BLD VENIPUNCTURE: CPT

## 2018-03-26 RX ADMIN — OXYCODONE HYDROCHLORIDE AND ACETAMINOPHEN 500 MG: 500 TABLET ORAL at 21:05

## 2018-03-26 RX ADMIN — OXYCODONE HYDROCHLORIDE AND ACETAMINOPHEN 500 MG: 500 TABLET ORAL at 08:53

## 2018-03-26 RX ADMIN — HEPARIN SODIUM 5000 UNITS: 5000 INJECTION, SOLUTION INTRAVENOUS; SUBCUTANEOUS at 22:00

## 2018-03-26 RX ADMIN — Medication 1000 MCG: at 09:00

## 2018-03-26 RX ADMIN — THERA TABS 1 TABLET: TAB at 08:54

## 2018-03-26 RX ADMIN — FAMOTIDINE 20 MG: 20 TABLET, FILM COATED ORAL at 17:19

## 2018-03-26 RX ADMIN — POTASSIUM CHLORIDE 20 MEQ: 1500 TABLET, EXTENDED RELEASE ORAL at 21:05

## 2018-03-26 RX ADMIN — ACETAMINOPHEN 500 MG: 500 TABLET ORAL at 23:24

## 2018-03-26 RX ADMIN — OXYCODONE HYDROCHLORIDE AND ACETAMINOPHEN 500 MG: 500 TABLET ORAL at 14:57

## 2018-03-26 RX ADMIN — FOLIC ACID 1 MG: 1 TABLET ORAL at 08:53

## 2018-03-26 RX ADMIN — ACETAMINOPHEN 500 MG: 500 TABLET ORAL at 17:20

## 2018-03-26 RX ADMIN — Medication 325 MG: at 08:53

## 2018-03-26 RX ADMIN — Medication 325 MG: at 17:19

## 2018-03-26 RX ADMIN — CARVEDILOL 6.25 MG: 6.25 TABLET, FILM COATED ORAL at 08:51

## 2018-03-26 ASSESSMENT — COGNITIVE AND FUNCTIONAL STATUS - GENERAL
HELP NEEDED FOR BATHING: A LITTLE
DAILY ACTIVITIY SCORE: 17
SUGGESTED CMS G CODE MODIFIER DAILY ACTIVITY: CK
PERSONAL GROOMING: A LITTLE
DRESSING REGULAR LOWER BODY CLOTHING: A LOT
DRESSING REGULAR UPPER BODY CLOTHING: A LITTLE
TOILETING: A LITTLE
EATING MEALS: A LITTLE

## 2018-03-26 ASSESSMENT — ENCOUNTER SYMPTOMS
SPUTUM PRODUCTION: 0
NECK PAIN: 0
ABDOMINAL PAIN: 0
SHORTNESS OF BREATH: 0
BLURRED VISION: 0
FEVER: 0
TREMORS: 0
DEPRESSION: 0
FALLS: 1
SENSORY CHANGE: 0
HEARTBURN: 0
SPEECH CHANGE: 0
WHEEZING: 0
DIZZINESS: 0
FLANK PAIN: 0
SEIZURES: 0
CHILLS: 0
COUGH: 0
WEIGHT LOSS: 1
BACK PAIN: 0
DIARRHEA: 0
HEADACHES: 0
ORTHOPNEA: 0
WEAKNESS: 1
CONSTIPATION: 0
PALPITATIONS: 0
TINGLING: 0
LOSS OF CONSCIOUSNESS: 0
DIAPHORESIS: 0
NAUSEA: 0
FOCAL WEAKNESS: 0
VOMITING: 0
DOUBLE VISION: 0
PND: 0
BLOOD IN STOOL: 0
CLAUDICATION: 0
HEMOPTYSIS: 0
MYALGIAS: 0

## 2018-03-26 ASSESSMENT — PAIN SCALES - GENERAL
PAINLEVEL_OUTOF10: 0
PAINLEVEL_OUTOF10: 0

## 2018-03-26 ASSESSMENT — ACTIVITIES OF DAILY LIVING (ADL): TOILETING: INDEPENDENT

## 2018-03-26 NOTE — PROGRESS NOTES
Assumed care of pt at 0700, pt resting in bed comfortably this AM. Pt denies needing any pain medication at this time. POC discussed and all questions/concerns addressed at this time. Pt frustrated with the amount of pills this AM, RN educated pt on reasoning for the pills. Bed is in low and locked position, call light within reach, bed alarm on and hourly rounding in place.

## 2018-03-26 NOTE — PROGRESS NOTES
"   Orthopaedic Progress Note    Interval changes:  Patient doing well post op  Slightly lethargic easily arousable and appropriate    ROS - Patient denies any new issues.  Pain well controlled.    Blood pressure 117/55, pulse 68, temperature 37.3 °C (99.1 °F), resp. rate 16, height 1.549 m (5' 1\"), weight 41 kg (90 lb 6.2 oz), SpO2 94 %.      Patient seen and examined  No acute distress  Breathing non labored  RRR  Left hip Surgical dressing is clean, dry, and intact. Patient clearly fires tibialis anterior, EHL, and gastrocnemius/soleus. Sensation is intact to light touch throughout superficial peroneal, deep peroneal, tibial, saphenous, and sural nerve distributions. Strong and palpable 2+ dorsalis pedis and posterior tibial pulses with capillary refill less than 2 seconds. No lower leg tenderness or discomfort.       Recent Labs      03/23/18   1742  03/24/18   0137  03/25/18   0147  03/26/18   0538   WBC  8.6  5.1  6.1   --    RBC  2.72*  2.51*  2.41*   --    HEMOGLOBIN  9.0*  8.4*  8.1*  8.3*   HEMATOCRIT  27.4*  25.3*  24.1*  25.3*   MCV  100.7*  100.8*  100.0*   --    MCH  33.1*  33.5*  33.6*   --    MCHC  32.8*  33.2*  33.6   --    RDW  52.1*  53.2*  52.0*   --    PLATELETCT  299  267  264   --    MPV  8.2*  8.5*  8.8*   --        Active Hospital Problems    Diagnosis   • Fracture of neck of femur (CMS-HCC) [S72.009A]     Priority: High   • Vitamin D deficiency [E55.9]     Priority: Low   • CKD (chronic kidney disease), stage III [N18.3]     Priority: Low   • Cachexia (CMS-HCC) [R64]     Priority: Low   • Anemia [D64.9]     Priority: Low   • Dementia [F03.90]     Priority: Low   • HTN (hypertension) [I10]     Priority: Low   • Hypokalemia [E87.6]     Priority: Low   • 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  Wt bearing status - WBAT  Wound care/Drains - dressing left in place  Future Procedures - none planned   Sutures/Staples out- 10-14 days post operatively  PT/OT-initiated  Antibiotics: completed  DVT Prophylaxis- TEDS/SCDs/Foot pumps  Smith-none  Case Coordination for Discharge Planning - Disposition pending therapy recs

## 2018-03-26 NOTE — THERAPY
"Occupational Therapy Evaluation completed.   Functional Status:  CGA supine to sit.  Max A LB dressing.  CGA sit to stand and to walk in room with FWW.  Pt washed her face seated EOB with CGA.  Pt returned to supine with CGA.  Pt is very motivated but was lethargic during today's session and unable to attend to ADL education. Per RN, pt received sedation during night 2' to agitation/sundowning.     Plan of Care: Will benefit from Occupational Therapy 3 times per week  Discharge Recommendations:  Equipment: Front-Wheel Walker and Will Continue to Assess for Equipment Needs.  Pt will benefit from short stay at post acute facility prior to DC home.    See \"Rehab Therapy-Acute\" Patient Summary Report for complete documentation.    "

## 2018-03-26 NOTE — PROGRESS NOTES
[facility]ist Progress Note    Date of Service: 3/25/2018    Chief Complaint  88 y.o. female admitted 3/23/2018 with GLF c/b Left femoral neck fracture s/p surgical repair.     Interval Problem Update  Patient seen and evaluated on rounds post operatively  Pain controlled  Pain regimen titrated, age appropriate  No other active complaints  Once anesthesia wears off tomorrow, code status discussion  Discussed with nursing, no other concerns from nursing perspective  Palliative consult to discuss advance care planning    Consultants/Specialty  Orthopedics    Disposition  Anticipate post acute placement to SNF        Review of Systems   Constitutional: Positive for malaise/fatigue and weight loss. Negative for chills, diaphoresis and fever.   HENT: Positive for hearing loss. Negative for tinnitus.    Eyes: Negative for blurred vision and double vision.   Respiratory: Negative for cough, hemoptysis, sputum production, shortness of breath and wheezing.    Cardiovascular: Negative for chest pain, palpitations, orthopnea, claudication, leg swelling and PND.   Gastrointestinal: Negative for abdominal pain, blood in stool, constipation, diarrhea, heartburn, melena, nausea and vomiting.   Genitourinary: Negative for dysuria, flank pain, frequency, hematuria and urgency.   Musculoskeletal: Positive for falls and joint pain. Negative for back pain, myalgias and neck pain.   Skin: Negative for itching and rash.   Neurological: Positive for weakness. Negative for dizziness, tingling, tremors, sensory change, speech change, focal weakness, seizures, loss of consciousness and headaches.   Psychiatric/Behavioral: Negative for depression and suicidal ideas.      Physical Exam  Laboratory/Imaging   Hemodynamics  Temp (24hrs), Av.9 °C (98.5 °F), Min:36.4 °C (97.6 °F), Max:37.3 °C (99.1 °F)   Temperature: 37.1 °C (98.8 °F)  Pulse  Av.7  Min: 57  Max: 93    Blood Pressure : 146/70      Respiratory      Respiration: 20,  Pulse Oximetry: 94 %             Fluids  No intake or output data in the 24 hours ending 03/25/18 1912    Nutrition  Orders Placed This Encounter   Procedures   • Diet Order     Standing Status:   Standing     Number of Occurrences:   1     Order Specific Question:   Diet:     Answer:   Regular [1]     Physical Exam   Constitutional: She is oriented to person, place, and time. She appears well-developed and well-nourished. No distress.   HENT:   Head: Normocephalic.   Mouth/Throat: Oropharynx is clear and moist. No oropharyngeal exudate.   Eyes: Conjunctivae are normal. Pupils are equal, round, and reactive to light. No scleral icterus.   Neck: No JVD present.   Cardiovascular: Normal rate, regular rhythm and normal heart sounds.  Exam reveals no gallop and no friction rub.    No murmur heard.  Pulmonary/Chest: Breath sounds normal. No stridor. No respiratory distress. She has no wheezes. She has no rales.   Abdominal: Soft. Bowel sounds are normal. She exhibits no distension. There is no tenderness. There is no rebound and no guarding.   Musculoskeletal: Normal range of motion. She exhibits no edema, tenderness or deformity.   Neurological: She is alert and oriented to person, place, and time. No cranial nerve deficit.   Skin: Skin is warm and dry. She is not diaphoretic.   Psychiatric: She has a normal mood and affect. Her behavior is normal. Judgment and thought content normal.       Recent Labs      03/23/18 1742 03/24/18 0137 03/25/18 0147   WBC  8.6  5.1  6.1   RBC  2.72*  2.51*  2.41*   HEMOGLOBIN  9.0*  8.4*  8.1*   HEMATOCRIT  27.4*  25.3*  24.1*   MCV  100.7*  100.8*  100.0*   MCH  33.1*  33.5*  33.6*   MCHC  32.8*  33.2*  33.6   RDW  52.1*  53.2*  52.0*   PLATELETCT  299  267  264   MPV  8.2*  8.5*  8.8*     Recent Labs      03/23/18 1742 03/24/18 0137 03/25/18   0146   SODIUM  135  136  133*   POTASSIUM  3.7  3.8  3.8   CHLORIDE  105  106  102   CO2  23 24 23   GLUCOSE  119*  106*  106*  "  BUN  24*  26*  28*   CREATININE  1.18  1.23  1.41*   CALCIUM  9.0  8.8  8.9     Recent Labs      03/23/18   1742   INR  1.09                  Assessment/Plan     * Fracture of neck of femur (CMS-HCC)- (present on admission)   Assessment & Plan    Status post ground-level fall caused by dog running into her.  Left valgus impacted femoral neck fracture.  S/p \"Percutaneous skeletal fixation, left femoral neck fracture\" by Dr Franco 03/24/2018  WBAT   DVT PPx with SCD and SC heparin  Vitamin D replacement  Outpatient DEXA with PCP  PT/OT  Anticipate post acute placement to SNF  Post operative care per orthopedics team        Cachexia (CMS-HCC)- (present on admission)   Assessment & Plan    Body mass index is 17.08 kg/m².   With severe protein calorie malnutrition as evident from SC fat loss  Optimize nutrition        CKD (chronic kidney disease), stage III- (present on admission)   Assessment & Plan    Monitor renal function / Avoid nephrotoxins and dose medications renally        Vitamin D deficiency- (present on admission)   Assessment & Plan    Vitamin D 50,000 units x 8 weeks  Then 2000 units daily  PCP to continue monitoring        Anemia- (present on admission)   Assessment & Plan    Likely AOCD   Iron / MV supplementation  Monitor Hb / Restrictive transfusion strategy        HTN (hypertension)- (present on admission)   Assessment & Plan    Decrease carvedilol to allow for age appropriate HTN control        Dementia- (present on admission)   Assessment & Plan    Avoid sedative, narcotics / BZDs as able        Hypokalemia- (present on admission)   Assessment & Plan    Continue home K supplementation  Continue monitoring        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 Avoid NSAIDs.  Careful monitoring of hemoglobin daily. Continue Pepcid twice a day.        Hypothyroidism- (present on admission) "   Assessment & Plan    Continue synthroid, TSH wnl          Quality-Core Measures   Reviewed items::  Labs reviewed, Medications reviewed and Radiology images reviewed  Smith catheter::  No Smith  DVT prophylaxis pharmacological::  Heparin  DVT prophylaxis - mechanical:  SCDs  Ulcer Prophylaxis::  Not indicated  Assessed for rehabilitation services:  Patient was assess for and/or received rehabilitation services during this hospitalization

## 2018-03-26 NOTE — CONSULTS
Reason for PC Consult: Advance Care Planning    Assessment:  General:  88yr old female with a PMH of  hypothyroidism, hyperlipidemia, dementia, GI bleed/ulcer presented to ED on 3/23/18 after a GLF.  Patient admitted  With impacted left femoral neck fracture.  Patient underwent a Percutaneous skeletal fixation, left femoral neck fracture, by Dr. Franco on 3/24/18.  Palliative Care referral for ACP.  Social- Patient resides with her 79yr sister, Ellie Chao.  She was independent in all ADLs prior to admission.  No noted history of tobacco or alcohol.    Dyspnea: No- 94% RA  Last BM:  (BOBBI)-    Pain: No-  Denies  Depression: Unable to determine-    Dementia: Mild Dementia       Spiritual:  Is Sikhism or spirituality important for coping with this illness? Unable to determine-    Has a  or spiritual provider visit been requested? No    Palliative Performance Scale: 30%    Advance Directive: Advance Directive, DPOA-    DPOA: Yes- Ellie Chao, sister [phone]  POLST: None on File    Code Status: Full-- Discussed with DPOA/Sister Ellie Chao.  Patient would not want to have CPR or intubation.  DPOA elected DNR as per 2 previous admissions and AD, on file      Outcome:  I met with the patient at bedside(T332/02).  I introduced myself and the role of Palliative Care in POC.  Patient remains confused as noted earlier by Dr. Finn.  I contacted Ellie, patient's sister/DPOA, and she reviewed the patient's current status.  We discussed her AD and choice of code status.  Ellie stated that the patent would not want aggressive life sustain treatment, No CPR/intubation.  Ellie plans to come see the patient tomorrow (3/27/18).  Plan to complete a POLST at that time. Family stated understanding and all questions were answered in full.    Encouraged family to call with any questions, card with contact number provided    Active listening, reflection, and empathic support utilized throughout this encounter.        Updated: Dr. Finn, BS RN    Plan: Visit with patient and sister tomorrow. Complete POLST, possible SNF.     Recommendations: Hospice/Ethics referral is inappropriate at this time as the patient is actively seeking treatment.      Thank you for allowing Palliative Care to participate in this patient's care. Please feel free to call [phone] with any questions or concerns.

## 2018-03-26 NOTE — CARE PLAN
Problem: Safety  Goal: Will remain free from falls  Outcome: PROGRESSING AS EXPECTED    Intervention: Implement fall precautions  Bed in low position, wheels locked, call light within reach, hourly rounding in place.      Problem: Pain Management  Goal: Pain level will decrease to patient's comfort goal  Outcome: PROGRESSING AS EXPECTED  Patient denies having any pain and declines pain medications.

## 2018-03-26 NOTE — DISCHARGE PLANNING
Medical Social Work    Per pts chart, pt needs a HH referral. SW met with pt at bedside who requested that referrals be sent to [facility]. SW addressed all questions and encouraged follow up as needed. SW sent the choice form to [facility] and confirmed receipt of choice form. SW to monitor response status and follow up with care team.

## 2018-03-27 LAB
FOLATE SERPL-MCNC: >23.5 NG/ML
PREALB SERPL-MCNC: 10 MG/DL (ref 18–38)
VIT B12 SERPL-MCNC: 587 PG/ML (ref 211–911)

## 2018-03-27 PROCEDURE — A9270 NON-COVERED ITEM OR SERVICE: HCPCS | Performed by: HOSPITALIST

## 2018-03-27 PROCEDURE — 97116 GAIT TRAINING THERAPY: CPT

## 2018-03-27 PROCEDURE — 36415 COLL VENOUS BLD VENIPUNCTURE: CPT

## 2018-03-27 PROCEDURE — 97530 THERAPEUTIC ACTIVITIES: CPT

## 2018-03-27 PROCEDURE — 700102 HCHG RX REV CODE 250 W/ 637 OVERRIDE(OP): Performed by: FAMILY MEDICINE

## 2018-03-27 PROCEDURE — 700111 HCHG RX REV CODE 636 W/ 250 OVERRIDE (IP): Performed by: HOSPITALIST

## 2018-03-27 PROCEDURE — 84134 ASSAY OF PREALBUMIN: CPT

## 2018-03-27 PROCEDURE — 97110 THERAPEUTIC EXERCISES: CPT

## 2018-03-27 PROCEDURE — A9270 NON-COVERED ITEM OR SERVICE: HCPCS | Performed by: FAMILY MEDICINE

## 2018-03-27 PROCEDURE — 82607 VITAMIN B-12: CPT

## 2018-03-27 PROCEDURE — 99232 SBSQ HOSP IP/OBS MODERATE 35: CPT | Performed by: FAMILY MEDICINE

## 2018-03-27 PROCEDURE — A9270 NON-COVERED ITEM OR SERVICE: HCPCS | Performed by: INTERNAL MEDICINE

## 2018-03-27 PROCEDURE — 82746 ASSAY OF FOLIC ACID SERUM: CPT

## 2018-03-27 PROCEDURE — 700102 HCHG RX REV CODE 250 W/ 637 OVERRIDE(OP): Performed by: HOSPITALIST

## 2018-03-27 PROCEDURE — 700102 HCHG RX REV CODE 250 W/ 637 OVERRIDE(OP): Performed by: INTERNAL MEDICINE

## 2018-03-27 PROCEDURE — 770006 HCHG ROOM/CARE - MED/SURG/GYN SEMI*

## 2018-03-27 RX ORDER — AMOXICILLIN 250 MG
2 CAPSULE ORAL 2 TIMES DAILY
Status: DISCONTINUED | OUTPATIENT
Start: 2018-03-27 | End: 2018-03-28 | Stop reason: HOSPADM

## 2018-03-27 RX ORDER — POLYETHYLENE GLYCOL 3350 17 G/17G
1 POWDER, FOR SOLUTION ORAL
Status: DISCONTINUED | OUTPATIENT
Start: 2018-03-27 | End: 2018-03-28 | Stop reason: HOSPADM

## 2018-03-27 RX ORDER — BISACODYL 10 MG
10 SUPPOSITORY, RECTAL RECTAL
Status: DISCONTINUED | OUTPATIENT
Start: 2018-03-27 | End: 2018-03-28 | Stop reason: HOSPADM

## 2018-03-27 RX ADMIN — Medication 1000 MCG: at 08:29

## 2018-03-27 RX ADMIN — ACETAMINOPHEN 500 MG: 500 TABLET ORAL at 17:22

## 2018-03-27 RX ADMIN — FAMOTIDINE 20 MG: 20 TABLET, FILM COATED ORAL at 17:22

## 2018-03-27 RX ADMIN — OXYCODONE HYDROCHLORIDE AND ACETAMINOPHEN 500 MG: 500 TABLET ORAL at 21:38

## 2018-03-27 RX ADMIN — THERA TABS 1 TABLET: TAB at 08:29

## 2018-03-27 RX ADMIN — OXYCODONE HYDROCHLORIDE AND ACETAMINOPHEN 500 MG: 500 TABLET ORAL at 08:29

## 2018-03-27 RX ADMIN — POLYETHYLENE GLYCOL 3350 1 PACKET: 17 POWDER, FOR SOLUTION ORAL at 11:29

## 2018-03-27 RX ADMIN — ACETAMINOPHEN 500 MG: 500 TABLET ORAL at 11:28

## 2018-03-27 RX ADMIN — STANDARDIZED SENNA CONCENTRATE AND DOCUSATE SODIUM 2 TABLET: 8.6; 5 TABLET, FILM COATED ORAL at 21:38

## 2018-03-27 RX ADMIN — Medication 325 MG: at 08:29

## 2018-03-27 RX ADMIN — FOLIC ACID 1 MG: 1 TABLET ORAL at 08:29

## 2018-03-27 RX ADMIN — LEVOTHYROXINE SODIUM 50 MCG: 25 TABLET ORAL at 06:31

## 2018-03-27 RX ADMIN — HEPARIN SODIUM 5000 UNITS: 5000 INJECTION, SOLUTION INTRAVENOUS; SUBCUTANEOUS at 14:07

## 2018-03-27 RX ADMIN — STANDARDIZED SENNA CONCENTRATE AND DOCUSATE SODIUM 2 TABLET: 8.6; 5 TABLET, FILM COATED ORAL at 11:28

## 2018-03-27 RX ADMIN — Medication 325 MG: at 11:28

## 2018-03-27 RX ADMIN — Medication 325 MG: at 17:22

## 2018-03-27 RX ADMIN — OXYCODONE HYDROCHLORIDE AND ACETAMINOPHEN 500 MG: 500 TABLET ORAL at 14:07

## 2018-03-27 RX ADMIN — HEPARIN SODIUM 5000 UNITS: 5000 INJECTION, SOLUTION INTRAVENOUS; SUBCUTANEOUS at 06:31

## 2018-03-27 RX ADMIN — POTASSIUM CHLORIDE 20 MEQ: 1500 TABLET, EXTENDED RELEASE ORAL at 08:29

## 2018-03-27 RX ADMIN — HEPARIN SODIUM 5000 UNITS: 5000 INJECTION, SOLUTION INTRAVENOUS; SUBCUTANEOUS at 21:40

## 2018-03-27 RX ADMIN — ACETAMINOPHEN 500 MG: 500 TABLET ORAL at 06:31

## 2018-03-27 ASSESSMENT — COGNITIVE AND FUNCTIONAL STATUS - GENERAL
MOVING FROM LYING ON BACK TO SITTING ON SIDE OF FLAT BED: A LITTLE
SUGGESTED CMS G CODE MODIFIER MOBILITY: CJ
STANDING UP FROM CHAIR USING ARMS: A LITTLE
WALKING IN HOSPITAL ROOM: A LITTLE
MOBILITY SCORE: 20
CLIMB 3 TO 5 STEPS WITH RAILING: A LITTLE

## 2018-03-27 ASSESSMENT — GAIT ASSESSMENTS
GAIT LEVEL OF ASSIST: STAND BY ASSIST
ASSISTIVE DEVICE: FRONT WHEEL WALKER
DISTANCE (FEET): 250

## 2018-03-27 ASSESSMENT — PAIN SCALES - GENERAL
PAINLEVEL_OUTOF10: 0
PAINLEVEL_OUTOF10: 1

## 2018-03-27 NOTE — PROGRESS NOTES
"   Orthopaedic Progress Note    Interval changes:  Patient doing well  Mobilizing hallway distances  Cleared by ortho for DC home with home health pending medicine clearance     ROS - Patient denies any new issues.  Pain well controlled.    Blood pressure 132/66, pulse 83, temperature 36.4 °C (97.5 °F), resp. rate 16, height 1.549 m (5' 1\"), weight 41 kg (90 lb 6.2 oz), SpO2 97 %.      Patient seen and examined  No acute distress  Breathing non labored  RRR  Left hip surgical dressing is clean, dry, and intact. Patient clearly fires tibialis anterior, EHL, and gastrocnemius/soleus. Sensation is intact to light touch throughout superficial peroneal, deep peroneal, tibial, saphenous, and sural nerve distributions. Strong and palpable 2+ dorsalis pedis and posterior tibial pulses with capillary refill less than 2 seconds. No lower leg tenderness or discomfort.       Recent Labs      03/25/18   0147  03/26/18   0538   WBC  6.1   --    RBC  2.41*   --    HEMOGLOBIN  8.1*  8.3*   HEMATOCRIT  24.1*  25.3*   MCV  100.0*   --    MCH  33.6*   --    MCHC  33.6   --    RDW  52.0*   --    PLATELETCT  264   --    MPV  8.8*   --        Active Hospital Problems    Diagnosis   • Fracture of neck of femur (CMS-[facility]) [S72.009A]     Priority: High   • Encephalopathy [G93.40]     Priority: Medium   • DNR (do not resuscitate) [Z66]     Priority: Low   • Vitamin D deficiency [E55.9]     Priority: Low   • CKD (chronic kidney disease), stage III [N18.3]     Priority: Low   • Cachexia (CMS-[facility]) [R64]     Priority: Low   • Anemia [D64.9]     Priority: Low   • Dementia [F03.90]     Priority: Low   • HTN (hypertension) [I10]     Priority: Low   • Hypokalemia [E87.6]     Priority: Low   • 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  Wt bearing status - WBAT  Wound care/Drains - dressing left in place  Future Procedures - none planned   Sutures/Staples out- 10-14 days post operatively  PT/OT-initiated  Antibiotics: completed  DVT Prophylaxis- TEDS/SCDs/Foot pumps  Smith-none  Case Coordination for Discharge Planning - Disposition home with sister and home health

## 2018-03-27 NOTE — PROGRESS NOTES
[facility]ist Progress Note    Date of Service: 3/26/2018    Chief Complaint  88 y.o. female admitted 3/23/2018 with GLF c/b Left femoral neck fracture s/p surgical repair.     Interval Problem Update  Patient seen and evaluated on rounds post operatively  She is more somnolent today  Received haldol last night  Stopped sedatives and narcotics at this time  No other active complaints  Palliative discussed with dPOA  Code status is DNR / DNI  Discussed with nursing, no other concerns from nursing perspective    Consultants/Specialty  Orthopedics  Palliative team    Disposition  Patient refusing SNF  I would recommend SNF   SW to continue discussing with patient at this time      Review of Systems   Constitutional: Positive for malaise/fatigue and weight loss. Negative for chills, diaphoresis and fever.   HENT: Positive for hearing loss. Negative for tinnitus.    Eyes: Negative for blurred vision and double vision.   Respiratory: Negative for cough, hemoptysis, sputum production, shortness of breath and wheezing.    Cardiovascular: Negative for chest pain, palpitations, orthopnea, claudication, leg swelling and PND.   Gastrointestinal: Negative for abdominal pain, blood in stool, constipation, diarrhea, heartburn, melena, nausea and vomiting.   Genitourinary: Negative for dysuria, flank pain, frequency, hematuria and urgency.   Musculoskeletal: Positive for falls and joint pain. Negative for back pain, myalgias and neck pain.   Skin: Negative for itching and rash.   Neurological: Positive for weakness. Negative for dizziness, tingling, tremors, sensory change, speech change, focal weakness, seizures, loss of consciousness and headaches.        Somnolence   Psychiatric/Behavioral: Negative for depression and suicidal ideas.      Physical Exam  Laboratory/Imaging   Hemodynamics  Temp (24hrs), Av.2 °C (99 °F), Min:36.7 °C (98 °F), Max:37.7 °C (99.8 °F)   Temperature: 37.2 °C (99 °F)  Pulse  Av.3  Min: 57   Max: 93    Blood Pressure : 110/66      Respiratory      Respiration: 17, Pulse Oximetry: 94 %             Fluids    Intake/Output Summary (Last 24 hours) at 03/26/18 1717  Last data filed at 03/26/18 1000   Gross per 24 hour   Intake              120 ml   Output                0 ml   Net              120 ml       Nutrition  Orders Placed This Encounter   Procedures   • Diet Order     Standing Status:   Standing     Number of Occurrences:   1     Order Specific Question:   Diet:     Answer:   Regular [1]     Physical Exam   Constitutional: She appears well-developed and well-nourished. No distress.   HENT:   Head: Normocephalic.   Mouth/Throat: Oropharynx is clear and moist. No oropharyngeal exudate.   Eyes: Conjunctivae are normal. Pupils are equal, round, and reactive to light. No scleral icterus.   Neck: No JVD present.   Cardiovascular: Normal rate, regular rhythm and normal heart sounds.  Exam reveals no gallop and no friction rub.    No murmur heard.  Pulmonary/Chest: Breath sounds normal. No stridor. No respiratory distress. She has no wheezes. She has no rales.   Abdominal: Soft. Bowel sounds are normal. She exhibits no distension. There is no tenderness. There is no rebound and no guarding.   Musculoskeletal: Normal range of motion. She exhibits tenderness. She exhibits no edema or deformity.   Neurological: She is alert. No cranial nerve deficit.   Confused today. Somnolent. Moving all extremities. B/l pupils are responsive. No focal neurological signs. Wakes up to load verbal stimuli and able to engage conversion but goes to sleep soon. Lethargic also.    Skin: Skin is warm and dry. She is not diaphoretic.       Recent Labs      03/23/18   1742  03/24/18   0137  03/25/18   0147  03/26/18   0538   WBC  8.6  5.1  6.1   --    RBC  2.72*  2.51*  2.41*   --    HEMOGLOBIN  9.0*  8.4*  8.1*  8.3*   HEMATOCRIT  27.4*  25.3*  24.1*  25.3*   MCV  100.7*  100.8*  100.0*   --    MCH  33.1*  33.5*  33.6*   --    MCHC   "32.8*  33.2*  33.6   --    RDW  52.1*  53.2*  52.0*   --    PLATELETCT  299  267  264   --    MPV  8.2*  8.5*  8.8*   --      Recent Labs      03/24/18   0137  03/25/18   0146  03/26/18   0538   SODIUM  136  133*  134*   POTASSIUM  3.8  3.8  4.1   CHLORIDE  106  102  104   CO2  24  23  23   GLUCOSE  106*  106*  99   BUN  26*  28*  24*   CREATININE  1.23  1.41*  1.09   CALCIUM  8.8  8.9  9.2     Recent Labs      03/23/18   1742   INR  1.09                  Assessment/Plan     * Fracture of neck of femur (CMS-HCC)- (present on admission)   Assessment & Plan    Status post ground-level fall caused by dog running into her.  Left valgus impacted femoral neck fracture.  S/p \"Percutaneous skeletal fixation, left femoral neck fracture\" by Dr Franco 03/24/2018  WBAT   DVT PPx with SCD and SC heparin  Vitamin D replacement  Outpatient DEXA with PCP  PT/OT  Anticipate post acute placement to SNF  Post operative care per orthopedics team  Scheduled tylenol for pain control  If pain despite above consider tramadol 25. Nursing to notify hospitalist team.         Encephalopathy   Assessment & Plan    Toxic / metabolic from sedatives received by the patient  Patient received Haldol last night  Very somnolent this am  Stop all sedatives. Avoid further haldol.   Scheduled Tylenol for pain control at this time  If she remains in pain consider tramadol 25 mg.   If persistent tomorrow consider CT head.   No focal neurological deficits noted at this time.         DNR (do not resuscitate)   Assessment & Plan    Discussed with dPOA by palliative team  Previously known DNR / DNI  Updated in EMR to reflect DNR / DNI, 03/26/18        Cachexia (CMS-HCC)- (present on admission)   Assessment & Plan    Body mass index is 17.08 kg/m².   With severe protein calorie malnutrition as evident from SC fat loss  Optimize nutrition        CKD (chronic kidney disease), stage III- (present on admission)   Assessment & Plan    Monitor renal function / Avoid " nephrotoxins and dose medications renally        Vitamin D deficiency- (present on admission)   Assessment & Plan    Vitamin D 50,000 units x 8 weeks  Then 2000 units daily  PCP to continue monitoring        Anemia- (present on admission)   Assessment & Plan    Likely AOCD   Iron / MV supplementation  Monitor Hb / Restrictive transfusion strategy  Hb is stable, no further needs for monitoring at this time.         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 Chronic problem.   History of bleeding ulcer September 2017. Avoid NSAIDs.  Careful monitoring of hemoglobin daily. Continue Pepcid twice a day.        Hypothyroidism- (present on admission)   Assessment & Plan    Continue synthroid, TSH wnl          Quality-Core Measures   Reviewed items::  Labs reviewed, Medications reviewed and Radiology images reviewed  Smith catheter::  No Smiht  DVT prophylaxis pharmacological::  Heparin  DVT prophylaxis - mechanical:  SCDs  Ulcer Prophylaxis::  Not indicated  Assessed for rehabilitation services:  Patient was assess for and/or received rehabilitation services during this hospitalization

## 2018-03-27 NOTE — PROGRESS NOTES
Patient to room 332 bed 2 from ER. Confused to person, place, time, and event. Blood started per order.

## 2018-03-27 NOTE — CARE PLAN
Problem: Safety  Goal: Will remain free from falls    Intervention: Assess risk factors for falls  Patient calls appropriately. Family at bedside.      Problem: Pain Management  Goal: Pain level will decrease to patient's comfort goal    Intervention: Follow pain managment plan developed in collaboration with patient and Interdisciplinary Team  Pain well controlled with scheduled tylenol.

## 2018-03-27 NOTE — PROGRESS NOTES
[facility]ist Progress Note    Date of Service: 3/27/2018    Chief Complaint  88 y.o. female admitted 3/23/2018 with left hip fx    Interval Problem Update  S/p left hip repair    Consultants/Specialty  ortho    Disposition  Pt and ot pending        Review of Systems   Unable to perform ROS: Acuity of condition      Physical Exam  Laboratory/Imaging   Hemodynamics  Temp (24hrs), Av.7 °C (98 °F), Min:36.4 °C (97.5 °F), Max:37.2 °C (99 °F)   Temperature: 36.4 °C (97.5 °F)  Pulse  Av.7  Min: 57  Max: 93    Blood Pressure : 132/66      Respiratory      Respiration: 16, Pulse Oximetry: 97 %             Fluids  No intake or output data in the 24 hours ending 18 1202    Nutrition  Orders Placed This Encounter   Procedures   • Diet Order     Standing Status:   Standing     Number of Occurrences:   1     Order Specific Question:   Diet:     Answer:   Regular [1]     Physical Exam   Constitutional: No distress.   HENT:   Head: Normocephalic and atraumatic.   Eyes: Right eye exhibits no discharge. Left eye exhibits no discharge.   Neck: Neck supple. No JVD present.   Cardiovascular: Normal rate and regular rhythm.    Pulmonary/Chest: Effort normal. No stridor. No respiratory distress. She has no wheezes. She has no rales.   Abdominal: Soft. She exhibits no distension. There is no tenderness. There is no rebound.   Musculoskeletal:   Dressing looks clean   Neurological: She is alert.   Skin: Skin is warm and dry. She is not diaphoretic.       Recent Labs      18   0147  18   0538   WBC  6.1   --    RBC  2.41*   --    HEMOGLOBIN  8.1*  8.3*   HEMATOCRIT  24.1*  25.3*   MCV  100.0*   --    MCH  33.6*   --    MCHC  33.6   --    RDW  52.0*   --    PLATELETCT  264   --    MPV  8.8*   --      Recent Labs      18   0146  18   0538   SODIUM  133*  134*   POTASSIUM  3.8  4.1   CHLORIDE  102  104   CO2  23  23   GLUCOSE  106*  99   BUN  28*  24*   CREATININE  1.41*  1.09   CALCIUM  8.9  9.2                       Assessment/Plan     * Fracture of neck of femur (CMS-HCC)- (present on admission)   Assessment & Plan    S/p hip fixation  Probable discharge on 3/28/18  Ortho input is noted         Encephalopathy   Assessment & Plan    Resolving  Seen at the bed side with the nurse  Very pleasant        DNR (do not resuscitate)   Assessment & Plan     palliative team   known DNR / DNI          Cachexia (CMS-HCC)- (present on admission)   Assessment & Plan    Body mass index is 17.08 kg/m².   Will check pre-albumin  Dietitian consult        CKD (chronic kidney disease), stage III- (present on admission)   Assessment & Plan    At base line        Vitamin D deficiency- (present on admission)   Assessment & Plan    Vitamin D 50,000 units x 8 weeks  Then 2000 units daily  PCP to continue monitoring        Anemia- (present on admission)   Assessment & Plan    Macrocytic  Will check b12 and folic  No sign of bleeding        HTN (hypertension)- (present on admission)   Assessment & Plan    Normotensive         Dementia- (present on admission)   Assessment & Plan    Close to the base line        Hypokalemia- (present on admission)   Assessment & Plan    resolved        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

## 2018-03-27 NOTE — THERAPY
"Physical Therapy Treatment completed.   Bed Mobility:  Supine to Sit: Supervised  Transfers: Sit to Stand: Stand by Assist  Gait: Level Of Assist: Stand by Assist with Front-Wheel Walker       Plan of Care: Will benefit from Physical Therapy 3 times per week  Discharge Recommendations: Equipment: Front-Wheel Walker.      See \"Rehab Therapy-Acute\" Patient Summary Report for complete documentation.     Pt presenting w/ improved functional mobility. Pt is able to demonstrate most mobility at a SBA level. She is showing improved strength in her L LE and very motivated to complete an HEP to address residual weakness. Pt was able to show increased distances w/ ambulation and stated \"I could have kept going but I don't want to do something just to do something.\" Discussed options for continued therapy after DC from the hospital w/ pt agreeable to HH. Pt is performing mobility independenlty and will benefit from HH. She states she has her sister for assistance as well as her neighbors if need be.  "

## 2018-03-27 NOTE — CARE PLAN
Problem: Safety  Goal: Will remain free from falls  Outcome: PROGRESSING AS EXPECTED  Pt is calling out appropriately     Problem: Pain Management  Goal: Pain level will decrease to patient's comfort goal  Outcome: PROGRESSING AS EXPECTED  Pt states that her pain is doing ok

## 2018-03-27 NOTE — DIETARY
Nutrition Services: consult for failure to thrive    RD visited pt 3/25 for full assessment 2' supplement consult and low BMI <19 (=17.0).  Care plan follow up date scheduled for tomorrow to assess PO intake.   Supplement order in place, we are sending Boost Plus TID with meals to encourage adequate PO intake.     RD following

## 2018-03-28 ENCOUNTER — HOME HEALTH ADMISSION (OUTPATIENT)
Dept: HOME HEALTH SERVICES | Facility: HOME HEALTHCARE | Age: 83
End: 2018-03-28
Payer: MEDICARE

## 2018-03-28 VITALS
WEIGHT: 90.39 LBS | SYSTOLIC BLOOD PRESSURE: 146 MMHG | HEIGHT: 61 IN | RESPIRATION RATE: 15 BRPM | DIASTOLIC BLOOD PRESSURE: 73 MMHG | HEART RATE: 94 BPM | OXYGEN SATURATION: 97 % | TEMPERATURE: 98.3 F | BODY MASS INDEX: 17.07 KG/M2

## 2018-03-28 LAB
ANION GAP SERPL CALC-SCNC: 7 MMOL/L (ref 0–11.9)
ANISOCYTOSIS BLD QL SMEAR: ABNORMAL
BASOPHILS # BLD AUTO: 0 % (ref 0–1.8)
BASOPHILS # BLD: 0 K/UL (ref 0–0.12)
BUN SERPL-MCNC: 31 MG/DL (ref 8–22)
CALCIUM SERPL-MCNC: 10 MG/DL (ref 8.5–10.5)
CHLORIDE SERPL-SCNC: 103 MMOL/L (ref 96–112)
CO2 SERPL-SCNC: 24 MMOL/L (ref 20–33)
CREAT SERPL-MCNC: 1.22 MG/DL (ref 0.5–1.4)
EOSINOPHIL # BLD AUTO: 0.19 K/UL (ref 0–0.51)
EOSINOPHIL NFR BLD: 3.5 % (ref 0–6.9)
ERYTHROCYTE [DISTWIDTH] IN BLOOD BY AUTOMATED COUNT: 52.3 FL (ref 35.9–50)
GLUCOSE SERPL-MCNC: 105 MG/DL (ref 65–99)
HCT VFR BLD AUTO: 28.2 % (ref 37–47)
HGB BLD-MCNC: 9.1 G/DL (ref 12–16)
LYMPHOCYTES # BLD AUTO: 1.3 K/UL (ref 1–4.8)
LYMPHOCYTES NFR BLD: 23.7 % (ref 22–41)
MACROCYTES BLD QL SMEAR: ABNORMAL
MANUAL DIFF BLD: NORMAL
MCH RBC QN AUTO: 32.6 PG (ref 27–33)
MCHC RBC AUTO-ENTMCNC: 32.3 G/DL (ref 33.6–35)
MCV RBC AUTO: 101.1 FL (ref 81.4–97.8)
MONOCYTES # BLD AUTO: 0.39 K/UL (ref 0–0.85)
MONOCYTES NFR BLD AUTO: 7 % (ref 0–13.4)
MORPHOLOGY BLD-IMP: NORMAL
NEUTROPHILS # BLD AUTO: 3.62 K/UL (ref 2–7.15)
NEUTROPHILS NFR BLD: 64.9 % (ref 44–72)
NEUTS BAND NFR BLD MANUAL: 0.9 % (ref 0–10)
NRBC # BLD AUTO: 0 K/UL
NRBC BLD-RTO: 0 /100 WBC
PLATELET # BLD AUTO: 315 K/UL (ref 164–446)
PLATELET BLD QL SMEAR: NORMAL
PMV BLD AUTO: 8.4 FL (ref 9–12.9)
POTASSIUM SERPL-SCNC: 4.5 MMOL/L (ref 3.6–5.5)
RBC # BLD AUTO: 2.79 M/UL (ref 4.2–5.4)
RBC BLD AUTO: PRESENT
SODIUM SERPL-SCNC: 134 MMOL/L (ref 135–145)
WBC # BLD AUTO: 5.5 K/UL (ref 4.8–10.8)

## 2018-03-28 PROCEDURE — 85007 BL SMEAR W/DIFF WBC COUNT: CPT

## 2018-03-28 PROCEDURE — 700102 HCHG RX REV CODE 250 W/ 637 OVERRIDE(OP): Performed by: HOSPITALIST

## 2018-03-28 PROCEDURE — A9270 NON-COVERED ITEM OR SERVICE: HCPCS | Performed by: FAMILY MEDICINE

## 2018-03-28 PROCEDURE — A9270 NON-COVERED ITEM OR SERVICE: HCPCS | Performed by: INTERNAL MEDICINE

## 2018-03-28 PROCEDURE — 700111 HCHG RX REV CODE 636 W/ 250 OVERRIDE (IP): Performed by: HOSPITALIST

## 2018-03-28 PROCEDURE — 85027 COMPLETE CBC AUTOMATED: CPT

## 2018-03-28 PROCEDURE — A9270 NON-COVERED ITEM OR SERVICE: HCPCS | Performed by: HOSPITALIST

## 2018-03-28 PROCEDURE — 99239 HOSP IP/OBS DSCHRG MGMT >30: CPT | Performed by: FAMILY MEDICINE

## 2018-03-28 PROCEDURE — 700102 HCHG RX REV CODE 250 W/ 637 OVERRIDE(OP): Performed by: INTERNAL MEDICINE

## 2018-03-28 PROCEDURE — 80048 BASIC METABOLIC PNL TOTAL CA: CPT

## 2018-03-28 PROCEDURE — 36415 COLL VENOUS BLD VENIPUNCTURE: CPT

## 2018-03-28 PROCEDURE — 700102 HCHG RX REV CODE 250 W/ 637 OVERRIDE(OP): Performed by: FAMILY MEDICINE

## 2018-03-28 RX ORDER — SODIUM CHLORIDE 9 MG/ML
INJECTION, SOLUTION INTRAVENOUS CONTINUOUS
Status: DISCONTINUED | OUTPATIENT
Start: 2018-03-28 | End: 2018-03-28 | Stop reason: CLARIF

## 2018-03-28 RX ORDER — SODIUM CHLORIDE 9 MG/ML
500 INJECTION, SOLUTION INTRAVENOUS ONCE
Status: DISCONTINUED | OUTPATIENT
Start: 2018-03-28 | End: 2018-03-28 | Stop reason: CLARIF

## 2018-03-28 RX ORDER — ASPIRIN 81 MG/1
81 TABLET ORAL 2 TIMES DAILY
Qty: 60 TAB | Refills: 0 | Status: SHIPPED | OUTPATIENT
Start: 2018-03-28 | End: 2018-03-28

## 2018-03-28 RX ORDER — ASPIRIN 325 MG
325 TABLET ORAL 2 TIMES DAILY
Status: ON HOLD | COMMUNITY
End: 2018-03-28

## 2018-03-28 RX ADMIN — ACETAMINOPHEN 500 MG: 500 TABLET ORAL at 12:48

## 2018-03-28 RX ADMIN — OXYCODONE HYDROCHLORIDE AND ACETAMINOPHEN 500 MG: 500 TABLET ORAL at 08:53

## 2018-03-28 RX ADMIN — THERA TABS 1 TABLET: TAB at 08:53

## 2018-03-28 RX ADMIN — OXYCODONE HYDROCHLORIDE AND ACETAMINOPHEN 500 MG: 500 TABLET ORAL at 14:37

## 2018-03-28 RX ADMIN — Medication 1000 MCG: at 08:53

## 2018-03-28 RX ADMIN — ACETAMINOPHEN 500 MG: 500 TABLET ORAL at 06:24

## 2018-03-28 RX ADMIN — Medication 325 MG: at 08:53

## 2018-03-28 RX ADMIN — STANDARDIZED SENNA CONCENTRATE AND DOCUSATE SODIUM 2 TABLET: 8.6; 5 TABLET, FILM COATED ORAL at 08:53

## 2018-03-28 RX ADMIN — Medication 325 MG: at 12:48

## 2018-03-28 RX ADMIN — FOLIC ACID 1 MG: 1 TABLET ORAL at 08:53

## 2018-03-28 RX ADMIN — HEPARIN SODIUM 5000 UNITS: 5000 INJECTION, SOLUTION INTRAVENOUS; SUBCUTANEOUS at 14:37

## 2018-03-28 RX ADMIN — MAGNESIUM HYDROXIDE 30 ML: 400 SUSPENSION ORAL at 08:53

## 2018-03-28 RX ADMIN — HEPARIN SODIUM 5000 UNITS: 5000 INJECTION, SOLUTION INTRAVENOUS; SUBCUTANEOUS at 06:29

## 2018-03-28 RX ADMIN — LEVOTHYROXINE SODIUM 50 MCG: 25 TABLET ORAL at 06:24

## 2018-03-28 ASSESSMENT — PAIN SCALES - GENERAL: PAINLEVEL_OUTOF10: 1

## 2018-03-28 ASSESSMENT — PATIENT HEALTH QUESTIONNAIRE - PHQ9
2. FEELING DOWN, DEPRESSED, IRRITABLE, OR HOPELESS: NOT AT ALL
SUM OF ALL RESPONSES TO PHQ9 QUESTIONS 1 AND 2: 0
1. LITTLE INTEREST OR PLEASURE IN DOING THINGS: NOT AT ALL

## 2018-03-28 ASSESSMENT — LIFESTYLE VARIABLES
ALCOHOL_USE: NO
EVER_SMOKED: NEVER

## 2018-03-28 NOTE — DISCHARGE INSTRUCTIONS
Discharge Instructions    Discharged to home by car with relative. Discharged via wheelchair, hospital escort: Yes.  Special equipment needed: Walker    Be sure to schedule a follow-up appointment with your primary care doctor or any specialists as instructed.     Discharge Plan:   Diet Plan: Discussed  Activity Level: Discussed  Confirmed Follow up Appointment: Patient to Call and Schedule Appointment  Confirmed Symptoms Management: Discussed  Medication Reconciliation Updated: Yes  Influenza Vaccine Indication: Not indicated: Previously immunized this influenza season and > 8 years of age    I understand that a diet low in cholesterol, fat, and sodium is recommended for good health. Unless I have been given specific instructions below for another diet, I accept this instruction as my diet prescription.   Other diet: Resume regular diet    Special Instructions:     Discharge instructions for the Orthopedic Patient    Follow up with Primary Care Physician within 2 weeks of discharge to home, regarding:  Review of medications and diagnostic testing.  Surveillance for medical complications.  Workup and treatment of osteoporosis, if appropriate.     -Is this a Joint Replacement patient? No    -Is this patient being discharged with medication to prevent blood clots?      Yes, Aspirin Aspirin, ASA oral tablets  What is this medicine?  ASPIRIN (AS pir in) is a pain reliever. It is used to treat mild pain and fever. This medicine is also used as directed by a doctor to prevent and to treat heart attacks, to prevent strokes, and to treat arthritis or inflammation.  This medicine may be used for other purposes; ask your health care provider or pharmacist if you have questions.  COMMON BRAND NAME(S): Aspir-Low, Aspir-Guerita, Aspirtab, Erika Advanced Aspirin, Erika Aspirin, Erika Aspirin Extra Strength, Erika Aspirin Plus, Erika Extra Strength, Erika Extra Strength Plus, Erika Genuine Aspirin, Erika Womens Aspirin, Bufferin,  Bufferin Extra Strength, Bufferin Low Dose  What should I tell my health care provider before I take this medicine?  They need to know if you have any of these conditions:  -anemia  -asthma  -bleeding problems  -child with chickenpox, the flu, or other viral infection  -diabetes  -gout  -if you frequently drink alcohol containing drinks  -kidney disease  -liver disease  -low level of vitamin K  -lupus  -smoke tobacco  -stomach ulcers or other problems  -an unusual or allergic reaction to aspirin, tartrazine dye, other medicines, dyes, or preservatives  -pregnant or trying to get pregnant  -breast-feeding  How should I use this medicine?  Take this medicine by mouth with a glass of water. Follow the directions on the package or prescription label. You can take this medicine with or without food. If it upsets your stomach, take it with food. Do not take your medicine more often than directed.  Talk to your pediatrician regarding the use of this medicine in children. While this drug may be prescribed for children as young as 12 years of age for selected conditions, precautions do apply. Children and teenagers should not use this medicine to treat chicken pox or flu symptoms unless directed by a doctor.  Patients over 65 years old may have a stronger reaction and need a smaller dose.  Overdosage: If you think you have taken too much of this medicine contact a poison control center or emergency room at once.  NOTE: This medicine is only for you. Do not share this medicine with others.  What if I miss a dose?  If you are taking this medicine on a regular schedule and miss a dose, take it as soon as you can. If it is almost time for your next dose, take only that dose. Do not take double or extra doses.  What may interact with this medicine?  Do not take this medicine with any of the following medications:  -cidofovir  -ketorolac  -probenecid  This medicine may also interact with the following  medications:  -alcohol  -alendronate  -bismuth subsalicylate  -flavocoxid  -herbal supplements like feverfew, garlic, julian, ginkgo biloba, horse chestnut  -medicines for diabetes or glaucoma like acetazolamide, methazolamide  -medicines for gout  -medicines that treat or prevent blood clots like enoxaparin, heparin, ticlopidine, warfarin  -other aspirin and aspirin-like medicines  -NSAIDs, medicines for pain and inflammation, like ibuprofen or naproxen  -pemetrexed  -sulfinpyrazone  -varicella live vaccine  This list may not describe all possible interactions. Give your health care provider a list of all the medicines, herbs, non-prescription drugs, or dietary supplements you use. Also tell them if you smoke, drink alcohol, or use illegal drugs. Some items may interact with your medicine.  What should I watch for while using this medicine?  If you are treating yourself for pain, tell your doctor or health care professional if the pain lasts more than 10 days, if it gets worse, or if there is a new or different kind of pain. Tell your doctor if you see redness or swelling. Also, check with your doctor if you have a fever that lasts for more than 3 days. Only take this medicine to prevent heart attacks or blood clotting if prescribed by your doctor or health care professional.  Do not take aspirin or aspirin-like medicines with this medicine. Too much aspirin can be dangerous. Always read the labels carefully.  This medicine can irritate your stomach or cause bleeding problems. Do not smoke cigarettes or drink alcohol while taking this medicine. Do not lie down for 30 minutes after taking this medicine to prevent irritation to your throat.  If you are scheduled for any medical or dental procedure, tell your healthcare provider that you are taking this medicine. You may need to stop taking this medicine before the procedure.  This medicine may be used to treat migraines. If you take migraine medicines for 10 or more  days a month, your migraines may get worse. Keep a diary of headache days and medicine use. Contact your healthcare professional if your migraine attacks occur more frequently.  What side effects may I notice from receiving this medicine?  Side effects that you should report to your doctor or health care professional as soon as possible:  -allergic reactions like skin rash, itching or hives, swelling of the face, lips, or tongue  -breathing problems  -changes in hearing, ringing in the ears  -confusion  -general ill feeling or flu-like symptoms  -pain on swallowing  -redness, blistering, peeling or loosening of the skin, including inside the mouth or nose  -signs and symptoms of bleeding such as bloody or black, tarry stools; red or dark-brown urine; spitting up blood or brown material that looks like coffee grounds; red spots on the skin; unusual bruising or bleeding from the eye, gums, or nose  -trouble passing urine or change in the amount of urine  -unusually weak or tired  -yellowing of the eyes or skin  Side effects that usually do not require medical attention (report to your doctor or health care professional if they continue or are bothersome):  -diarrhea or constipation  -headache  -nausea, vomiting  -stomach gas, heartburn  This list may not describe all possible side effects. Call your doctor for medical advice about side effects. You may report side effects to FDA at 1-317-FDA-7140.  Where should I keep my medicine?  Keep out of the reach of children.  Store at room temperature between 15 and 30 degrees C (59 and 86 degrees F). Protect from heat and moisture. Do not use this medicine if it has a strong vinegar smell. Throw away any unused medicine after the expiration date.  NOTE: This sheet is a summary. It may not cover all possible information. If you have questions about this medicine, talk to your doctor, pharmacist, or health care provider.  © 2018 Elsevier/Gold Standard (2014-08-19  11:30:31)      · Is patient discharged on Warfarin / Coumadin?   No     Depression / Suicide Risk    As you are discharged from this [facility] facility, it is important to learn how to keep safe from harming yourself.    Recognize the warning signs:  · Abrupt changes in personality, positive or negative- including increase in energy   · Giving away possessions  · Change in eating patterns- significant weight changes-  positive or negative  · Change in sleeping patterns- unable to sleep or sleeping all the time   · Unwillingness or inability to communicate  · Depression  · Unusual sadness, discouragement and loneliness  · Talk of wanting to die  · Neglect of personal appearance   · Rebelliousness- reckless behavior  · Withdrawal from people/activities they love  · Confusion- inability to concentrate     If you or a loved one observes any of these behaviors or has concerns about self-harm, here's what you can do:  · Talk about it- your feelings and reasons for harming yourself  · Remove any means that you might use to hurt yourself (examples: pills, rope, extension cords, firearm)  · Get professional help from the community (Mental Health, Substance Abuse, psychological counseling)  · Do not be alone:Call your Safe Contact- someone whom you trust who will be there for you.  · Call your local CRISIS HOTLINE 987-4895 or 218-044-9873  · Call your local Children's Mobile Crisis Response Team [place] (194) 466-5866 or www.[employer]  · Call the toll free National Suicide Prevention Hotlines   · National Suicide Prevention Lifeline 131-477-UYZN (0223)  · National Hope Line Network 800-SUICIDE (414-3114)      Hip Fracture  A hip fracture is a fracture of the upper part of your thigh bone (femur).  What are the causes?  A hip fracture is caused by a direct blow to the side of your hip. This is usually the result of a fall but can occur in other circumstances, such as an automobile accident.  What increases the  risk?  There is an increased risk of hip fractures in people with:  · An unsteady walking pattern (gait) and those with conditions that contribute to poor balance, such as Parkinson's disease or dementia.  · Osteopenia and osteoporosis.  · Cancer that spreads to the leg bones.  · Certain metabolic diseases.  What are the signs or symptoms?  Symptoms of hip fracture include:  · Pain over the injured hip.  · Inability to put weight on the leg in which the fracture occurred (although, some patients are able to walk after a hip fracture).  · Toes and foot of the affected leg point outward when you lie down.  How is this diagnosed?  A physical exam can determine if a hip fracture is likely to have occurred. X-ray exams are needed to confirm the fracture and to look for other injuries. The X-ray exam can help to determine the type of hip fracture. Rarely, the fracture is not visible on an X-ray image and a CT scan or MRI will have to be done.  How is this treated?  The treatment for a fracture is usually surgery. This means using a screw, nail, or camron to hold the bones in place.  Follow these instructions at home:  Take all medicines as directed by your health care provider.  Contact a health care provider if:  Pain continues, even after taking pain medicine.  This information is not intended to replace advice given to you by your health care provider. Make sure you discuss any questions you have with your health care provider.  Document Released: 12/18/2006 Document Revised: 05/25/2017 Document Reviewed: 07/30/2014  [employer] Interactive Patient Education © 2017 Elsevier Inc.

## 2018-03-28 NOTE — DISCHARGE SUMMARY
CHIEF COMPLAINT ON ADMISSION  Chief Complaint   Patient presents with   • T-5000 GLF   • Hip Pain     left   • Shoulder Pain     left       CODE STATUS  DNR    HPI & HOSPITAL COURSE  This is a 88 y.o. female here with   Ground fall onto cement patio and had left hip pain.she came to the ed and was diagnosed with left femoral femoral neck fracture.  She was seen by ortho and was taken to the O.R.she had Left valgus impacted femoral neck fracture and had percutaneous fixation.she did well after the operation.she was seen by pt and ot. And home health was recommended Medication. spoke to /surgery and she is cleared and can be on asa 81mg if there is no contraindications,however there was a ?of gi bleed and we will not put her on asa.and she woill f/u with ortho in 10 days.for malnutrition her pre-albumin came back at 10 and she has severe malnutrition.we will refer her to her pcp and to a dietitian as out pt for further management.for her ckd I have dced the kcl bid.  For her htn we will resume coreg.  For ?of gi bleed her h/h are stable and no sign of bleed at this time.      Administered by Nicolas Franco at 9:25 AM.    Pt tolerated?      Administered by Nicolas Franco at 9:24 AM.       The patient met 2-midnight criteria for an inpatient stay at the time of discharge.    Therefore, she is discharged in good and stable condition with close outpatient follow-up.    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 *      FOLLOW UP  Future Appointments  Date Time Provider Department Center   4/13/2018 8:20 AM John Garces M.D. [address]     No follow-up provider specified.    MEDICATIONS ON DISCHARGE   Lamar Dangelo   Home Medication Instructions DANII:[id number]    Printed on:03/28/18 0669   Medication Information                                   carvedilol (COREG) 12.5 MG Tab  Take 1 Tab by mouth 2 times a day, with meals.             cyanocobalamin (VITAMIN B12) 1000 MCG Tab  Take 1 Tab by mouth every day.             ferrous sulfate (IRON SUPPLEMENT) 325 (65 Fe) MG tablet  Take 325 mg by mouth every day.             levothyroxine (SYNTHROID) 50 MCG Tab  Take 50 mcg by mouth Every morning on an empty stomach.             omeprazole (PRILOSEC) 20 MG delayed-release capsule  Take 20 mg by mouth 2 times a day.                 DIET  Orders Placed This Encounter   Procedures   • Diet Order     Standing Status:   Standing     Number of Occurrences:   1     Order Specific Question:   Diet:     Answer:   Regular [1]       ACTIVITY  As tolerated.  Weight bearing as tolerated      CONSULTATIONS  Ortho  Palliative care    PROCEDURES  Percutaneous skeletal fixation, left femoral neck   fracture.    LABORATORY  Lab Results   Component Value Date/Time    SODIUM 134 (L) 03/28/2018 03:47 AM    POTASSIUM 4.5 03/28/2018 03:47 AM    CHLORIDE 103 03/28/2018 03:47 AM    CO2 24 03/28/2018 03:47 AM    GLUCOSE 105 (H) 03/28/2018 03:47 AM    BUN 31 (H) 03/28/2018 03:47 AM    CREATININE 1.22 03/28/2018 03:47 AM        Lab Results   Component Value Date/Time    WBC 5.5 03/28/2018 03:47 AM    HEMOGLOBIN 9.1 (L) 03/28/2018 03:47 AM    HEMATOCRIT 28.2 (L) 03/28/2018 03:47 AM    PLATELETCT 315 03/28/2018 03:47 AM        Total time of the discharge process exceeds 36 minutes

## 2018-03-28 NOTE — DISCHARGE PLANNING
ATTN: Case Management  RE: Referral for Home Health                We would like to take this opportunity to thank you for submitting a referral for your patient to continue care with [facility]. Our skilled team is dedicated to helping all patients recover and gain independence in the home setting.            As of 3/28/18, we have accepted the above patient into our service. A [facility] clinician will be out to see the patient within 48 hours to conduct our initial visit. If you have any questions or concerns regarding the patient’s transition to Home Health, please do not hesitate to contact us. We are open for referrals 7 days a week from 8AM to 5PM at [phone].      We look forward to collaborating with you,  [facility] Team

## 2018-03-28 NOTE — FACE TO FACE
Face to Face Note  -  Durable Medical Equipment    Nicolas Franco M.D. - NPI: [id number]  I certify that this patient is under my care and that they had a durable medical equipment(DME)face to face encounter by myself that meets the physician DME face-to-face encounter requirements with this patient on:    Date of encounter:   Patient:                    MRN:                       YOB: 2018  Lamar Dangelo  [id number]  6/5/1929     The encounter with the patient was in whole, or in part, for the following medical condition, which is the primary reason for durable medical equipment:  Post-Op Surgery    I certify that, based on my findings, the following durable medical equipment is medically necessary:  Walkers.    HOME O2 Saturation Measurements:(Values must be present for Home Oxygen orders)         ,     ,         My Clinical findings support the need for the above equipment due to:  Other - s/p hip fixation    Supporting Symptoms: left hip pain

## 2018-03-28 NOTE — DISCHARGE PLANNING
Received transportation request from [facility] for patient to transfer home via [facility] van. Call placed to [facility] with [facility] dispatch and transport time arranged for 1500.    JOHNNY Toledo has been notified via phone.

## 2018-03-28 NOTE — FACE TO FACE
Face to Face Supporting Documentation - Home Health    The encounter with this patient was in whole or in part the primary reason for home health admission.    Date of encounter:   Patient:                    MRN:                       YOB: 2018  Lamar Dangelo  [id number]  6/5/1929     Home health to see patient for:  Skilled Nursing care for assessment, interventions & education    Skilled need for:  Surgical Aftercare hip fracture and fixation    Skilled nursing interventions to include:  Comment: hip fracture and fixation    Homebound status evidenced by:  Needs the assistance of another person in order to leave the home. Leaving home requires a considerable and taxing effort. There is a normal inability to leave the home.    Community Physician to provide follow up care: BELLA Cross     Optional Interventions? No      I certify the face to face encounter for this home health care referral meets the CMS requirements and the encounter/clinical assessment with the patient was, in whole, or in part, for the medical condition(s) listed above, which is the primary reason for home health care. Based on my clinical findings: the service(s) are medically necessary, support the need for home health care, and the homebound criteria are met.  I certify that this patient has had a face to face encounter by myself.  Nicolas Franco M.D. - NPI: [id number]

## 2018-03-28 NOTE — CARE PLAN
Problem: Nutritional:  Goal: Achieve adequate nutritional intake  Patient will consume >50% of meals and supplements    Outcome: PROGRESSING AS EXPECTED  Admit day 5.  Pt on a regular diet with supplements TID. PO intake is variable per ADLs, but has been % of the last 3 meals.  Nutrition Representative is seeing pt daily for meal choices.   Pt has D/c orders.    RD following.

## 2018-03-28 NOTE — PROGRESS NOTES
Pt alert, confused at times to place and time this this evening, reorients quiclkly, up to BR , steady gait with walker, reports minimal to no pain L hip, drsg c/d/i, tolerated regular diet

## 2018-03-28 NOTE — DISCHARGE PLANNING
Received choice form from FLORECITA Skelton for patients home health services, referral has been sent to [facility]  per patient request.

## 2018-03-28 NOTE — PROGRESS NOTES
"   Orthopaedic PA Progress Note    Interval changes:Did well overnight, mobilizing well, anticipate discharge today    ROS - Patient denies any new issues. No chest pain, dyspnea, or fever.  Pain well controlled.    Blood pressure 146/73, pulse 94, temperature 36.8 °C (98.3 °F), resp. rate 15, height 1.549 m (5' 1\"), weight 41 kg (90 lb 6.2 oz), SpO2 97 %.    Patient seen and examined  No acute distress  Breathing non labored  RRR  Surgical dressing is clean, dry, and intact. Patient clearly fires tibialis anterior, EHL, and gastrocnemius/soleus. Sensation is intact to light touch throughout superficial peroneal, deep peroneal, tibial, saphenous, and sural nerve distributions. Strong and palpable 2+ dorsalis pedis and posterior tibial pulses with capillary refill less than 2 seconds. No lower leg tenderness or discomfort.    Recent Labs      03/26/18   0538  03/28/18   0347   WBC   --   5.5   RBC   --   2.79*   HEMOGLOBIN  8.3*  9.1*   HEMATOCRIT  25.3*  28.2*   MCV   --   101.1*   MCH   --   32.6   MCHC   --   32.3*   RDW   --   52.3*   PLATELETCT   --   315   MPV   --   8.4*       Active Hospital Problems    Diagnosis   • Fracture of neck of femur (CMS-HCC) [S72.009A]     Priority: High   • Encephalopathy [G93.40]     Priority: Medium   • DNR (do not resuscitate) [Z66]     Priority: Low   • Vitamin D deficiency [E55.9]     Priority: Low   • CKD (chronic kidney disease), stage III [N18.3]     Priority: Low   • Cachexia (CMS-HCC) [R64]     Priority: Low   • Anemia [D64.9]     Priority: Low   • Dementia [F03.90]     Priority: Low   • HTN (hypertension) [I10]     Priority: Low   • Hypokalemia [E87.6]     Priority: Low   • 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  Wt bearing status - WBAT  Wound care/Drains - dressing left in place  Future Procedures - none planned   Sutures/Staples out- 10-14 days post operatively  PT/OT-initiated  Antibiotics: completed  DVT Prophylaxis- TEDS/SCDs/Foot pumps  May transition from UFH to  mg PO BID x 30 days postop  Smith-none  Case Coordination for Discharge Planning - Disposition home with sister and home health; follow-up Dr Franco at [facility] 10-14 days post-op

## 2018-03-29 ENCOUNTER — PATIENT OUTREACH (OUTPATIENT)
Dept: HEALTH INFORMATION MANAGEMENT | Facility: OTHER | Age: 83
End: 2018-03-29

## 2018-03-29 NOTE — PROGRESS NOTES
Patient discharged to home. No IV access. Pain well controlled with PO medications. Voiding adequate amounts without difficulty. Ambulating with steady gait. Discharge instructions and provided and discussed with sister Ellie over phone. Patient and family verbalized understanding of discharge instructions and have no further questions or concerns at this time

## 2018-03-31 ENCOUNTER — HOME CARE VISIT (OUTPATIENT)
Dept: HOME HEALTH SERVICES | Facility: HOME HEALTHCARE | Age: 83
End: 2018-03-31
Payer: MEDICARE

## 2018-03-31 VITALS
SYSTOLIC BLOOD PRESSURE: 124 MMHG | HEIGHT: 61 IN | BODY MASS INDEX: 17.94 KG/M2 | OXYGEN SATURATION: 95 % | HEART RATE: 63 BPM | TEMPERATURE: 99.3 F | WEIGHT: 95 LBS | DIASTOLIC BLOOD PRESSURE: 60 MMHG

## 2018-03-31 PROCEDURE — G0493 RN CARE EA 15 MIN HH/HOSPICE: HCPCS

## 2018-03-31 PROCEDURE — 665001 SOC-HOME HEALTH

## 2018-03-31 SDOH — ECONOMIC STABILITY: HOUSING INSECURITY: UNSAFE APPLIANCES: 0

## 2018-03-31 SDOH — ECONOMIC STABILITY: HOUSING INSECURITY: UNSAFE COOKING RANGE AREA: 0

## 2018-03-31 ASSESSMENT — ENCOUNTER SYMPTOMS
VOMITING: DENIES
NAUSEA: DENIES

## 2018-03-31 ASSESSMENT — PATIENT HEALTH QUESTIONNAIRE - PHQ9
2. FEELING DOWN, DEPRESSED, IRRITABLE, OR HOPELESS: 00
1. LITTLE INTEREST OR PLEASURE IN DOING THINGS: 00

## 2018-03-31 ASSESSMENT — ACTIVITIES OF DAILY LIVING (ADL)
OASIS_M1830: 03
HOME_HEALTH_OASIS: 01

## 2018-04-02 ENCOUNTER — HOME CARE VISIT (OUTPATIENT)
Dept: HOME HEALTH SERVICES | Facility: HOME HEALTHCARE | Age: 83
End: 2018-04-02
Payer: MEDICARE

## 2018-04-02 ENCOUNTER — TELEPHONE (OUTPATIENT)
Dept: HEALTH INFORMATION MANAGEMENT | Facility: OTHER | Age: 83
End: 2018-04-02

## 2018-04-02 ENCOUNTER — PATIENT OUTREACH (OUTPATIENT)
Dept: HEALTH INFORMATION MANAGEMENT | Facility: OTHER | Age: 83
End: 2018-04-02

## 2018-04-02 VITALS
HEART RATE: 66 BPM | SYSTOLIC BLOOD PRESSURE: 118 MMHG | TEMPERATURE: 98.8 F | OXYGEN SATURATION: 99 % | DIASTOLIC BLOOD PRESSURE: 64 MMHG | RESPIRATION RATE: 16 BRPM

## 2018-04-02 PROCEDURE — G0152 HHCP-SERV OF OT,EA 15 MIN: HCPCS

## 2018-04-02 SDOH — ECONOMIC STABILITY: HOUSING INSECURITY
HOME SAFETY: TED PT TO REMOVE MULTIPLE THROW RUGS IN BR AND USE ONLY WHEN BATHING. RECOMMEND GRAB BARS IN BR WHER TOWEL RACK IS AS PT USES FOR STABILITY.

## 2018-04-02 SDOH — ECONOMIC STABILITY: HOUSING INSECURITY
HOME SAFETY: PT HAS LARGE 90# DOG WHICH WAS THE CAUSE OF HER FALL. INSTRUCTED TO LIMIT STANDING CONTACT W/ DOG, TO SIT WHEN DOG IS COMING IN FROM OUTSIDE OR EXCITED. INSTRUCTED PT TO WEAR SUPPORTIVE SHOES OR NON-SLIP SOCKS TO PREVRNT FALLS ON TILE FLOORS. INSTRUC

## 2018-04-02 ASSESSMENT — ACTIVITIES OF DAILY LIVING (ADL)
EATING_ASSISTANCE: 0
DRESSING_LB_ASSISTANCE: 0
GROOMING_COMMENTS: STANDING AT SINK
DRESSING_UB_ASSISTANCE: 0
TOILETING_EQUIPMENT_USED: GRAB BAR
TRANSPORTATION_ASSISTANCE: 6
BATHING_ASSISTANCE: 1
BATHING_ASSISTIVE_EQUIPMENT_USED: GRAB BARS
GROOMING_ASSISTANCE: 0
TOILETING_ASSISTANCE: 0
MEAL_PREP_ASSISTANCE: 5
BATHING_ASSISTIVE_EQUIPMENT_NEEDED: SHOWER CHAIR,HANDHELD
HOUSEKEEPING_ASSISTANCE: 6
ORAL_CARE_ASSISTANCE: 0
TELEPHONE_ASSISTANCE: 0
LAUNDRY_ASSISTANCE: 6
SHOPPING_ASSISTANCE: 6

## 2018-04-02 NOTE — TELEPHONE ENCOUNTER
Medications reviewed against discharge summary. Potassium chloride was discontinued at discharge, remains on patient's medication list. Interaction noted between levothyroxine and ferrous sulfate with recommendation to separate doses by at least 4 hours. Outbound call to Lamar to discuss. Patient states her sister Ellie helps manage her medications. Spoke with Ellie but was unable to verify if patient is taking potassium due to poor eyesight. Notified [facility] nursing supervisor Kika to have [facility] RN assist with medication organization so that levothyroxine and ferrous sulfate are spaced out and that patient is no longer taking potassium.     Tatiana Harper, PharmD

## 2018-04-02 NOTE — PROGRESS NOTES
Received referral from [facility] for med rec. Medications reviewed against discharge summary. Potassium chloride was discontinued at discharge, remains on patient's medication list. Interaction noted between levothyroxine and ferrous sulfate with recommendation to separate doses by at least 4 hours. Outbound call to Lamar to discuss. Patient states her sister Ellie helps manage her medications. Spoke with Ellie but was unable to verify if patient is taking potassium due to poor eyesight. Notified [facility] nursing supervisor Kika to have [facility] RN assist with medication organization so that levothyroxine and ferrous sulfate are spaced out and that patient is no longer taking potassium.     Tatiana Harper, PharmD

## 2018-04-03 ENCOUNTER — HOME CARE VISIT (OUTPATIENT)
Dept: HOME HEALTH SERVICES | Facility: HOME HEALTHCARE | Age: 83
End: 2018-04-03
Payer: MEDICARE

## 2018-04-03 VITALS
HEART RATE: 73 BPM | DIASTOLIC BLOOD PRESSURE: 68 MMHG | TEMPERATURE: 99.5 F | OXYGEN SATURATION: 98 % | SYSTOLIC BLOOD PRESSURE: 144 MMHG | RESPIRATION RATE: 16 BRPM

## 2018-04-03 PROCEDURE — G0493 RN CARE EA 15 MIN HH/HOSPICE: HCPCS

## 2018-04-03 ASSESSMENT — ENCOUNTER SYMPTOMS
VOMITING: NO ISSUES AT THE TIME OF THIS ASSESSMENT.
RESPIRATORY SYMPTOMS COMMENTS: NO ISSUES AT THE TIME OF THIS ASSESSMENT.
NAUSEA: NO ISSUES AT THE TIME OF THIS ASSESSMENT.
DIFFICULTY THINKING: 1

## 2018-04-04 PROCEDURE — G0180 MD CERTIFICATION HHA PATIENT: HCPCS | Performed by: FAMILY MEDICINE

## 2018-04-05 ENCOUNTER — HOME CARE VISIT (OUTPATIENT)
Dept: HOME HEALTH SERVICES | Facility: HOME HEALTHCARE | Age: 83
End: 2018-04-05
Payer: MEDICARE

## 2018-04-05 VITALS
HEART RATE: 70 BPM | OXYGEN SATURATION: 96 % | DIASTOLIC BLOOD PRESSURE: 60 MMHG | RESPIRATION RATE: 18 BRPM | TEMPERATURE: 97.7 F | SYSTOLIC BLOOD PRESSURE: 122 MMHG

## 2018-04-05 PROCEDURE — G0151 HHCP-SERV OF PT,EA 15 MIN: HCPCS

## 2018-04-05 SDOH — ECONOMIC STABILITY: HOUSING INSECURITY: UNSAFE APPLIANCES: 0

## 2018-04-05 SDOH — ECONOMIC STABILITY: HOUSING INSECURITY
HOME SAFETY: PT LIVES WITH SISTER AND THEY HELP TO TAKE CARE OF EACH OTHER. HAS MULTIPLE STEPS IN THE HOME UP TO KITCHEN AND TO ENTRY WAY AND OUT TO THE OUTSIDE AND DOWN TO BED ROOM, PT IS ABLE TO DEMONSTRATE EACH SET OF STEPS WITHOUT ANY DIFFICULTY. EACH SET OF

## 2018-04-05 SDOH — ECONOMIC STABILITY: HOUSING INSECURITY: HOME SAFETY: STAIRS HAS RAILING ON BOTH SIDES

## 2018-04-05 SDOH — ECONOMIC STABILITY: HOUSING INSECURITY: UNSAFE COOKING RANGE AREA: 0

## 2018-04-05 ASSESSMENT — GAIT ASSESSMENTS
RIGHT STEP CLEAR: 1
TIME: 1
GAIT SCORE: 12
INITIATION OF GAIT IMMEDIATELY AFTER GO: 1
STEP CONTINUITY: 1
LEFT STEP CLEAR: 1
RIGHT STEP PASS: 1
SURVEY COMPLETE: TRUE
BALANCE AND GAIT SCORE: 26
LEFT STEP PASS: 1
STEP SYMMETRY: 1
TRUNK: 2
PATH: 2

## 2018-04-05 ASSESSMENT — ACTIVITIES OF DAILY LIVING (ADL)
TOILETING_ASSISTANCE: 0
TRANSPORTATION_ASSISTANCE: 6
ADLS_COMMENTS: <!--EPICS-->SEE OT EVAL FOR MORE DETAILS<!--EPICE-->
EATING_ASSISTANCE: 0
BATHING_ASSISTANCE: 0
ORAL_CARE_ASSISTANCE: 0
SHOPPING_ASSISTANCE: 1
TELEPHONE_ASSISTANCE: 2
MEAL_PREP_ASSISTANCE: 1
LAUNDRY_ASSISTANCE: 1
DRESSING_LB_ASSISTANCE: 0
LAUNDRY_ASSISTANCE: 2
TELEPHONE_ASSISTANCE: 1
HOUSEKEEPING_ASSISTANCE: 6
DRESSING_UB_ASSISTANCE: 0
MEAL_PREP_ASSISTANCE: 2
SHOPPING_ASSISTANCE: 2
GROOMING_ASSISTANCE: 0

## 2018-04-05 ASSESSMENT — BALANCE ASSESSMENTS
TURNING 360 DEGREES STEADINESS: 1
SURVEY COMPLETE: TRUE
IMMEDIATE STANDING BALANCE FIRST 5 SECONDS: 2
TURNING 360 DEGREES STEPS: 1
SITTING DOWN: 1
EYES CLOSED AT MAXIMUM POSITION NUDGED: 1
SITTING BALANCE: 1
STANDING BALANCE: 2
ARISES: 1
ATTEMPTS TO ARISE: 2
NUDGED: 2
BALANCE SCORE: 14

## 2018-04-13 ENCOUNTER — OFFICE VISIT (OUTPATIENT)
Dept: MEDICAL GROUP | Facility: MEDICAL CENTER | Age: 83
End: 2018-04-13
Payer: MEDICARE

## 2018-04-13 VITALS
HEART RATE: 65 BPM | TEMPERATURE: 98 F | HEIGHT: 63 IN | SYSTOLIC BLOOD PRESSURE: 120 MMHG | OXYGEN SATURATION: 96 % | DIASTOLIC BLOOD PRESSURE: 60 MMHG | RESPIRATION RATE: 16 BRPM | BODY MASS INDEX: 16.95 KG/M2 | WEIGHT: 95.68 LBS

## 2018-04-13 DIAGNOSIS — Z00.00 HEALTH CARE MAINTENANCE: ICD-10-CM

## 2018-04-13 DIAGNOSIS — D64.9 ANEMIA, UNSPECIFIED TYPE: ICD-10-CM

## 2018-04-13 DIAGNOSIS — N18.30 CKD (CHRONIC KIDNEY DISEASE), STAGE III (HCC): ICD-10-CM

## 2018-04-13 DIAGNOSIS — E03.4 HYPOTHYROIDISM DUE TO ACQUIRED ATROPHY OF THYROID: ICD-10-CM

## 2018-04-13 DIAGNOSIS — I10 ESSENTIAL HYPERTENSION: ICD-10-CM

## 2018-04-13 DIAGNOSIS — F03.90 DEMENTIA WITHOUT BEHAVIORAL DISTURBANCE, UNSPECIFIED DEMENTIA TYPE: ICD-10-CM

## 2018-04-13 DIAGNOSIS — E55.9 VITAMIN D DEFICIENCY: ICD-10-CM

## 2018-04-13 PROCEDURE — 99204 OFFICE O/P NEW MOD 45 MIN: CPT | Performed by: FAMILY MEDICINE

## 2018-04-13 NOTE — PROGRESS NOTES
CC: New patient ( Thyroid disorder, HTN, h/o femoral fracture, kidney disease, low vit D, Anemia)    HPI:  Lamar presents today to establish a new PCP.    Patient lives with her sister. Has been relatively active, she stsated that she works a lot at the back yard, has a recent h/o left hip fracture, S/P surgery ( in 2/2018). Currently active, uses a cane. Came in today with her neighbor  to establish care. Has the following medical issues:    Hypothyroidism due to acquired atrophy of thyroid  She has been tolerating Levothyroxine, no palpitation, no cold or heat intolerance, has been on levothyroxine 50 mcg daily.    Essential hypertension  Has been adequately controlled on current medication. Denies headache, chest pain, and SOB.Has been on Carvedilol 12.5 mg daily, no side effects.    Anemia, unspecified type  Her last Hb level was 9.1. She has been on Iron supplement. 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    Vitamin D deficiency/ Left femoral neck fracture   Her last vit D level was 16. She has been asymptomatic. However has a recent h/o left femoral neck fracture( Left valgus impacted femoral neck fracture.), underwent percutaneous skeletal fixation on 3/24/2018. Currently asymptomatic. S/P PT, now she uses a cane. She still follows up with orthopedist.    CKD (chronic kidney disease), stage III  Her last eGFR was 42, with normal Cr. Has been asymptomatic.    Dementia without behavioral disturbance, unspecified dementia type  Patient has been having on and of memory issues, but it has not been affecting her quality of life, she still goes out and socialize, however her recent femora;l fracture may restrict that a  little bit. Has not h/o wandering around, or loosing her way home.    Pneumonia vaccine is UTD        Patient Active Problem List    Diagnosis Date Noted   • Fracture of neck of femur (CMS-HCC) 03/23/2018     Priority: High   • Encephalopathy 03/26/2018     Priority: Medium   • Sundowning 09/03/2017     Priority: Medium   • DNR (do not resuscitate) 03/26/2018     Priority: Low   • Vitamin D deficiency 03/24/2018     Priority: Low   • CKD (chronic kidney disease), stage III 03/24/2018     Priority: Low   • Cachexia (CMS-HCC) 03/24/2018     Priority: Low   • Anemia 03/07/2018     Priority: Low   • Dementia 09/12/2017     Priority: Low   • HTN (hypertension) 09/12/2017     Priority: Low   • Hypokalemia 09/11/2017     Priority: Low   • Acute on possible chronic GI bleeding due to fundal ulcer, duodenal AVM 09/02/2017     Priority: Low   • Hypothyroidism 07/02/2010     Priority: Low   • Hyperlipidemia 07/02/2010     Priority: Low   • Vitamin B12 deficiency 03/09/2018   • Dehydration 03/08/2018   • Near syncope 03/07/2018   • Elevated troponin 09/12/2017       Current Outpatient Prescriptions   Medication Sig Dispense Refill   • potassium Chloride ER (K-TAB) 20 MEQ Tab CR tablet Take 20 mEq by mouth 2 times a day.     • cyanocobalamin (VITAMIN B12) 1000 MCG Tab Take 1 Tab by mouth every day. 30 Tab 2   • ferrous sulfate (IRON SUPPLEMENT) 325 (65 Fe) MG tablet Take 325 mg by mouth every day.     • omeprazole (PRILOSEC) 20 MG delayed-release capsule Take 20 mg by mouth 2 times a day.     • levothyroxine (SYNTHROID) 50 MCG Tab Take 50 mcg by mouth Every morning on an empty stomach.     • carvedilol (COREG) 12.5 MG Tab Take 1 Tab by mouth 2 times a day, with meals. 60 Tab 1     No current facility-administered medications for this visit.          Allergies as of 04/13/2018 - Reviewed 04/13/2018   Allergen Reaction Noted   • Nkda [no known drug allergy]  07/02/2010        Social History     Social History   • Marital status:  "     Spouse name: N/A   • Number of children: N/A   • Years of education: N/A     Occupational History   • Not on file.     Social History Main Topics   • Smoking status: Never Smoker   • Smokeless tobacco: Never Used   • Alcohol use No   • Drug use: No   • Sexual activity: Not on file      Comment: single, 0 kids.      Other Topics Concern   • Not on file     Social History Narrative   • No narrative on file       Family History   Problem Relation Age of Onset   • Heart Disease Father    • Hypertension Sister        Past Surgical History:   Procedure Laterality Date   • HIP CANNULATED SCREW Left 3/24/2018    Procedure: HIP CANNULATED SCREW;  Surgeon: Manish Franco M.D.;  Location: SURGERY [facility];  Service: Orthopedics   • COLONOSCOPY - ENDO N/A 9/3/2017    Procedure: COLONOSCOPY - ENDO;  Surgeon: Randi Sahni M.D.;  Location: ENDOSCOPY [facility];  Service: Gastroenterology   • GASTROSCOPY-ENDO N/A 9/3/2017    Procedure: GASTROSCOPY-ENDO;  Surgeon: Randi Sahni M.D.;  Location: ENDOSCOPY [facility];  Service: Gastroenterology   • APPENDECTOMY     • TONSILLECTOMY         ROS:  Denies any Headache, Blurred Vision, Confusion Chest pain,  Shortness of breath,  Abdominal pain, Changes of bowel or bladder, Lower ext edema, Fevers, Nights sweats, Weight Changes, Focal weakness or numbness.  All other systems are negative.    /60   Pulse 65   Temp 36.7 °C (98 °F)   Resp 16   Ht 1.6 m (5' 3\")   Wt 43.4 kg (95 lb 10.9 oz)   SpO2 96%   BMI 16.95 kg/m²     Physical Exam:  Gen:         Alert and oriented, No apparent distress.  HEENT:   Perrla, TM clear,  Oralpharynx no erythema or exudates.  Neck:       No Jugular venous distension, Lymphadenopathy, Thyromegaly, Bruits.  Lungs:     Clear to auscultation bilaterally  CV:          Regular rate and rhythm. No murmurs, rubs or gallops.  Abd:         Soft non tender, non distended. Normal active bowel sounds. No              "                           Hepatosplenomegaly, No pulsatile masses.  Ext:          No clubbing, cyanosis, edema.      Assessment and Plan.   88 y.o. female     1. Hypothyroidism due to acquired atrophy of thyroid  He has been tolerating Levothyroxine, no palpitation, no cold or heat intolerance  Continue on levothyroxine 50 mcg daily.    2. Essential hypertension  Has been adequately controlled on current medication. Denies headache, chest pain, and SOB.  Continue on Carvedilol 12.5 mg daily    3. Anemia, unspecified type  Last Hb level was 9.1. Has been on Iron supplement.  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  Continue on Iron supplement.  Advised to RTC/ ER  if symptomatic, or if she has black stool.  4. Vitamin D deficiency  Last vit D level was 16.  Advised to take 8571-5298 unit daily,  Will repeat vit D next visit.    5. CKD (chronic kidney disease), stage III  Last eGFR was 42, with normal Cr.  Recommend hydration and avoiding nephrotoxic medications.  Will continue watch kidney functions.    6. Health care maintenance  Pneumonia vaccine is UTD  Will do FIT test visit.    7. Dementia without behavioral disturbance, unspecified dementia type  Stable, probably age related memory issues. Has been physically and mentally active.  Will continue watch.

## 2018-04-16 ENCOUNTER — HOME CARE VISIT (OUTPATIENT)
Dept: HOME HEALTH SERVICES | Facility: HOME HEALTHCARE | Age: 83
End: 2018-04-16
Payer: MEDICARE

## 2018-04-17 SDOH — ECONOMIC STABILITY: HOUSING INSECURITY: UNSAFE COOKING RANGE AREA: 0

## 2018-04-17 SDOH — ECONOMIC STABILITY: HOUSING INSECURITY: HOME SAFETY: PT HAS MULTIPLE STAIRS IN HOME AND ALL OF THEM HAVE A RAILING OR 2 RAILINGS ON EACH SIDE.

## 2018-04-17 SDOH — ECONOMIC STABILITY: HOUSING INSECURITY: UNSAFE APPLIANCES: 0

## 2018-04-17 ASSESSMENT — ACTIVITIES OF DAILY LIVING (ADL)
HOME_HEALTH_OASIS: 01
OASIS_M1830: 03

## 2018-05-07 ENCOUNTER — PATIENT OUTREACH (OUTPATIENT)
Dept: HEALTH INFORMATION MANAGEMENT | Facility: OTHER | Age: 83
End: 2018-05-07

## 2018-05-07 NOTE — PROGRESS NOTES
Patient  Lamar Dangelo  was originally  discharged on 03/11/2018 for dehydration. [facility] Patient Advocate assisted with multiple discharge orders including 1-  Discharge Clinic Follow up appointment. The patient readmitted to [facility] on 03/23/2018 for a fall that caused a fracture on  left femur and was discharge on 03/28/2018.   [facility] Patient Advocate assisted with discharge orders including  1- Primary Care Physician  and  Confirming that the patient received her walker upon discharge.  [facility] assisted patient  with scheduling an orthopedic hospital  follow up and sending Medical Records to  Dr. Manish Franco on 04/09/2018.  [facility] also assisted with Discharge orders for  Home Health which the patient started to utilize on 03/31/2018.  The patient has 2 future  appointments scheduled 1- Primary Care physician follow up  and   1- Orthopedic  appointment with Dr. Manish Franco on 05/07/2018. PPS score was  conducted 70%.

## 2019-01-29 RX ORDER — LANOLIN ALCOHOL/MO/W.PET/CERES
CREAM (GRAM) TOPICAL
Qty: 90 TAB | Refills: 3 | Status: SHIPPED | OUTPATIENT
Start: 2019-01-29 | End: 2019-12-04 | Stop reason: SDUPTHER

## 2019-07-01 RX ORDER — FERROUS SULFATE 325(65) MG
325 TABLET ORAL DAILY
Qty: 180 TAB | Refills: 1 | Status: SHIPPED | OUTPATIENT
Start: 2019-07-01 | End: 2019-10-02 | Stop reason: SDUPTHER

## 2019-07-01 RX ORDER — LEVOTHYROXINE SODIUM 0.05 MG/1
50 TABLET ORAL
Qty: 90 TAB | Refills: 3 | Status: SHIPPED | OUTPATIENT
Start: 2019-07-01 | End: 2019-10-02 | Stop reason: SDUPTHER

## 2019-07-01 RX ORDER — CARVEDILOL 12.5 MG/1
12.5 TABLET ORAL 2 TIMES DAILY WITH MEALS
Qty: 60 TAB | Refills: 1 | Status: SHIPPED | OUTPATIENT
Start: 2019-07-01 | End: 2019-10-02 | Stop reason: SDUPTHER

## 2019-07-01 RX ORDER — OMEPRAZOLE 20 MG/1
20 CAPSULE, DELAYED RELEASE ORAL 2 TIMES DAILY
Qty: 90 CAP | Refills: 3 | Status: SHIPPED | OUTPATIENT
Start: 2019-07-01 | End: 2019-10-02 | Stop reason: SDUPTHER

## 2019-07-03 ENCOUNTER — TELEPHONE (OUTPATIENT)
Dept: MEDICAL GROUP | Facility: MEDICAL CENTER | Age: 84
End: 2019-07-03

## 2019-07-03 NOTE — TELEPHONE ENCOUNTER
I did not see this patient since April 2018, patient need to be seen find out why she is on potassium.

## 2019-07-03 NOTE — TELEPHONE ENCOUNTER
pharmacy is calling to ask why is she not on a dieretic if she is taking high potassium. Can they change it?     [phone]

## 2019-10-02 RX ORDER — CARVEDILOL 12.5 MG/1
TABLET ORAL
Qty: 180 TAB | Refills: 2 | Status: SHIPPED | OUTPATIENT
Start: 2019-10-02 | End: 2019-12-04 | Stop reason: SDUPTHER

## 2019-10-02 RX ORDER — OMEPRAZOLE 20 MG/1
CAPSULE, DELAYED RELEASE ORAL
Qty: 180 CAP | Refills: 2 | Status: SHIPPED | OUTPATIENT
Start: 2019-10-02 | End: 2019-12-04 | Stop reason: SDUPTHER

## 2019-10-02 RX ORDER — POTASSIUM CHLORIDE 20 MEQ/1
TABLET, EXTENDED RELEASE ORAL
Qty: 60 TAB | Refills: 0 | Status: SHIPPED | OUTPATIENT
Start: 2019-10-02 | End: 2019-10-23 | Stop reason: SDUPTHER

## 2019-10-02 RX ORDER — FERROUS SULFATE 325(65) MG
TABLET ORAL
Qty: 90 TAB | Refills: 0 | Status: SHIPPED | OUTPATIENT
Start: 2019-10-02 | End: 2019-12-04 | Stop reason: SDUPTHER

## 2019-10-02 RX ORDER — LEVOTHYROXINE SODIUM 0.05 MG/1
TABLET ORAL
Qty: 90 TAB | Refills: 2 | Status: SHIPPED | OUTPATIENT
Start: 2019-10-02 | End: 2019-12-04 | Stop reason: SDUPTHER

## 2019-10-23 RX ORDER — POTASSIUM CHLORIDE 20 MEQ/1
TABLET, EXTENDED RELEASE ORAL
Qty: 60 TAB | Refills: 0 | Status: SHIPPED | OUTPATIENT
Start: 2019-10-23 | End: 2020-08-03

## 2019-12-04 ENCOUNTER — OFFICE VISIT (OUTPATIENT)
Dept: MEDICAL GROUP | Facility: MEDICAL CENTER | Age: 84
End: 2019-12-04
Payer: MEDICARE

## 2019-12-04 VITALS
HEIGHT: 63 IN | DIASTOLIC BLOOD PRESSURE: 70 MMHG | BODY MASS INDEX: 19.81 KG/M2 | HEART RATE: 62 BPM | TEMPERATURE: 99.6 F | RESPIRATION RATE: 16 BRPM | OXYGEN SATURATION: 96 % | WEIGHT: 111.8 LBS | SYSTOLIC BLOOD PRESSURE: 116 MMHG

## 2019-12-04 DIAGNOSIS — I10 ESSENTIAL HYPERTENSION: ICD-10-CM

## 2019-12-04 DIAGNOSIS — E03.4 HYPOTHYROIDISM DUE TO ACQUIRED ATROPHY OF THYROID: ICD-10-CM

## 2019-12-04 DIAGNOSIS — D75.89 MACROCYTOSIS: ICD-10-CM

## 2019-12-04 DIAGNOSIS — E55.9 VITAMIN D DEFICIENCY: ICD-10-CM

## 2019-12-04 DIAGNOSIS — D64.9 ANEMIA, UNSPECIFIED TYPE: ICD-10-CM

## 2019-12-04 DIAGNOSIS — N18.30 CKD (CHRONIC KIDNEY DISEASE), STAGE III (HCC): ICD-10-CM

## 2019-12-04 PROCEDURE — 99214 OFFICE O/P EST MOD 30 MIN: CPT | Performed by: FAMILY MEDICINE

## 2019-12-04 RX ORDER — LEVOTHYROXINE SODIUM 0.05 MG/1
TABLET ORAL
Qty: 90 TAB | Refills: 2 | Status: SHIPPED | OUTPATIENT
Start: 2019-12-04 | End: 2020-08-24

## 2019-12-04 RX ORDER — CARVEDILOL 12.5 MG/1
TABLET ORAL
Qty: 180 TAB | Refills: 2 | Status: SHIPPED | OUTPATIENT
Start: 2019-12-04 | End: 2020-08-24

## 2019-12-04 RX ORDER — OMEPRAZOLE 20 MG/1
CAPSULE, DELAYED RELEASE ORAL
Qty: 180 CAP | Refills: 2 | Status: SHIPPED | OUTPATIENT
Start: 2019-12-04 | End: 2020-08-24

## 2019-12-04 RX ORDER — POTASSIUM CHLORIDE 1500 MG/1
20 TABLET, EXTENDED RELEASE ORAL DAILY
Qty: 90 TAB | Refills: 2 | Status: SHIPPED | OUTPATIENT
Start: 2019-12-04 | End: 2020-07-27

## 2019-12-04 RX ORDER — LANOLIN ALCOHOL/MO/W.PET/CERES
CREAM (GRAM) TOPICAL
Qty: 90 TAB | Refills: 3 | Status: SHIPPED | OUTPATIENT
Start: 2019-12-04 | End: 2020-08-24

## 2019-12-04 RX ORDER — FERROUS SULFATE 325(65) MG
TABLET ORAL
Qty: 90 TAB | Refills: 0 | Status: SHIPPED | OUTPATIENT
Start: 2019-12-04 | End: 2020-02-26

## 2019-12-05 NOTE — PROGRESS NOTES
CC: Hypertension, hypothyroidism, anemia, macrocytosis, CKD, vitamin D deficiency    HPI:   Lamar presents today to discuss the following medical issues:    Essential hypertension  Blood pressure has been adequately controlled denies any headache, chest pain, shortness of breath.  Patient has been on carvedilol 12.5 mg twice a day.    Hypothyroidism due to acquired atrophy of thyroid  She has been tolerating Levothyroxine, no palpitation, no cold or heat intolerance, currently on levothyroxine 50 mcg daily.    Anemia, unspecified type/mild microcytosis  Patient has history of GI bleeds 20 months ago.  Currently denies any abdominal pain, or blood in the stool.  Her last CBC showed hemoglobin 9.1.  However she is currently asymptomatic.  Has been on iron tablets daily, and vitamin B12 1000 mcg daily.    CKD (chronic kidney disease), stage III (HCC)  Her last GFR was 42, however patient has been asymptomatic.    Vitamin D deficiency  Last vitamin D level was low(16).  Patient has been on vitamin D over-the-counter.  Patient has been asymptomatic.      Patient Active Problem List    Diagnosis Date Noted   • Fracture of neck of femur (HCC) 03/23/2018     Priority: High   • Encephalopathy 03/26/2018     Priority: Medium   • Sundowning 09/03/2017     Priority: Medium   • DNR (do not resuscitate) 03/26/2018     Priority: Low   • Vitamin D deficiency 03/24/2018     Priority: Low   • CKD (chronic kidney disease), stage III (HCC) 03/24/2018     Priority: Low   • Cachexia (HCC) 03/24/2018     Priority: Low   • Anemia 03/07/2018     Priority: Low   • Dementia (HCC) 09/12/2017     Priority: Low   • HTN (hypertension) 09/12/2017     Priority: Low   • Hypokalemia 09/11/2017     Priority: Low   • Acute on possible chronic GI bleeding due to fundal ulcer, duodenal AVM 09/02/2017     Priority: Low   • Hypothyroidism 07/02/2010     Priority: Low   • Hyperlipidemia 07/02/2010     Priority: Low   • Vitamin B12 deficiency 03/09/2018   •  "Dehydration 03/08/2018   • Near syncope 03/07/2018   • Elevated troponin 09/12/2017       Current Outpatient Medications   Medication Sig Dispense Refill   • potassium chloride SA (KDUR) 20 MEQ Tab CR TAKE (1) TABLET BY MOUTH TWICE DAILY. 60 Tab 0   • omeprazole (PRILOSEC) 20 MG delayed-release capsule TAKE (1) CAPSULE BY MOUTH TWICE DAILY. 180 Cap 2   • FEROSUL 325 (65 Fe) MG tablet TAKE 1 TABLET BY MOUTH ONCE DAILY. 90 Tab 0   • carvedilol (COREG) 12.5 MG Tab TAKE (1) TABLET BY MOUTH TWICE DAILY. 180 Tab 2   • levothyroxine (SYNTHROID) 50 MCG Tab TAKE 1 TABLET BY MOUTH EVERY MORNING 30 MINUTES BEFORE BREAKFAST (EMPTY STOMACH) 90 Tab 2   • Cyanocobalamin (VITAMIN B-12) 1000 MCG Tab TAKE 1 TABLET BY MOUTH ONCE DAILY. 90 Tab 3   • potassium Chloride ER (K-TAB) 20 MEQ Tab CR tablet Take 20 mEq by mouth 2 times a day.       No current facility-administered medications for this visit.          Allergies as of 12/04/2019 - Reviewed 12/04/2019   Allergen Reaction Noted   • Nkda [no known drug allergy]  07/02/2010        ROS: Denies any chest pain, Shortness of breath, Changes bowel or bladder, Lower extremity edema.    Physical Exam:  /70 (BP Location: Right arm, Patient Position: Sitting, BP Cuff Size: Adult)   Pulse 62   Temp 37.6 °C (99.6 °F) (Temporal)   Resp 16   Ht 1.6 m (5' 3\")   Wt 50.7 kg (111 lb 12.8 oz)   SpO2 96%   BMI 19.80 kg/m²   Gen.: Well-developed, well-nourished, no apparent distress,pleasant and cooperative with the examination  Skin:  Warm and dry with good turgor. No rashes or suspicious lesions in visible areas  HEENT:Sinuses nontender with palpation, TMs clear, nares patent with pink mucosa and clear rhinorrhea,no septal deviation ,polyps or lesions. lips without lesions, oropharynx clear.  Neck: Trachea midline,no masses or adenopathy. No JVD.  Cor: Regular rate and rhythm without murmur, gallop or rub.  Lungs: Respirations unlabored.Clear to auscultation with equal breath sounds " bilaterally. No wheezes, rhonchi.  Extremities: No cyanosis, clubbing or edema.      Assessment and Plan.   89 y.o. female     1. Essential hypertension  Controlled.  Continue on carvedilol 12.5 mg twice a day.    - CBC WITH DIFFERENTIAL; Future  - Comp Metabolic Panel; Future  - Lipid Profile; Future    2. Hypothyroidism due to acquired atrophy of thyroid  He has been tolerating Levothyroxine, no palpitation, no cold or heat intolerance  Continue on levothyroxine 50 mcg daily.    - CBC WITH DIFFERENTIAL; Future  - TSH; Future  - FREE THYROXINE; Future    3. Anemia, unspecified type  History of GI bleeds 20 months ago.  Currently asymptomatic  Repeat CBC.    4. CKD (chronic kidney disease), stage III (HCC)  Last GFR was 42, however patient has been asymptomatic.  Recommend hydration and avoiding nephrotoxic medication.    5. Vitamin D deficiency  Last vitamin D level was low(16).  Patient has been on vitamin D over-the-counter.    - VITAMIN D,25 HYDROXY; Future    6. Macrocytosis  Macrocytosis with anemia, has been on vitamin B12 1000 micrograms daily.  Will recheck vitamin B12.    - VITAMIN B12; Future

## 2019-12-07 ENCOUNTER — HOSPITAL ENCOUNTER (OUTPATIENT)
Dept: LAB | Facility: MEDICAL CENTER | Age: 84
End: 2019-12-07
Attending: FAMILY MEDICINE
Payer: MEDICARE

## 2019-12-07 DIAGNOSIS — I10 ESSENTIAL HYPERTENSION: ICD-10-CM

## 2019-12-07 DIAGNOSIS — E55.9 VITAMIN D DEFICIENCY: ICD-10-CM

## 2019-12-07 DIAGNOSIS — E03.4 HYPOTHYROIDISM DUE TO ACQUIRED ATROPHY OF THYROID: ICD-10-CM

## 2019-12-07 DIAGNOSIS — D75.89 MACROCYTOSIS: ICD-10-CM

## 2019-12-07 LAB
25(OH)D3 SERPL-MCNC: 20 NG/ML (ref 30–100)
ALBUMIN SERPL BCP-MCNC: 4.2 G/DL (ref 3.2–4.9)
ALBUMIN/GLOB SERPL: 1.8 G/DL
ALP SERPL-CCNC: 101 U/L (ref 30–99)
ALT SERPL-CCNC: 11 U/L (ref 2–50)
ANION GAP SERPL CALC-SCNC: 7 MMOL/L (ref 0–11.9)
AST SERPL-CCNC: 13 U/L (ref 12–45)
BASOPHILS # BLD AUTO: 0.2 % (ref 0–1.8)
BASOPHILS # BLD: 0.01 K/UL (ref 0–0.12)
BILIRUB SERPL-MCNC: 0.5 MG/DL (ref 0.1–1.5)
BUN SERPL-MCNC: 25 MG/DL (ref 8–22)
CALCIUM SERPL-MCNC: 9.6 MG/DL (ref 8.5–10.5)
CHLORIDE SERPL-SCNC: 107 MMOL/L (ref 96–112)
CHOLEST SERPL-MCNC: 253 MG/DL (ref 100–199)
CO2 SERPL-SCNC: 26 MMOL/L (ref 20–33)
CREAT SERPL-MCNC: 1.17 MG/DL (ref 0.5–1.4)
EOSINOPHIL # BLD AUTO: 0.07 K/UL (ref 0–0.51)
EOSINOPHIL NFR BLD: 1.7 % (ref 0–6.9)
ERYTHROCYTE [DISTWIDTH] IN BLOOD BY AUTOMATED COUNT: 48.2 FL (ref 35.9–50)
FASTING STATUS PATIENT QL REPORTED: NORMAL
GLOBULIN SER CALC-MCNC: 2.3 G/DL (ref 1.9–3.5)
GLUCOSE SERPL-MCNC: 88 MG/DL (ref 65–99)
HCT VFR BLD AUTO: 40.2 % (ref 37–47)
HDLC SERPL-MCNC: 49 MG/DL
HGB BLD-MCNC: 13.4 G/DL (ref 12–16)
IMM GRANULOCYTES # BLD AUTO: 0.01 K/UL (ref 0–0.11)
IMM GRANULOCYTES NFR BLD AUTO: 0.2 % (ref 0–0.9)
LDLC SERPL CALC-MCNC: 174 MG/DL
LYMPHOCYTES # BLD AUTO: 0.91 K/UL (ref 1–4.8)
LYMPHOCYTES NFR BLD: 22.5 % (ref 22–41)
MCH RBC QN AUTO: 34 PG (ref 27–33)
MCHC RBC AUTO-ENTMCNC: 33.3 G/DL (ref 33.6–35)
MCV RBC AUTO: 102 FL (ref 81.4–97.8)
MONOCYTES # BLD AUTO: 0.42 K/UL (ref 0–0.85)
MONOCYTES NFR BLD AUTO: 10.4 % (ref 0–13.4)
NEUTROPHILS # BLD AUTO: 2.62 K/UL (ref 2–7.15)
NEUTROPHILS NFR BLD: 65 % (ref 44–72)
NRBC # BLD AUTO: 0 K/UL
NRBC BLD-RTO: 0 /100 WBC
PLATELET # BLD AUTO: 197 K/UL (ref 164–446)
PMV BLD AUTO: 9 FL (ref 9–12.9)
POTASSIUM SERPL-SCNC: 4.3 MMOL/L (ref 3.6–5.5)
PROT SERPL-MCNC: 6.5 G/DL (ref 6–8.2)
RBC # BLD AUTO: 3.94 M/UL (ref 4.2–5.4)
SODIUM SERPL-SCNC: 140 MMOL/L (ref 135–145)
T4 FREE SERPL-MCNC: 1 NG/DL (ref 0.53–1.43)
TRIGL SERPL-MCNC: 151 MG/DL (ref 0–149)
TSH SERPL DL<=0.005 MIU/L-ACNC: 1.97 UIU/ML (ref 0.38–5.33)
VIT B12 SERPL-MCNC: >1500 PG/ML (ref 211–911)
WBC # BLD AUTO: 4 K/UL (ref 4.8–10.8)

## 2019-12-07 PROCEDURE — 84443 ASSAY THYROID STIM HORMONE: CPT

## 2019-12-07 PROCEDURE — 80061 LIPID PANEL: CPT

## 2019-12-07 PROCEDURE — 82306 VITAMIN D 25 HYDROXY: CPT

## 2019-12-07 PROCEDURE — 82607 VITAMIN B-12: CPT

## 2019-12-07 PROCEDURE — 85025 COMPLETE CBC W/AUTO DIFF WBC: CPT

## 2019-12-07 PROCEDURE — 80053 COMPREHEN METABOLIC PANEL: CPT

## 2019-12-07 PROCEDURE — 36415 COLL VENOUS BLD VENIPUNCTURE: CPT

## 2019-12-07 PROCEDURE — 84439 ASSAY OF FREE THYROXINE: CPT

## 2019-12-11 NOTE — PALLIATIVE CARE
Palliative Care follow-up  Met with the patient, sitting up at bedside eating, sister/DPOA, Ellie and friends.  Patient states that she is fine and does not require a SNF for post-op strengthening.  Reviewed code status with the patient, sister.  MICHELA completed and signed, scanned into Epic, confirm DNR status.      Updated: Dr. Franco, RN and Palliative Care team.     Plan: Home with HH    Thank you for allowing Palliative Care to participate in this patient's care. Please feel free to call [phone] with any questions or concerns.   PAIN SCALE 3 OF 10.

## 2020-02-26 RX ORDER — FERROUS SULFATE 325(65) MG
TABLET ORAL
Qty: 100 TAB | Refills: 1 | Status: SHIPPED | OUTPATIENT
Start: 2020-02-26 | End: 2020-04-23

## 2020-04-23 RX ORDER — FERROUS SULFATE 325(65) MG
TABLET ORAL
Qty: 30 TAB | Refills: 0 | Status: SHIPPED | OUTPATIENT
Start: 2020-04-23 | End: 2020-05-22

## 2020-05-26 RX ORDER — FERROUS SULFATE 325(65) MG
325 TABLET ORAL DAILY
Qty: 30 TAB | Refills: 3 | Status: SHIPPED | OUTPATIENT
Start: 2020-05-26 | End: 2020-09-22

## 2020-07-11 ENCOUNTER — HOSPITAL ENCOUNTER (EMERGENCY)
Facility: MEDICAL CENTER | Age: 85
End: 2020-07-11
Attending: EMERGENCY MEDICINE
Payer: MEDICARE

## 2020-07-11 ENCOUNTER — APPOINTMENT (OUTPATIENT)
Dept: RADIOLOGY | Facility: MEDICAL CENTER | Age: 85
End: 2020-07-11
Attending: EMERGENCY MEDICINE
Payer: MEDICARE

## 2020-07-11 VITALS
HEART RATE: 64 BPM | RESPIRATION RATE: 16 BRPM | SYSTOLIC BLOOD PRESSURE: 180 MMHG | WEIGHT: 111 LBS | BODY MASS INDEX: 19.67 KG/M2 | OXYGEN SATURATION: 96 % | TEMPERATURE: 98.5 F | DIASTOLIC BLOOD PRESSURE: 81 MMHG | HEIGHT: 63 IN

## 2020-07-11 DIAGNOSIS — R13.10 ODYNOPHAGIA: ICD-10-CM

## 2020-07-11 LAB
ALBUMIN SERPL BCP-MCNC: 4.5 G/DL (ref 3.2–4.9)
ALBUMIN/GLOB SERPL: 1.9 G/DL
ALP SERPL-CCNC: 138 U/L (ref 30–99)
ALT SERPL-CCNC: 13 U/L (ref 2–50)
ANION GAP SERPL CALC-SCNC: 14 MMOL/L (ref 7–16)
AST SERPL-CCNC: 19 U/L (ref 12–45)
BASOPHILS # BLD AUTO: 0.2 % (ref 0–1.8)
BASOPHILS # BLD: 0.01 K/UL (ref 0–0.12)
BILIRUB SERPL-MCNC: 0.7 MG/DL (ref 0.1–1.5)
BUN SERPL-MCNC: 15 MG/DL (ref 8–22)
CALCIUM SERPL-MCNC: 9.9 MG/DL (ref 8.5–10.5)
CHLORIDE SERPL-SCNC: 100 MMOL/L (ref 96–112)
CO2 SERPL-SCNC: 21 MMOL/L (ref 20–33)
COVID ORDER STATUS COVID19: NORMAL
CREAT SERPL-MCNC: 1.05 MG/DL (ref 0.5–1.4)
EKG IMPRESSION: NORMAL
EOSINOPHIL # BLD AUTO: 0.04 K/UL (ref 0–0.51)
EOSINOPHIL NFR BLD: 0.9 % (ref 0–6.9)
ERYTHROCYTE [DISTWIDTH] IN BLOOD BY AUTOMATED COUNT: 47.5 FL (ref 35.9–50)
GLOBULIN SER CALC-MCNC: 2.4 G/DL (ref 1.9–3.5)
GLUCOSE SERPL-MCNC: 95 MG/DL (ref 65–99)
HCT VFR BLD AUTO: 40.2 % (ref 37–47)
HGB BLD-MCNC: 13.4 G/DL (ref 12–16)
IMM GRANULOCYTES # BLD AUTO: 0.01 K/UL (ref 0–0.11)
IMM GRANULOCYTES NFR BLD AUTO: 0.2 % (ref 0–0.9)
LIPASE SERPL-CCNC: 21 U/L (ref 11–82)
LYMPHOCYTES # BLD AUTO: 0.91 K/UL (ref 1–4.8)
LYMPHOCYTES NFR BLD: 20.9 % (ref 22–41)
MCH RBC QN AUTO: 33.5 PG (ref 27–33)
MCHC RBC AUTO-ENTMCNC: 33.3 G/DL (ref 33.6–35)
MCV RBC AUTO: 100.5 FL (ref 81.4–97.8)
MONOCYTES # BLD AUTO: 0.36 K/UL (ref 0–0.85)
MONOCYTES NFR BLD AUTO: 8.3 % (ref 0–13.4)
NEUTROPHILS # BLD AUTO: 3.03 K/UL (ref 2–7.15)
NEUTROPHILS NFR BLD: 69.5 % (ref 44–72)
NRBC # BLD AUTO: 0 K/UL
NRBC BLD-RTO: 0 /100 WBC
PLATELET # BLD AUTO: 196 K/UL (ref 164–446)
PMV BLD AUTO: 9.3 FL (ref 9–12.9)
POTASSIUM SERPL-SCNC: 4.1 MMOL/L (ref 3.6–5.5)
PROT SERPL-MCNC: 6.9 G/DL (ref 6–8.2)
RBC # BLD AUTO: 4 M/UL (ref 4.2–5.4)
S PYO DNA SPEC NAA+PROBE: NOT DETECTED
SARS-COV-2 RNA RESP QL NAA+PROBE: NOTDETECTED
SODIUM SERPL-SCNC: 135 MMOL/L (ref 135–145)
SPECIMEN SOURCE: NORMAL
TROPONIN T SERPL-MCNC: 20 NG/L (ref 6–19)
TROPONIN T SERPL-MCNC: 21 NG/L (ref 6–19)
WBC # BLD AUTO: 4.4 K/UL (ref 4.8–10.8)

## 2020-07-11 PROCEDURE — 80053 COMPREHEN METABOLIC PANEL: CPT

## 2020-07-11 PROCEDURE — 93005 ELECTROCARDIOGRAM TRACING: CPT | Performed by: EMERGENCY MEDICINE

## 2020-07-11 PROCEDURE — 85025 COMPLETE CBC W/AUTO DIFF WBC: CPT

## 2020-07-11 PROCEDURE — 71045 X-RAY EXAM CHEST 1 VIEW: CPT

## 2020-07-11 PROCEDURE — 700117 HCHG RX CONTRAST REV CODE 255: Performed by: EMERGENCY MEDICINE

## 2020-07-11 PROCEDURE — U0004 COV-19 TEST NON-CDC HGH THRU: HCPCS

## 2020-07-11 PROCEDURE — 87651 STREP A DNA AMP PROBE: CPT

## 2020-07-11 PROCEDURE — 99284 EMERGENCY DEPT VISIT MOD MDM: CPT

## 2020-07-11 PROCEDURE — 70491 CT SOFT TISSUE NECK W/DYE: CPT

## 2020-07-11 PROCEDURE — 83690 ASSAY OF LIPASE: CPT

## 2020-07-11 PROCEDURE — 84484 ASSAY OF TROPONIN QUANT: CPT

## 2020-07-11 PROCEDURE — C9803 HOPD COVID-19 SPEC COLLECT: HCPCS | Performed by: EMERGENCY MEDICINE

## 2020-07-11 RX ADMIN — IOHEXOL 75 ML: 350 INJECTION, SOLUTION INTRAVENOUS at 18:08

## 2020-07-11 ASSESSMENT — FIBROSIS 4 INDEX: FIB4 SCORE: 1.81

## 2020-07-11 NOTE — ED TRIAGE NOTES
"..  Chief Complaint   Patient presents with   • Difficulty Swallowing     pt unable to swallowing x's 3 days. Pt states she feels like something is in her throat. pt states increase mucus and congestion. pt's son states his mom has been going to the casino and hasn't been wearing her mask all the time when she goes out.    • Shaking     x's 24-48 hours    ..BP (!) 168/90   Pulse 69   Temp 36.9 °C (98.5 °F) (Temporal)   Resp 16   Ht 1.6 m (5' 3\")   Wt 50.3 kg (111 lb)   SpO2 95%      .Explained triage process, to waiting room. Asked to inform RN if questions or concerns arise.   "

## 2020-07-11 NOTE — ED PROVIDER NOTES
"ED Provider  Scribed for Dada Galvan D.O. by Po French. 7/11/2020  3:33 PM    Means of arrival: Walk In  History obtained from: Patient  History limited by: None    CHIEF COMPLAINT  Chief Complaint   Patient presents with   • Difficulty Swallowing     pt unable to swallowing x's 3 days. Pt states she feels like something is in her throat. pt states increase mucus and congestion. pt's son states his mom has been going to the casino and hasn't been wearing her mask all the time when she goes out.    • Shaking     x's 24-48 hours        HPI  Lamar Dangelo is a 89 y.o. female who presents for evaluation of dysphagia. Patient states that for the last three days she has been unable to swallow foods and describes this problem as though she has a \"lump in her throat\". Patient states that she has tried to eat and drink a few times since this problem onset however claims that the food comes back up. Patient also reports some chest discomfort when trying to swallow food, however this is said to resolve itself. Given these problems, she has come here for evaluation and any necessary treatment. She otherwise denies having any fevers, chills, cough, nausea, vomiting, diarrhea, abdominal pain, chest pain, or shortness of breath.    REVIEW OF SYSTEMS  See HPI for further details. All other systems are negative.     PAST MEDICAL HISTORY   has a past medical history of Hyperlipidemia (7/2/2010) and Hypothyroid (7/2/2010).    SOCIAL HISTORY  Social History     Tobacco Use   • Smoking status: Never Smoker   • Smokeless tobacco: Never Used   Substance and Sexual Activity   • Alcohol use: No   • Drug use: No   • Sexual activity: Not on file     Comment: single, 0 kids.        SURGICAL HISTORY   has a past surgical history that includes tonsillectomy; appendectomy; colonoscopy - endo (N/A, 9/3/2017); gastroscopy-endo (N/A, 9/3/2017); and hip cannulated screw (Left, 3/24/2018).    CURRENT MEDICATIONS  Home Medications     " "Reviewed by Sarah Beth Alvarez R.N. (Registered Nurse) on 07/11/20 at 1517  Med List Status: Not Addressed   Medication Last Dose Status   carvedilol (COREG) 12.5 MG Tab  Active   Cyanocobalamin (VITAMIN B-12) 1000 MCG Tab  Active   ferrous sulfate (FEROSUL) 325 (65 Fe) MG tablet  Active   levothyroxine (SYNTHROID) 50 MCG Tab  Active   omeprazole (PRILOSEC) 20 MG delayed-release capsule  Active   potassium Chloride ER (K-TAB) 20 MEQ Tab CR tablet  Active   potassium chloride SA (KDUR) 20 MEQ Tab CR  Active                ALLERGIES  Allergies   Allergen Reactions   • Nkda [No Known Drug Allergy]        PHYSICAL EXAM  VITAL SIGNS: BP (!) 168/90   Pulse 69   Temp 36.9 °C (98.5 °F) (Temporal)   Resp 16   Ht 1.6 m (5' 3\")   Wt 50.3 kg (111 lb)   SpO2 95%   BMI 19.66 kg/m²   Constitutional: Alert in no apparent distress.  HENT: No signs of trauma, mucous membranes are dry, soft palate has erythema but no exudates or swelling  Eyes: Conjunctiva normal, Non-icteric.   Neck: Normal range of motion, No tenderness, Supple.  Lymphatic: No lymphadenopathy noted.   Cardiovascular: Regular rate and rhythm, no murmurs.   Thorax & Lungs: Normal breath sounds, No respiratory distress, No wheezing, No chest tenderness.   Abdomen: Bowel sounds normal, Soft, No tenderness, No masses, No pulsatile masses. No peritoneal signs.  Skin: Warm, Dry, normal color.   Back: No bony tenderness, No CVA tenderness.   Extremities: No edema, No tenderness, No cyanosis  Musculoskeletal: Good range of motion in all major joints. No tenderness to palpation or major deformities noted.   Neurologic: Alert and oriented x4, Normal motor function, Normal sensory function, No focal deficits noted.   Psychiatric: Affect normal, Judgment normal, Mood normal.       DIAGNOSTIC STUDIES / PROCEDURES    EKG  12 Lead EKG interpreted by me shown below.     LABS  Results for orders placed or performed during the hospital encounter of 07/11/20   CBC w/ Differential "   Result Value Ref Range    WBC 4.4 (L) 4.8 - 10.8 K/uL    RBC 4.00 (L) 4.20 - 5.40 M/uL    Hemoglobin 13.4 12.0 - 16.0 g/dL    Hematocrit 40.2 37.0 - 47.0 %    .5 (H) 81.4 - 97.8 fL    MCH 33.5 (H) 27.0 - 33.0 pg    MCHC 33.3 (L) 33.6 - 35.0 g/dL    RDW 47.5 35.9 - 50.0 fL    Platelet Count 196 164 - 446 K/uL    MPV 9.3 9.0 - 12.9 fL    Neutrophils-Polys 69.50 44.00 - 72.00 %    Lymphocytes 20.90 (L) 22.00 - 41.00 %    Monocytes 8.30 0.00 - 13.40 %    Eosinophils 0.90 0.00 - 6.90 %    Basophils 0.20 0.00 - 1.80 %    Immature Granulocytes 0.20 0.00 - 0.90 %    Nucleated RBC 0.00 /100 WBC    Neutrophils (Absolute) 3.03 2.00 - 7.15 K/uL    Lymphs (Absolute) 0.91 (L) 1.00 - 4.80 K/uL    Monos (Absolute) 0.36 0.00 - 0.85 K/uL    Eos (Absolute) 0.04 0.00 - 0.51 K/uL    Baso (Absolute) 0.01 0.00 - 0.12 K/uL    Immature Granulocytes (abs) 0.01 0.00 - 0.11 K/uL    NRBC (Absolute) 0.00 K/uL   Complete Metabolic Panel (CMP)   Result Value Ref Range    Sodium 135 135 - 145 mmol/L    Potassium 4.1 3.6 - 5.5 mmol/L    Chloride 100 96 - 112 mmol/L    Co2 21 20 - 33 mmol/L    Anion Gap 14.0 7.0 - 16.0    Glucose 95 65 - 99 mg/dL    Bun 15 8 - 22 mg/dL    Creatinine 1.05 0.50 - 1.40 mg/dL    Calcium 9.9 8.5 - 10.5 mg/dL    AST(SGOT) 19 12 - 45 U/L    ALT(SGPT) 13 2 - 50 U/L    Alkaline Phosphatase 138 (H) 30 - 99 U/L    Total Bilirubin 0.7 0.1 - 1.5 mg/dL    Albumin 4.5 3.2 - 4.9 g/dL    Total Protein 6.9 6.0 - 8.2 g/dL    Globulin 2.4 1.9 - 3.5 g/dL    A-G Ratio 1.9 g/dL   Troponin STAT   Result Value Ref Range    Troponin T 21 (H) 6 - 19 ng/L   Lipase   Result Value Ref Range    Lipase 21 11 - 82 U/L   COVID/SARS CoV-2 PCR    Specimen: Nasopharyngeal; Respirate   Result Value Ref Range    COVID Order Status Received    Group A Strep by PCR   Result Value Ref Range    Group A Strep by PCR Not Detected Not Detected   SARS-CoV-2, PCR (In-House)   Result Value Ref Range    SARS-CoV-2 Source NP Swab     SARS-CoV-2 by PCR  NotDetected    ESTIMATED GFR   Result Value Ref Range    GFR If  59 (A) >60 mL/min/1.73 m 2    GFR If Non African American 49 (A) >60 mL/min/1.73 m 2   TROPONIN   Result Value Ref Range    Troponin T 20 (H) 6 - 19 ng/L   EKG   Result Value Ref Range    Report       [facility] Emergency Dept[facility]    Test Date:  2020  Pt Name:    NIYAH BANUELOS              Department: ER  MRN:        [id number]                      Room:       [facility]  Gender:     Female                       Technician: [id number]  :        [id number]                   Requested By:ROLDAN PEÑA  Order #:    [id number]                    Reading MD: ROLDAN PEÑA D.O.    Measurements  Intervals                                Axis  Rate:       72                           P:          72  MT:         224                          QRS:        32  QRSD:       76                           T:          42  QT:         404  QTc:        443    Interpretive Statements  SINUS RHYTHM  FIRST DEGREE AV BLOCK  Compared to ECG 2018 18:32:51  Electronically Signed On 2020 19:15:55 PDT by ROLDAN PEÑA D.O.       All labs reviewed by me.    RADIOLOGY  CT-SOFT TISSUE NECK WITH   Final Result      1.  No evidence of pharyngeal or laryngeal mass.      2.  No evidence of tonsillar enlargement or abscess.      DX-CHEST-PORTABLE (1 VIEW)   Final Result      Chronic interstitial prominence.        The radiologist's interpretations of all radiological studies have been reviewed by me.    Films have been independently by me      COURSE  Pertinent Labs & Imaging studies reviewed. (See chart for details)    3:33 PM - Patient seen and examined at bedside. Discussed plan of care consisting of imaging, EKG and blood work, which patient understands and agrees to. Ordered for DX-Chest 1 view, CT-Soft tissue neck w/, COVID PCR, CBC with differential, CMP, Troponin, Lipase, Group A Strep by PCR, EKG  to evaluate her  symptoms.    7:16 PM - Upon reassessment, patient is resting comfortably. No new complaints at this time. Discussed results with patient and/or family as well as the importance of primary care follow up. Patient understands plan of care, strict return precautions for new or changing symptoms and agrees to discharge.    MEDICAL DECISION MAKING  This is a 89 y.o. female who presents with complaints of trouble swallowing.  It is somewhat painful.  And she does have erythema in the soft palate.  There is no exudates.    Strep was negative.  Coronavirus was negative    As she was complaining of trouble swallowing she did not have any difficulty handling her secretions and the CT was done shows no obstructive process visible or abscess.  With that patient was given p.o. fluids and she did tolerate this without difficulty.  She is referred to ENT for follow-up.    Troponin was elevated at 21 which is only minimally elevated.  Repeat was done his troponin is trending down which is not consistent with a potential cardiac disease.  With that she is stable for discharge home      The patient will return for new or worsening symptoms and is stable at the time of discharge.    The patient is referred to a primary physician for blood pressure management, diabetic screening, and for all other preventative health concerns.    DISPOSITION:  Patient will be discharged home in stable condition.    FOLLOW UP:  Joe Robertson M.D.  [address] [address]  [phone]    In 1 week        FINAL IMPRESSION  1. Odynophagia         Po CULLEN (Scribe), am scribing for, and in the presence of, Dada Galvan D.O..    Electronically signed by: Po French (Joelibtalat), 7/11/2020    Dada CULLEN D.O. personally performed the services described in this documentation, as scribed by Po French in my presence, and it is both accurate and complete. C.    The note accurately reflects work and decisions made by me.   Dada Galvan D.O.  7/11/2020  8:58 PM

## 2020-07-12 NOTE — ED NOTES
Discharge instructions given to pt, pt verbalized understanding. VSS. No prescriptions. All personal belongings accounted for. Pt ambulated safely with walker. IV was removed.

## 2020-07-12 NOTE — DISCHARGE INSTRUCTIONS
Drink adequate water per day.  Eat only soft foods.  Please follow-up with ENT.  Return if your symptoms change or worsen

## 2020-07-12 NOTE — ED NOTES
Received report from JOHNNY Borden. Introduced self to pt, updated white board. Pt expressed desire to be discharged, was waiting on repeat Troponin. Repeat just resulted and pt has been placed for d/c.

## 2020-07-17 ENCOUNTER — PATIENT OUTREACH (OUTPATIENT)
Dept: HEALTH INFORMATION MANAGEMENT | Facility: OTHER | Age: 85
End: 2020-07-17

## 2020-07-17 NOTE — PROGRESS NOTES
Outcome: Left Message    Please transfer to Patient Outreach Team at [phone] when patient returns call.    [facility] Verified: yes    Attempt # 1

## 2020-07-22 NOTE — PROGRESS NOTES
Outcome: Called was answered and disconnected right away. Unable to speak to anyone or leave voicemail.     Please transfer to Patient Outreach Team at [phone] when patient returns call.    [facility] Verified: yes    Attempt # 2

## 2020-07-27 RX ORDER — POTASSIUM CHLORIDE 20 MEQ/1
TABLET, EXTENDED RELEASE ORAL
Qty: 30 TAB | Refills: 0 | Status: SHIPPED | OUTPATIENT
Start: 2020-07-27 | End: 2020-08-24

## 2020-08-03 ENCOUNTER — APPOINTMENT (OUTPATIENT)
Dept: RADIOLOGY | Facility: MEDICAL CENTER | Age: 85
DRG: 543 | End: 2020-08-03
Attending: EMERGENCY MEDICINE
Payer: MEDICARE

## 2020-08-03 ENCOUNTER — HOSPITAL ENCOUNTER (INPATIENT)
Facility: MEDICAL CENTER | Age: 85
LOS: 4 days | DRG: 543 | End: 2020-08-07
Attending: EMERGENCY MEDICINE | Admitting: HOSPITALIST
Payer: MEDICARE

## 2020-08-03 DIAGNOSIS — M48.54XS COMPRESSION FRACTURE OF THORACIC SPINE, NON-TRAUMATIC, SEQUELA: ICD-10-CM

## 2020-08-03 DIAGNOSIS — R29.6 MULTIPLE FALLS: ICD-10-CM

## 2020-08-03 DIAGNOSIS — E87.1 HYPONATREMIA: ICD-10-CM

## 2020-08-03 PROBLEM — S32.050D COMPRESSION FRACTURE OF L5 VERTEBRA WITH ROUTINE HEALING: Status: ACTIVE | Noted: 2020-08-03

## 2020-08-03 PROBLEM — S22.060D COMPRESSION FRACTURE OF T8 VERTEBRA WITH ROUTINE HEALING: Status: ACTIVE | Noted: 2020-08-03

## 2020-08-03 LAB
ALBUMIN SERPL BCP-MCNC: 3.8 G/DL (ref 3.2–4.9)
ALBUMIN/GLOB SERPL: 1.7 G/DL
ALP SERPL-CCNC: 94 U/L (ref 30–99)
ALT SERPL-CCNC: 19 U/L (ref 2–50)
ANION GAP SERPL CALC-SCNC: 12 MMOL/L (ref 7–16)
AST SERPL-CCNC: 27 U/L (ref 12–45)
BASOPHILS # BLD AUTO: 0.2 % (ref 0–1.8)
BASOPHILS # BLD: 0.01 K/UL (ref 0–0.12)
BILIRUB SERPL-MCNC: 0.7 MG/DL (ref 0.1–1.5)
BUN SERPL-MCNC: 18 MG/DL (ref 8–22)
CALCIUM SERPL-MCNC: 9.4 MG/DL (ref 8.5–10.5)
CHLORIDE SERPL-SCNC: 95 MMOL/L (ref 96–112)
CO2 SERPL-SCNC: 21 MMOL/L (ref 20–33)
COVID ORDER STATUS COVID19: NORMAL
CREAT SERPL-MCNC: 0.94 MG/DL (ref 0.5–1.4)
EOSINOPHIL # BLD AUTO: 0.04 K/UL (ref 0–0.51)
EOSINOPHIL NFR BLD: 0.9 % (ref 0–6.9)
ERYTHROCYTE [DISTWIDTH] IN BLOOD BY AUTOMATED COUNT: 46.1 FL (ref 35.9–50)
GLOBULIN SER CALC-MCNC: 2.2 G/DL (ref 1.9–3.5)
GLUCOSE SERPL-MCNC: 96 MG/DL (ref 65–99)
HCT VFR BLD AUTO: 35.6 % (ref 37–47)
HGB BLD-MCNC: 12.2 G/DL (ref 12–16)
IMM GRANULOCYTES # BLD AUTO: 0.02 K/UL (ref 0–0.11)
IMM GRANULOCYTES NFR BLD AUTO: 0.4 % (ref 0–0.9)
LYMPHOCYTES # BLD AUTO: 0.76 K/UL (ref 1–4.8)
LYMPHOCYTES NFR BLD: 16.7 % (ref 22–41)
MCH RBC QN AUTO: 33.7 PG (ref 27–33)
MCHC RBC AUTO-ENTMCNC: 34.3 G/DL (ref 33.6–35)
MCV RBC AUTO: 98.3 FL (ref 81.4–97.8)
MONOCYTES # BLD AUTO: 0.57 K/UL (ref 0–0.85)
MONOCYTES NFR BLD AUTO: 12.5 % (ref 0–13.4)
NEUTROPHILS # BLD AUTO: 3.16 K/UL (ref 2–7.15)
NEUTROPHILS NFR BLD: 69.3 % (ref 44–72)
NRBC # BLD AUTO: 0 K/UL
NRBC BLD-RTO: 0 /100 WBC
PLATELET # BLD AUTO: 213 K/UL (ref 164–446)
PMV BLD AUTO: 8.8 FL (ref 9–12.9)
POTASSIUM SERPL-SCNC: 3.5 MMOL/L (ref 3.6–5.5)
PROT SERPL-MCNC: 6 G/DL (ref 6–8.2)
RBC # BLD AUTO: 3.62 M/UL (ref 4.2–5.4)
SARS-COV-2 RNA RESP QL NAA+PROBE: NOTDETECTED
SODIUM SERPL-SCNC: 128 MMOL/L (ref 135–145)
SPECIMEN SOURCE: NORMAL
WBC # BLD AUTO: 4.6 K/UL (ref 4.8–10.8)

## 2020-08-03 PROCEDURE — 99223 1ST HOSP IP/OBS HIGH 75: CPT | Mod: AI | Performed by: HOSPITALIST

## 2020-08-03 PROCEDURE — 700102 HCHG RX REV CODE 250 W/ 637 OVERRIDE(OP): Performed by: HOSPITALIST

## 2020-08-03 PROCEDURE — 700111 HCHG RX REV CODE 636 W/ 250 OVERRIDE (IP): Performed by: HOSPITALIST

## 2020-08-03 PROCEDURE — 96374 THER/PROPH/DIAG INJ IV PUSH: CPT

## 2020-08-03 PROCEDURE — 80053 COMPREHEN METABOLIC PANEL: CPT

## 2020-08-03 PROCEDURE — 72131 CT LUMBAR SPINE W/O DYE: CPT

## 2020-08-03 PROCEDURE — 99285 EMERGENCY DEPT VISIT HI MDM: CPT

## 2020-08-03 PROCEDURE — 85025 COMPLETE CBC W/AUTO DIFF WBC: CPT

## 2020-08-03 PROCEDURE — A9270 NON-COVERED ITEM OR SERVICE: HCPCS | Performed by: HOSPITALIST

## 2020-08-03 PROCEDURE — U0003 INFECTIOUS AGENT DETECTION BY NUCLEIC ACID (DNA OR RNA); SEVERE ACUTE RESPIRATORY SYNDROME CORONAVIRUS 2 (SARS-COV-2) (CORONAVIRUS DISEASE [COVID-19]), AMPLIFIED PROBE TECHNIQUE, MAKING USE OF HIGH THROUGHPUT TECHNOLOGIES AS DESCRIBED BY CMS-2020-01-R: HCPCS

## 2020-08-03 PROCEDURE — 770006 HCHG ROOM/CARE - MED/SURG/GYN SEMI*

## 2020-08-03 PROCEDURE — 72128 CT CHEST SPINE W/O DYE: CPT

## 2020-08-03 PROCEDURE — 70450 CT HEAD/BRAIN W/O DYE: CPT

## 2020-08-03 PROCEDURE — 700111 HCHG RX REV CODE 636 W/ 250 OVERRIDE (IP): Performed by: EMERGENCY MEDICINE

## 2020-08-03 PROCEDURE — 700105 HCHG RX REV CODE 258: Performed by: HOSPITALIST

## 2020-08-03 PROCEDURE — 36415 COLL VENOUS BLD VENIPUNCTURE: CPT

## 2020-08-03 PROCEDURE — C9803 HOPD COVID-19 SPEC COLLECT: HCPCS | Performed by: HOSPITALIST

## 2020-08-03 PROCEDURE — 96376 TX/PRO/DX INJ SAME DRUG ADON: CPT

## 2020-08-03 RX ORDER — ENALAPRILAT 1.25 MG/ML
1.25 INJECTION INTRAVENOUS EVERY 6 HOURS PRN
Status: DISCONTINUED | OUTPATIENT
Start: 2020-08-03 | End: 2020-08-07 | Stop reason: HOSPADM

## 2020-08-03 RX ORDER — BISACODYL 10 MG
10 SUPPOSITORY, RECTAL RECTAL
Status: DISCONTINUED | OUTPATIENT
Start: 2020-08-03 | End: 2020-08-07 | Stop reason: HOSPADM

## 2020-08-03 RX ORDER — LEVOTHYROXINE SODIUM 0.03 MG/1
50 TABLET ORAL
Status: DISCONTINUED | OUTPATIENT
Start: 2020-08-04 | End: 2020-08-07 | Stop reason: HOSPADM

## 2020-08-03 RX ORDER — HEPARIN SODIUM 5000 [USP'U]/ML
5000 INJECTION, SOLUTION INTRAVENOUS; SUBCUTANEOUS EVERY 8 HOURS
Status: DISCONTINUED | OUTPATIENT
Start: 2020-08-03 | End: 2020-08-07 | Stop reason: HOSPADM

## 2020-08-03 RX ORDER — FERROUS SULFATE 325(65) MG
325 TABLET ORAL DAILY
Status: DISCONTINUED | OUTPATIENT
Start: 2020-08-03 | End: 2020-08-03

## 2020-08-03 RX ORDER — POTASSIUM CHLORIDE 20 MEQ/1
20 TABLET, EXTENDED RELEASE ORAL 2 TIMES DAILY
Status: DISCONTINUED | OUTPATIENT
Start: 2020-08-03 | End: 2020-08-07 | Stop reason: HOSPADM

## 2020-08-03 RX ORDER — OMEPRAZOLE 20 MG/1
20 CAPSULE, DELAYED RELEASE ORAL DAILY
Status: DISCONTINUED | OUTPATIENT
Start: 2020-08-04 | End: 2020-08-07 | Stop reason: HOSPADM

## 2020-08-03 RX ORDER — POLYETHYLENE GLYCOL 3350 17 G/17G
1 POWDER, FOR SOLUTION ORAL
Status: DISCONTINUED | OUTPATIENT
Start: 2020-08-03 | End: 2020-08-07 | Stop reason: HOSPADM

## 2020-08-03 RX ORDER — AMOXICILLIN 250 MG
2 CAPSULE ORAL 2 TIMES DAILY
Status: DISCONTINUED | OUTPATIENT
Start: 2020-08-03 | End: 2020-08-07 | Stop reason: HOSPADM

## 2020-08-03 RX ORDER — SODIUM CHLORIDE 9 MG/ML
INJECTION, SOLUTION INTRAVENOUS CONTINUOUS
Status: DISCONTINUED | OUTPATIENT
Start: 2020-08-03 | End: 2020-08-06

## 2020-08-03 RX ORDER — ONDANSETRON 2 MG/ML
4 INJECTION INTRAMUSCULAR; INTRAVENOUS EVERY 4 HOURS PRN
Status: DISCONTINUED | OUTPATIENT
Start: 2020-08-03 | End: 2020-08-07 | Stop reason: HOSPADM

## 2020-08-03 RX ORDER — ONDANSETRON 4 MG/1
4 TABLET, ORALLY DISINTEGRATING ORAL EVERY 4 HOURS PRN
Status: DISCONTINUED | OUTPATIENT
Start: 2020-08-03 | End: 2020-08-07 | Stop reason: HOSPADM

## 2020-08-03 RX ORDER — CARVEDILOL 12.5 MG/1
12.5 TABLET ORAL 2 TIMES DAILY WITH MEALS
Status: DISCONTINUED | OUTPATIENT
Start: 2020-08-03 | End: 2020-08-07 | Stop reason: HOSPADM

## 2020-08-03 RX ORDER — LABETALOL HYDROCHLORIDE 5 MG/ML
10 INJECTION, SOLUTION INTRAVENOUS EVERY 4 HOURS PRN
Status: DISCONTINUED | OUTPATIENT
Start: 2020-08-03 | End: 2020-08-07 | Stop reason: HOSPADM

## 2020-08-03 RX ORDER — ACETAMINOPHEN 325 MG/1
650 TABLET ORAL EVERY 6 HOURS PRN
Status: DISCONTINUED | OUTPATIENT
Start: 2020-08-03 | End: 2020-08-07 | Stop reason: HOSPADM

## 2020-08-03 RX ORDER — FERROUS SULFATE 325(65) MG
325 TABLET ORAL DAILY
Status: DISCONTINUED | OUTPATIENT
Start: 2020-08-04 | End: 2020-08-07 | Stop reason: HOSPADM

## 2020-08-03 RX ORDER — CHOLECALCIFEROL (VITAMIN D3) 125 MCG
1000 CAPSULE ORAL DAILY
Status: DISCONTINUED | OUTPATIENT
Start: 2020-08-04 | End: 2020-08-07 | Stop reason: HOSPADM

## 2020-08-03 RX ADMIN — CARVEDILOL 12.5 MG: 12.5 TABLET, FILM COATED ORAL at 17:38

## 2020-08-03 RX ADMIN — DOCUSATE SODIUM 50 MG AND SENNOSIDES 8.6 MG 2 TABLET: 8.6; 5 TABLET, FILM COATED ORAL at 17:38

## 2020-08-03 RX ADMIN — SODIUM CHLORIDE: 9 INJECTION, SOLUTION INTRAVENOUS at 16:47

## 2020-08-03 RX ADMIN — POTASSIUM CHLORIDE 20 MEQ: 1500 TABLET, EXTENDED RELEASE ORAL at 17:38

## 2020-08-03 RX ADMIN — HEPARIN SODIUM 5000 UNITS: 5000 INJECTION, SOLUTION INTRAVENOUS; SUBCUTANEOUS at 21:08

## 2020-08-03 RX ADMIN — FENTANYL CITRATE 50 MCG: 50 INJECTION INTRAMUSCULAR; INTRAVENOUS at 11:35

## 2020-08-03 RX ADMIN — FENTANYL CITRATE 50 MCG: 50 INJECTION INTRAMUSCULAR; INTRAVENOUS at 14:56

## 2020-08-03 SDOH — ECONOMIC STABILITY: FOOD INSECURITY: WITHIN THE PAST 12 MONTHS, YOU WORRIED THAT YOUR FOOD WOULD RUN OUT BEFORE YOU GOT MONEY TO BUY MORE.: NEVER TRUE

## 2020-08-03 SDOH — ECONOMIC STABILITY: FOOD INSECURITY: WITHIN THE PAST 12 MONTHS, THE FOOD YOU BOUGHT JUST DIDN'T LAST AND YOU DIDN'T HAVE MONEY TO GET MORE.: NEVER TRUE

## 2020-08-03 SDOH — ECONOMIC STABILITY: TRANSPORTATION INSECURITY
IN THE PAST 12 MONTHS, HAS THE LACK OF TRANSPORTATION KEPT YOU FROM MEDICAL APPOINTMENTS OR FROM GETTING MEDICATIONS?: NO

## 2020-08-03 SDOH — ECONOMIC STABILITY: TRANSPORTATION INSECURITY
IN THE PAST 12 MONTHS, HAS LACK OF TRANSPORTATION KEPT YOU FROM MEETINGS, WORK, OR FROM GETTING THINGS NEEDED FOR DAILY LIVING?: NO

## 2020-08-03 ASSESSMENT — COGNITIVE AND FUNCTIONAL STATUS - GENERAL
HELP NEEDED FOR BATHING: A LITTLE
SUGGESTED CMS G CODE MODIFIER DAILY ACTIVITY: CK
TURNING FROM BACK TO SIDE WHILE IN FLAT BAD: A LOT
TOILETING: A LITTLE
DAILY ACTIVITIY SCORE: 17
CLIMB 3 TO 5 STEPS WITH RAILING: A LOT
DRESSING REGULAR LOWER BODY CLOTHING: A LOT
SUGGESTED CMS G CODE MODIFIER MOBILITY: CL
PERSONAL GROOMING: A LITTLE
STANDING UP FROM CHAIR USING ARMS: A LOT
MOVING TO AND FROM BED TO CHAIR: A LITTLE
MOBILITY SCORE: 13
EATING MEALS: A LITTLE
DRESSING REGULAR UPPER BODY CLOTHING: A LITTLE
MOVING FROM LYING ON BACK TO SITTING ON SIDE OF FLAT BED: A LOT
WALKING IN HOSPITAL ROOM: A LOT

## 2020-08-03 ASSESSMENT — ENCOUNTER SYMPTOMS
SHORTNESS OF BREATH: 0
VOMITING: 0
NAUSEA: 0
CHILLS: 1
CONSTIPATION: 0
FEVER: 0
MEMORY LOSS: 1
BACK PAIN: 1
WHEEZING: 0
HEADACHES: 0
DIARRHEA: 0
COUGH: 0

## 2020-08-03 ASSESSMENT — FIBROSIS 4 INDEX: FIB4 SCORE: 2.45

## 2020-08-03 ASSESSMENT — LIFESTYLE VARIABLES
ON A TYPICAL DAY WHEN YOU DRINK ALCOHOL HOW MANY DRINKS DO YOU HAVE: 0
TOTAL SCORE: 0
REASON UNABLE TO ASSESS: PATIENT IS CONFUSED
EVER FELT BAD OR GUILTY ABOUT YOUR DRINKING: NO
ALCOHOL_USE: NO
HOW MANY TIMES IN THE PAST YEAR HAVE YOU HAD 5 OR MORE DRINKS IN A DAY: 0
TOTAL SCORE: 0
TOTAL SCORE: 0
CONSUMPTION TOTAL: NEGATIVE
HAVE YOU EVER FELT YOU SHOULD CUT DOWN ON YOUR DRINKING: NO
EVER HAD A DRINK FIRST THING IN THE MORNING TO STEADY YOUR NERVES TO GET RID OF A HANGOVER: NO
AVERAGE NUMBER OF DAYS PER WEEK YOU HAVE A DRINK CONTAINING ALCOHOL: 0
HAVE PEOPLE ANNOYED YOU BY CRITICIZING YOUR DRINKING: NO
DOES PATIENT WANT TO STOP DRINKING: NO
EVER_SMOKED: NEVER

## 2020-08-03 ASSESSMENT — PATIENT HEALTH QUESTIONNAIRE - PHQ9
SUM OF ALL RESPONSES TO PHQ9 QUESTIONS 1 AND 2: 0
1. LITTLE INTEREST OR PLEASURE IN DOING THINGS: NOT AT ALL
2. FEELING DOWN, DEPRESSED, IRRITABLE, OR HOPELESS: NOT AT ALL

## 2020-08-03 ASSESSMENT — COPD QUESTIONNAIRES
HAVE YOU SMOKED AT LEAST 100 CIGARETTES IN YOUR ENTIRE LIFE: NO/DON'T KNOW
IN THE PAST 12 MONTHS DO YOU DO LESS THAN YOU USED TO BECAUSE OF YOUR BREATHING PROBLEMS: DISAGREE/UNSURE
DO YOU EVER COUGH UP ANY MUCUS OR PHLEGM?: NO/ONLY WITH OCCASIONAL COLDS OR INFECTIONS
DURING THE PAST 4 WEEKS HOW MUCH DID YOU FEEL SHORT OF BREATH: NONE/LITTLE OF THE TIME
COPD SCREENING SCORE: 2

## 2020-08-03 NOTE — ASSESSMENT & PLAN NOTE
Patient does have contusion on her face due to fall.  She is a poor historian however and does not remember the fall  PT OT -min assistance

## 2020-08-03 NOTE — ED PROVIDER NOTES
ED Provider Note    ER PROVIDER NOTE        CHIEF COMPLAINT  Chief Complaint   Patient presents with   • T-5000 GLF   • Back Pain       HPI  Lamar Dangelo is a 89 y.o. female who presents to the emergency department complaining of a fall and back pain.  Her son reports that she has had low back pain for the last month, seem to be gradual in onset, which the patient states as well.  No known injury.  It is worse with certain movements and no radiation to the pain.  Last night she was stooped over because of the pain and tripped on a step fell hitting her head.  Unknown LOC.  She did have another fall this morning.  She is having some headache now so son brought her in this morning.  She does not take any blood thinners or aspirin.  She denies any nausea or vomiting.  No focal weakness numbness or tingling.  No bowel or bladder incontinence.  No recent fevers chills cough or congestion    REVIEW OF SYSTEMS  Pertinent positives include back pain, headache, fall. Pertinent negatives include no weakness or numbness. See HPI for details. All other systems reviewed and are negative.    PAST MEDICAL HISTORY   has a past medical history of Hyperlipidemia (7/2/2010) and Hypothyroid (7/2/2010).    SURGICAL HISTORY   has a past surgical history that includes tonsillectomy; appendectomy; colonoscopy - endo (N/A, 9/3/2017); gastroscopy-endo (N/A, 9/3/2017); and hip cannulated screw (Left, 3/24/2018).    FAMILY HISTORY  Family History   Problem Relation Age of Onset   • Heart Disease Father    • Hypertension Sister        SOCIAL HISTORY  Social History     Socioeconomic History   • Marital status:      Spouse name: Not on file   • Number of children: Not on file   • Years of education: Not on file   • Highest education level: Not on file   Occupational History   • Not on file   Social Needs   • Financial resource strain: Not on file   • Food insecurity     Worry: Not on file     Inability: Not on file   • Transportation  "needs     Medical: Not on file     Non-medical: Not on file   Tobacco Use   • Smoking status: Never Smoker   • Smokeless tobacco: Never Used   Substance and Sexual Activity   • Alcohol use: No   • Drug use: No   • Sexual activity: Not on file     Comment: single, 0 kids.    Lifestyle   • Physical activity     Days per week: Not on file     Minutes per session: Not on file   • Stress: Not on file   Relationships   • Social connections     Talks on phone: Not on file     Gets together: Not on file     Attends Oriental orthodox service: Not on file     Active member of club or organization: Not on file     Attends meetings of clubs or organizations: Not on file     Relationship status: Not on file   • Intimate partner violence     Fear of current or ex partner: Not on file     Emotionally abused: Not on file     Physically abused: Not on file     Forced sexual activity: Not on file   Other Topics Concern   • Not on file   Social History Narrative   • Not on file      Social History     Substance and Sexual Activity   Drug Use No       CURRENT MEDICATIONS  Home Medications    **Home medications have not yet been reviewed for this encounter**         ALLERGIES  Allergies   Allergen Reactions   • Nkda [No Known Drug Allergy]        PHYSICAL EXAM  VITAL SIGNS: BP (!) 162/74   Pulse 62   Temp 35.9 °C (96.6 °F) (Temporal)   Resp 16   Ht 1.6 m (5' 3\")   Wt 54.4 kg (120 lb)   SpO2 94%   BMI 21.26 kg/m²   Pulse ox interpretation: I interpret this pulse ox as normal.    Constitutional: Alert in no apparent distress.  Frail-appearing  HENT: Abrasion to the right forehead with some ecchymosis,  Bilateral external ears normal, Nose normal.   Eyes: Pupils are equal and reactive, Conjunctiva normal, Non-icteric.   Neck: Normal range of motion, No tenderness, Supple, No stridor.   Lymphatic: No lymphadenopathy noted.   Cardiovascular: Regular rate and rhythm, no murmurs.   Thorax & Lungs: Normal breath sounds, No respiratory distress, " No wheezing, No chest tenderness.   Abdomen: Bowel sounds normal, Soft, No tenderness, No masses, No pulsatile masses. No peritoneal signs.  Skin: Warm, Dry, No erythema, No rash.   Back: Kyphosis noted, tenderness over the low T to high L-spine without deformity or step-off, No CVA tenderness.   Extremities: Intact distal pulses, No edema, No tenderness, No cyanosis, .  Musculoskeletal: Good range of motion in all major joints. No tenderness to palpation or major deformities noted.   Neurologic: Alert , Normal motor function, Normal sensory function, No focal deficits noted.   Psychiatric: Affect normal, Judgment normal, Mood normal.     DIAGNOSTIC STUDIES / PROCEDURES        LABS  Labs Reviewed   CBC WITH DIFFERENTIAL - Abnormal; Notable for the following components:       Result Value    WBC 4.6 (*)     RBC 3.62 (*)     Hematocrit 35.6 (*)     MCV 98.3 (*)     MCH 33.7 (*)     MPV 8.8 (*)     Lymphocytes 16.70 (*)     Lymphs (Absolute) 0.76 (*)     All other components within normal limits   COMP METABOLIC PANEL - Abnormal; Notable for the following components:    Sodium 128 (*)     Potassium 3.5 (*)     Chloride 95 (*)     All other components within normal limits   ESTIMATED GFR - Abnormal; Notable for the following components:    GFR If Non  56 (*)     All other components within normal limits       All labs reviewed by me.    RADIOLOGY  CT-HEAD W/O   Final Result      1.  No acute intracranial abnormality      2.  Underlying cerebral volume loss, white matter change, and atherosclerotic plaque      CT-LSPINE W/O PLUS RECONS   Final Result      1.  Moderate to severe multilevel degenerative change of lumbar spine.   2.  Minimal anterolisthesis at L3-4, likely degenerative.   3.  Multiple chronic compression deformities.   4.  No acute lumbar spine fracture.   5.  Widening of the anterior disc space at L3-4, similar to prior exam.      CT-TSPINE W/O PLUS RECONS   Final Result      1.  Old,  worsened T8 compression fracture with associated kyphosis      2.  T5 and T12 compression fracture that are new since 9/1/2017 although the specific acuity is indeterminate      3.  Bilateral atelectasis and small pleural effusion                  No follow up imaging is recommended per consensus guidelines of the 2019 ACR Incidental Findings Committee for probably benign incidental simple appearing renal cystic lesion(s) based on imaging criteria.        The radiologist's interpretation of all radiological studies have been reviewed and images independently viewed by me.    COURSE & MEDICAL DECISION MAKING  Nursing notes, VS, PMSFHx reviewed in chart.    10:17 AM Patient seen and examined at triage per TBI protocol, CTs ordered.     Patient is reevaluated on return from CT, she is quite uncomfortable this time with her back pain, have ordered for fentanyl, labs in addition to her prior CTs    12:39 PM  On reevaluation, patient is more comfortable, updated on all results    I discussed with her son who is with her today, will plan for admission    Discussed case with hospitalist for admission    Decision Making:  This is a 89 y.o. female presented with multiple falls.  Patient does have old compression fractures although does not appear to have sustained any new injury.  She did meet our TBI protocol and had emergent CT of her head which was unremarkable.  However patient has had some progressive back pain, with her multiple compression fractures and now increasing falls I do feel she is unsafe at home.  She lives in a trilevel house with her elderly sister.  As such we will plan for admission for pain control as well as physical therapy and OT, and evaluation for a safe home environment    Is admitted in stable condition    FINAL IMPRESSION  1. Multiple falls    2. Compression fracture of thoracic spine, non-traumatic, sequela    3. Hyponatremia         The note accurately reflects work and decisions made by me.   Arturo Hills M.D.  8/3/2020  1:02 PM

## 2020-08-03 NOTE — ED NOTES
"Patient calling out needing to urinate. Pure wick placed and patient urinated 300 ml yellow urine. Patient c/o \"terrible pain\" in her back. ERP made aware and order given. Room assigned and awaiting transport.   "

## 2020-08-03 NOTE — ED NOTES
Pt swabbed for admit. Resting comfortably and reoriented often about being admitted. Patient provided with more warm blankets for comfort and waiting for bed assignment.

## 2020-08-03 NOTE — H&P
Hospital Medicine History & Physical Note    Date of Service  8/3/2020    Primary Care Physician  John Garces M.D.    Consultants  None    Code Status  DNAR/DNI -patient cannot remember what her CODE STATUS was but wanted to leave it up to the physician.  I did check her records and she was DNR during her previous admission so I placed DNR as consistent with her previous known wishes.    Chief Complaint  Recurrent falls, back pain, weakness    History of Presenting Illness  89 y.o. female w/h/o hyperlipidemia and hypothyroidism who presented 8/3/2020 with recurrent falls, back pain, weakness.  Patient has dementia and is a poor historian.  Per ERP note, patient has been having low back pain for the past month.  It was gradual in onset.  Last night she was stooped over because of the pain and tripped on a step and fell hitting her head.  She had another fall this morning.  She was having a headache so her son brought her in this morning.  She does not take any blood thinners or aspirin.     Review of Systems  Review of Systems   Constitutional: Positive for chills. Negative for fever.   Respiratory: Negative for cough, shortness of breath and wheezing.    Cardiovascular: Negative for chest pain.   Gastrointestinal: Negative for constipation, diarrhea, nausea and vomiting.   Genitourinary: Negative for dysuria.   Musculoskeletal: Positive for back pain.   Neurological: Negative for headaches.   Psychiatric/Behavioral: Positive for memory loss.     all other systems reviewed and are negative    Past Medical History   has a past medical history of Hyperlipidemia (7/2/2010) and Hypothyroid (7/2/2010).    Surgical History   has a past surgical history that includes tonsillectomy; appendectomy; colonoscopy - endo (N/A, 9/3/2017); gastroscopy-endo (N/A, 9/3/2017); and hip cannulated screw (Left, 3/24/2018).    Family History  family history includes Heart Disease in her father; Hypertension in her  "sister.    Social History   reports that she has never smoked. She has never used smokeless tobacco. She reports that she does not drink alcohol or use drugs.    Allergies  Allergies   Allergen Reactions   • Nkda [No Known Drug Allergy]        Medications  No current facility-administered medications on file prior to encounter.      Current Outpatient Medications on File Prior to Encounter   Medication Sig Dispense Refill   • potassium chloride SA (KDUR) 20 MEQ Tab CR TAKE 1 TABLET BY MOUTH ONCE DAILY. 30 Tab 0   • ferrous sulfate (FEROSUL) 325 (65 Fe) MG tablet Take 1 Tab by mouth every day. 30 Tab 3   • carvedilol (COREG) 12.5 MG Tab TAKE (1) TABLET BY MOUTH TWICE DAILY. 180 Tab 2   • Cyanocobalamin (VITAMIN B-12) 1000 MCG Tab TAKE 1 TABLET BY MOUTH ONCE DAILY. 90 Tab 3   • levothyroxine (SYNTHROID) 50 MCG Tab TAKE 1 TABLET BY MOUTH EVERY MORNING 30 MINUTES BEFORE BREAKFAST (EMPTY STOMACH) 90 Tab 2   • omeprazole (PRILOSEC) 20 MG delayed-release capsule TAKE (1) CAPSULE BY MOUTH TWICE DAILY. 180 Cap 2       Physical Exam  Weight/BMI: Body mass index is 21.26 kg/m².  BP (!) 175/65   Pulse 63   Temp 36.2 °C (97.2 °F) (Temporal)   Resp 18   Ht 1.6 m (5' 3\")   Wt 54.4 kg (120 lb)   SpO2 96%    Vitals:    08/03/20 1258 08/03/20 1302 08/03/20 1401 08/03/20 1540   BP:  156/74 (!) 180/74 (!) 175/65   Pulse: (!) 59 66 (!) 59 63   Resp:    18   Temp:    36.2 °C (97.2 °F)   TempSrc:    Temporal   SpO2: 95% 96% 97% 96%   Weight:       Height:        Oxygen Therapy:  Pulse Oximetry: 96 %, O2 (LPM): 0, O2 Delivery Device: None - Room Air  Physical Exam  Constitutional:       General: She is not in acute distress.     Appearance: She is well-developed.   HENT:      Head: Normocephalic.      Right Ear: External ear normal.      Left Ear: External ear normal.      Mouth/Throat:      Pharynx: No oropharyngeal exudate or posterior oropharyngeal erythema.   Eyes:      General:         Right eye: No discharge.         Left eye: " No discharge.   Cardiovascular:      Rate and Rhythm: Normal rate.      Heart sounds: No gallop.    Pulmonary:      Effort: No respiratory distress.      Breath sounds: No wheezing.   Abdominal:      General: There is no distension.      Tenderness: There is no abdominal tenderness.   Skin:     General: Skin is warm.   Neurological:      Mental Status: She is alert. She is disoriented.   Psychiatric:         Cognition and Memory: Memory is impaired. She exhibits impaired recent memory and impaired remote memory.         Laboratory:   Objective   Recent Results (from the past 24 hour(s))   CBC WITH DIFFERENTIAL    Collection Time: 08/03/20 11:32 AM   Result Value Ref Range    WBC 4.6 (L) 4.8 - 10.8 K/uL    RBC 3.62 (L) 4.20 - 5.40 M/uL    Hemoglobin 12.2 12.0 - 16.0 g/dL    Hematocrit 35.6 (L) 37.0 - 47.0 %    MCV 98.3 (H) 81.4 - 97.8 fL    MCH 33.7 (H) 27.0 - 33.0 pg    MCHC 34.3 33.6 - 35.0 g/dL    RDW 46.1 35.9 - 50.0 fL    Platelet Count 213 164 - 446 K/uL    MPV 8.8 (L) 9.0 - 12.9 fL    Neutrophils-Polys 69.30 44.00 - 72.00 %    Lymphocytes 16.70 (L) 22.00 - 41.00 %    Monocytes 12.50 0.00 - 13.40 %    Eosinophils 0.90 0.00 - 6.90 %    Basophils 0.20 0.00 - 1.80 %    Immature Granulocytes 0.40 0.00 - 0.90 %    Nucleated RBC 0.00 /100 WBC    Neutrophils (Absolute) 3.16 2.00 - 7.15 K/uL    Lymphs (Absolute) 0.76 (L) 1.00 - 4.80 K/uL    Monos (Absolute) 0.57 0.00 - 0.85 K/uL    Eos (Absolute) 0.04 0.00 - 0.51 K/uL    Baso (Absolute) 0.01 0.00 - 0.12 K/uL    Immature Granulocytes (abs) 0.02 0.00 - 0.11 K/uL    NRBC (Absolute) 0.00 K/uL   COMP METABOLIC PANEL    Collection Time: 08/03/20 11:32 AM   Result Value Ref Range    Sodium 128 (L) 135 - 145 mmol/L    Potassium 3.5 (L) 3.6 - 5.5 mmol/L    Chloride 95 (L) 96 - 112 mmol/L    Co2 21 20 - 33 mmol/L    Anion Gap 12.0 7.0 - 16.0    Glucose 96 65 - 99 mg/dL    Bun 18 8 - 22 mg/dL    Creatinine 0.94 0.50 - 1.40 mg/dL    Calcium 9.4 8.5 - 10.5 mg/dL    AST(SGOT) 27 12  - 45 U/L    ALT(SGPT) 19 2 - 50 U/L    Alkaline Phosphatase 94 30 - 99 U/L    Total Bilirubin 0.7 0.1 - 1.5 mg/dL    Albumin 3.8 3.2 - 4.9 g/dL    Total Protein 6.0 6.0 - 8.2 g/dL    Globulin 2.2 1.9 - 3.5 g/dL    A-G Ratio 1.7 g/dL   ESTIMATED GFR    Collection Time: 08/03/20 11:32 AM   Result Value Ref Range    GFR If African American >60 >60 mL/min/1.73 m 2    GFR If Non  56 (A) >60 mL/min/1.73 m 2   Routine (COVID/SARS COV-2 In-House PCR up to 24 hours)    Collection Time: 08/03/20  1:40 PM    Specimen: Nasopharyngeal; Respirate   Result Value Ref Range    COVID Order Status Received    SARS-CoV-2, PCR (In-House)    Collection Time: 08/03/20  1:40 PM   Result Value Ref Range    SARS-CoV-2 Source NP Swab        (click the triangle to expand results)    Imaging:  CT-HEAD W/O   Final Result      1.  No acute intracranial abnormality      2.  Underlying cerebral volume loss, white matter change, and atherosclerotic plaque      CT-LSPINE W/O PLUS RECONS   Final Result      1.  Moderate to severe multilevel degenerative change of lumbar spine.   2.  Minimal anterolisthesis at L3-4, likely degenerative.   3.  Multiple chronic compression deformities.   4.  No acute lumbar spine fracture.   5.  Widening of the anterior disc space at L3-4, similar to prior exam.      CT-TSPINE W/O PLUS RECONS   Final Result      1.  Old, worsened T8 compression fracture with associated kyphosis      2.  T5 and T12 compression fracture that are new since 9/1/2017 although the specific acuity is indeterminate      3.  Bilateral atelectasis and small pleural effusion                  No follow up imaging is recommended per consensus guidelines of the 2019 ACR Incidental Findings Committee for probably benign incidental simple appearing renal cystic lesion(s) based on imaging criteria.        Assessment/Plan:  I anticipate this patient will require at least two midnights for appropriate medical management, necessitating  inpatient admission.  Given her hyponatremia which will be treated with IV fluids.    * Frequent falls- (present on admission)  Assessment & Plan  Patient does have contusion on her face due to fall.  She is a poor historian however and does not remember the fall  PT OT pending    Sundowning- (present on admission)  Assessment & Plan  Hx of  Will monitor for    Hyponatremia- (present on admission)  Assessment & Plan  Likely hypovolemic from decreased PO intake  Encouraging PO intake and giving IVF    Compression fracture of T8 vertebra with routine healing- (present on admission)  Assessment & Plan  T8 is chronic, seen on CT  T5 and T12 unclear from CT acuity  Likely would not be a candidate for kyphoplasty given how many separate compression fractures patient has    Compression fracture of L5 vertebra with routine healing- (present on admission)  Assessment & Plan  Chronic, seen on CT    Vitamin B12 deficiency- (present on admission)  Assessment & Plan  History of  Previously checked December 19 and not low  Recheck PENDING      DNR (do not resuscitate)- (present on admission)  Assessment & Plan  Patient was not able to express her current CODE STATUS due to dementia but chart review showed she was DNR her previous admission and will be continued as such according to her wishes    Cachexia (HCC)- (present on admission)  Assessment & Plan  Dietary consult    HTN (hypertension)- (present on admission)  Assessment & Plan  Continue carvedilol    Dementia (HCC)- (present on admission)  Assessment & Plan  With very poor memory and is a poor historian    Hypokalemia- (present on admission)  Assessment & Plan  Continuing outpatient potassium    Hyperlipidemia- (present on admission)  Assessment & Plan  Not currently on medications for this outpatient    Hypothyroidism- (present on admission)  Assessment & Plan  Continue outpatient levothyroxine  TSH and free T4 recheck pending      VTE prophylaxis:  sc heparin

## 2020-08-03 NOTE — ED TRIAGE NOTES
Pt BIB son with c/c of a GLF yesterday around 1500. Pt tripped on a step, unknown LOC. Pt with abrasion and ecchymosis around R eye. Pt also reporting worsening back pain fover the last month.

## 2020-08-03 NOTE — ASSESSMENT & PLAN NOTE
T8 is chronic, seen on CT  T5 and T12 unclear from CT acuity    8/4 possible candidate for kyphoplasty given how many separate compression fractures patient has  - will consider if increasing pain, pt/ot    8/5 PT/OT, min assistance with front wheel walker  Recommending home health, if able with patient family able to provide 24/7 assistance   to assist  Encourage activity    8/6 home health ordered  -encourage activity  Has home support

## 2020-08-03 NOTE — PROGRESS NOTES
Patient presenting to the emergency department with recurrent falls, back pain, weakness.  Chemistry panel with concern for dehydration and hyponatremia.    ERP, Dr. Hills requesting admission for:  1.  Recurrent falls, needs PT/OT evaluation  2.  Hyponatremia, requiring IV fluid hydration  3.  Back pain control  4.  Uncertain acuity of compression fractures evident on CT imaging    Patient assigned to Dr. Justin, who will evaluate the patient and determine an appropriate admission status and floor disposition based on patient evaluation.    Stewart Finn M.D.  08/03/20  1:03 PM

## 2020-08-03 NOTE — ED NOTES
Patient back to room from imaging. Report from Ruth. Patient c/o back pain and no active bleeding from her head. Patient and family at bedside aware of POC. Call light within reach. Awaiting results.

## 2020-08-03 NOTE — PROGRESS NOTES
· 2 RN skin check complete with Mindy Montes RN.   · Devices in place purewick, PIV.  · Skin assessed under devices YES.  · Confirmed pressure ulcers found on NA.  · New potential pressure ulcers noted on NA. Wound consult placed? No. Photo uploaded? Yes (skin tear to right elbow).   · The following interventions are in place q2 turns, pillows in use for postioning purewick in place.      R elbow skin tear noted, small cuts present on right hand, bruising on right leg and right facial area.

## 2020-08-03 NOTE — ASSESSMENT & PLAN NOTE
Likely hypovolemic from decreased PO intake  Encouraging PO intake and giving IVF    8/4 cont ivf, monitor

## 2020-08-03 NOTE — ED NOTES
Med Rec completed per patient's home pharmacy   Allergies reviewed  No ORAL antibiotics in last 14 days

## 2020-08-04 ENCOUNTER — PATIENT OUTREACH (OUTPATIENT)
Dept: HEALTH INFORMATION MANAGEMENT | Facility: OTHER | Age: 85
End: 2020-08-04

## 2020-08-04 LAB
ALBUMIN SERPL BCP-MCNC: 3.1 G/DL (ref 3.2–4.9)
BUN SERPL-MCNC: 15 MG/DL (ref 8–22)
CALCIUM SERPL-MCNC: 8.6 MG/DL (ref 8.5–10.5)
CHLORIDE SERPL-SCNC: 99 MMOL/L (ref 96–112)
CO2 SERPL-SCNC: 22 MMOL/L (ref 20–33)
CREAT SERPL-MCNC: 0.86 MG/DL (ref 0.5–1.4)
ERYTHROCYTE [DISTWIDTH] IN BLOOD BY AUTOMATED COUNT: 46.2 FL (ref 35.9–50)
GLUCOSE SERPL-MCNC: 80 MG/DL (ref 65–99)
HCT VFR BLD AUTO: 31.5 % (ref 37–47)
HGB BLD-MCNC: 10.8 G/DL (ref 12–16)
MCH RBC QN AUTO: 33.2 PG (ref 27–33)
MCHC RBC AUTO-ENTMCNC: 34.3 G/DL (ref 33.6–35)
MCV RBC AUTO: 96.9 FL (ref 81.4–97.8)
PHOSPHATE SERPL-MCNC: 2 MG/DL (ref 2.5–4.5)
PLATELET # BLD AUTO: 200 K/UL (ref 164–446)
PMV BLD AUTO: 8.8 FL (ref 9–12.9)
POTASSIUM SERPL-SCNC: 3.6 MMOL/L (ref 3.6–5.5)
RBC # BLD AUTO: 3.25 M/UL (ref 4.2–5.4)
SODIUM SERPL-SCNC: 129 MMOL/L (ref 135–145)
T4 FREE SERPL-MCNC: 1.11 NG/DL (ref 0.93–1.7)
TSH SERPL DL<=0.005 MIU/L-ACNC: 6.56 UIU/ML (ref 0.38–5.33)
VIT B12 SERPL-MCNC: >4000 PG/ML (ref 211–911)
WBC # BLD AUTO: 4.1 K/UL (ref 4.8–10.8)

## 2020-08-04 PROCEDURE — 99233 SBSQ HOSP IP/OBS HIGH 50: CPT | Performed by: INTERNAL MEDICINE

## 2020-08-04 PROCEDURE — 82607 VITAMIN B-12: CPT

## 2020-08-04 PROCEDURE — 700102 HCHG RX REV CODE 250 W/ 637 OVERRIDE(OP): Performed by: HOSPITALIST

## 2020-08-04 PROCEDURE — 700111 HCHG RX REV CODE 636 W/ 250 OVERRIDE (IP): Performed by: HOSPITALIST

## 2020-08-04 PROCEDURE — 97161 PT EVAL LOW COMPLEX 20 MIN: CPT

## 2020-08-04 PROCEDURE — 700105 HCHG RX REV CODE 258: Performed by: HOSPITALIST

## 2020-08-04 PROCEDURE — 84443 ASSAY THYROID STIM HORMONE: CPT

## 2020-08-04 PROCEDURE — 36415 COLL VENOUS BLD VENIPUNCTURE: CPT

## 2020-08-04 PROCEDURE — 85027 COMPLETE CBC AUTOMATED: CPT

## 2020-08-04 PROCEDURE — 80069 RENAL FUNCTION PANEL: CPT

## 2020-08-04 PROCEDURE — 770006 HCHG ROOM/CARE - MED/SURG/GYN SEMI*

## 2020-08-04 PROCEDURE — A9270 NON-COVERED ITEM OR SERVICE: HCPCS | Performed by: HOSPITALIST

## 2020-08-04 PROCEDURE — 97166 OT EVAL MOD COMPLEX 45 MIN: CPT

## 2020-08-04 PROCEDURE — 84439 ASSAY OF FREE THYROXINE: CPT

## 2020-08-04 RX ADMIN — POTASSIUM CHLORIDE 20 MEQ: 1500 TABLET, EXTENDED RELEASE ORAL at 18:02

## 2020-08-04 RX ADMIN — POTASSIUM CHLORIDE 20 MEQ: 1500 TABLET, EXTENDED RELEASE ORAL at 05:15

## 2020-08-04 RX ADMIN — DOCUSATE SODIUM 50 MG AND SENNOSIDES 8.6 MG 2 TABLET: 8.6; 5 TABLET, FILM COATED ORAL at 05:15

## 2020-08-04 RX ADMIN — FERROUS SULFATE TAB 325 MG (65 MG ELEMENTAL FE) 325 MG: 325 (65 FE) TAB at 05:14

## 2020-08-04 RX ADMIN — SODIUM CHLORIDE: 9 INJECTION, SOLUTION INTRAVENOUS at 05:14

## 2020-08-04 RX ADMIN — ACETAMINOPHEN 650 MG: 325 TABLET, FILM COATED ORAL at 05:14

## 2020-08-04 RX ADMIN — CARVEDILOL 12.5 MG: 12.5 TABLET, FILM COATED ORAL at 18:02

## 2020-08-04 RX ADMIN — OMEPRAZOLE 20 MG: 20 CAPSULE, DELAYED RELEASE ORAL at 05:15

## 2020-08-04 RX ADMIN — LEVOTHYROXINE SODIUM 50 MCG: 25 TABLET ORAL at 05:15

## 2020-08-04 RX ADMIN — HEPARIN SODIUM 5000 UNITS: 5000 INJECTION, SOLUTION INTRAVENOUS; SUBCUTANEOUS at 15:14

## 2020-08-04 RX ADMIN — CARVEDILOL 12.5 MG: 12.5 TABLET, FILM COATED ORAL at 08:44

## 2020-08-04 RX ADMIN — HEPARIN SODIUM 5000 UNITS: 5000 INJECTION, SOLUTION INTRAVENOUS; SUBCUTANEOUS at 05:15

## 2020-08-04 RX ADMIN — CYANOCOBALAMIN TAB 500 MCG 1000 MCG: 500 TAB at 05:15

## 2020-08-04 SDOH — ECONOMIC STABILITY: INCOME INSECURITY: HOW HARD IS IT FOR YOU TO PAY FOR THE VERY BASICS LIKE FOOD, HOUSING, MEDICAL CARE, AND HEATING?: NOT HARD AT ALL

## 2020-08-04 ASSESSMENT — COGNITIVE AND FUNCTIONAL STATUS - GENERAL
DAILY ACTIVITIY SCORE: 19
MOVING TO AND FROM BED TO CHAIR: A LITTLE
DRESSING REGULAR UPPER BODY CLOTHING: A LITTLE
STANDING UP FROM CHAIR USING ARMS: A LITTLE
SUGGESTED CMS G CODE MODIFIER MOBILITY: CJ
HELP NEEDED FOR BATHING: A LITTLE
SUGGESTED CMS G CODE MODIFIER DAILY ACTIVITY: CK
WALKING IN HOSPITAL ROOM: A LITTLE
TOILETING: A LITTLE
MOBILITY SCORE: 20
DRESSING REGULAR LOWER BODY CLOTHING: A LITTLE
PERSONAL GROOMING: A LITTLE
CLIMB 3 TO 5 STEPS WITH RAILING: A LITTLE

## 2020-08-04 ASSESSMENT — ENCOUNTER SYMPTOMS
FLANK PAIN: 0
DEPRESSION: 0
NAUSEA: 0
FEVER: 0
CHILLS: 0
DIAPHORESIS: 0
MEMORY LOSS: 0
VOMITING: 0
SHORTNESS OF BREATH: 0
DIZZINESS: 0
COUGH: 0
NERVOUS/ANXIOUS: 0
WEAKNESS: 1
ABDOMINAL PAIN: 0
SPEECH CHANGE: 0
PALPITATIONS: 0
BACK PAIN: 0
MYALGIAS: 1
SENSORY CHANGE: 0
HEADACHES: 0
FOCAL WEAKNESS: 0

## 2020-08-04 ASSESSMENT — ACTIVITIES OF DAILY LIVING (ADL): TOILETING: INDEPENDENT

## 2020-08-04 ASSESSMENT — GAIT ASSESSMENTS
ASSISTIVE DEVICE: FRONT WHEEL WALKER
GAIT LEVEL OF ASSIST: MINIMAL ASSIST
DISTANCE (FEET): 300

## 2020-08-04 ASSESSMENT — FIBROSIS 4 INDEX: FIB4 SCORE: 2.82

## 2020-08-04 NOTE — PROGRESS NOTES
Community Health Worker Intake  • Social determinates of health intake complete.   • Identified barriers to none.  • Contact information provided to Lamar Dangelo.  • Accepted Meds-To-Beds.   • Inpatient assessment completed.    CHW Daniel met with pt bedside to introduce CCM services, pt accepted. Pt confirmed PCP is John Garces M.D. Pt reports availability is open to any time. PCP appmnt will be made by hospital schedulers prior to d/c. Pt reports no transportation issues getting to appmnts. Pt reports no trouble paying for resources such as care, housing, and food. Pt reports family is support system. Pt unsure if she is confident in managing her health after d/c.    Plan: Follow up call after d/c.

## 2020-08-04 NOTE — PROGRESS NOTES
Assumed care of patient at 0700. Received bedside report from noc RN. Patient resting comfortably in bed. No signs of distress. Bed is in low and locked position. Non-slip socks are in place. Call light is within reach. Bed alarm is on. Patient room near nursing station.

## 2020-08-04 NOTE — THERAPY
"Occupational Therapy   Initial Evaluation     Patient Name: Lamar Dangelo  Age:  89 y.o., Sex:  female  Medical Record #: [id number]  Today's Date: 8/4/2020       Precautions: Fall Risk, Other (See Comments)(dementia?)  Comments:  baseline dementia and T-spine compression fractures     Assessment  Patient is 89 y.o. female admitted after GLF, PMHX: Dementia, hyperlipidemia, & hypothyroidism. Pt is a poor historian unclear of her PLOF, pt is demosntrating confusion, and decreased activity tolerance, need to confirm w/family regarding pts baseline and their ability to provide care to pt. Will follow to assist w/d/c planning.     Plan  Recommend Occupational Therapy 2 times per week until therapy goals are met for the following treatments:  Self Care/Activities of Daily Living, Therapeutic Activities and Therapeutic Exercises.    Discharge recommendations:  TBD HH vs additional services if family is unable to provide care?    Subjective  \"I am so cold I need more blankets\"      Objective   08/04/20 1622   Prior Living Situation   Prior Services None   Housing / Facility 3 Story House   Equipment Owned Unable to Determine At This Time   Lives with - Patient's Self Care Capacity Other (Comments)  (pt stated sister )   Prior Level of ADL Function   Self Feeding Independent   Grooming / Hygiene Independent   Bathing Independent   Dressing Independent   Toileting Independent   Comments per pt report    Prior Level of IADL Function- unable to determine at this time    History of Falls   History of Falls Yes   Precautions   Precautions Fall Risk;Other (See Comments)  (dementia?)   Pain 0 - 10 Group   Therapist Pain Assessment During Activity;Nurse Notified  (rib pain w/bed mobility )   Cognition    Cognition / Consciousness X   Orientation Level Not Oriented to Place;Not Oriented to Reason;Not Oriented to Time;Not Oriented to Month;Not Oriented to Year   Level of Consciousness Confused   Ability To Follow Commands 2 Step " "  Safety Awareness Impaired   New Learning Impaired   Sequencing Impaired   Comments pt AOx2 unable to give full hx unclear of PLOF perseverating on being \"cold\"    Passive ROM Upper Body   Passive ROM Upper Body WDL   Active ROM Upper Body   Active ROM Upper Body  WDL   Strength Upper Body   Upper Body Strength  X   Gross Strength Generalized Weakness, Equal Bilaterally.    Bed Mobility    Supine to Sit Supervised   Sit to Supine Supervised   Scooting Supervised   Rolling Supervised   ADL Assessment   Grooming Minimal Assist;Standing   Upper Body Dressing Minimal Assist   Lower Body Dressing Minimal Assist   Functional Mobility   Sit to Stand Minimal Assist   Bed, Chair, Wheelchair Transfer Moderate Assist   Mobility walking in room w/fww    Visual Perception   Visual Perception  Not Tested   Edema / Skin Assessment   Edema / Skin  X   Comments bruising to face and RLE    Activity Tolerance   Sitting Edge of Bed 5   Standing 5   Patient / Family Goals   Patient / Family Goal #1 I need more blankets    Short Term Goals   Short Term Goal # 1 pt will complete grooming standing at sink w/spv    Short Term Goal # 2 pt will complete FB dressing w/spv    Short Term Goal # 3 pt will complete toilet txf w/spv    Education Group   Education Provided Role of Occupational Therapist   Role of Occupational Therapist Patient Response Patient;Acceptance;No Learning Evidence   Problem List   Problem List Decreased Active Daily Living Skills;Decreased Functional Mobility;Decreased Activity Tolerance;Safety Awareness Deficits / Cognition;Impaired Postural Control / Balance   Anticipated Discharge Equipment   DC Equipment Unable To Determine At This Time   Interdisciplinary Plan of Care Collaboration   IDT Collaboration with  Nursing   Patient Position at End of Therapy In Bed;Bed Alarm On;Call Light within Reach;Tray Table within Reach;Phone within Reach   Collaboration Comments RN aware of OT eval and pts efforts    Session " Information   Date / Session Number  8/4 #1 (1/2, 8/10)   Priority 2

## 2020-08-04 NOTE — CARE PLAN
Problem: Communication  Goal: The ability to communicate needs accurately and effectively will improve  Outcome: PROGRESSING AS EXPECTED  Note: Educated patient on the use of call light for assistance. Went over controls and functions on the remote. Answered any questions patient had. Patient has been calling appropriately.      Problem: Safety  Goal: Will remain free from injury  Outcome: PROGRESSING AS EXPECTED  Note: Patient educated on the importance of utilizing call light for assistance. Bed is in low position and locked. Patient is wearing non-slip socks. Bed alarm is on. Call light within reach and patient calls appropriately. Patient room near nursing station.

## 2020-08-04 NOTE — PROGRESS NOTES
[facility] Medicine Daily Progress Note    Date of Service  8/4/2020    Chief Complaint  89 y.o. female admitted 8/3/2020 with h/o compression fracture and falls and h/a.    Hospital Course    89 y.o. female w/h/o hyperlipidemia and hypothyroidism who presented 8/3/2020 with recurrent falls, back pain, weakness.  Patient has dementia and is a poor historian.  Per ERP note, patient has been having low back pain for the past month.  It was gradual in onset.  Last night she was stooped over because of the pain and tripped on a step and fell hitting her head.  She had another fall this morning.  She was having a headache so her son brought her in this morning.  She does not take any blood thinners or aspirin.       Interval Problem Update  S/p fall with chronic back pain  Pt/ot eval    Consultants/Specialty  none    Code Status  DNR    Disposition  TBD    Review of Systems  Review of Systems   Constitutional: Negative for chills, diaphoresis, fever and malaise/fatigue.   HENT: Negative for congestion and hearing loss.    Respiratory: Negative for cough and shortness of breath.    Cardiovascular: Negative for chest pain, palpitations and leg swelling.   Gastrointestinal: Negative for abdominal pain, nausea and vomiting.   Genitourinary: Negative for dysuria and flank pain.   Musculoskeletal: Positive for myalgias. Negative for back pain and joint pain.   Neurological: Positive for weakness. Negative for dizziness, sensory change, speech change, focal weakness and headaches.   Psychiatric/Behavioral: Negative for depression and memory loss. The patient is not nervous/anxious.         Physical Exam  Temp:  [36.3 °C (97.4 °F)-36.6 °C (97.8 °F)] 36.3 °C (97.4 °F)  Pulse:  [64-80] 64  Resp:  [18] 18  BP: (122-153)/(50-70) 139/50  SpO2:  [96 %] 96 %    Physical Exam  Vitals signs and nursing note reviewed.   Constitutional:       General: She is not in acute distress.     Appearance: She is ill-appearing. She is not  toxic-appearing or diaphoretic.   HENT:      Head: Normocephalic and atraumatic.      Nose: Nose normal.   Eyes:      General:         Right eye: No discharge.         Left eye: No discharge.      Pupils: Pupils are equal, round, and reactive to light.   Neck:      Musculoskeletal: Neck supple.      Thyroid: No thyromegaly.      Vascular: No JVD.   Cardiovascular:      Rate and Rhythm: Normal rate.      Heart sounds: No murmur.   Pulmonary:      Effort: No respiratory distress.      Breath sounds: Normal breath sounds. No wheezing.   Abdominal:      General: Bowel sounds are normal. There is no distension.      Palpations: Abdomen is soft.      Tenderness: There is no abdominal tenderness.   Musculoskeletal:         General: Tenderness present.   Skin:     General: Skin is warm and dry.      Findings: Erythema and rash present.   Neurological:      Mental Status: She is alert and oriented to person, place, and time.      Cranial Nerves: No cranial nerve deficit.   Psychiatric:         Behavior: Behavior normal.         Thought Content: Thought content normal.         Fluids    Intake/Output Summary (Last 24 hours) at 8/4/2020 1923  Last data filed at 8/4/2020 1432  Gross per 24 hour   Intake 360 ml   Output 700 ml   Net -340 ml       Laboratory  Recent Labs     08/03/20  1132 08/04/20  0325   WBC 4.6* 4.1*   RBC 3.62* 3.25*   HEMOGLOBIN 12.2 10.8*   HEMATOCRIT 35.6* 31.5*   MCV 98.3* 96.9   MCH 33.7* 33.2*   MCHC 34.3 34.3   RDW 46.1 46.2   PLATELETCT 213 200   MPV 8.8* 8.8*     Recent Labs     08/03/20  1132 08/04/20  0325   SODIUM 128* 129*   POTASSIUM 3.5* 3.6   CHLORIDE 95* 99   CO2 21 22   GLUCOSE 96 80   BUN 18 15   CREATININE 0.94 0.86   CALCIUM 9.4 8.6                   Imaging  CT-HEAD W/O   Final Result      1.  No acute intracranial abnormality      2.  Underlying cerebral volume loss, white matter change, and atherosclerotic plaque      CT-LSPINE W/O PLUS RECONS   Final Result      1.  Moderate to severe  multilevel degenerative change of lumbar spine.   2.  Minimal anterolisthesis at L3-4, likely degenerative.   3.  Multiple chronic compression deformities.   4.  No acute lumbar spine fracture.   5.  Widening of the anterior disc space at L3-4, similar to prior exam.      CT-TSPINE W/O PLUS RECONS   Final Result      1.  Old, worsened T8 compression fracture with associated kyphosis      2.  T5 and T12 compression fracture that are new since 9/1/2017 although the specific acuity is indeterminate      3.  Bilateral atelectasis and small pleural effusion                  No follow up imaging is recommended per consensus guidelines of the 2019 ACR Incidental Findings Committee for probably benign incidental simple appearing renal cystic lesion(s) based on imaging criteria.           Assessment/Plan  * Frequent falls- (present on admission)  Assessment & Plan  Patient does have contusion on her face due to fall.  She is a poor historian however and does not remember the fall  PT OT pending    Sundowning- (present on admission)  Assessment & Plan  Hx of  Will monitor for    Hyponatremia- (present on admission)  Assessment & Plan  Likely hypovolemic from decreased PO intake  Encouraging PO intake and giving IVF    8/4 cont ivf, monitor    Compression fracture of T8 vertebra with routine healing- (present on admission)  Assessment & Plan  T8 is chronic, seen on CT  T5 and T12 unclear from CT acuity    8/4 possible candidate for kyphoplasty given how many separate compression fractures patient has  - will consider if increasing pain, pt/ot    Compression fracture of L5 vertebra with routine healing- (present on admission)  Assessment & Plan  Chronic, seen on CT    Vitamin B12 deficiency- (present on admission)  Assessment & Plan  History of  Previously checked December 19 and not low  elevated      DNR (do not resuscitate)- (present on admission)  Assessment & Plan  Patient was not able to express her current CODE STATUS  due to dementia but chart review showed she was DNR her previous admission and will be continued as such according to her wishes    Cachexia (HCC)- (present on admission)  Assessment & Plan  Dietary consult    HTN (hypertension)- (present on admission)  Assessment & Plan  Continue carvedilol    Dementia (HCC)- (present on admission)  Assessment & Plan  With very poor memory and is a poor historian    Hypokalemia- (present on admission)  Assessment & Plan  Continuing outpatient potassium    Hyperlipidemia- (present on admission)  Assessment & Plan  Not currently on medications for this outpatient    Hypothyroidism- (present on admission)  Assessment & Plan  Continue outpatient levothyroxine  TSH and free T4 - wnl       VTE prophylaxis: scd

## 2020-08-04 NOTE — PROGRESS NOTES
"Assumed pt care at 1900 from day RN. Aware of fall risk status. Bed alarm on. VS /70   Pulse 70   Temp 36.3 °C (97.4 °F) (Temporal)   Resp 18   Ht 1.6 m (5' 3\")   Wt 54.4 kg (120 lb)   SpO2 96%   BMI 21.26 kg/m² . Assessment complete. Discussed plan of care with patient.    "

## 2020-08-04 NOTE — THERAPY
"Physical Therapy   Initial Evaluation     Patient Name: Lamar Dangelo  Age:  89 y.o., Sex:  female  Medical Record #: [id number]  Today's Date: 8/4/2020     Precautions: Fall Risk, Other (See Comments)(dementia?)    Assessment  Patient is 89 y.o. female with a diagnosis of dementia presents from home after GLF. Patient found to have chronic compression fractures. Patient is a poor historian and has been having frequent falls at home. Per the notes, she lives with her sister and reports that she is indep with all mobility and ADLs. Will need to get a better history from son to obtain PLOF. Regardless, patient should have 24/7 supervision 2/2 advanced dementia and risk for unsafe decisions and behavior. Patient would needs 24/7 supervision for safe DC home as well as stair training and HHPT.  Ideally patient would do best in a memory care unit for safety. Please mobilize patient OOB to chair for all meals, blinds up and frequent reorientation as she is at a high risk for delirium     Plan    Recommend Physical Therapy 3 times per week until therapy goals are met for the following treatments:  Gait Training, Neuro Re-Education / Balance, Stair Training, Therapeutic Activities and Therapeutic Exercises    Discharge recommendations: Recommend home health transitional care for continued physical therapy services.     Subjective    \"I fell on a pile of rocks, so now I have rocks in my back\"     Objective     08/04/20 1420   Precautions   Precautions Fall Risk   Comments baseline dementia and T-spine compression fractures    Pain 0 - 10 Group   Location Back   Pain Rating Scale (NPRS) 5   Therapist Pain Assessment Post Activity Pain Same as Prior to Activity   Prior Living Situation   Prior Services None   Housing / Facility 3 Story House   Equipment Owned None   Lives with - Patient's Self Care Capacity Other (Comments)  (lives with sister)   Comments Patient is a poor historian and reports that she lives with her " grandparents and that she doesn't have any kids. However, per notes, she lives with her sister and her son marilou day to the ED   Prior Level of Functional Mobility   Bed Mobility Independent   Transfer Status Independent   Ambulation Independent   Assistive Devices Used None   Comments Patient is an unreliable historian    History of Falls   History of Falls Yes   Date of Last Fall   (frequent falls. Is here for a fall)   Cognition    Cognition / Consciousness X   Orientation Level Not Oriented to Place;Not Oriented to Reason;Not Oriented to Time;Not Oriented to Month;Not Oriented to Year   Level of Consciousness Confused   Ability To Follow Commands 2 Step   Safety Awareness Impaired   New Learning Impaired   Sequencing Impaired   Comments patient with baseline dementia   Active ROM Lower Body    Active ROM Lower Body  WDL   Strength Lower Body   Lower Body Strength  WDL   Sensation Lower Body   Lower Extremity Sensation   WDL   Strength Upper Body   Upper Body Strength  WDL   Balance Assessment   Sitting Balance (Static) Fair -   Sitting Balance (Dynamic) Fair -   Standing Balance (Static) Fair -   Standing Balance (Dynamic) Fair -   Weight Shift Sitting Good   Weight Shift Standing Fair   Comments w/FWW   Gait Analysis   Gait Level Of Assist Minimal Assist   Assistive Device Front Wheel Walker   Distance (Feet) 300   # of Times Distance was Traveled 1   Deviation   (FF trunk)   Weight Bearing Status no restrictions    Skilled Intervention Tactile Cuing;Verbal Cuing   Comments Pt reports that she doesn't use a FWW at baseline but refuses to walk without AD in the hallway   Bed Mobility    Supine to Sit Supervised   Sit to Supine Supervised   Scooting Supervised   Functional Mobility   Sit to Stand Minimal Assist   Mobility ambulated and stepped on and off the scale for weight without physical assist    Comments w/FWW   How much difficulty does the patient currently have...   Turning over in bed (including  adjusting bedclothes, sheets and blankets)? 4   Sitting down on and standing up from a chair with arms (e.g., wheelchair, bedside commode, etc.) 4   Moving from lying on back to sitting on the side of the bed? 3   How much help from another person does the patient currently need...   Moving to and from a bed to a chair (including a wheelchair)? 3   Need to walk in a hospital room? 3   Climbing 3-5 steps with a railing? 3   6 clicks Mobility Score 20   Activity Tolerance   Sitting in Chair 45 min   Sitting Edge of Bed 5 min   Standing 10 min   Short Term Goals    Short Term Goal # 1 in 6 visits patient will perfomr bed mobility via log roll without cueing or use of bed rail for safe DC home   Short Term Goal # 2 in 6 visits patient will ambulate 400' Laura for safe DC home   Short Term Goal # 3 in 6 visits patient will ascend/descend 12 stairs Laura for safe DC home    Education Group   Education Provided Role of Physical Therapist   Role of Physical Therapist Patient Response Patient;Acceptance;Explanation;Demonstration;Reinforcement Needed   Problem List    Problems Pain;Impaired Transfers;Impaired Ambulation;Impaired Balance;Safety Awareness Deficits / Cognition   Anticipated Discharge Equipment   DC Equipment Unable To Determine At This Time     Patricia Pino, PT, DPT, GCS

## 2020-08-04 NOTE — DIETARY
Nutrition services: Day 1 of admit.  89 yo female admitted with hypokalemia, back pain and frequent falls.   Consult received for failure to thrive from MD.    Evaluation:  1. Pt is confused and difficult to obtain information for malnutrition evaluation.  2. Currently on a soft and bite sized diet with thin liquids. Taking % of breakfast today.   3. Scale weight today 110# is decreased 1# from December 2019. Not significant. BMI 19.57.  4. Pt is noted to been having frequent falls.   5. Pt is noted to have dementia and be a poor historian.    Malnutrition risk: not identified at this time.     Recommendations/Plan:  1. encourage intake of meals. Order supplements if pt is taking less than 50% of most meals.  2. document intake of all meals as % taken in ADL's to provide interdisciplinary communication across all shifts   3. monitor daily weights  4. Nutrition rep will continue to see patient for ongoing meal and snack preferences.

## 2020-08-04 NOTE — ASSESSMENT & PLAN NOTE
With very poor memory and is a poor historian    8/5  requires frequent reorientation, refusing prescription, intermittently

## 2020-08-04 NOTE — ASSESSMENT & PLAN NOTE
Continue carvedilol    8/5 uncontrolled  We will add lisinopril    8/6 with hypotension last evening  Now controlled  Dc acei

## 2020-08-04 NOTE — ASSESSMENT & PLAN NOTE
Patient was not able to express her current CODE STATUS due to dementia but chart review showed she was DNR her previous admission and will be continued as such according to her wishes

## 2020-08-05 LAB
ANION GAP SERPL CALC-SCNC: 14 MMOL/L (ref 7–16)
BASOPHILS # BLD AUTO: 0.2 % (ref 0–1.8)
BASOPHILS # BLD: 0.01 K/UL (ref 0–0.12)
BUN SERPL-MCNC: 9 MG/DL (ref 8–22)
CALCIUM SERPL-MCNC: 9.1 MG/DL (ref 8.5–10.5)
CHLORIDE SERPL-SCNC: 95 MMOL/L (ref 96–112)
CO2 SERPL-SCNC: 21 MMOL/L (ref 20–33)
CREAT SERPL-MCNC: 0.97 MG/DL (ref 0.5–1.4)
EOSINOPHIL # BLD AUTO: 0.04 K/UL (ref 0–0.51)
EOSINOPHIL NFR BLD: 0.8 % (ref 0–6.9)
ERYTHROCYTE [DISTWIDTH] IN BLOOD BY AUTOMATED COUNT: 44.9 FL (ref 35.9–50)
GLUCOSE SERPL-MCNC: 128 MG/DL (ref 65–99)
HCT VFR BLD AUTO: 37.4 % (ref 37–47)
HGB BLD-MCNC: 13 G/DL (ref 12–16)
IMM GRANULOCYTES # BLD AUTO: 0.03 K/UL (ref 0–0.11)
IMM GRANULOCYTES NFR BLD AUTO: 0.6 % (ref 0–0.9)
LYMPHOCYTES # BLD AUTO: 0.69 K/UL (ref 1–4.8)
LYMPHOCYTES NFR BLD: 13 % (ref 22–41)
MCH RBC QN AUTO: 32.9 PG (ref 27–33)
MCHC RBC AUTO-ENTMCNC: 34.8 G/DL (ref 33.6–35)
MCV RBC AUTO: 94.7 FL (ref 81.4–97.8)
MONOCYTES # BLD AUTO: 0.59 K/UL (ref 0–0.85)
MONOCYTES NFR BLD AUTO: 11.1 % (ref 0–13.4)
NEUTROPHILS # BLD AUTO: 3.95 K/UL (ref 2–7.15)
NEUTROPHILS NFR BLD: 74.3 % (ref 44–72)
NRBC # BLD AUTO: 0 K/UL
NRBC BLD-RTO: 0 /100 WBC
PLATELET # BLD AUTO: 265 K/UL (ref 164–446)
PMV BLD AUTO: 8.7 FL (ref 9–12.9)
POTASSIUM SERPL-SCNC: 3.6 MMOL/L (ref 3.6–5.5)
RBC # BLD AUTO: 3.95 M/UL (ref 4.2–5.4)
SODIUM SERPL-SCNC: 130 MMOL/L (ref 135–145)
WBC # BLD AUTO: 5.3 K/UL (ref 4.8–10.8)

## 2020-08-05 PROCEDURE — A9270 NON-COVERED ITEM OR SERVICE: HCPCS | Performed by: HOSPITALIST

## 2020-08-05 PROCEDURE — 700102 HCHG RX REV CODE 250 W/ 637 OVERRIDE(OP): Performed by: INTERNAL MEDICINE

## 2020-08-05 PROCEDURE — 80048 BASIC METABOLIC PNL TOTAL CA: CPT

## 2020-08-05 PROCEDURE — 700102 HCHG RX REV CODE 250 W/ 637 OVERRIDE(OP): Performed by: HOSPITALIST

## 2020-08-05 PROCEDURE — 700111 HCHG RX REV CODE 636 W/ 250 OVERRIDE (IP): Performed by: HOSPITALIST

## 2020-08-05 PROCEDURE — 700105 HCHG RX REV CODE 258: Performed by: HOSPITALIST

## 2020-08-05 PROCEDURE — 85025 COMPLETE CBC W/AUTO DIFF WBC: CPT

## 2020-08-05 PROCEDURE — 36415 COLL VENOUS BLD VENIPUNCTURE: CPT

## 2020-08-05 PROCEDURE — 770006 HCHG ROOM/CARE - MED/SURG/GYN SEMI*

## 2020-08-05 PROCEDURE — 99233 SBSQ HOSP IP/OBS HIGH 50: CPT | Performed by: INTERNAL MEDICINE

## 2020-08-05 PROCEDURE — A9270 NON-COVERED ITEM OR SERVICE: HCPCS | Performed by: INTERNAL MEDICINE

## 2020-08-05 RX ORDER — LISINOPRIL 10 MG/1
10 TABLET ORAL
Status: DISCONTINUED | OUTPATIENT
Start: 2020-08-05 | End: 2020-08-05

## 2020-08-05 RX ORDER — SODIUM CHLORIDE 9 MG/ML
500 INJECTION, SOLUTION INTRAVENOUS ONCE
Status: COMPLETED | OUTPATIENT
Start: 2020-08-05 | End: 2020-08-05

## 2020-08-05 RX ADMIN — CARVEDILOL 12.5 MG: 12.5 TABLET, FILM COATED ORAL at 17:17

## 2020-08-05 RX ADMIN — HEPARIN SODIUM 5000 UNITS: 5000 INJECTION, SOLUTION INTRAVENOUS; SUBCUTANEOUS at 20:41

## 2020-08-05 RX ADMIN — CARVEDILOL 12.5 MG: 12.5 TABLET, FILM COATED ORAL at 08:21

## 2020-08-05 RX ADMIN — ACETAMINOPHEN 650 MG: 325 TABLET, FILM COATED ORAL at 08:21

## 2020-08-05 RX ADMIN — POTASSIUM CHLORIDE 20 MEQ: 1500 TABLET, EXTENDED RELEASE ORAL at 17:18

## 2020-08-05 RX ADMIN — DOCUSATE SODIUM 50 MG AND SENNOSIDES 8.6 MG 2 TABLET: 8.6; 5 TABLET, FILM COATED ORAL at 17:18

## 2020-08-05 RX ADMIN — SODIUM CHLORIDE 500 ML: 9 INJECTION, SOLUTION INTRAVENOUS at 20:15

## 2020-08-05 RX ADMIN — LISINOPRIL 10 MG: 10 TABLET ORAL at 11:40

## 2020-08-05 ASSESSMENT — ENCOUNTER SYMPTOMS
WEAKNESS: 1
FOCAL WEAKNESS: 0
SENSORY CHANGE: 0
BLURRED VISION: 0
DOUBLE VISION: 0
NERVOUS/ANXIOUS: 0
SPEECH CHANGE: 0
FEVER: 0
CHILLS: 0
MYALGIAS: 1
HEADACHES: 0
NAUSEA: 0
ABDOMINAL PAIN: 0
MEMORY LOSS: 1
FLANK PAIN: 0
DIZZINESS: 0
SHORTNESS OF BREATH: 0
BACK PAIN: 0

## 2020-08-05 ASSESSMENT — PATIENT HEALTH QUESTIONNAIRE - PHQ9
SUM OF ALL RESPONSES TO PHQ9 QUESTIONS 1 AND 2: 0
2. FEELING DOWN, DEPRESSED, IRRITABLE, OR HOPELESS: NOT AT ALL
1. LITTLE INTEREST OR PLEASURE IN DOING THINGS: NOT AT ALL

## 2020-08-05 NOTE — PROGRESS NOTES
Notified Dr. Fuller about patient refusing IV and about increased BP. Dr. Fuller states it is ok for patient to not have IV but to encourage her to take her oral medications. Also notified Dr. Fuller patient has been up to bathroom, using her call light and vest restraint removed and bed alarm in place.

## 2020-08-05 NOTE — PROGRESS NOTES
Bedside shift report received from night nurse. Plan of care discussed. Patient resting with eyes closed and non labored respirations in vest restraint. Bed locked, in lowest position. No needs at this time. Communication board updated. Call light and personal belongings within reach. Bed alarm in place. Will continue to monitor.

## 2020-08-05 NOTE — PROGRESS NOTES
New orders for lisinopril. BP taken 120/51/ Patient agreeable to taking medication whole by mouth and did great.

## 2020-08-05 NOTE — DISCHARGE PLANNING
Care Transition Team Assessment    Information Source  Orientation : Disoriented to Time, Disoriented to Event  Information Given By: Patient  Informant's Name: Lamar  Who is responsible for making decisions for patient? : Patient         Elopement Risk  Legal Hold: No  Ambulatory or Self Mobile in Wheelchair: Yes  Disoriented: Place-At Risk for Elopement, Time-At Risk for Elopement, Situation-At Risk for Elopement, Person-At Risk for Elopement  Elopement Risk: Not at Risk for Elopement    Interdisciplinary Discharge Planning  Does Admitting Nurse Feel This Could be a Complex Discharge?: No  Primary Care Physician: John Garces MD  Lives with - Patient's Self Care Capacity: Other (Comments)(siblings)  Patient or legal guardian wants to designate a caregiver (see row info): No  Support Systems: Friends / Neighbors, Family Member(s)  Housing / Facility: [address]  Do You Take your Prescribed Medications Regularly: Yes  Reasons Why Not Taking Medications : Memory Issues  Able to Return to Previous ADL's: Future Time w/Therapy  Mobility Issues: Yes  Prior Services: Intermittent Physical Support for ADL Per Family  Patient Expects to be Discharged to:: Home with Home Health   Assistance Needed: Yes  Durable Medical Equipment: Unknown    Discharge Preparedness  What is your plan after discharge?: Home health care  What are your discharge supports?: Sibling  Prior Functional Level: Ambulatory, Needs Assist with Medication Management, Needs Assist with Activities of Daily Living    Functional Assesment  Prior Functional Level: Ambulatory, Needs Assist with Medication Management, Needs Assist with Activities of Daily Living    Finances  Financial Barriers to Discharge: No  Prescription Coverage: Yes    Vision / Hearing Impairment  Vision Impairment : No  Hearing Impairment : No              Domestic Abuse  Have you ever been the victim of abuse or violence?: No  Physical Abuse or Sexual Abuse: No  Verbal Abuse or  Emotional Abuse: No  Possible Abuse Reported to:: Not Applicable         Discharge Risks or Barriers  Discharge risks or barriers?: No    Anticipated Discharge Information  Discharge Disposition: D/T to home under HHA care in anticipation of covered skilled care (06)  Discharge Address: [address] [place] [address]

## 2020-08-05 NOTE — PROGRESS NOTES
[facility] Medicine Daily Progress Note    Date of Service  8/5/2020    Chief Complaint  89 y.o. female admitted 8/3/2020 with h/o compression fracture and falls and h/a.    Hospital Course    89 y.o. female w/h/o hyperlipidemia and hypothyroidism who presented 8/3/2020 with recurrent falls, back pain, weakness.  Patient has dementia and is a poor historian.  Per ERP note, patient has been having low back pain for the past month.  It was gradual in onset.  Last night she was stooped over because of the pain and tripped on a step and fell hitting her head.  She had another fall this morning.  She was having a headache so her son brought her in this morning.  She does not take any blood thinners or aspirin.       Interval Problem Update    Patient with agitation, removed IV line  No IV access at this point  Uncontrolled hypertension, intermittently refusing prescription  Patient easily reoriented, encouraged prescription intake  Reports generalized weakness  Oriented x1 to person  States she lives with family    As per PT/OT evaluation, min assist with front wheel walker  Possible home with home health if able to provide 24//7 assistance,  to assist    Consultants/Specialty  none    Code Status  DNR    Disposition  Home versus skilled nursing facility placement  Will need 24/7 assistance   to assist  If home, will order home health    Review of Systems  Review of Systems   Constitutional: Negative for chills, fever and malaise/fatigue.   HENT: Negative for congestion and hearing loss.    Eyes: Negative for blurred vision and double vision.   Respiratory: Negative for shortness of breath.    Cardiovascular: Negative for chest pain and leg swelling.   Gastrointestinal: Negative for abdominal pain and nausea.   Genitourinary: Negative for dysuria and flank pain.   Musculoskeletal: Positive for myalgias. Negative for back pain.   Neurological: Positive for weakness. Negative for dizziness, sensory  change, speech change, focal weakness and headaches.   Psychiatric/Behavioral: Positive for memory loss. The patient is not nervous/anxious.         Physical Exam  Temp:  [36.3 °C (97.3 °F)-36.5 °C (97.7 °F)] 36.3 °C (97.3 °F)  Pulse:  [64-68] 68  Resp:  [18] 18  BP: (120-184)/(50-93) 120/51  SpO2:  [95 %-96 %] 96 %    Physical Exam  Vitals signs and nursing note reviewed.   Constitutional:       General: She is not in acute distress.     Appearance: She is ill-appearing. She is not diaphoretic.   HENT:      Head: Normocephalic and atraumatic.      Nose: Nose normal.   Eyes:      Pupils: Pupils are equal, round, and reactive to light.   Neck:      Musculoskeletal: Neck supple.      Thyroid: No thyromegaly.      Vascular: No JVD.   Cardiovascular:      Rate and Rhythm: Normal rate.   Pulmonary:      Effort: No respiratory distress.      Breath sounds: Normal breath sounds.   Abdominal:      General: Bowel sounds are normal. There is no distension.      Palpations: Abdomen is soft.   Musculoskeletal:         General: No swelling or tenderness.   Skin:     General: Skin is warm and dry.      Findings: No erythema or rash.   Neurological:      Mental Status: She is alert and oriented to person, place, and time.      Cranial Nerves: No cranial nerve deficit.      Motor: Weakness present.   Psychiatric:         Behavior: Behavior normal.         Thought Content: Thought content normal.         Fluids    Intake/Output Summary (Last 24 hours) at 8/5/2020 1401  Last data filed at 8/5/2020 1140  Gross per 24 hour   Intake 480 ml   Output --   Net 480 ml       Laboratory  Recent Labs     08/03/20  1132 08/04/20  0325   WBC 4.6* 4.1*   RBC 3.62* 3.25*   HEMOGLOBIN 12.2 10.8*   HEMATOCRIT 35.6* 31.5*   MCV 98.3* 96.9   MCH 33.7* 33.2*   MCHC 34.3 34.3   RDW 46.1 46.2   PLATELETCT 213 200   MPV 8.8* 8.8*     Recent Labs     08/03/20  1132 08/04/20  0325   SODIUM 128* 129*   POTASSIUM 3.5* 3.6   CHLORIDE 95* 99   CO2 21 22    GLUCOSE 96 80   BUN 18 15   CREATININE 0.94 0.86   CALCIUM 9.4 8.6                   Imaging  CT-HEAD W/O   Final Result      1.  No acute intracranial abnormality      2.  Underlying cerebral volume loss, white matter change, and atherosclerotic plaque      CT-LSPINE W/O PLUS RECONS   Final Result      1.  Moderate to severe multilevel degenerative change of lumbar spine.   2.  Minimal anterolisthesis at L3-4, likely degenerative.   3.  Multiple chronic compression deformities.   4.  No acute lumbar spine fracture.   5.  Widening of the anterior disc space at L3-4, similar to prior exam.      CT-TSPINE W/O PLUS RECONS   Final Result      1.  Old, worsened T8 compression fracture with associated kyphosis      2.  T5 and T12 compression fracture that are new since 9/1/2017 although the specific acuity is indeterminate      3.  Bilateral atelectasis and small pleural effusion                  No follow up imaging is recommended per consensus guidelines of the 2019 ACR Incidental Findings Committee for probably benign incidental simple appearing renal cystic lesion(s) based on imaging criteria.           Assessment/Plan  * Frequent falls- (present on admission)  Assessment & Plan  Patient does have contusion on her face due to fall.  She is a poor historian however and does not remember the fall  PT OT -min assistance    Sundowning- (present on admission)  Assessment & Plan  Hx of  Will monitor for    Hyponatremia- (present on admission)  Assessment & Plan  Likely hypovolemic from decreased PO intake  Encouraging PO intake and giving IVF    8/4 cont ivf, monitor    Compression fracture of T8 vertebra with routine healing- (present on admission)  Assessment & Plan  T8 is chronic, seen on CT  T5 and T12 unclear from CT acuity    8/4 possible candidate for kyphoplasty given how many separate compression fractures patient has  - will consider if increasing pain, pt/ot    8/5 PT/OT, min assistance with front wheel  walker  Recommending home health, if able with patient family able to provide 24/7 assistance   to assist  Encourage activity    Compression fracture of L5 vertebra with routine healing- (present on admission)  Assessment & Plan  Chronic, seen on CT    Vitamin B12 deficiency- (present on admission)  Assessment & Plan  History of  Previously checked December 19 and not low  elevated      DNR (do not resuscitate)- (present on admission)  Assessment & Plan  Patient was not able to express her current CODE STATUS due to dementia but chart review showed she was DNR her previous admission and will be continued as such according to her wishes    Cachexia (HCC)- (present on admission)  Assessment & Plan  Dietary consult    HTN (hypertension)- (present on admission)  Assessment & Plan  Continue carvedilol    8/5 uncontrolled  We will add lisinopril    Dementia (HCC)- (present on admission)  Assessment & Plan  With very poor memory and is a poor historian    8/5  requires frequent reorientation, refusing prescription, intermittently    Hypokalemia- (present on admission)  Assessment & Plan  Continuing outpatient potassium    Hyperlipidemia- (present on admission)  Assessment & Plan  Not currently on medications for this outpatient    Hypothyroidism- (present on admission)  Assessment & Plan  Continue outpatient levothyroxine  TSH and free T4 - wnl       VTE prophylaxis: scd

## 2020-08-05 NOTE — DISCHARGE PLANNING
Anticipated Disposition:  Home with Home Health    Action:   This CM met with Pt in her room, obtained assessment information. Pt lives with Ellie mike, and Blas retired caregiver/friend comes over to their house 3 times/ day to help with meals. Acacia friend who lives across from the street also check on Pt all the time, provide ride to doctor's appointment. Pt would like to go home with home health. This CM called Ellie mike verified above information. Blas was also put on the phone, stated she will help take care of Pt.   [facility] selected by Pt, choice form signed and faxed to [facility].           Barriers to Discharge:   [facility] acceptance.   Medical clearance      Plan:  Please follow up with [facility] for HH acceptance  Please follow up with attending physician for medical clearance.

## 2020-08-05 NOTE — PROGRESS NOTES
"Assumed pt care at 1900 from day RN. Aware of fall risk status. Patient is confused. Bed alarm on. Pt continues to try to get out of bed to \"go home\". Lap belt on and pt knows how to remove.    Pt pulled IV out for the second time. Refusing to let staff start another one. Rounding ALEJANDRA notified. VS BP (!) 168/93   Pulse 65   Temp 36.5 °C (97.7 °F) (Temporal)   Resp 18   Ht 1.6 m (5' 3\")   Wt 50.1 kg (110 lb 7.2 oz)   SpO2 95%   BMI 19.57 kg/m² . PRN BP medication is IV and no access. Rounding APRN notified and aware. Pt is also refusing to let staff recheck her VS. Pt refusing all medications. APRN also notified of noncompliance.     Pt educated about benefits/risk of non compliance. Pt still refusing VS, medications, and treatment.      "

## 2020-08-05 NOTE — PROGRESS NOTES
"Inquired with patient if we could please start an IV to help lower her blood pressure. Patient states, \"No please don't do that. I have enough in me at this time.\" Tried to educate patient on the importance. Patient states, \"No. Please don't do that to me.\" Will continue to try.  "

## 2020-08-05 NOTE — PROGRESS NOTES
"Patient up to bathroom x1 assist. Patient urinated in toilet and walked back to bed. Patient stating she is in a lot of pain in her back area. Informed patient that her BP is high and I have some medicine for her. Patient states ok I will take it. Crushed in applesauce and patient took small bite and states ok that is enough. Informed patient she needs to take another bite and states, \"no that is enough. I do not want anymore at this time.\" Will continue to try.   "

## 2020-08-06 ENCOUNTER — HOME HEALTH ADMISSION (OUTPATIENT)
Dept: HOME HEALTH SERVICES | Facility: HOME HEALTHCARE | Age: 85
End: 2020-08-06
Payer: MEDICARE

## 2020-08-06 LAB
ANION GAP SERPL CALC-SCNC: 12 MMOL/L (ref 7–16)
BUN SERPL-MCNC: 9 MG/DL (ref 8–22)
CALCIUM SERPL-MCNC: 9.3 MG/DL (ref 8.5–10.5)
CHLORIDE SERPL-SCNC: 95 MMOL/L (ref 96–112)
CO2 SERPL-SCNC: 22 MMOL/L (ref 20–33)
CREAT SERPL-MCNC: 0.86 MG/DL (ref 0.5–1.4)
GLUCOSE SERPL-MCNC: 126 MG/DL (ref 65–99)
POTASSIUM SERPL-SCNC: 4 MMOL/L (ref 3.6–5.5)
SODIUM SERPL-SCNC: 129 MMOL/L (ref 135–145)

## 2020-08-06 PROCEDURE — 36415 COLL VENOUS BLD VENIPUNCTURE: CPT

## 2020-08-06 PROCEDURE — 99232 SBSQ HOSP IP/OBS MODERATE 35: CPT | Performed by: INTERNAL MEDICINE

## 2020-08-06 PROCEDURE — 700111 HCHG RX REV CODE 636 W/ 250 OVERRIDE (IP): Performed by: HOSPITALIST

## 2020-08-06 PROCEDURE — 97535 SELF CARE MNGMENT TRAINING: CPT

## 2020-08-06 PROCEDURE — 700102 HCHG RX REV CODE 250 W/ 637 OVERRIDE(OP): Performed by: HOSPITALIST

## 2020-08-06 PROCEDURE — A9270 NON-COVERED ITEM OR SERVICE: HCPCS | Performed by: HOSPITALIST

## 2020-08-06 PROCEDURE — 80048 BASIC METABOLIC PNL TOTAL CA: CPT

## 2020-08-06 PROCEDURE — 770006 HCHG ROOM/CARE - MED/SURG/GYN SEMI*

## 2020-08-06 RX ADMIN — HEPARIN SODIUM 5000 UNITS: 5000 INJECTION, SOLUTION INTRAVENOUS; SUBCUTANEOUS at 05:35

## 2020-08-06 RX ADMIN — DOCUSATE SODIUM 50 MG AND SENNOSIDES 8.6 MG 2 TABLET: 8.6; 5 TABLET, FILM COATED ORAL at 05:35

## 2020-08-06 RX ADMIN — CARVEDILOL 12.5 MG: 12.5 TABLET, FILM COATED ORAL at 17:12

## 2020-08-06 RX ADMIN — LEVOTHYROXINE SODIUM 50 MCG: 25 TABLET ORAL at 05:35

## 2020-08-06 RX ADMIN — FERROUS SULFATE TAB 325 MG (65 MG ELEMENTAL FE) 325 MG: 325 (65 FE) TAB at 05:35

## 2020-08-06 RX ADMIN — CARVEDILOL 12.5 MG: 12.5 TABLET, FILM COATED ORAL at 07:51

## 2020-08-06 RX ADMIN — POTASSIUM CHLORIDE 20 MEQ: 1500 TABLET, EXTENDED RELEASE ORAL at 17:12

## 2020-08-06 RX ADMIN — CYANOCOBALAMIN TAB 500 MCG 1000 MCG: 500 TAB at 05:35

## 2020-08-06 RX ADMIN — POTASSIUM CHLORIDE 20 MEQ: 1500 TABLET, EXTENDED RELEASE ORAL at 05:35

## 2020-08-06 RX ADMIN — HEPARIN SODIUM 5000 UNITS: 5000 INJECTION, SOLUTION INTRAVENOUS; SUBCUTANEOUS at 12:11

## 2020-08-06 ASSESSMENT — ENCOUNTER SYMPTOMS
NAUSEA: 0
MYALGIAS: 1
COUGH: 0
ABDOMINAL PAIN: 0
HEADACHES: 0
FEVER: 0
NERVOUS/ANXIOUS: 0
BACK PAIN: 0
PALPITATIONS: 0
MEMORY LOSS: 1
CHILLS: 0
WEAKNESS: 1
SHORTNESS OF BREATH: 0
FOCAL WEAKNESS: 0
HEARTBURN: 0

## 2020-08-06 ASSESSMENT — COGNITIVE AND FUNCTIONAL STATUS - GENERAL
DRESSING REGULAR LOWER BODY CLOTHING: A LITTLE
DAILY ACTIVITIY SCORE: 20
DRESSING REGULAR UPPER BODY CLOTHING: A LITTLE
TOILETING: A LITTLE
SUGGESTED CMS G CODE MODIFIER DAILY ACTIVITY: CJ
HELP NEEDED FOR BATHING: A LITTLE

## 2020-08-06 NOTE — DISCHARGE PLANNING
ATTN: Case Management  RE: Referral for Home Health    As of 08/06/2020,  we have accepted the Home Health referral for the patient listed above.    A [facility] clinician will be out to see the patient within 48 hours. If you have any questions or concerns regarding the patient's transition to Home Health, please do not hesitate to contact us at [phone].      We look forward to collaborating with you,  [facility] Home Health Team

## 2020-08-06 NOTE — PROGRESS NOTES
RN notified by CNA that pt's BP 80/52. HR 60-70s. Pt asymptomatic on assessment. Hospitalist paged, orders received for 500 ml NS bolus and DC lisinopril. Will monitor.

## 2020-08-06 NOTE — CARE PLAN
Problem: Discharge Barriers/Planning  Goal: Patient's continuum of care needs will be met  Outcome: PROGRESSING AS EXPECTED     Problem: Knowledge Deficit  Goal: Knowledge of disease process/condition, treatment plan, diagnostic tests, and medications will improve  Outcome: PROGRESSING SLOWER THAN EXPECTED     Pt confused, reoriented and reeducated throughout shift. Pt to be discharged tomorrow am with home health. Will continue to monitor.

## 2020-08-06 NOTE — FACE TO FACE
Face to Face Supporting Documentation - Home Health    The encounter with this patient was in whole or in part the primary reason for home health admission.    Date of encounter:   Patient:                    MRN:                       YOB: 2020  Lamar Dangelo  [id number]  6/5/1929     Home health to see patient for:  Skilled Nursing care for assessment, interventions & education, Physical Therapy evaluation and treatment and Occupational therapy evaluation and treatment    Skilled need for:  Exacerbation of Chronic Disease State frequent falls with back pain, compression fracture    Skilled nursing interventions to include:  Comment: pt/ot    Homebound status evidenced by:  Need the aid of supportive devices such as crutches, canes, wheelchairs or walkers or Needs the assistance of another person in order to leave the home. Leaving home requires a considerable and taxing effort. There is a normal inability to leave the home.    Community Physician to provide follow up care: John Garces M.D.     Optional Interventions? No      I certify the face to face encounter for this home health care referral meets the CMS requirements and the encounter/clinical assessment with the patient was, in whole, or in part, for the medical condition(s) listed above, which is the primary reason for home health care. Based on my clinical findings: the service(s) are medically necessary, support the need for home health care, and the homebound criteria are met.  I certify that this patient has had a face to face encounter by myself.  Diamond Fuller D.O. - BRANDENI: [phone]

## 2020-08-06 NOTE — DISCHARGE PLANNING
Anticipated Disposition:  Home with [facility]    Action:   Per [facility], no HH referral order.   This CM TTed  to obtain HH order.          Barriers to Discharge:   Medical clearance  Home Health acceptance      Plan:  Please follow up with attending physician for medical clearance.   Please follow up with [facility] for HH acceptance.

## 2020-08-06 NOTE — THERAPY
"Occupational Therapy  Daily Treatment     Patient Name: Lamar Dangelo  Age:  89 y.o., Sex:  female  Medical Record #: [id number]  Today's Date: 8/6/2020     Precautions  Precautions: Fall Risk  Comments: baseline dementia and T-spine compression fractures     Assessment    Pt is making progress toward her goals. She is limited by confusion and decreased activity tolerance.     Plan    Continue current treatment plan.       Discharge Recommendations: Recommend home health for continued occupational therapy services(with family and friends providing supervision and assist)    Subjective    \"Thank you for helping me.\"      Objective       08/06/20 1514   Cognition    Cognition / Consciousness X   Level of Consciousness Confused   Comments pleasant and cooperative, disoriented to situation but very clear that she wants to go home    Bed Mobility    Sit to Supine Supervised   Scooting Supervised   Activities of Daily Living   Grooming Supervision;Standing  (verbal cues to locate items on sink )   Upper Body Dressing Minimal Assist  (gown )   Lower Body Dressing Supervision  (socks)   Toileting Supervision  (pt up in BR having BM when OT arrived )   Functional Mobility   Sit to Stand Supervised   Toilet Transfers Supervised   Short Term Goals   Short Term Goal # 1 pt will complete grooming standing at sink w/spv    Goal Outcome # 1 Goal met   Short Term Goal # 2 pt will complete FB dressing w/spv    Goal Outcome # 2 Progressing as expected   Short Term Goal # 3 pt will complete toilet txf w/spv    Goal Outcome # 3 Goal met         "

## 2020-08-06 NOTE — PROGRESS NOTES
[facility] Medicine Daily Progress Note    Date of Service  8/6/2020    Chief Complaint  89 y.o. female admitted 8/3/2020 with h/o compression fracture and falls and h/a.    Hospital Course    89 y.o. female w/h/o hyperlipidemia and hypothyroidism who presented 8/3/2020 with recurrent falls, back pain, weakness.  Patient has dementia and is a poor historian.  Per ERP note, patient has been having low back pain for the past month.  It was gradual in onset.  Last night she was stooped over because of the pain and tripped on a step and fell hitting her head.  She had another fall this morning.  She was having a headache so her son brought her in this morning.  She does not take any blood thinners or aspirin.       Interval Problem Update    Resting, easily arousable  PT OT with supervised assistance  Per , patient has family and friends support at home with 24/7 assistance    Consultants/Specialty  none    Code Status  DNR    Disposition  Home with home health in a.m. on August 7      Review of Systems  Review of Systems   Constitutional: Negative for chills and fever.   HENT: Negative for congestion and hearing loss.    Respiratory: Negative for cough and shortness of breath.    Cardiovascular: Negative for palpitations and leg swelling.   Gastrointestinal: Negative for abdominal pain, heartburn and nausea.   Genitourinary: Negative for dysuria.   Musculoskeletal: Positive for myalgias. Negative for back pain.   Neurological: Positive for weakness. Negative for focal weakness and headaches.   Psychiatric/Behavioral: Positive for memory loss. The patient is not nervous/anxious.         Physical Exam  Temp:  [36.1 °C (97 °F)-36.7 °C (98.1 °F)] 36.1 °C (97 °F)  Pulse:  [63-72] 70  Resp:  [16-18] 16  BP: ()/(51-67) 119/64  SpO2:  [93 %-95 %] 94 %    Physical Exam  Vitals signs and nursing note reviewed.   Constitutional:       General: She is not in acute distress.     Appearance: She is ill-appearing.    HENT:      Head: Normocephalic and atraumatic.      Nose: Nose normal.   Eyes:      Pupils: Pupils are equal, round, and reactive to light.   Neck:      Musculoskeletal: Neck supple.      Thyroid: No thyromegaly.      Vascular: No JVD.   Cardiovascular:      Rate and Rhythm: Normal rate.   Pulmonary:      Effort: Pulmonary effort is normal. No respiratory distress.   Abdominal:      General: Bowel sounds are normal. There is no distension.      Palpations: Abdomen is soft.   Musculoskeletal:         General: No swelling or tenderness.   Skin:     General: Skin is warm and dry.   Neurological:      Mental Status: She is alert and oriented to person, place, and time.      Cranial Nerves: No cranial nerve deficit.      Motor: Weakness present.   Psychiatric:         Mood and Affect: Mood normal.         Thought Content: Thought content normal.         Fluids    Intake/Output Summary (Last 24 hours) at 8/6/2020 1319  Last data filed at 8/6/2020 0552  Gross per 24 hour   Intake 1250 ml   Output --   Net 1250 ml       Laboratory  Recent Labs     08/04/20 0325 08/05/20  1354   WBC 4.1* 5.3   RBC 3.25* 3.95*   HEMOGLOBIN 10.8* 13.0   HEMATOCRIT 31.5* 37.4   MCV 96.9 94.7   MCH 33.2* 32.9   MCHC 34.3 34.8   RDW 46.2 44.9   PLATELETCT 200 265   MPV 8.8* 8.7*     Recent Labs     08/04/20  0325 08/05/20  1354 08/06/20  0942   SODIUM 129* 130* 129*   POTASSIUM 3.6 3.6 4.0   CHLORIDE 99 95* 95*   CO2 22 21 22   GLUCOSE 80 128* 126*   BUN 15 9 9   CREATININE 0.86 0.97 0.86   CALCIUM 8.6 9.1 9.3                   Imaging  CT-HEAD W/O   Final Result      1.  No acute intracranial abnormality      2.  Underlying cerebral volume loss, white matter change, and atherosclerotic plaque      CT-LSPINE W/O PLUS RECONS   Final Result      1.  Moderate to severe multilevel degenerative change of lumbar spine.   2.  Minimal anterolisthesis at L3-4, likely degenerative.   3.  Multiple chronic compression deformities.   4.  No acute lumbar  spine fracture.   5.  Widening of the anterior disc space at L3-4, similar to prior exam.      CT-TSPINE W/O PLUS RECONS   Final Result      1.  Old, worsened T8 compression fracture with associated kyphosis      2.  T5 and T12 compression fracture that are new since 9/1/2017 although the specific acuity is indeterminate      3.  Bilateral atelectasis and small pleural effusion                  No follow up imaging is recommended per consensus guidelines of the 2019 ACR Incidental Findings Committee for probably benign incidental simple appearing renal cystic lesion(s) based on imaging criteria.           Assessment/Plan  * Frequent falls- (present on admission)  Assessment & Plan  Patient does have contusion on her face due to fall.  She is a poor historian however and does not remember the fall  PT OT -min assistance    Sundowning- (present on admission)  Assessment & Plan  Hx of  Will monitor for    Hyponatremia- (present on admission)  Assessment & Plan  Likely hypovolemic from decreased PO intake  Encouraging PO intake and giving IVF    8/4 cont ivf, monitor    Compression fracture of T8 vertebra with routine healing- (present on admission)  Assessment & Plan  T8 is chronic, seen on CT  T5 and T12 unclear from CT acuity    8/4 possible candidate for kyphoplasty given how many separate compression fractures patient has  - will consider if increasing pain, pt/ot    8/5 PT/OT, min assistance with front wheel walker  Recommending home health, if able with patient family able to provide 24/7 assistance   to assist  Encourage activity    8/6 home health ordered  -encourage activity  Has home support    Compression fracture of L5 vertebra with routine healing- (present on admission)  Assessment & Plan  Chronic, seen on CT    Vitamin B12 deficiency- (present on admission)  Assessment & Plan  History of  Previously checked December 19 and not low  elevated      DNR (do not resuscitate)- (present on  admission)  Assessment & Plan  Patient was not able to express her current CODE STATUS due to dementia but chart review showed she was DNR her previous admission and will be continued as such according to her wishes    Cachexia (HCC)- (present on admission)  Assessment & Plan  Dietary consult    HTN (hypertension)- (present on admission)  Assessment & Plan  Continue carvedilol    8/5 uncontrolled  We will add lisinopril    8/6 with hypotension last evening  Now controlled  Dc acei    Dementia (HCC)- (present on admission)  Assessment & Plan  With very poor memory and is a poor historian    8/5  requires frequent reorientation, refusing prescription, intermittently    Hypokalemia- (present on admission)  Assessment & Plan  Continuing outpatient potassium    Hyperlipidemia- (present on admission)  Assessment & Plan  Not currently on medications for this outpatient    Hypothyroidism- (present on admission)  Assessment & Plan  Continue outpatient levothyroxine  TSH and free T4 - wnl       VTE prophylaxis: scd

## 2020-08-06 NOTE — CARE PLAN
Problem: Communication  Goal: The ability to communicate needs accurately and effectively will improve  Outcome: PROGRESSING AS EXPECTED     Problem: Safety  Goal: Will remain free from falls  Outcome: PROGRESSING AS EXPECTED     Problem: Mobility  Goal: Risk for activity intolerance will decrease  Outcome: PROGRESSING AS EXPECTED     Problem: Bowel/Gastric:  Goal: Normal bowel function is maintained or improved  Outcome: PROGRESSING SLOWER THAN EXPECTED  Note: Last BM PTA except for smears. Abdomen is slightly firm and distended. Bowel sounds present. Pt c/o pain in RUQ abd with activity. Will monitor.     Problem: Safety - Medical Restraint  Goal: Remains free of injury from restraints (Restraint for Interference with Medical Device)  Description: INTERVENTIONS:  1. Determine that other, less restrictive measures have been tried or would not be effective before applying the restraint  2. Evaluate the patient's condition at the time of restraint application  3. Inform patient/family regarding the reason for restraint  4. Q2H: Monitor safety, psychosocial status, comfort, nutrition and hydration  Outcome: MET  Goal: Free from restraint(s) (Restraint for Interference with Medical Device)  Description: INTERVENTIONS:  1. ONCE/SHIFT or MINIMUM Q12H: Assess and document the continuing need for restraints  2. Q24H: Continued use of restraint requires LIP to perform face to face examination and written order  3. Identify and implement measures to help patient regain control  Outcome: MET

## 2020-08-06 NOTE — PROGRESS NOTES
Received report from dayshift RN and assumed care of pt. Pt currently sitting in chair eating supper. Denies any acute complaints or concerns at this time. Pleasantly confused, AO to self.     Fall precautions in place and chair alarm on for safety. Call light within reach, pt re-educated on how to call for assistance. Will monitor.

## 2020-08-07 VITALS
SYSTOLIC BLOOD PRESSURE: 155 MMHG | OXYGEN SATURATION: 95 % | RESPIRATION RATE: 16 BRPM | DIASTOLIC BLOOD PRESSURE: 65 MMHG | TEMPERATURE: 97.3 F | WEIGHT: 110.45 LBS | BODY MASS INDEX: 19.57 KG/M2 | HEART RATE: 69 BPM | HEIGHT: 63 IN

## 2020-08-07 PROCEDURE — 99239 HOSP IP/OBS DSCHRG MGMT >30: CPT | Performed by: INTERNAL MEDICINE

## 2020-08-07 PROCEDURE — A9270 NON-COVERED ITEM OR SERVICE: HCPCS | Performed by: HOSPITALIST

## 2020-08-07 PROCEDURE — 700111 HCHG RX REV CODE 636 W/ 250 OVERRIDE (IP): Performed by: HOSPITALIST

## 2020-08-07 PROCEDURE — 700102 HCHG RX REV CODE 250 W/ 637 OVERRIDE(OP): Performed by: HOSPITALIST

## 2020-08-07 RX ADMIN — HEPARIN SODIUM 5000 UNITS: 5000 INJECTION, SOLUTION INTRAVENOUS; SUBCUTANEOUS at 04:42

## 2020-08-07 RX ADMIN — POTASSIUM CHLORIDE 20 MEQ: 1500 TABLET, EXTENDED RELEASE ORAL at 04:42

## 2020-08-07 RX ADMIN — CARVEDILOL 12.5 MG: 12.5 TABLET, FILM COATED ORAL at 09:10

## 2020-08-07 RX ADMIN — DOCUSATE SODIUM 50 MG AND SENNOSIDES 8.6 MG 2 TABLET: 8.6; 5 TABLET, FILM COATED ORAL at 04:41

## 2020-08-07 RX ADMIN — CYANOCOBALAMIN TAB 500 MCG 1000 MCG: 500 TAB at 04:42

## 2020-08-07 RX ADMIN — LEVOTHYROXINE SODIUM 50 MCG: 25 TABLET ORAL at 04:42

## 2020-08-07 RX ADMIN — OMEPRAZOLE 20 MG: 20 CAPSULE, DELAYED RELEASE ORAL at 04:42

## 2020-08-07 RX ADMIN — FERROUS SULFATE TAB 325 MG (65 MG ELEMENTAL FE) 325 MG: 325 (65 FE) TAB at 04:41

## 2020-08-07 NOTE — CARE PLAN
Problem: Safety  Goal: Will remain free from falls  Outcome: PROGRESSING AS EXPECTED  Note: Pt impulsive. Fall precautions in place. Up SBA.      Problem: Venous Thromboembolism (VTW)/Deep Vein Thrombosis (DVT) Prevention:  Goal: Patient will participate in Venous Thrombosis (VTE)/Deep Vein Thrombosis (DVT)Prevention Measures  Outcome: PROGRESSING AS EXPECTED  Note: Pt ambulatory. Heparin in use.

## 2020-08-07 NOTE — DISCHARGE INSTRUCTIONS
Discharge Instructions    Discharged to home by car with relative. Discharged via wheelchair, hospital escort: Yes.  Special equipment needed: Walker    Be sure to schedule a follow-up appointment with your primary care doctor or any specialists as instructed.     Discharge Plan:   Diet Plan: Discussed  Activity Level: Discussed  Confirmed Follow up Appointment: Patient to Call and Schedule Appointment  Confirmed Symptoms Management: Discussed  Medication Reconciliation Updated: Yes    I understand that a diet low in cholesterol, fat, and sodium is recommended for good health. Unless I have been given specific instructions below for another diet, I accept this instruction as my diet prescription.   Other diet: Regular    Special Instructions: None    · Is patient discharged on Warfarin / Coumadin?   No     Depression / Suicide Risk    As you are discharged from this [facility] facility, it is important to learn how to keep safe from harming yourself.    Recognize the warning signs:  · Abrupt changes in personality, positive or negative- including increase in energy   · Giving away possessions  · Change in eating patterns- significant weight changes-  positive or negative  · Change in sleeping patterns- unable to sleep or sleeping all the time   · Unwillingness or inability to communicate  · Depression  · Unusual sadness, discouragement and loneliness  · Talk of wanting to die  · Neglect of personal appearance   · Rebelliousness- reckless behavior  · Withdrawal from people/activities they love  · Confusion- inability to concentrate     If you or a loved one observes any of these behaviors or has concerns about self-harm, here's what you can do:  · Talk about it- your feelings and reasons for harming yourself  · Remove any means that you might use to hurt yourself (examples: pills, rope, extension cords, firearm)  · Get professional help from the community (Mental Health, Substance Abuse, psychological  counseling)  · Do not be alone:Call your Safe Contact- someone whom you trust who will be there for you.  · Call your local CRISIS HOTLINE 339-9243 or 958-321-4786  · Call your local Children's Mobile Crisis Response Team [place] (856) 044-6287 or www.Bionanoplus  · Call the toll free National Suicide Prevention Hotlines   · National Suicide Prevention Lifeline 695-115-HAGJ (0833)  · National Hope Line Network 800-SUICIDE (804-7336)          Discharge Instructions per Diamond Fuller D.O.    Follow-up with primary care provider in 1 week    DIET: Dysphasia    ACTIVITY: As tolerated    DIAGNOSIS: History of frequent falls, with compression fracture    Return to ER if intractable pain or inability to ambulate

## 2020-08-07 NOTE — PROGRESS NOTES
Pt discharged off unit via wheelchair escort by RN with personal belongings including: clothes, discharge instructions . Pt is A/O x self. PIV removed, tip intact. Wrist band removed. Discharge instructions discussed, pt verbalized understanding of discharge instructions.        [facility] and being transported home by her personal care giver.

## 2020-08-07 NOTE — DISCHARGE SUMMARY
Discharge Summary    CHIEF COMPLAINT ON ADMISSION  Chief Complaint   Patient presents with   • T-5000 GLF   • Back Pain       Reason for Admission  T-5000     Admission Date  8/3/2020    CODE STATUS  DNAR/DNI    HPI & HOSPITAL COURSE     89 y.o. female w/h/o hyperlipidemia and hypothyroidism who presented 8/3/2020 with recurrent falls, back pain, weakness.  Patient has dementia and is a poor historian.  Per ERP note, patient has been having low back pain for the past month.  It was gradual in onset.  Last night she was stooped over because of the pain and tripped on a step and fell hitting her head.  She had another fall this morning.  She was having a headache so her son brought her in this morning.  She does not take any blood thinners or aspirin.    She had a CT scan of cervical, thoracic and lumbar spine with chronic compression deformities noted.  And multiple levels of degenerative changes noted.  There is old worsening T8 compression fracture with associated kyphosis and T5 and T8 12 compression fracture that are new since examination in September 2017.  Patient was seen by therapy and appears to be at baseline level of function.  She is ambulatory with assistance.  She denies any pain on examination and appears to be at baseline function and mentation.    She has no further complaints of a headache.  At this point, patient will discharge to home with home health assistance.  She reports having a appropriate family and friend assistance including a sister and multiple being helpful neighbors.    Therefore, she is discharged in good and stable condition to home with organized home healthcare and close outpatient follow-up.    The patient met 2-midnight criteria for an inpatient stay at the time of discharge.    Discharge Date  8/7/2020    FOLLOW UP ITEMS POST DISCHARGE  Follow-up with primary care provider in 1 week    DISCHARGE DIAGNOSES  Principal Problem:    Frequent falls POA: Yes  Active Problems:     Sundowning POA: Yes    Vitamin B12 deficiency POA: Yes    Compression fracture of L5 vertebra with routine healing POA: Yes    Compression fracture of T8 vertebra with routine healing POA: Yes    Hyponatremia POA: Yes    Hypothyroidism POA: Yes    Hyperlipidemia POA: Yes    Hypokalemia POA: Yes    Dementia (HCC) POA: Yes    HTN (hypertension) POA: Yes    Cachexia (HCC) POA: Yes    DNR (do not resuscitate) POA: Yes      Overview: IMO load March 2020  Resolved Problems:    * No resolved hospital problems. *      FOLLOW UP  No future appointments.  John Garces M.D.  [address]  [facility] [address]  [place] [address]  [phone]    In 1 week  For follow up.       MEDICATIONS ON DISCHARGE     Medication List      CONTINUE taking these medications      Instructions   carvedilol 12.5 MG Tabs  Commonly known as: COREG   TAKE (1) TABLET BY MOUTH TWICE DAILY.     ferrous sulfate 325 (65 Fe) MG tablet  Commonly known as: FeroSul   Take 1 Tab by mouth every day.  Dose: 325 mg     levothyroxine 50 MCG Tabs  Commonly known as: SYNTHROID   TAKE 1 TABLET BY MOUTH EVERY MORNING 30 MINUTES BEFORE BREAKFAST (EMPTY STOMACH)     omeprazole 20 MG delayed-release capsule  Commonly known as: PRILOSEC   TAKE (1) CAPSULE BY MOUTH TWICE DAILY.     potassium chloride SA 20 MEQ Tbcr  Commonly known as: Kdur   TAKE 1 TABLET BY MOUTH ONCE DAILY.     vitamin B-12 1000 MCG Tabs   TAKE 1 TABLET BY MOUTH ONCE DAILY.            Allergies  Allergies   Allergen Reactions   • Nkda [No Known Drug Allergy]        DIET  Orders Placed This Encounter   Procedures   • Diet Order Regular     Standing Status:   Standing     Number of Occurrences:   1     Order Specific Question:   Diet:     Answer:   Regular [1]     Order Specific Question:   Texture Modifier     Answer:   Level 6 - Soft & Bite Sized (Dysphagia 3)     Order Specific Question:   Liquid level     Answer:   Level 0 - Thin       ACTIVITY  As tolerated.  Weight bearing as  tolerated    CONSULTATIONS  None    PROCEDURES  None    LABORATORY  Lab Results   Component Value Date    SODIUM 129 (L) 08/06/2020    POTASSIUM 4.0 08/06/2020    CHLORIDE 95 (L) 08/06/2020    CO2 22 08/06/2020    GLUCOSE 126 (H) 08/06/2020    BUN 9 08/06/2020    CREATININE 0.86 08/06/2020        Lab Results   Component Value Date    WBC 5.3 08/05/2020    HEMOGLOBIN 13.0 08/05/2020    HEMATOCRIT 37.4 08/05/2020    PLATELETCT 265 08/05/2020        Total time of the discharge process exceeds 45 minutes.

## 2020-08-07 NOTE — CARE PLAN
Problem: Nutritional:  Goal: Achieve adequate nutritional intake  Description: Patient will consume 50% of meals  Outcome: MET    Pt taking 50-75% of meals

## 2020-08-07 NOTE — DISCHARGE PLANNING
Anticipated Discharge Disposition: home w/ home health    Action: [facility]    Barriers to Discharge: none    Plan: pt dc'd home

## 2020-08-07 NOTE — PROGRESS NOTES
"Assume care of pt at 0700. Report received from NOC RN. Pt is A/O x self. Pt denies pain but states she has \"cold feet\". Pt is resting in bed. Bed in lowest and locked position, call light in reach, hourly rounding in place. Labs reviewed. Communication board updated. Will continue to monitor.       "

## 2020-08-08 ENCOUNTER — HOME CARE VISIT (OUTPATIENT)
Dept: HOME HEALTH SERVICES | Facility: HOME HEALTHCARE | Age: 85
End: 2020-08-08
Payer: MEDICARE

## 2020-08-08 VITALS
TEMPERATURE: 98 F | RESPIRATION RATE: 16 BRPM | SYSTOLIC BLOOD PRESSURE: 144 MMHG | OXYGEN SATURATION: 96 % | HEART RATE: 76 BPM | DIASTOLIC BLOOD PRESSURE: 89 MMHG

## 2020-08-08 PROCEDURE — 665001 SOC-HOME HEALTH

## 2020-08-08 PROCEDURE — G0493 RN CARE EA 15 MIN HH/HOSPICE: HCPCS

## 2020-08-08 ASSESSMENT — ENCOUNTER SYMPTOMS
NAUSEA: DENIES
VOMITING: DENIES

## 2020-08-08 ASSESSMENT — PATIENT HEALTH QUESTIONNAIRE - PHQ9
1. LITTLE INTEREST OR PLEASURE IN DOING THINGS: 00
CLINICAL INTERPRETATION OF PHQ2 SCORE: 0
2. FEELING DOWN, DEPRESSED, IRRITABLE, OR HOPELESS: 00

## 2020-08-10 ENCOUNTER — PATIENT OUTREACH (OUTPATIENT)
Dept: MEDICAL GROUP | Facility: MEDICAL CENTER | Age: 85
End: 2020-08-10

## 2020-08-10 ENCOUNTER — PATIENT OUTREACH (OUTPATIENT)
Dept: HEALTH INFORMATION MANAGEMENT | Facility: OTHER | Age: 85
End: 2020-08-10

## 2020-08-10 ENCOUNTER — DOCUMENTATION (OUTPATIENT)
Dept: VASCULAR LAB | Facility: MEDICAL CENTER | Age: 85
End: 2020-08-10

## 2020-08-10 ENCOUNTER — HOME CARE VISIT (OUTPATIENT)
Dept: HOME HEALTH SERVICES | Facility: HOME HEALTHCARE | Age: 85
End: 2020-08-10
Payer: MEDICARE

## 2020-08-10 VITALS
DIASTOLIC BLOOD PRESSURE: 68 MMHG | TEMPERATURE: 98.3 F | RESPIRATION RATE: 16 BRPM | HEART RATE: 64 BPM | OXYGEN SATURATION: 96 % | SYSTOLIC BLOOD PRESSURE: 122 MMHG

## 2020-08-10 PROCEDURE — G0151 HHCP-SERV OF PT,EA 15 MIN: HCPCS

## 2020-08-10 SDOH — ECONOMIC STABILITY: HOUSING INSECURITY

## 2020-08-10 ASSESSMENT — ENCOUNTER SYMPTOMS
POOR JUDGMENT: 1
MUSCLE WEAKNESS: 1
CONTUSION: 1
DIFFICULTY THINKING: 1

## 2020-08-10 ASSESSMENT — ACTIVITIES OF DAILY LIVING (ADL)
AMBULATION ASSISTANCE ON FLAT SURFACES: 1
AMBULATION_DISTANCE/DURATION_TOLERATED: 50 FEET

## 2020-08-10 NOTE — PROGRESS NOTES
[facility]    Received referral from [facility] to review patient's medication list.    Med list reviewed and reconciled.  No clinically significant DDI identified.  Allergies reviewed.    Sridevi Veloz, ShavonneD

## 2020-08-10 NOTE — PROGRESS NOTES
CHW Daniel TC to [place] to follow up after d/c. EC Acacia answered the phone. Acacia is currently not home with Lamar. This CHW introduced CCM services to Acacia. This CHW provided CCM contact information to Lamar. This CHW discussed AVS with Acacia. Reminded Acacia about Lamar's upcoming appmnts listed on AVS. Encouraged Acacia to have Lamar reach out to me.     Plan: This CHW will dc Lamar from CCM active list by 8/14 if no response received.

## 2020-08-10 NOTE — PROGRESS NOTES
Nurse CM outreach call to patient. Patient's sister Ellie answered telephone. Patient came on telephone briefly and gave nurse CM permission to talk to her sister, Ellie.  Nurse attempted to review discharge instructions and see if patient has her medications. Pt isn't able to tell this CM if she has her medications. Nurse tried to contact home care nurse to see if pt has medications. Nurse spoke to home care nurseKaley.. Nurse reports pt will have a visit with CM, Filemon Au.   Per notes in chart it appears a neighbor comes over and assists with filling a medication box..  Pt is currently being followed by [facility].  Pt reports it would be easier if a provider made a visit to her residence for hospital follow-up. This nurse CM contacted patients personal care assistant with [facility] to determine if service available through [facility]. Pt also has a community health worker listed in Epic.This nurse contacted Community Care Management team to see if patient could get assistance with follow-up appointment, pt prefers home visit.Pt may benefit from GSC visit or video visit with PCP.

## 2020-08-10 NOTE — PROGRESS NOTES
This CHW TC to [place], and spoke with EC Acacia. Informed Acacia about Geriatric Specialty Care. Acacia reports she would like for [facility] to reach out to her and discuss more about GSC services. Acacia reports she would like to speak with [place] and family first before agreeing to [facility] services. Acaica reports she works full time, and would need to attend [place] when [facility] visits. This CHW TC to [facility] and spoke with Jan. Informed Jan about [place] and EC Acacia would like for [facility] to call her. Jan reports she would give Acacia a call today or tomorrow. This CHW offered to schedule f/u with PCP. Acacia declined, and reports she will do it herself. [facility] [place] and would need to figure out a time that best work with her work schedule.    Plan: This CHW will follow up with Acacia on 8/12 regarding [place] and [facility].

## 2020-08-11 ENCOUNTER — HOME CARE VISIT (OUTPATIENT)
Dept: HOME HEALTH SERVICES | Facility: HOME HEALTHCARE | Age: 85
End: 2020-08-11
Payer: MEDICARE

## 2020-08-11 VITALS
OXYGEN SATURATION: 96 % | HEART RATE: 70 BPM | DIASTOLIC BLOOD PRESSURE: 72 MMHG | RESPIRATION RATE: 16 BRPM | TEMPERATURE: 98.4 F | SYSTOLIC BLOOD PRESSURE: 128 MMHG

## 2020-08-11 PROCEDURE — G0299 HHS/HOSPICE OF RN EA 15 MIN: HCPCS

## 2020-08-11 ASSESSMENT — ENCOUNTER SYMPTOMS
NAUSEA: DENIES
MUSCLE WEAKNESS: 1
VOMITING: DENIES

## 2020-08-11 ASSESSMENT — ACTIVITIES OF DAILY LIVING (ADL)
CURRENT_FUNCTION: STAND BY ASSIST
AMBULATION ASSISTANCE: STAND BY ASSIST

## 2020-08-11 NOTE — PROGRESS NOTES
PT completed evaluation on 8/10/20.  Frequency 2 week 2 effective 8/10/20.  PT also recommends MSW evaluation for community resources/non-skilled home care services.  Order requested. Please assist with authorization as needed.

## 2020-08-12 ENCOUNTER — HOME CARE VISIT (OUTPATIENT)
Dept: HOME HEALTH SERVICES | Facility: HOME HEALTHCARE | Age: 85
End: 2020-08-12
Payer: MEDICARE

## 2020-08-12 VITALS
RESPIRATION RATE: 16 BRPM | HEART RATE: 71 BPM | SYSTOLIC BLOOD PRESSURE: 120 MMHG | DIASTOLIC BLOOD PRESSURE: 70 MMHG | TEMPERATURE: 97.9 F | OXYGEN SATURATION: 98 %

## 2020-08-12 PROCEDURE — G0151 HHCP-SERV OF PT,EA 15 MIN: HCPCS

## 2020-08-12 PROCEDURE — G0156 HHCP-SVS OF AIDE,EA 15 MIN: HCPCS

## 2020-08-12 ASSESSMENT — ENCOUNTER SYMPTOMS
DIFFICULTY THINKING: 1
POOR JUDGMENT: 1

## 2020-08-12 NOTE — PROGRESS NOTES
8/12. This CHW TC to Lamar's EC Acacia regarding Geriatric Specialty Services. Acacia reports she has spoken with Lamar and family members, and they are declining GSC services. Acacia reports Lamar is currently being seen by Home Health. Acacia also reports no follow up appmnt with PCP John Garces M.D. She reports she is currently working on it. She declined assistance from this CHW with scheduling the appmnt. Acacia reports no other needs from this CHW and CCM. Encouraged Acacia and Lamar to reach out to me when needed. Lamar will be d/c from [facility] services.

## 2020-08-13 ENCOUNTER — HOME CARE VISIT (OUTPATIENT)
Dept: HOME HEALTH SERVICES | Facility: HOME HEALTHCARE | Age: 85
End: 2020-08-13
Payer: MEDICARE

## 2020-08-13 PROCEDURE — G0155 HHCP-SVS OF CSW,EA 15 MIN: HCPCS

## 2020-08-14 ENCOUNTER — HOME CARE VISIT (OUTPATIENT)
Dept: HOME HEALTH SERVICES | Facility: HOME HEALTHCARE | Age: 85
End: 2020-08-14
Payer: MEDICARE

## 2020-08-14 VITALS
TEMPERATURE: 99.4 F | OXYGEN SATURATION: 98 % | DIASTOLIC BLOOD PRESSURE: 70 MMHG | SYSTOLIC BLOOD PRESSURE: 118 MMHG | HEART RATE: 69 BPM | RESPIRATION RATE: 20 BRPM

## 2020-08-14 VITALS
SYSTOLIC BLOOD PRESSURE: 120 MMHG | HEART RATE: 71 BPM | DIASTOLIC BLOOD PRESSURE: 70 MMHG | TEMPERATURE: 98.7 F | OXYGEN SATURATION: 98 % | RESPIRATION RATE: 16 BRPM

## 2020-08-14 PROCEDURE — G0299 HHS/HOSPICE OF RN EA 15 MIN: HCPCS

## 2020-08-14 ASSESSMENT — ACTIVITIES OF DAILY LIVING (ADL)
AMBULATION ASSISTANCE: STAND BY ASSIST
CURRENT_FUNCTION: STAND BY ASSIST

## 2020-08-14 ASSESSMENT — ENCOUNTER SYMPTOMS
NAUSEA: DENIES
MUSCLE WEAKNESS: 1
VOMITING: DENIES

## 2020-08-17 ENCOUNTER — HOSPITAL ENCOUNTER (EMERGENCY)
Facility: MEDICAL CENTER | Age: 85
End: 2020-08-17
Attending: EMERGENCY MEDICINE
Payer: MEDICARE

## 2020-08-17 ENCOUNTER — APPOINTMENT (OUTPATIENT)
Dept: RADIOLOGY | Facility: MEDICAL CENTER | Age: 85
End: 2020-08-17
Attending: EMERGENCY MEDICINE
Payer: MEDICARE

## 2020-08-17 VITALS
TEMPERATURE: 98.1 F | HEART RATE: 71 BPM | WEIGHT: 102.07 LBS | OXYGEN SATURATION: 98 % | HEIGHT: 63 IN | SYSTOLIC BLOOD PRESSURE: 187 MMHG | DIASTOLIC BLOOD PRESSURE: 82 MMHG | RESPIRATION RATE: 18 BRPM | BODY MASS INDEX: 18.09 KG/M2

## 2020-08-17 DIAGNOSIS — S80.11XA LEG HEMATOMA, RIGHT, INITIAL ENCOUNTER: ICD-10-CM

## 2020-08-17 LAB
ALBUMIN SERPL BCP-MCNC: 3.9 G/DL (ref 3.2–4.9)
ALBUMIN/GLOB SERPL: 1.6 G/DL
ALP SERPL-CCNC: 128 U/L (ref 30–99)
ALT SERPL-CCNC: 10 U/L (ref 2–50)
ANION GAP SERPL CALC-SCNC: 11 MMOL/L (ref 7–16)
AST SERPL-CCNC: 15 U/L (ref 12–45)
BASOPHILS # BLD AUTO: 0.2 % (ref 0–1.8)
BASOPHILS # BLD: 0.01 K/UL (ref 0–0.12)
BILIRUB SERPL-MCNC: 0.6 MG/DL (ref 0.1–1.5)
BUN SERPL-MCNC: 18 MG/DL (ref 8–22)
CALCIUM SERPL-MCNC: 9.7 MG/DL (ref 8.5–10.5)
CHLORIDE SERPL-SCNC: 99 MMOL/L (ref 96–112)
CO2 SERPL-SCNC: 21 MMOL/L (ref 20–33)
CREAT SERPL-MCNC: 0.97 MG/DL (ref 0.5–1.4)
EKG IMPRESSION: NORMAL
EOSINOPHIL # BLD AUTO: 0.03 K/UL (ref 0–0.51)
EOSINOPHIL NFR BLD: 0.6 % (ref 0–6.9)
ERYTHROCYTE [DISTWIDTH] IN BLOOD BY AUTOMATED COUNT: 48.8 FL (ref 35.9–50)
GLOBULIN SER CALC-MCNC: 2.4 G/DL (ref 1.9–3.5)
GLUCOSE SERPL-MCNC: 107 MG/DL (ref 65–99)
HCT VFR BLD AUTO: 38.7 % (ref 37–47)
HGB BLD-MCNC: 13.1 G/DL (ref 12–16)
IMM GRANULOCYTES # BLD AUTO: 0.04 K/UL (ref 0–0.11)
IMM GRANULOCYTES NFR BLD AUTO: 0.8 % (ref 0–0.9)
LYMPHOCYTES # BLD AUTO: 0.94 K/UL (ref 1–4.8)
LYMPHOCYTES NFR BLD: 19.7 % (ref 22–41)
MCH RBC QN AUTO: 33.3 PG (ref 27–33)
MCHC RBC AUTO-ENTMCNC: 33.9 G/DL (ref 33.6–35)
MCV RBC AUTO: 98.5 FL (ref 81.4–97.8)
MONOCYTES # BLD AUTO: 0.56 K/UL (ref 0–0.85)
MONOCYTES NFR BLD AUTO: 11.7 % (ref 0–13.4)
NEUTROPHILS # BLD AUTO: 3.19 K/UL (ref 2–7.15)
NEUTROPHILS NFR BLD: 67 % (ref 44–72)
NRBC # BLD AUTO: 0 K/UL
NRBC BLD-RTO: 0 /100 WBC
PLATELET # BLD AUTO: 239 K/UL (ref 164–446)
PMV BLD AUTO: 8.6 FL (ref 9–12.9)
POTASSIUM SERPL-SCNC: 4 MMOL/L (ref 3.6–5.5)
PROT SERPL-MCNC: 6.3 G/DL (ref 6–8.2)
RBC # BLD AUTO: 3.93 M/UL (ref 4.2–5.4)
SODIUM SERPL-SCNC: 131 MMOL/L (ref 135–145)
TROPONIN T SERPL-MCNC: 25 NG/L (ref 6–19)
TROPONIN T SERPL-MCNC: 27 NG/L (ref 6–19)
WBC # BLD AUTO: 4.8 K/UL (ref 4.8–10.8)

## 2020-08-17 PROCEDURE — 84484 ASSAY OF TROPONIN QUANT: CPT

## 2020-08-17 PROCEDURE — 80053 COMPREHEN METABOLIC PANEL: CPT

## 2020-08-17 PROCEDURE — 71045 X-RAY EXAM CHEST 1 VIEW: CPT

## 2020-08-17 PROCEDURE — 93971 EXTREMITY STUDY: CPT | Mod: RT

## 2020-08-17 PROCEDURE — 99285 EMERGENCY DEPT VISIT HI MDM: CPT

## 2020-08-17 PROCEDURE — 85025 COMPLETE CBC W/AUTO DIFF WBC: CPT

## 2020-08-17 PROCEDURE — 93005 ELECTROCARDIOGRAM TRACING: CPT | Performed by: EMERGENCY MEDICINE

## 2020-08-17 ASSESSMENT — FIBROSIS 4 INDEX: FIB4 SCORE: 2.13

## 2020-08-17 NOTE — ED NOTES
IV started per orders. Pt tolerated procedure well, resting comfortably at this time. Labs sent. Pt educated about plan of care.  Pt assessed. Large, dark purple bruising noted to RLE pretibial area. Pt reports CP has resolved.

## 2020-08-17 NOTE — ED TRIAGE NOTES
"Lamar Dangelo   89 y.o. female   Chief Complaint   Patient presents with   • Chest Pain     generalized chest burning since last night before going to bed, radiating to the throat. pt reports experiencing some SOB, does not appear to be in distress at this time.    • Leg Swelling     RLE swelling, ecchymosis x 1 day with leg cramping       Pt to triage in wheelchair with family member present following ECG.   Pt is alert and oriented x 4 with some forgetfullness, speaking in full sentences, follows commands and responds appropriately to questions. NAD. Resp are even and unlabored.   Pt placed in lobby. Pt educated on triage process. Pt encouraged to alert staff for any changes.    /73   Pulse 74   Temp 36.7 °C (98.1 °F) (Temporal)   Resp 16   Ht 1.6 m (5' 3\")   Wt 46.3 kg (102 lb 1.2 oz)   SpO2 96%   BMI 18.08 kg/m²     "

## 2020-08-17 NOTE — ED NOTES
Pt discharged home. Explained discharge instructions. Questions and comments addressed. Pt verbalized understanding of instructions. Pt advised to follow-up with Geriatrics in 2 days or as needed or return to ED for any new or worsening of symptoms. VS stable. Pt's family member at bedside and will be driving pt home.

## 2020-08-17 NOTE — ED PROVIDER NOTES
"ED Provider Note    CHIEF COMPLAINT  Chief Complaint   Patient presents with   • Chest Pain     generalized chest burning since last night before going to bed, radiating to the throat. pt reports experiencing some SOB, does not appear to be in distress at this time.    • Leg Swelling     RLE swelling, ecchymosis x 1 day with leg cramping        HPI  Lamar Dangelo is a 89 y.o. female who presents for evaluation of several complaints including intermittent \"burning \"in the throat without cough or chest pain, and right leg ecchymosis and swelling.  The patient is accompanied by 1 of her neighbors.  She lives with her sister who is 10 years younger than her.  She denies any obvious trauma.  The patient apparently has had an ecchymotic area to her right leg for several days but the swelling became worse within the last 12 hours.  She denies high fevers productive cough.  No known exposures to COVID-19.  No report of numbness tingling weakness.    REVIEW OF SYSTEMS  See HPI for further details.  No head injury numbness tingling weakness fevers chills all other systems are negative.     PAST MEDICAL HISTORY  Past Medical History:   Diagnosis Date   • Hypothyroid 7/2/2010   • Hyperlipidemia 7/2/2010       FAMILY HISTORY  Noncontributory    SOCIAL HISTORY  Social History     Socioeconomic History   • Marital status:      Spouse name: Not on file   • Number of children: Not on file   • Years of education: Not on file   • Highest education level: Not on file   Occupational History   • Not on file   Social Needs   • Financial resource strain: Not hard at all   • Food insecurity     Worry: Never true     Inability: Never true   • Transportation needs     Medical: No     Non-medical: No   Tobacco Use   • Smoking status: Never Smoker   • Smokeless tobacco: Never Used   Substance and Sexual Activity   • Alcohol use: No   • Drug use: No   • Sexual activity: Not on file     Comment: single, 0 kids.    Lifestyle   • " Physical activity     Days per week: Not on file     Minutes per session: Not on file   • Stress: Not on file   Relationships   • Social connections     Talks on phone: Not on file     Gets together: Not on file     Attends Alevism service: Not on file     Active member of club or organization: Not on file     Attends meetings of clubs or organizations: Not on file     Relationship status: Not on file   • Intimate partner violence     Fear of current or ex partner: Not on file     Emotionally abused: Not on file     Physically abused: Not on file     Forced sexual activity: Not on file   Other Topics Concern   • Not on file   Social History Narrative   • Not on file       SURGICAL HISTORY  Past Surgical History:   Procedure Laterality Date   • HIP CANNULATED SCREW Left 3/24/2018    Procedure: HIP CANNULATED SCREW;  Surgeon: Manish Franco M.D.;  Location: SURGERY [facility];  Service: Orthopedics   • COLONOSCOPY - ENDO N/A 9/3/2017    Procedure: COLONOSCOPY - ENDO;  Surgeon: Randi Sahni M.D.;  Location: ENDOSCOPY [facility];  Service: Gastroenterology   • GASTROSCOPY-ENDO N/A 9/3/2017    Procedure: GASTROSCOPY-ENDO;  Surgeon: Randi Sahni M.D.;  Location: ENDOSCOPY [facility];  Service: Gastroenterology   • APPENDECTOMY     • TONSILLECTOMY         CURRENT MEDICATIONS  Home Medications     Reviewed by Randee Devi R.N. (Registered Nurse) on 08/17/20 at 0823  Med List Status: Complete   Medication Last Dose Status   carvedilol (COREG) 12.5 MG Tab 8/16/2020 Active   Cyanocobalamin (VITAMIN B-12) 1000 MCG Tab 8/16/2020 Active   ferrous sulfate (FEROSUL) 325 (65 Fe) MG tablet 8/16/2020 Active   levothyroxine (SYNTHROID) 50 MCG Tab 8/16/2020 Active   omeprazole (PRILOSEC) 20 MG delayed-release capsule 8/16/2020 Active   potassium chloride SA (KDUR) 20 MEQ Tab CR 8/16/2020 Active                ALLERGIES  Allergies   Allergen Reactions   • Nkda [No Known Drug Allergy]        PHYSICAL  "EXAM  VITAL SIGNS: /73   Pulse 74   Temp 36.7 °C (98.1 °F) (Temporal)   Resp 16   Ht 1.6 m (5' 3\")   Wt 46.3 kg (102 lb 1.2 oz)   SpO2 96%   BMI 18.08 kg/m²       Constitutional: Well developed, Well nourished, No acute distress, Non-toxic appearance.   HENT: Normocephalic, Atraumatic, Bilateral external ears normal, Oropharynx moist, No oral exudates, Nose normal.  Posterior pharynx is clear  Eyes: PERRLA, EOMI, Conjunctiva normal, No discharge.   Neck: Normal range of motion, No tenderness, Supple, No stridor.   Cardiovascular: Normal heart rate, Normal rhythm, No murmurs, No rubs, No gallops.   Thorax & Lungs: Normal breath sounds, No respiratory distress, No wheezing, No chest tenderness.   Abdomen: Bowel sounds normal, Soft, No tenderness, No masses, No pulsatile masses.   Skin: Warm, Dry, No erythema, No rash.   Back: No tenderness, No CVA tenderness.   Extremities: Right lower extremity exam is notable for 4 x 6 cm hematoma to the right anterior leg compartments are soft dorsalis pedal pulses intact no erythema   neurologic: Alert & oriented x 3, Normal motor function, Normal sensory function, No focal deficits noted.   Psychiatric: Affect normal, Judgment normal, Mood normal.       Results for orders placed or performed during the hospital encounter of 08/17/20   CBC with Differential   Result Value Ref Range    WBC 4.8 4.8 - 10.8 K/uL    RBC 3.93 (L) 4.20 - 5.40 M/uL    Hemoglobin 13.1 12.0 - 16.0 g/dL    Hematocrit 38.7 37.0 - 47.0 %    MCV 98.5 (H) 81.4 - 97.8 fL    MCH 33.3 (H) 27.0 - 33.0 pg    MCHC 33.9 33.6 - 35.0 g/dL    RDW 48.8 35.9 - 50.0 fL    Platelet Count 239 164 - 446 K/uL    MPV 8.6 (L) 9.0 - 12.9 fL    Neutrophils-Polys 67.00 44.00 - 72.00 %    Lymphocytes 19.70 (L) 22.00 - 41.00 %    Monocytes 11.70 0.00 - 13.40 %    Eosinophils 0.60 0.00 - 6.90 %    Basophils 0.20 0.00 - 1.80 %    Immature Granulocytes 0.80 0.00 - 0.90 %    Nucleated RBC 0.00 /100 WBC    Neutrophils " (Absolute) 3.19 2.00 - 7.15 K/uL    Lymphs (Absolute) 0.94 (L) 1.00 - 4.80 K/uL    Monos (Absolute) 0.56 0.00 - 0.85 K/uL    Eos (Absolute) 0.03 0.00 - 0.51 K/uL    Baso (Absolute) 0.01 0.00 - 0.12 K/uL    Immature Granulocytes (abs) 0.04 0.00 - 0.11 K/uL    NRBC (Absolute) 0.00 K/uL   Complete Metabolic Panel (CMP)   Result Value Ref Range    Sodium 131 (L) 135 - 145 mmol/L    Potassium 4.0 3.6 - 5.5 mmol/L    Chloride 99 96 - 112 mmol/L    Co2 21 20 - 33 mmol/L    Anion Gap 11.0 7.0 - 16.0    Glucose 107 (H) 65 - 99 mg/dL    Bun 18 8 - 22 mg/dL    Creatinine 0.97 0.50 - 1.40 mg/dL    Calcium 9.7 8.5 - 10.5 mg/dL    AST(SGOT) 15 12 - 45 U/L    ALT(SGPT) 10 2 - 50 U/L    Alkaline Phosphatase 128 (H) 30 - 99 U/L    Total Bilirubin 0.6 0.1 - 1.5 mg/dL    Albumin 3.9 3.2 - 4.9 g/dL    Total Protein 6.3 6.0 - 8.2 g/dL    Globulin 2.4 1.9 - 3.5 g/dL    A-G Ratio 1.6 g/dL   Troponin   Result Value Ref Range    Troponin T 27 (H) 6 - 19 ng/L   ESTIMATED GFR   Result Value Ref Range    GFR If African American >60 >60 mL/min/1.73 m 2    GFR If Non African American 54 (A) >60 mL/min/1.73 m 2   TROPONIN   Result Value Ref Range    Troponin T 25 (H) 6 - 19 ng/L   EKG (NOW)   Result Value Ref Range    Report       [facility]    Test Date:  2020  Pt Name:    NIYAH BANUELOS              Department: ER  MRN:        [id number]                      Room:  Gender:     Female                       Technician: [id number]  :        [id number]                   Requested By:ER TRIAGE PROTOCOL  Order #:    [id number]                    Reading MD:    Measurements  Intervals                                Axis  Rate:       74                           P:          95  WA:         196                          QRS:        41  QRSD:       84                           T:          -7  QT:         364  QTc:        404    Interpretive Statements  SINUS RHYTHM  PROBABLE INFERIOR INFARCT, AGE  "INDETERMINATE  NONSPECIFIC T ABNORMALITIES, ANTERIOR LEADS  Compared to ECG 07/11/2020 16:19:12  Myocardial infarct finding now present  T-wave abnormality now present  First degree AV block no longer present      CPT Codes:       84511      ICD Codes:       R22.41  R60.9      History:         Right lateral calf area of swelling/edema started this                     morning after waking up. No prior study.      Limitations:      PROCEDURES:   Right lower extremity venous duplex imaging.    The following venous structures were evaluated: common femoral, profunda    femoral, greater saphenous, femoral, popliteal , peroneal and posterior    tibial veins.    Serial compression, augmentation maneuvers,  color and spectral Doppler    flow evaluations were performed.       FINDINGS:   Right lower extremity -.    No superficial or deep venous thrombosis.    Complete color filling and compressibility with normal venous flow dynamics    including spontaneous flow, response to augmentation maneuvers, and    respiratory phasicity.    There is an area of fluid seen at the lateral calf. Unable to measure.    Dimentions are too large to fit on the screen. No vascularized flow is seen    within.       Flow was evaluated in the contralateral common femoral vein and normal    venous flow dynamics including spontaneous flow, respiratory phasic    variation and augmentation were demonstrated.   US-EXTREMITY VENOUS LOWER UNILAT RIGHT   Final Result      DX-CHEST-PORTABLE (1 VIEW)   Final Result      No acute cardiopulmonary abnormality.          COURSE & MEDICAL DECISION MAKING  Pertinent Labs & Imaging studies reviewed. (See chart for details)  Patient presents here primarily with right leg pain and bruising.  There is no suggestion of DVT.  Exam is most consistent with a hematoma.  Compartments are soft.  She did have what I would consider very atypical symptoms and never had chest pain.  She had some \"tickling \"in her throat.  Her " pharynx is clear.  Serial troponins are low indeterminate but not trending upwards.  EKG does not demonstrate any ischemia.  I recommended heating pads for the hematoma and to return as needed for new or worsening symptoms    FINAL IMPRESSION  1.  Right leg hematoma  2.  Dyspnea, resolved         Electronically signed by: Derick Greenberg M.D., 8/17/2020 8:35 AM

## 2020-08-18 ENCOUNTER — HOME CARE VISIT (OUTPATIENT)
Dept: HOME HEALTH SERVICES | Facility: HOME HEALTHCARE | Age: 85
End: 2020-08-18
Payer: MEDICARE

## 2020-08-18 ENCOUNTER — HOSPITAL ENCOUNTER (OUTPATIENT)
Facility: MEDICAL CENTER | Age: 85
End: 2020-08-18
Attending: NURSE PRACTITIONER
Payer: MEDICARE

## 2020-08-18 LAB — IRON SERPL-MCNC: 78 UG/DL (ref 40–170)

## 2020-08-18 PROCEDURE — G0299 HHS/HOSPICE OF RN EA 15 MIN: HCPCS

## 2020-08-18 PROCEDURE — 36415 COLL VENOUS BLD VENIPUNCTURE: CPT

## 2020-08-18 PROCEDURE — 83540 ASSAY OF IRON: CPT

## 2020-08-18 ASSESSMENT — ACTIVITIES OF DAILY LIVING (ADL): OASIS_M1830: 03

## 2020-08-19 ENCOUNTER — HOME CARE VISIT (OUTPATIENT)
Dept: HOME HEALTH SERVICES | Facility: HOME HEALTHCARE | Age: 85
End: 2020-08-19
Payer: MEDICARE

## 2020-08-19 VITALS
SYSTOLIC BLOOD PRESSURE: 115 MMHG | DIASTOLIC BLOOD PRESSURE: 62 MMHG | TEMPERATURE: 96.8 F | OXYGEN SATURATION: 98 % | HEART RATE: 87 BPM | RESPIRATION RATE: 16 BRPM

## 2020-08-19 PROCEDURE — G0152 HHCP-SERV OF OT,EA 15 MIN: HCPCS

## 2020-08-19 PROCEDURE — G0151 HHCP-SERV OF PT,EA 15 MIN: HCPCS

## 2020-08-19 ASSESSMENT — ENCOUNTER SYMPTOMS
POOR JUDGMENT: 1
DIFFICULTY THINKING: 1

## 2020-08-19 NOTE — PROGRESS NOTES
fyi, patient's temporal temperature was 96.8 degrees on 8/19/20 at PT visit.  Other vitals within parameters.  Patient denies any dizziness, light headedness or pain.  States she feels good.

## 2020-08-20 VITALS
RESPIRATION RATE: 16 BRPM | TEMPERATURE: 96.8 F | SYSTOLIC BLOOD PRESSURE: 115 MMHG | HEART RATE: 82 BPM | DIASTOLIC BLOOD PRESSURE: 62 MMHG | OXYGEN SATURATION: 96 %

## 2020-08-20 ASSESSMENT — ENCOUNTER SYMPTOMS: DIFFICULTY THINKING: 1

## 2020-08-21 ENCOUNTER — HOME CARE VISIT (OUTPATIENT)
Dept: HOME HEALTH SERVICES | Facility: HOME HEALTHCARE | Age: 85
End: 2020-08-21
Payer: MEDICARE

## 2020-08-21 VITALS
RESPIRATION RATE: 16 BRPM | HEART RATE: 69 BPM | OXYGEN SATURATION: 97 % | SYSTOLIC BLOOD PRESSURE: 120 MMHG | TEMPERATURE: 98.2 F | DIASTOLIC BLOOD PRESSURE: 65 MMHG

## 2020-08-21 PROCEDURE — G0299 HHS/HOSPICE OF RN EA 15 MIN: HCPCS

## 2020-08-21 PROCEDURE — G0151 HHCP-SERV OF PT,EA 15 MIN: HCPCS

## 2020-08-21 ASSESSMENT — ENCOUNTER SYMPTOMS
POOR JUDGMENT: 1
DIFFICULTY THINKING: 1

## 2020-08-22 VITALS
RESPIRATION RATE: 16 BRPM | TEMPERATURE: 98.5 F | SYSTOLIC BLOOD PRESSURE: 128 MMHG | DIASTOLIC BLOOD PRESSURE: 72 MMHG | HEART RATE: 79 BPM | OXYGEN SATURATION: 97 %

## 2020-08-22 SDOH — ECONOMIC STABILITY: HOUSING INSECURITY
HOME SAFETY: HOME HAS 2 STAIRWAYS W/IN W/ 4 STEPS EACH W/ HAND RAILS, ONE FROM KITCHEN DOWN TO LIVINGROOM, 2 DOWN TO BEDROOM.

## 2020-08-22 ASSESSMENT — ACTIVITIES OF DAILY LIVING (ADL)
AMBULATION ASSISTANCE: SUPERVISION
PREPARING MEALS: DEPENDENT
FEEDING: INDEPENDENT
AMBULATION ASSISTANCE: 1
BATHING_CURRENT_FUNCTION: SUPERVISION
GROOMING_CURRENT_FUNCTION: INDEPENDENT
TOILETING: INDEPENDENT
PHYSICAL TRANSFERS ASSESSED: 1
FEEDING ASSESSED: 1
TOILETING: 1
GROOMING ASSESSED: 1
GROOMING EQUIPMENT USED: STANDING AT SINK
CURRENT_FUNCTION: SUPERVISION
BATHING ASSESSED: 1
DRESSING_UB_CURRENT_FUNCTION: INDEPENDENT
DRESSING_LB_CURRENT_FUNCTION: INDEPENDENT

## 2020-08-22 ASSESSMENT — ENCOUNTER SYMPTOMS
NAUSEA: DENIES
VOMITING: DENIES

## 2020-08-23 VITALS
DIASTOLIC BLOOD PRESSURE: 65 MMHG | RESPIRATION RATE: 16 BRPM | TEMPERATURE: 98.2 F | OXYGEN SATURATION: 97 % | HEART RATE: 69 BPM | SYSTOLIC BLOOD PRESSURE: 120 MMHG

## 2020-08-23 ASSESSMENT — ENCOUNTER SYMPTOMS
NAUSEA: DENIES
VOMITING: DENIES
MUSCLE WEAKNESS: 1
LIMITED RANGE OF MOTION: 1

## 2020-08-23 NOTE — PROGRESS NOTES
Pt was having PT when this nurse arrived. PT will do discipline DC today. Pt is doing great with physical therapy. Still noticed some edema to BLE and pt denies any pain or discomfort. She doesn't have heating pads at home and SN will bring some next time. Bruise to face is resolving. Pt tells this nurse that she is feeling very good. Reviewed  pt binder, visit frequency and calendar. Pt and cg voiced understanding and no other questi ons.

## 2020-08-24 RX ORDER — LANOLIN ALCOHOL/MO/W.PET/CERES
CREAM (GRAM) TOPICAL
Qty: 30 TAB | Refills: 0 | Status: SHIPPED | OUTPATIENT
Start: 2020-08-24 | End: 2020-09-22

## 2020-08-24 RX ORDER — CARVEDILOL 12.5 MG/1
TABLET ORAL
Qty: 60 TAB | Refills: 0 | Status: SHIPPED | OUTPATIENT
Start: 2020-08-24 | End: 2020-09-22

## 2020-08-24 RX ORDER — LEVOTHYROXINE SODIUM 0.05 MG/1
TABLET ORAL
Qty: 30 TAB | Refills: 0 | Status: SHIPPED | OUTPATIENT
Start: 2020-08-24 | End: 2020-09-22

## 2020-08-24 RX ORDER — OMEPRAZOLE 20 MG/1
CAPSULE, DELAYED RELEASE ORAL
Qty: 60 CAP | Refills: 0 | Status: SHIPPED | OUTPATIENT
Start: 2020-08-24 | End: 2020-09-22

## 2020-08-24 RX ORDER — POTASSIUM CHLORIDE 20 MEQ/1
TABLET, EXTENDED RELEASE ORAL
Qty: 30 TAB | Refills: 0 | Status: SHIPPED | OUTPATIENT
Start: 2020-08-24 | End: 2020-09-22

## 2020-08-26 ENCOUNTER — HOME CARE VISIT (OUTPATIENT)
Dept: HOME HEALTH SERVICES | Facility: HOME HEALTHCARE | Age: 85
End: 2020-08-26
Payer: MEDICARE

## 2020-08-26 PROCEDURE — G0180 MD CERTIFICATION HHA PATIENT: HCPCS | Performed by: FAMILY MEDICINE

## 2020-08-26 PROCEDURE — G0299 HHS/HOSPICE OF RN EA 15 MIN: HCPCS

## 2020-08-26 ASSESSMENT — FIBROSIS 4 INDEX: FIB4 SCORE: 1.81

## 2020-08-28 ENCOUNTER — TELEMEDICINE (OUTPATIENT)
Dept: MEDICAL GROUP | Facility: MEDICAL CENTER | Age: 85
End: 2020-08-28
Payer: MEDICARE

## 2020-08-28 ENCOUNTER — HOME CARE VISIT (OUTPATIENT)
Dept: HOME HEALTH SERVICES | Facility: HOME HEALTHCARE | Age: 85
End: 2020-08-28
Payer: MEDICARE

## 2020-08-28 VITALS
TEMPERATURE: 97.8 F | HEART RATE: 65 BPM | BODY MASS INDEX: 17.89 KG/M2 | RESPIRATION RATE: 16 BRPM | DIASTOLIC BLOOD PRESSURE: 70 MMHG | OXYGEN SATURATION: 98 % | WEIGHT: 101 LBS | SYSTOLIC BLOOD PRESSURE: 118 MMHG

## 2020-08-28 VITALS
TEMPERATURE: 97.6 F | RESPIRATION RATE: 16 BRPM | HEART RATE: 65 BPM | SYSTOLIC BLOOD PRESSURE: 110 MMHG | DIASTOLIC BLOOD PRESSURE: 58 MMHG | OXYGEN SATURATION: 98 %

## 2020-08-28 DIAGNOSIS — Z53.8 APPOINTMENT CANCELED BY HOSPITAL: ICD-10-CM

## 2020-08-28 PROCEDURE — 99999 PR NO CHARGE: CPT | Performed by: FAMILY MEDICINE

## 2020-08-28 PROCEDURE — G0299 HHS/HOSPICE OF RN EA 15 MIN: HCPCS

## 2020-08-28 ASSESSMENT — ENCOUNTER SYMPTOMS
LIMITED RANGE OF MOTION: 1
NAUSEA: DENIES
MUSCLE WEAKNESS: 1
VOMITING: DENIES

## 2020-08-28 NOTE — PROGRESS NOTES
Two sisters both present at this visit. Pt denies any slips, trips, falls, or changes to current meds. VSS, pt denies any pain or discomfort. Swelling in RLE decreased. RN provided several instant heating pad and instructed pt to use no more than 20 min every time and up to 4 times daily. Educated pt and family to monitor for any s/sx of skin breakdown or heat injury and report to HH if any. Ellie said that they were planning for taking  pt out for breakfast this Sunday. Reviewed infection prevention, use of facial masks and social distancing. Pt and family all voiced understanding to the above teaching and interventions. No other questions at this time. Visited lasted 30 min per pt requests. She will have breakfast after this SNV.

## 2020-09-03 ENCOUNTER — HOME CARE VISIT (OUTPATIENT)
Dept: HOME HEALTH SERVICES | Facility: HOME HEALTHCARE | Age: 85
End: 2020-09-03
Payer: MEDICARE

## 2020-09-03 PROCEDURE — G0299 HHS/HOSPICE OF RN EA 15 MIN: HCPCS

## 2020-09-03 ASSESSMENT — FIBROSIS 4 INDEX: FIB4 SCORE: 1.81

## 2020-09-04 VITALS
TEMPERATURE: 98.2 F | DIASTOLIC BLOOD PRESSURE: 65 MMHG | OXYGEN SATURATION: 97 % | WEIGHT: 101 LBS | BODY MASS INDEX: 17.89 KG/M2 | SYSTOLIC BLOOD PRESSURE: 100 MMHG | RESPIRATION RATE: 16 BRPM | HEART RATE: 68 BPM

## 2020-09-04 ASSESSMENT — ENCOUNTER SYMPTOMS
DIFFICULTY THINKING: 1
MUSCLE WEAKNESS: 1

## 2020-09-10 ENCOUNTER — HOME CARE VISIT (OUTPATIENT)
Dept: HOME HEALTH SERVICES | Facility: HOME HEALTHCARE | Age: 85
End: 2020-09-10
Payer: MEDICARE

## 2020-09-10 PROCEDURE — 665001 SOC-HOME HEALTH

## 2020-09-10 PROCEDURE — G0299 HHS/HOSPICE OF RN EA 15 MIN: HCPCS

## 2020-09-10 ASSESSMENT — FIBROSIS 4 INDEX: FIB4 SCORE: 1.81

## 2020-09-13 VITALS
WEIGHT: 101 LBS | RESPIRATION RATE: 17 BRPM | SYSTOLIC BLOOD PRESSURE: 124 MMHG | OXYGEN SATURATION: 97 % | HEART RATE: 67 BPM | BODY MASS INDEX: 17.89 KG/M2 | DIASTOLIC BLOOD PRESSURE: 66 MMHG | TEMPERATURE: 97.4 F

## 2020-09-17 ENCOUNTER — HOME CARE VISIT (OUTPATIENT)
Dept: HOME HEALTH SERVICES | Facility: HOME HEALTHCARE | Age: 85
End: 2020-09-17
Payer: MEDICARE

## 2020-09-17 NOTE — PROGRESS NOTES
Spoke to PT's sister in regards to scheduling AWV, she informed me that they do not have the equipment for a Vv and do not want to go into the office

## 2020-09-22 RX ORDER — POTASSIUM CHLORIDE 20 MEQ/1
20 TABLET, EXTENDED RELEASE ORAL DAILY
Qty: 30 TAB | Refills: 3 | Status: SHIPPED | OUTPATIENT
Start: 2020-09-22 | End: 2021-01-21

## 2020-09-22 RX ORDER — CARVEDILOL 12.5 MG/1
TABLET ORAL
Qty: 60 TAB | Refills: 3 | Status: SHIPPED | OUTPATIENT
Start: 2020-09-22 | End: 2021-01-21

## 2020-09-22 RX ORDER — OMEPRAZOLE 20 MG/1
CAPSULE, DELAYED RELEASE ORAL
Qty: 60 CAP | Refills: 3 | Status: SHIPPED | OUTPATIENT
Start: 2020-09-22 | End: 2021-01-21

## 2020-09-22 RX ORDER — LANOLIN ALCOHOL/MO/W.PET/CERES
CREAM (GRAM) TOPICAL
Qty: 30 TAB | Refills: 3 | Status: SHIPPED | OUTPATIENT
Start: 2020-09-22 | End: 2021-01-21

## 2020-09-22 RX ORDER — LEVOTHYROXINE SODIUM 0.05 MG/1
TABLET ORAL
Qty: 30 TAB | Refills: 3 | Status: SHIPPED | OUTPATIENT
Start: 2020-09-22 | End: 2021-01-21

## 2020-09-22 RX ORDER — FERROUS SULFATE 325(65) MG
325 TABLET ORAL DAILY
Qty: 30 TAB | Refills: 3 | Status: SHIPPED | OUTPATIENT
Start: 2020-09-22 | End: 2021-01-21

## 2020-09-24 ENCOUNTER — HOME CARE VISIT (OUTPATIENT)
Dept: HOME HEALTH SERVICES | Facility: HOME HEALTHCARE | Age: 85
End: 2020-09-24
Payer: MEDICARE

## 2020-09-24 PROCEDURE — G0493 RN CARE EA 15 MIN HH/HOSPICE: HCPCS

## 2020-09-24 ASSESSMENT — FIBROSIS 4 INDEX: FIB4 SCORE: 1.81

## 2020-09-27 VITALS
TEMPERATURE: 97.7 F | SYSTOLIC BLOOD PRESSURE: 124 MMHG | BODY MASS INDEX: 17.89 KG/M2 | RESPIRATION RATE: 16 BRPM | WEIGHT: 101 LBS | HEART RATE: 72 BPM | DIASTOLIC BLOOD PRESSURE: 70 MMHG | OXYGEN SATURATION: 96 %

## 2020-09-27 ASSESSMENT — ACTIVITIES OF DAILY LIVING (ADL)
OASIS_M1830: 01
HOME_HEALTH_OASIS: 01

## 2020-09-27 ASSESSMENT — PATIENT HEALTH QUESTIONNAIRE - PHQ9: CLINICAL INTERPRETATION OF PHQ2 SCORE: 0

## 2021-01-01 ENCOUNTER — TELEMEDICINE (OUTPATIENT)
Dept: MEDICAL GROUP | Facility: MEDICAL CENTER | Age: 86
End: 2021-01-01
Payer: MEDICARE

## 2021-01-01 ENCOUNTER — APPOINTMENT (OUTPATIENT)
Dept: MEDICAL GROUP | Facility: MEDICAL CENTER | Age: 86
End: 2021-01-01
Payer: MEDICARE

## 2021-01-01 ENCOUNTER — HOME CARE VISIT (OUTPATIENT)
Dept: HOSPICE | Facility: HOSPICE | Age: 86
End: 2021-01-01
Payer: MEDICARE

## 2021-01-01 ENCOUNTER — TELEMEDICINE (OUTPATIENT)
Dept: MEDICAL GROUP | Facility: MEDICAL CENTER | Age: 86
End: 2021-01-01

## 2021-01-01 ENCOUNTER — HOSPICE ADMISSION (OUTPATIENT)
Dept: HOSPICE | Facility: HOSPICE | Age: 86
End: 2021-01-01
Payer: MEDICARE

## 2021-01-01 ENCOUNTER — TELEPHONE (OUTPATIENT)
Dept: MEDICAL GROUP | Facility: MEDICAL CENTER | Age: 86
End: 2021-01-01

## 2021-01-01 ENCOUNTER — OFFICE VISIT (OUTPATIENT)
Dept: MEDICAL GROUP | Facility: MEDICAL CENTER | Age: 86
End: 2021-01-01
Payer: MEDICARE

## 2021-01-01 VITALS
DIASTOLIC BLOOD PRESSURE: 59 MMHG | WEIGHT: 94 LBS | BODY MASS INDEX: 16.65 KG/M2 | SYSTOLIC BLOOD PRESSURE: 125 MMHG | HEART RATE: 74 BPM

## 2021-01-01 VITALS
SYSTOLIC BLOOD PRESSURE: 120 MMHG | BODY MASS INDEX: 17.54 KG/M2 | DIASTOLIC BLOOD PRESSURE: 64 MMHG | WEIGHT: 99 LBS | HEART RATE: 67 BPM

## 2021-01-01 VITALS — BODY MASS INDEX: 16.3 KG/M2 | HEIGHT: 63 IN

## 2021-01-01 DIAGNOSIS — I27.20 PULMONARY HYPERTENSION (HCC): ICD-10-CM

## 2021-01-01 DIAGNOSIS — Z53.8 APPOINTMENT CANCELED BY HOSPITAL: ICD-10-CM

## 2021-01-01 DIAGNOSIS — F03.90 DEMENTIA WITHOUT BEHAVIORAL DISTURBANCE, UNSPECIFIED DEMENTIA TYPE: ICD-10-CM

## 2021-01-01 DIAGNOSIS — I10 ESSENTIAL HYPERTENSION: ICD-10-CM

## 2021-01-01 DIAGNOSIS — Z09 HOSPITAL DISCHARGE FOLLOW-UP: Primary | ICD-10-CM

## 2021-01-01 DIAGNOSIS — R29.6 FREQUENT FALLS: ICD-10-CM

## 2021-01-01 DIAGNOSIS — E03.9 ACQUIRED HYPOTHYROIDISM: ICD-10-CM

## 2021-01-01 PROCEDURE — 99442 PR PHYSICIAN TELEPHONE EVALUATION 11-20 MIN: CPT | Performed by: FAMILY MEDICINE

## 2021-01-01 PROCEDURE — 99214 OFFICE O/P EST MOD 30 MIN: CPT | Mod: 95 | Performed by: FAMILY MEDICINE

## 2021-01-01 RX ORDER — ASPIRIN 81 MG/1
TABLET, DELAYED RELEASE ORAL
Qty: 30 TABLET | Refills: 8 | Status: SHIPPED | OUTPATIENT
Start: 2021-01-01 | End: 2022-01-01

## 2021-01-01 RX ORDER — LEVOTHYROXINE SODIUM 0.05 MG/1
TABLET ORAL
Qty: 30 TABLET | Refills: 10 | Status: SHIPPED | OUTPATIENT
Start: 2021-01-01 | End: 2022-01-01

## 2021-01-01 RX ORDER — ASPIRIN 81 MG/1
TABLET, COATED ORAL
Qty: 30 TABLET | Refills: 0 | Status: SHIPPED | OUTPATIENT
Start: 2021-01-01 | End: 2021-01-01

## 2021-01-01 RX ORDER — MULTIVITAMIN,THERAPEUTIC
TABLET ORAL
Qty: 30 TABLET | Refills: 0 | Status: SHIPPED | OUTPATIENT
Start: 2021-01-01 | End: 2021-01-01

## 2021-01-01 RX ORDER — ASPIRIN 81 MG/1
81 TABLET ORAL DAILY
Qty: 30 TABLET | Refills: 0 | Status: SHIPPED | OUTPATIENT
Start: 2021-01-01 | End: 2021-01-01

## 2021-01-01 RX ORDER — POTASSIUM CHLORIDE 20 MEQ/1
TABLET, EXTENDED RELEASE ORAL
Qty: 30 TABLET | Refills: 0 | Status: SHIPPED | OUTPATIENT
Start: 2021-01-01 | End: 2022-01-01

## 2021-01-01 RX ORDER — DOCUSATE SODIUM 100 MG/1
CAPSULE, LIQUID FILLED ORAL
Qty: 60 CAPSULE | Refills: 11 | Status: SHIPPED | OUTPATIENT
Start: 2021-01-01 | End: 2022-01-01

## 2021-01-01 RX ORDER — PSEUDOEPHEDRINE HCL 30 MG
100 TABLET ORAL 2 TIMES DAILY
Qty: 60 CAPSULE | Refills: 0 | Status: SHIPPED | OUTPATIENT
Start: 2021-01-01 | End: 2021-01-01

## 2021-01-01 RX ORDER — LEVOTHYROXINE SODIUM 0.05 MG/1
TABLET ORAL
Qty: 30 TABLET | Refills: 0 | Status: SHIPPED | OUTPATIENT
Start: 2021-01-01 | End: 2021-01-01

## 2021-01-01 RX ORDER — QUETIAPINE FUMARATE 25 MG/1
TABLET, FILM COATED ORAL
Qty: 30 TABLET | Refills: 10 | Status: SHIPPED | OUTPATIENT
Start: 2021-01-01 | End: 2022-01-01

## 2021-01-01 RX ORDER — FERROUS SULFATE 325(65) MG
TABLET ORAL
Qty: 30 TABLET | Refills: 0 | Status: SHIPPED | OUTPATIENT
Start: 2021-01-01 | End: 2021-01-01

## 2021-01-01 RX ORDER — FOLIC ACID 1 MG/1
TABLET ORAL
Qty: 100 TABLET | Refills: 0 | Status: SHIPPED | OUTPATIENT
Start: 2021-01-01 | End: 2022-01-01

## 2021-01-01 RX ORDER — LISINOPRIL 10 MG/1
TABLET ORAL
Qty: 100 TABLET | Refills: 0 | Status: SHIPPED | OUTPATIENT
Start: 2021-01-01 | End: 2022-01-01

## 2021-01-01 RX ORDER — LANOLIN ALCOHOL/MO/W.PET/CERES
CREAM (GRAM) TOPICAL
Qty: 30 TABLET | Refills: 0 | Status: SHIPPED | OUTPATIENT
Start: 2021-01-01 | End: 2022-01-01

## 2021-01-01 RX ORDER — CHOLECALCIFEROL (VITAMIN D3) 25 MCG
TABLET,CHEWABLE ORAL
Qty: 30 TABLET | Refills: 11 | Status: SHIPPED | OUTPATIENT
Start: 2021-01-01 | End: 2022-01-01

## 2021-01-01 RX ORDER — QUETIAPINE FUMARATE 25 MG/1
25 TABLET, FILM COATED ORAL
Qty: 60 TABLET | Refills: 3 | Status: SHIPPED | OUTPATIENT
Start: 2021-01-01 | End: 2021-01-01

## 2021-01-01 RX ORDER — POTASSIUM CHLORIDE 20 MEQ/1
TABLET, EXTENDED RELEASE ORAL
Qty: 30 TABLET | Refills: 0 | Status: SHIPPED | OUTPATIENT
Start: 2021-01-01 | End: 2021-01-01

## 2021-01-01 RX ORDER — POLYETHYLENE GLYCOL 3350 17 G/17G
POWDER, FOR SOLUTION ORAL
Qty: 30 EACH | Refills: 3 | Status: SHIPPED | OUTPATIENT
Start: 2021-01-01 | End: 2022-01-01

## 2021-01-01 RX ORDER — OMEPRAZOLE 20 MG/1
CAPSULE, DELAYED RELEASE ORAL
Qty: 60 CAPSULE | Refills: 0 | Status: SHIPPED | OUTPATIENT
Start: 2021-01-01 | End: 2022-01-01

## 2021-01-01 RX ORDER — CARVEDILOL 12.5 MG/1
TABLET ORAL
Qty: 60 TABLET | Refills: 11 | Status: SHIPPED | OUTPATIENT
Start: 2021-01-01 | End: 2022-01-01

## 2021-01-01 RX ORDER — AMLODIPINE BESYLATE 5 MG/1
TABLET ORAL
Qty: 100 TABLET | Refills: 0 | Status: SHIPPED | OUTPATIENT
Start: 2021-01-01 | End: 2022-01-01

## 2021-01-01 RX ORDER — DOCUSATE SODIUM 100 MG/1
CAPSULE, LIQUID FILLED ORAL
Qty: 60 CAPSULE | Refills: 0 | Status: SHIPPED | OUTPATIENT
Start: 2021-01-01 | End: 2021-01-01

## 2021-01-01 RX ORDER — FERROUS SULFATE 325(65) MG
TABLET ORAL
Qty: 60 TABLET | Refills: 11 | Status: SHIPPED | OUTPATIENT
Start: 2021-01-01 | End: 2022-01-01

## 2021-01-01 RX ORDER — MULTIVITAMIN WITH FOLIC ACID 400 MCG
TABLET ORAL
Qty: 30 TABLET | Refills: 11 | Status: SHIPPED | OUTPATIENT
Start: 2021-01-01 | End: 2022-01-01

## 2021-01-01 ASSESSMENT — FIBROSIS 4 INDEX
FIB4 SCORE: 2.61
FIB4 SCORE: 2.61

## 2021-01-11 DIAGNOSIS — Z23 NEED FOR VACCINATION: ICD-10-CM

## 2021-01-21 RX ORDER — POTASSIUM CHLORIDE 20 MEQ/1
TABLET, EXTENDED RELEASE ORAL
Qty: 30 TAB | Refills: 0 | Status: SHIPPED | OUTPATIENT
Start: 2021-01-21 | End: 2021-02-22

## 2021-01-21 RX ORDER — LEVOTHYROXINE SODIUM 0.05 MG/1
TABLET ORAL
Qty: 30 TAB | Refills: 0 | Status: SHIPPED | OUTPATIENT
Start: 2021-01-21 | End: 2021-02-22

## 2021-01-21 RX ORDER — FERROUS SULFATE 325(65) MG
TABLET ORAL
Qty: 30 TAB | Refills: 0 | Status: SHIPPED | OUTPATIENT
Start: 2021-01-21 | End: 2021-02-22

## 2021-01-21 RX ORDER — CARVEDILOL 12.5 MG/1
TABLET ORAL
Qty: 60 TAB | Refills: 0 | Status: SHIPPED | OUTPATIENT
Start: 2021-01-21 | End: 2021-02-22

## 2021-01-21 RX ORDER — LANOLIN ALCOHOL/MO/W.PET/CERES
CREAM (GRAM) TOPICAL
Qty: 30 TAB | Refills: 0 | Status: SHIPPED | OUTPATIENT
Start: 2021-01-21 | End: 2021-02-22

## 2021-01-21 RX ORDER — OMEPRAZOLE 20 MG/1
CAPSULE, DELAYED RELEASE ORAL
Qty: 60 CAP | Refills: 0 | Status: SHIPPED | OUTPATIENT
Start: 2021-01-21 | End: 2021-02-22

## 2021-02-22 RX ORDER — POTASSIUM CHLORIDE 20 MEQ/1
TABLET, EXTENDED RELEASE ORAL
Qty: 90 TABLET | Refills: 0 | Status: ON HOLD | OUTPATIENT
Start: 2021-02-22 | End: 2021-07-22

## 2021-02-22 RX ORDER — LEVOTHYROXINE SODIUM 0.05 MG/1
TABLET ORAL
Qty: 90 TABLET | Refills: 3 | Status: SHIPPED | OUTPATIENT
Start: 2021-02-22 | End: 2021-01-01

## 2021-02-22 RX ORDER — FERROUS SULFATE 325(65) MG
TABLET ORAL
Qty: 90 TABLET | Refills: 3 | Status: SHIPPED | OUTPATIENT
Start: 2021-02-22 | End: 2021-01-01

## 2021-02-22 RX ORDER — LANOLIN ALCOHOL/MO/W.PET/CERES
CREAM (GRAM) TOPICAL
Qty: 90 TABLET | Refills: 3 | Status: SHIPPED | OUTPATIENT
Start: 2021-02-22 | End: 2021-01-01

## 2021-02-22 RX ORDER — CARVEDILOL 12.5 MG/1
TABLET ORAL
Qty: 180 TABLET | Refills: 3 | Status: SHIPPED | OUTPATIENT
Start: 2021-02-22 | End: 2021-01-01

## 2021-02-22 RX ORDER — OMEPRAZOLE 20 MG/1
CAPSULE, DELAYED RELEASE ORAL
Qty: 90 CAPSULE | Refills: 3 | Status: SHIPPED | OUTPATIENT
Start: 2021-02-22 | End: 2021-01-01

## 2021-05-13 ENCOUNTER — HOSPITAL ENCOUNTER (EMERGENCY)
Facility: MEDICAL CENTER | Age: 86
End: 2021-05-13
Attending: EMERGENCY MEDICINE | Admitting: EMERGENCY MEDICINE
Payer: MEDICARE

## 2021-05-13 ENCOUNTER — APPOINTMENT (OUTPATIENT)
Dept: RADIOLOGY | Facility: MEDICAL CENTER | Age: 86
End: 2021-05-13
Attending: EMERGENCY MEDICINE
Payer: MEDICARE

## 2021-05-13 VITALS
HEART RATE: 60 BPM | WEIGHT: 92.59 LBS | SYSTOLIC BLOOD PRESSURE: 138 MMHG | RESPIRATION RATE: 16 BRPM | HEIGHT: 61 IN | OXYGEN SATURATION: 98 % | DIASTOLIC BLOOD PRESSURE: 76 MMHG | TEMPERATURE: 98.3 F | BODY MASS INDEX: 17.48 KG/M2

## 2021-05-13 DIAGNOSIS — F03.90 DEMENTIA WITHOUT BEHAVIORAL DISTURBANCE, UNSPECIFIED DEMENTIA TYPE: ICD-10-CM

## 2021-05-13 DIAGNOSIS — M54.50 MIDLINE LOW BACK PAIN WITHOUT SCIATICA, UNSPECIFIED CHRONICITY: ICD-10-CM

## 2021-05-13 LAB
APPEARANCE UR: CLEAR
COLOR UR AUTO: YELLOW
GLUCOSE UR QL STRIP.AUTO: NEGATIVE MG/DL
KETONES UR QL STRIP.AUTO: 15 MG/DL
LEUKOCYTE ESTERASE UR QL STRIP.AUTO: NEGATIVE
NITRITE UR QL STRIP.AUTO: NEGATIVE
PH UR STRIP.AUTO: 5.5 [PH] (ref 5–8)
PROT UR QL STRIP: 30 MG/DL
RBC UR QL AUTO: ABNORMAL
SP GR UR STRIP.AUTO: >=1.03 (ref 1–1.03)

## 2021-05-13 PROCEDURE — 99283 EMERGENCY DEPT VISIT LOW MDM: CPT

## 2021-05-13 PROCEDURE — 87086 URINE CULTURE/COLONY COUNT: CPT

## 2021-05-13 PROCEDURE — 81002 URINALYSIS NONAUTO W/O SCOPE: CPT

## 2021-05-13 PROCEDURE — 72100 X-RAY EXAM L-S SPINE 2/3 VWS: CPT

## 2021-05-13 ASSESSMENT — FIBROSIS 4 INDEX: FIB4 SCORE: 1.81

## 2021-05-13 NOTE — ED PROVIDER NOTES
ED Provider Note    Scribed for Naun Mullins M.D. by Lorin Bright. 5/13/2021, 3:17 PM.    Primary care provider: John Garces M.D.  Means of arrival: walk in  History obtained from: Patient  History limited by: None    CHIEF COMPLAINT  Chief Complaint   Patient presents with    Other     pt to triage with POA, reports pt sister called her and said pt has been bleeding. pt reports seeing blood on toilet paper once this morning. none in the toilet. not sure if it's rectal or vaginal. denies blood thinners.        HPI  Lamar Dangelo is a 89 y.o. female who presents to the Emergency Department for evaluation of lower back pains above her buttocks onset several weeks. The patient endorses the presence of blood when she wiped in the bathroom at 11 AM but is unsure if it is vaginal or rectal. The patient arrived with her neighbor who reports the patient's sister noticed the bleeding. She endorses the presence of blood on her pad since this morning. She admits to additional symptoms of abdominal pain, but denies vomiting, diarrhea, dysuria, or constipation. No alleviating factors were reported. The patient has a history of dementia and is being brought in by the power of .      REVIEW OF SYSTEMS  As above otherwise all other systems are negative    PAST MEDICAL HISTORY   has a past medical history of Hyperlipidemia (7/2/2010) and Hypothyroid (7/2/2010).    SURGICAL HISTORY   has a past surgical history that includes tonsillectomy; appendectomy; colonoscopy - endo (N/A, 9/3/2017); gastroscopy-endo (N/A, 9/3/2017); and hip cannulated screw (Left, 3/24/2018).    SOCIAL HISTORY  Social History     Tobacco Use    Smoking status: Never Smoker    Smokeless tobacco: Never Used   Substance Use Topics    Alcohol use: No    Drug use: No      Social History     Substance and Sexual Activity   Drug Use No       FAMILY HISTORY  Family History   Problem Relation Age of Onset    Heart Disease Father      "Hypertension Sister        CURRENT MEDICATIONS  Home Medications       Reviewed by Barrett Oneill R.N. (Registered Nurse) on 05/13/21 at 1308  Med List Status: Partial     Medication Last Dose Status   carvedilol (COREG) 12.5 MG Tab  Active   Cyanocobalamin (VITAMIN B-12) 1000 MCG Tab  Active   FEROSUL 325 (65 Fe) MG tablet  Active   levothyroxine (SYNTHROID) 50 MCG Tab  Active   omeprazole (PRILOSEC) 20 MG delayed-release capsule  Active   potassium chloride SA (KDUR) 20 MEQ Tab CR  Active                    ALLERGIES  Allergies   Allergen Reactions    Nkda [No Known Drug Allergy]        PHYSICAL EXAM  VITAL SIGNS: /76   Pulse 60   Temp 36.8 °C (98.3 °F) (Temporal)   Resp 16   Ht 1.549 m (5' 1\")   Wt 42 kg (92 lb 9.5 oz)   SpO2 98%   BMI 17.50 kg/m²     Constitutional: Elderly in appearance, Well developed, Well nourished, acute distress, Non-toxic appearance.   HENT: Normocephalic, Atraumatic, Bilateral external ears normal, oropharynx moist, No oral exudates, Nose normal.   Eyes:conjunctiva is normal, there are no signs of exudate.   Neck: Supple, no meningeal signs.  Lymphatic: No lymphadenopathy noted.   Cardiovascular: Regular rate and rhythm without murmurs gallops or rubs.   Thorax & Lungs: No respiratory distress. Breathing comfortably. Lungs are clear to auscultation bilaterally, there are no wheezes no rales. Chest wall is nontender.  Abdomen: Soft, nontender despite pain, nondistended. Bowel sounds are present.   : no overt signs of bleeding from vaginal wall or rectum  Skin: Warm, Dry, No erythema,   Back: Tenderness to upper thoracic lower lumbar region, No CVA tenderness.  Musculoskeletal: significant cifosis, No overt point tenderness,  Good range of motion in all major joints. No major deformities noted. Intact distal pulses, no clubbing, no cyanosis, no edema,   Neurologic: Alert & oriented x 3, Moving all extremities. No gross abnormalities.    Psychiatric: Affect normal, Judgment " normal, Mood normal.     RADIOLOGY  DX-LUMBAR SPINE-2 OR 3 VIEWS   Final Result      1.  There is marked osteopenia without gross evidence of an acute displaced fracture or malalignment.   2.  There is probably chronic loss of height at the L4 level.   3.  Exam limited by the marked osteopenia.        The radiologist's interpretation of all radiological studies have been reviewed by me.    Results for orders placed or performed during the hospital encounter of 05/13/21   POCT urinalysis device results   Result Value Ref Range    POC Color Yellow     POC Appearance Clear     POC Glucose Negative Negative mg/dL    POC Ketones 15 (A) Negative mg/dL    POC Specific Gravity >=1.030 (A) 1.005 - 1.030    POC Blood Moderate (A) Negative    POC Urine PH 5.5 5.0 - 8.0    POC Protein 30 (A) Negative mg/dL    POC Nitrites Negative Negative    POC Leukocyte Esterase Negative Negative       COURSE & MEDICAL DECISION MAKING  Pertinent Labs & Imaging studies reviewed. (See chart for details)    3:17 PM - Patient seen and examined at bedside along with her neighbor under power of . The patient presents with dementia and emphasizes lower back pain. Ordered DX-Lumbar Spine to evaluate her symptoms.     4:35 PM - I reevaluated the patient at bedside. I discussed the patient's diagnostic study results which show no acute fractures. Ordered POCT urinalysis and urine culture to evaluate patient.    5:03 PM - I reevaluated the patient at bedside. I discussed plan for discharge and follow up as outlined below. The patient verbalizes they feel comfortable going home. The patient is stable for discharge at this time and will return for any new or worsening symptoms. Patient verbalizes understanding and support with my plan for discharge.     It was noticed that the patient's blood pressure was greater than 120/80 on today's visit. At this point, most likely related to reactive hypertension, secondary to the emergency visit itself. I  have recommend the patient followup with her primary care physician for recheck of her blood pressure.         Decision Making:  Patient presents for evaluation.  Clinically there is no overt signs of bleeding from the vaginal vault nor the rectum.  The patient does have dementia and was complaining of back pain so I did do an x-ray x-ray shows no acute findings she does have osteopenia and other chronic fractures.  The patient did have a CT scan done in 2020 of her whole thoracic and lumbar spine which was reviewed by myself.  At this point there is no overt signs of bleeding and with one bout of bleeding today it is very difficult to tell where it was coming from we did do a urinalysis which was positive for blood but was not gross hematuria on examination.  I did send for culture and sensitivity but negative leukocytes negative nitrates.  I advised that the patient treat her back pain with ibuprofen and Tylenol. Patient should follow up with her primary care physician for recheck     The patient will return for new or worsening symptoms and is stable at the time of discharge.    The patient is referred to a primary physician for blood pressure management, diabetic screening, and for all other preventative health concerns.    DISPOSITION:  Patient will be discharged home in stable condition.    FOLLOW UP:  John Garces M.D.  [address] [place] [id number] [phone]    Schedule an appointment as soon as possible for a visit in 1 week  For re-check, Return if any symptoms worsen    FINAL IMPRESSION  1. Midline low back pain without sciatica, unspecified chronicity    2. Dementia without behavioral disturbance, unspecified dementia type (HCC)          Lorin CULLEN), am scribing for, and in the presence of, Naun Mullins M.D..    Electronically signed by: Lorin Augustin), 5/13/2021    Naun CULLEN M.D. personally performed the services described  in this documentation, as scribed by Lorin Bright in my presence, and it is both accurate and complete. C    The note accurately reflects work and decisions made by me.  Naun Mullins M.D.  5/13/2021  10:26 PM

## 2021-05-13 NOTE — ED TRIAGE NOTES
"Chief Complaint   Patient presents with   • Other     pt to triage with POA, reports pt sister called her and said pt has been bleeding. pt reports seeing blood on toilet paper once this morning. none in the toilet. not sure if it's rectal or vaginal. denies blood thinners.      Pt ambulatory to triage for above. NAD noted. VSS.     /76   Pulse 60   Temp 36.8 °C (98.3 °F) (Temporal)   Resp 16   Ht 1.549 m (5' 1\")   Wt 42 kg (92 lb 9.5 oz)   SpO2 98%   BMI 17.50 kg/m²     "

## 2021-05-15 LAB
BACTERIA UR CULT: NORMAL
SIGNIFICANT IND 70042: NORMAL
SITE SITE: NORMAL
SOURCE SOURCE: NORMAL

## 2021-05-21 ENCOUNTER — PATIENT MESSAGE (OUTPATIENT)
Dept: HEALTH INFORMATION MANAGEMENT | Facility: OTHER | Age: 86
End: 2021-05-21

## 2021-05-22 NOTE — PROGRESS NOTES
ED Follow-up phone call completed. Kelly is scheduled for a follow-up visit 6/8/21. Kelly requested we contact Lindsay Roger, as she assists with managing health appointments.    Outbound call to [facility]. [facility] is aware of upcoming PCP appointment and requested  contact information be sent to Lamar's "[employer]hart. No further needs at this time.

## 2021-06-01 ENCOUNTER — PATIENT OUTREACH (OUTPATIENT)
Dept: HEALTH INFORMATION MANAGEMENT | Facility: OTHER | Age: 86
End: 2021-06-01

## 2021-06-01 ENCOUNTER — PATIENT MESSAGE (OUTPATIENT)
Dept: HEALTH INFORMATION MANAGEMENT | Facility: OTHER | Age: 86
End: 2021-06-01

## 2021-06-01 NOTE — PROGRESS NOTES
Outcome: Comprehensive Health Assessment scheduled: Tuesday, June 22, 2021 at 8:00 am (1 hour) with Dr. Ball - [address] [place] [address].    Mbr's daughter, Epic Verified, scheduled Comprehensive Health Assessment no further needs at this time.     Attempt # 2

## 2021-06-08 ENCOUNTER — APPOINTMENT (OUTPATIENT)
Dept: MEDICAL GROUP | Facility: MEDICAL CENTER | Age: 86
End: 2021-06-08
Payer: MEDICARE

## 2021-06-16 ENCOUNTER — TELEPHONE (OUTPATIENT)
Dept: MEDICAL GROUP | Facility: MEDICAL CENTER | Age: 86
End: 2021-06-16

## 2021-06-16 NOTE — TELEPHONE ENCOUNTER
ESTABLISHED PATIENT PRE-VISIT PLANNING     Patient was NOT contacted to complete PVP.     Note: Patient will not be contacted if there is no indication to call.     1.  Reviewed notes from the last few office visits within the medical group: Yes    2.  If any orders were placed at last visit or intended to be done for this visit (i.e. 6 mos follow-up), do we have Results/Consult Notes?         •  Labs - Labs were not ordered at last office visit.  Note: If patient appointment is for lab review and patient did not complete labs, check with provider if OK to reschedule patient until labs completed.       •  Imaging - Imaging was not ordered at last office visit.       •  Referrals - No referrals were ordered at last office visit.    3. Is this appointment scheduled as a Hospital Follow-Up? No    4. AHA (Pulse8) form printed for Provider? Yes

## 2021-06-24 ENCOUNTER — OFFICE VISIT (OUTPATIENT)
Dept: MEDICAL GROUP | Facility: MEDICAL CENTER | Age: 86
End: 2021-06-24
Payer: MEDICARE

## 2021-06-24 ENCOUNTER — HOSPITAL ENCOUNTER (OUTPATIENT)
Dept: LAB | Facility: MEDICAL CENTER | Age: 86
End: 2021-06-24
Attending: FAMILY MEDICINE
Payer: MEDICARE

## 2021-06-24 VITALS
OXYGEN SATURATION: 96 % | SYSTOLIC BLOOD PRESSURE: 142 MMHG | WEIGHT: 96.4 LBS | BODY MASS INDEX: 17.08 KG/M2 | DIASTOLIC BLOOD PRESSURE: 78 MMHG | HEART RATE: 72 BPM | HEIGHT: 63 IN | RESPIRATION RATE: 16 BRPM | TEMPERATURE: 98.5 F

## 2021-06-24 DIAGNOSIS — I10 ESSENTIAL HYPERTENSION: ICD-10-CM

## 2021-06-24 DIAGNOSIS — E55.9 VITAMIN D DEFICIENCY: ICD-10-CM

## 2021-06-24 DIAGNOSIS — E03.4 HYPOTHYROIDISM DUE TO ACQUIRED ATROPHY OF THYROID: ICD-10-CM

## 2021-06-24 DIAGNOSIS — N18.31 STAGE 3A CHRONIC KIDNEY DISEASE: ICD-10-CM

## 2021-06-24 LAB
25(OH)D3 SERPL-MCNC: 18 NG/ML (ref 30–100)
ALBUMIN SERPL BCP-MCNC: 3.9 G/DL (ref 3.2–4.9)
ALBUMIN/GLOB SERPL: 1.7 G/DL
ALP SERPL-CCNC: 96 U/L (ref 30–99)
ALT SERPL-CCNC: 16 U/L (ref 2–50)
ANION GAP SERPL CALC-SCNC: 9 MMOL/L (ref 7–16)
AST SERPL-CCNC: 28 U/L (ref 12–45)
BASOPHILS # BLD AUTO: 0.2 % (ref 0–1.8)
BASOPHILS # BLD: 0.01 K/UL (ref 0–0.12)
BILIRUB SERPL-MCNC: 0.3 MG/DL (ref 0.1–1.5)
BUN SERPL-MCNC: 27 MG/DL (ref 8–22)
CALCIUM SERPL-MCNC: 9.2 MG/DL (ref 8.5–10.5)
CHLORIDE SERPL-SCNC: 106 MMOL/L (ref 96–112)
CHOLEST SERPL-MCNC: 199 MG/DL (ref 100–199)
CO2 SERPL-SCNC: 24 MMOL/L (ref 20–33)
CREAT SERPL-MCNC: 1.02 MG/DL (ref 0.5–1.4)
EOSINOPHIL # BLD AUTO: 0.06 K/UL (ref 0–0.51)
EOSINOPHIL NFR BLD: 1.4 % (ref 0–6.9)
ERYTHROCYTE [DISTWIDTH] IN BLOOD BY AUTOMATED COUNT: 51.6 FL (ref 35.9–50)
GLOBULIN SER CALC-MCNC: 2.3 G/DL (ref 1.9–3.5)
GLUCOSE SERPL-MCNC: 101 MG/DL (ref 65–99)
HCT VFR BLD AUTO: 38.4 % (ref 37–47)
HDLC SERPL-MCNC: 62 MG/DL
HGB BLD-MCNC: 12.3 G/DL (ref 12–16)
IMM GRANULOCYTES # BLD AUTO: 0.02 K/UL (ref 0–0.11)
IMM GRANULOCYTES NFR BLD AUTO: 0.5 % (ref 0–0.9)
LDLC SERPL CALC-MCNC: 120 MG/DL
LYMPHOCYTES # BLD AUTO: 0.86 K/UL (ref 1–4.8)
LYMPHOCYTES NFR BLD: 19.4 % (ref 22–41)
MCH RBC QN AUTO: 33.3 PG (ref 27–33)
MCHC RBC AUTO-ENTMCNC: 32 G/DL (ref 33.6–35)
MCV RBC AUTO: 104.1 FL (ref 81.4–97.8)
MONOCYTES # BLD AUTO: 0.52 K/UL (ref 0–0.85)
MONOCYTES NFR BLD AUTO: 11.7 % (ref 0–13.4)
NEUTROPHILS # BLD AUTO: 2.96 K/UL (ref 2–7.15)
NEUTROPHILS NFR BLD: 66.8 % (ref 44–72)
NRBC # BLD AUTO: 0 K/UL
NRBC BLD-RTO: 0 /100 WBC
PLATELET # BLD AUTO: 164 K/UL (ref 164–446)
PMV BLD AUTO: 9.7 FL (ref 9–12.9)
POTASSIUM SERPL-SCNC: 4.1 MMOL/L (ref 3.6–5.5)
PROT SERPL-MCNC: 6.2 G/DL (ref 6–8.2)
RBC # BLD AUTO: 3.69 M/UL (ref 4.2–5.4)
SODIUM SERPL-SCNC: 139 MMOL/L (ref 135–145)
T4 FREE SERPL-MCNC: 1.37 NG/DL (ref 0.93–1.7)
TRIGL SERPL-MCNC: 84 MG/DL (ref 0–149)
TSH SERPL DL<=0.005 MIU/L-ACNC: 1.96 UIU/ML (ref 0.38–5.33)
WBC # BLD AUTO: 4.4 K/UL (ref 4.8–10.8)

## 2021-06-24 PROCEDURE — 36415 COLL VENOUS BLD VENIPUNCTURE: CPT

## 2021-06-24 PROCEDURE — 85025 COMPLETE CBC W/AUTO DIFF WBC: CPT

## 2021-06-24 PROCEDURE — 80053 COMPREHEN METABOLIC PANEL: CPT

## 2021-06-24 PROCEDURE — 99214 OFFICE O/P EST MOD 30 MIN: CPT | Performed by: FAMILY MEDICINE

## 2021-06-24 PROCEDURE — 82306 VITAMIN D 25 HYDROXY: CPT

## 2021-06-24 PROCEDURE — 80061 LIPID PANEL: CPT

## 2021-06-24 PROCEDURE — 84439 ASSAY OF FREE THYROXINE: CPT

## 2021-06-24 PROCEDURE — 84443 ASSAY THYROID STIM HORMONE: CPT

## 2021-06-24 RX ORDER — INFLUENZA A VIRUS A/MICHIGAN/45/2015 X-275 (H1N1) ANTIGEN (FORMALDEHYDE INACTIVATED), INFLUENZA A VIRUS A/SINGAPORE/INFIMH-16-0019/2016 IVR-186 (H3N2) ANTIGEN (FORMALDEHYDE INACTIVATED), AND INFLUENZA B VIRUS B/MARYLAND/15/2016 BX-69A (A B/COLORADO/6/2017-LIKE VIRUS) ANTIGEN (FORMALDEHYDE INACTIVATED) 60; 60; 60 UG/.5ML; UG/.5ML; UG/.5ML
INJECTION, SUSPENSION INTRAMUSCULAR
COMMUNITY
End: 2021-06-24

## 2021-06-24 ASSESSMENT — PATIENT HEALTH QUESTIONNAIRE - PHQ9: CLINICAL INTERPRETATION OF PHQ2 SCORE: 0

## 2021-06-24 ASSESSMENT — FIBROSIS 4 INDEX: FIB4 SCORE: 1.83

## 2021-06-24 NOTE — LETTER
June 25, 2021         Lamar Dangelo  [address] [facility] [address]        Dear Lamar:      Below are the results from your recent visit:    Resulted Orders   Lipid Profile   Result Value Ref Range    Cholesterol,Tot 199 100 - 199 mg/dL    Triglycerides 84 0 - 149 mg/dL    HDL 62 >=40 mg/dL     (H) <100 mg/dL    Narrative    Fast 8-10 hours, OK to drink water as needed during fast,  take medications per your provider's instructions.  Request patient fasting?->Yes  Fasting Instructions:->Fast 8-10 hours, OK to drink water as  needed during fast, take medications per your provider's  instruction.   VITAMIN D,25 HYDROXY   Result Value Ref Range    25-Hydroxy   Vitamin D 25 18 (L) 30 - 100 ng/mL      Comment:      Adult Ranges:   <20 ng/mL - Deficiency  20-29 ng/mL - Insufficiency   ng/mL - Sufficiency  Effective 3/18/2020, this electrochemiluminescence binding assay is  performed using Roche margy e immunoassay analyzer.  The Elecsys Vitamin D  total II assay is intended for the quantitative determination of total 25  hydroxyvitamin D in human serum and plasma. This assay is to be used as an  aid in the assessment of vitamin D sufficiency in adults.      Narrative    Fast 8-10 hours, OK to drink water as needed during fast,  take medications per your provider's instructions.  Request patient fasting?->Yes  Fasting Instructions:->Fast 8-10 hours, OK to drink water as  needed during fast, take medications per your provider's  instruction.   Comp Metabolic Panel   Result Value Ref Range    Sodium 139 135 - 145 mmol/L    Potassium 4.1 3.6 - 5.5 mmol/L    Chloride 106 96 - 112 mmol/L    Co2 24 20 - 33 mmol/L    Anion Gap 9.0 7.0 - 16.0    Glucose 101 (H) 65 - 99 mg/dL    Bun 27 (H) 8 - 22 mg/dL    Creatinine 1.02 0.50 - 1.40 mg/dL    Calcium 9.2 8.5 - 10.5 mg/dL    AST(SGOT) 28 12 - 45 U/L    ALT(SGPT) 16 2 - 50 U/L    Alkaline Phosphatase 96 30 - 99 U/L    Total Bilirubin 0.3 0.1 - 1.5 mg/dL    Albumin  3.9 3.2 - 4.9 g/dL    Total Protein 6.2 6.0 - 8.2 g/dL    Globulin 2.3 1.9 - 3.5 g/dL    A-G Ratio 1.7 g/dL    Narrative    Fast 8-10 hours, OK to drink water as needed during fast,  take medications per your provider's instructions.  Request patient fasting?->Yes  Fasting Instructions:->Fast 8-10 hours, OK to drink water as  needed during fast, take medications per your provider's  instruction.   FREE THYROXINE   Result Value Ref Range    Free T-4 1.37 0.93 - 1.70 ng/dL      Comment:      Please note new FT4 reference range effective 4/29/2020.    Narrative    Fast 8-10 hours, OK to drink water as needed during fast,  take medications per your provider's instructions.  Request patient fasting?->Yes  Fasting Instructions:->Fast 8-10 hours, OK to drink water as  needed during fast, take medications per your provider's  instruction.   TSH   Result Value Ref Range    TSH 1.960 0.380 - 5.330 uIU/mL      Comment:      Please note new reference ranges effective 12/14/2017 10:00 AM    Pregnant Females, 1st Trimester  0.050-3.700  Pregnant Females, 2nd Trimester  0.310-4.350  Pregnant Females, 3rd Trimester  0.410-5.180      Narrative    Fast 8-10 hours, OK to drink water as needed during fast,  take medications per your provider's instructions.  Request patient fasting?->Yes  Fasting Instructions:->Fast 8-10 hours, OK to drink water as  needed during fast, take medications per your provider's  instruction.   CBC WITH DIFFERENTIAL   Result Value Ref Range    WBC 4.4 (L) 4.8 - 10.8 K/uL    RBC 3.69 (L) 4.20 - 5.40 M/uL    Hemoglobin 12.3 12.0 - 16.0 g/dL    Hematocrit 38.4 37.0 - 47.0 %    .1 (H) 81.4 - 97.8 fL    MCH 33.3 (H) 27.0 - 33.0 pg    MCHC 32.0 (L) 33.6 - 35.0 g/dL    RDW 51.6 (H) 35.9 - 50.0 fL    Platelet Count 164 164 - 446 K/uL    MPV 9.7 9.0 - 12.9 fL    Neutrophils-Polys 66.80 44.00 - 72.00 %    Lymphocytes 19.40 (L) 22.00 - 41.00 %    Monocytes 11.70 0.00 - 13.40 %    Eosinophils 1.40 0.00 - 6.90 %     Basophils 0.20 0.00 - 1.80 %    Immature Granulocytes 0.50 0.00 - 0.90 %    Nucleated RBC 0.00 /100 WBC    Neutrophils (Absolute) 2.96 2.00 - 7.15 K/uL      Comment:      Includes immature neutrophils, if present.    Lymphs (Absolute) 0.86 (L) 1.00 - 4.80 K/uL    Monos (Absolute) 0.52 0.00 - 0.85 K/uL    Eos (Absolute) 0.06 0.00 - 0.51 K/uL    Baso (Absolute) 0.01 0.00 - 0.12 K/uL    Immature Granulocytes (abs) 0.02 0.00 - 0.11 K/uL    NRBC (Absolute) 0.00 K/uL    Narrative    Fast 8-10 hours, OK to drink water as needed during fast,  take medications per your provider's instructions.  Request patient fasting?->Yes  Fasting Instructions:->Fast 8-10 hours, OK to drink water as  needed during fast, take medications per your provider's  instruction.   ESTIMATED GFR   Result Value Ref Range    GFR If African American >60 >60 mL/min/1.73 m 2    GFR If Non  51 (A) >60 mL/min/1.73 m 2    Narrative    Fast 8-10 hours, OK to drink water as needed during fast,  take medications per your provider's instructions.  Request patient fasting?->Yes  Fasting Instructions:->Fast 8-10 hours, OK to drink water as  needed during fast, take medications per your provider's  instruction.     The test results show that you have low vitamin D, I recommend taking over-the-counter vitamin D 3000 unit daily.    Blood glucose slightly high, recommend low carbohydrate low-fat diet.  .  If you have any questions or concerns, please don't hesitate to call.        Sincerely,    Dr John Garces     Electronically Signed

## 2021-06-24 NOTE — PROGRESS NOTES
CC: Hypothyroidism, hypertension, CKD, vitamin D deficiency, constipation    HPI:   Lamar presents today to discuss the following medical issues:    Hypothyroidism due to acquired atrophy of thyroid  She has been tolerating Levothyroxine, no palpitation, no cold or heat intolerance, currently on levothyroxine 50 mcg daily.    Essential hypertension  Blood pressure has been adequately controlled for her age.  Denies any headache, chest pain, or shortness of breath.  Patient has been on carvedilol 12.5 mg twice a day    Stage 3a chronic kidney disease (HCC)  Patient has been asymptomatic, last GFR was 54, normal creatinine    Vitamin D deficiency  History of vitamin D deficiency, she has been taking over-the-counter vitamin D.  She has been asymptomatic    Constipation  Patient reported intermittent constipation.  Denies any abdominal pain.  As per patient she goes to the bathroom every day but her stool has been hard.     Patient Active Problem List    Diagnosis Date Noted   • Frequent falls 08/03/2020   • Compression fracture of L5 vertebra with routine healing 08/03/2020   • Compression fracture of T8 vertebra with routine healing 08/03/2020   • Hyponatremia 08/03/2020   • Encephalopathy 03/26/2018   • DNR (do not resuscitate) 03/26/2018   • Vitamin D deficiency 03/24/2018   • CKD (chronic kidney disease), stage III ([facility]) 03/24/2018   • Cachexia ([facility]) 03/24/2018   • Fracture of neck of femur ([facility]) 03/23/2018   • Vitamin B12 deficiency 03/09/2018   • Dehydration 03/08/2018   • Near syncope 03/07/2018   • Anemia 03/07/2018   • Dementia ([facility]) 09/12/2017   • HTN (hypertension) 09/12/2017   • Elevated troponin 09/12/2017   • Hypokalemia 09/11/2017   • Sundowning 09/03/2017   • 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 180 tablet 3   • Cyanocobalamin (VITAMIN B-12) 1000 MCG Tab TAKE 1 TABLET BY MOUTH ONCE DAILY. 90 tablet 3   • FEROSUL 325 (65 Fe) MG tablet TAKE 1 TABLET BY MOUTH ONCE DAILY. 90 tablet 3   • levothyroxine (SYNTHROID) 50 MCG Tab TAKE 1 TABLET BY MOUTH EVERY MORNING 30 MINUTES BEFORE BREAKFAST. 90 tablet 3   • omeprazole (PRILOSEC) 20 MG delayed-release capsule TAKE (1) CAPSULE BY MOUTH TWICE DAILY. 90 capsule 3   • potassium chloride SA (KDUR) 20 MEQ Tab CR TAKE 1 TABLET BY MOUTH ONCE DAILY. 90 tablet 0     No current facility-administered medications for this visit.         Allergies as of 06/24/2021 - Reviewed 06/24/2021   Allergen Reaction Noted   • Nkda [no known drug allergy]  07/02/2010        ROS: Denies any chest pain, Shortness of breath, Changes bowel or bladder, Lower extremity edema.    Physical Exam:  /78   Pulse 72   Temp 36.9 °C (98.5 °F)   Resp 16   Ht 1.6 m (5' 3\")   Wt 43.7 kg (96 lb 6.4 oz)   SpO2 96%   BMI 17.08 kg/m²   Gen.: Well-developed, well-nourished, no apparent distress,pleasant and cooperative with the examination  Skin:  Warm and dry with good turgor. No rashes or suspicious lesions in visible areas  HEENT:Sinuses nontender with palpation, TMs clear, nares patent with pink mucosa and clear rhinorrhea,no septal deviation ,polyps or lesions. lips without lesions, oropharynx clear.  Neck: Trachea midline,no masses or adenopathy. No JVD.  Cor: Regular rate and rhythm without murmur, gallop or rub.  Lungs: Respirations unlabored.Clear to auscultation with equal breath sounds bilaterally. No wheezes, rhonchi.  Extremities: No cyanosis, clubbing or edema.  Abdomen soft, no tenderness, no distention, normal bowel sound      Assessment and Plan.   89 y.o. " female     1. Hypothyroidism due to acquired atrophy of thyroid  He has been tolerating Levothyroxine, no palpitation, no cold or heat intolerance  Continue levothyroxine 50 mcg daily.    - TSH; Future  - FREE THYROXINE; Future    2. Essential hypertension  Controlled.  Continue on carvedilol 12.5 mg twice a day    - CBC WITH DIFFERENTIAL; Future  - Comp Metabolic Panel; Future  - Lipid Profile; Future    3. Stage 3a chronic kidney disease (HCC)  Last GFR was 54, normal creatinine  Recommend hydration avoiding nephrotoxic medication    4. Vitamin D deficiency  History of vitamin D deficiency, she has been taking over-the-counter vitamin D.  Will be repeated vitamin D level.    - VITAMIN D,25 HYDROXY; Future    5.  Constipation  Recommend hydration, and fiber consumption.  Advised to take Senokot as needed

## 2021-07-03 ENCOUNTER — PATIENT MESSAGE (OUTPATIENT)
Dept: MEDICAL GROUP | Facility: MEDICAL CENTER | Age: 86
End: 2021-07-03

## 2021-07-03 ENCOUNTER — HOSPITAL ENCOUNTER (OUTPATIENT)
Facility: MEDICAL CENTER | Age: 86
End: 2021-07-22
Attending: EMERGENCY MEDICINE | Admitting: STUDENT IN AN ORGANIZED HEALTH CARE EDUCATION/TRAINING PROGRAM
Payer: MEDICARE

## 2021-07-03 ENCOUNTER — APPOINTMENT (OUTPATIENT)
Dept: RADIOLOGY | Facility: MEDICAL CENTER | Age: 86
End: 2021-07-03
Attending: EMERGENCY MEDICINE
Payer: MEDICARE

## 2021-07-03 DIAGNOSIS — F03.90 DEMENTIA WITHOUT BEHAVIORAL DISTURBANCE, UNSPECIFIED DEMENTIA TYPE: ICD-10-CM

## 2021-07-03 DIAGNOSIS — R53.81 DEBILITY: ICD-10-CM

## 2021-07-03 DIAGNOSIS — S09.90XA CLOSED HEAD INJURY, INITIAL ENCOUNTER: ICD-10-CM
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
## 2021-07-03 LAB
ALBUMIN SERPL BCP-MCNC: 3.6 G/DL (ref 3.2–4.9)
ALBUMIN/GLOB SERPL: 1.7 G/DL
ALP SERPL-CCNC: 95 U/L (ref 30–99)
ALT SERPL-CCNC: 14 U/L (ref 2–50)
ANION GAP SERPL CALC-SCNC: 11 MMOL/L (ref 7–16)
APPEARANCE UR: CLEAR
AST SERPL-CCNC: 22 U/L (ref 12–45)
BACTERIA #/AREA URNS HPF: NEGATIVE /HPF
BASOPHILS # BLD AUTO: 0.2 % (ref 0–1.8)
BASOPHILS # BLD: 0.01 K/UL (ref 0–0.12)
BILIRUB SERPL-MCNC: 1 MG/DL (ref 0.1–1.5)
BILIRUB UR QL STRIP.AUTO: NEGATIVE
BUN SERPL-MCNC: 25 MG/DL (ref 8–22)
CALCIUM SERPL-MCNC: 8.7 MG/DL (ref 8.5–10.5)
CHLORIDE SERPL-SCNC: 105 MMOL/L (ref 96–112)
CK SERPL-CCNC: 85 U/L (ref 0–154)
CO2 SERPL-SCNC: 24 MMOL/L (ref 20–33)
COLOR UR: YELLOW
CREAT SERPL-MCNC: 1.09 MG/DL (ref 0.5–1.4)
EKG IMPRESSION: NORMAL
EOSINOPHIL # BLD AUTO: 0.09 K/UL (ref 0–0.51)
EOSINOPHIL NFR BLD: 1.7 % (ref 0–6.9)
EPI CELLS #/AREA URNS HPF: NORMAL /HPF
ERYTHROCYTE [DISTWIDTH] IN BLOOD BY AUTOMATED COUNT: 50.6 FL (ref 35.9–50)
GLOBULIN SER CALC-MCNC: 2.1 G/DL (ref 1.9–3.5)
GLUCOSE SERPL-MCNC: 83 MG/DL (ref 65–99)
GLUCOSE UR STRIP.AUTO-MCNC: NEGATIVE MG/DL
HCT VFR BLD AUTO: 35.5 % (ref 37–47)
HGB BLD-MCNC: 11.6 G/DL (ref 12–16)
HYALINE CASTS #/AREA URNS LPF: NORMAL /LPF
IMM GRANULOCYTES # BLD AUTO: 0.03 K/UL (ref 0–0.11)
IMM GRANULOCYTES NFR BLD AUTO: 0.6 % (ref 0–0.9)
KETONES UR STRIP.AUTO-MCNC: 15 MG/DL
LEUKOCYTE ESTERASE UR QL STRIP.AUTO: ABNORMAL
LYMPHOCYTES # BLD AUTO: 0.74 K/UL (ref 1–4.8)
LYMPHOCYTES NFR BLD: 14.1 % (ref 22–41)
MAGNESIUM SERPL-MCNC: 2.1 MG/DL (ref 1.5–2.5)
MCH RBC QN AUTO: 33.1 PG (ref 27–33)
MCHC RBC AUTO-ENTMCNC: 32.7 G/DL (ref 33.6–35)
MCV RBC AUTO: 101.4 FL (ref 81.4–97.8)
MICRO URNS: ABNORMAL
MONOCYTES # BLD AUTO: 0.57 K/UL (ref 0–0.85)
MONOCYTES NFR BLD AUTO: 10.8 % (ref 0–13.4)
NEUTROPHILS # BLD AUTO: 3.82 K/UL (ref 2–7.15)
NEUTROPHILS NFR BLD: 72.6 % (ref 44–72)
NITRITE UR QL STRIP.AUTO: NEGATIVE
NRBC # BLD AUTO: 0 K/UL
NRBC BLD-RTO: 0 /100 WBC
PH UR STRIP.AUTO: 6.5 [PH] (ref 5–8)
PHOSPHATE SERPL-MCNC: 3.3 MG/DL (ref 2.5–4.5)
PLATELET # BLD AUTO: 162 K/UL (ref 164–446)
PMV BLD AUTO: 9 FL (ref 9–12.9)
POTASSIUM SERPL-SCNC: 3.7 MMOL/L (ref 3.6–5.5)
PROT SERPL-MCNC: 5.7 G/DL (ref 6–8.2)
PROT UR QL STRIP: NEGATIVE MG/DL
RBC # BLD AUTO: 3.5 M/UL (ref 4.2–5.4)
RBC # URNS HPF: NORMAL /HPF
RBC UR QL AUTO: NEGATIVE
SODIUM SERPL-SCNC: 140 MMOL/L (ref 135–145)
SP GR UR STRIP.AUTO: 1.02
TSH SERPL DL<=0.005 MIU/L-ACNC: 1.93 UIU/ML (ref 0.38–5.33)
UROBILINOGEN UR STRIP.AUTO-MCNC: 0.2 MG/DL
WBC # BLD AUTO: 5.3 K/UL (ref 4.8–10.8)
WBC #/AREA URNS HPF: NORMAL /HPF

## 2021-07-03 PROCEDURE — 84100 ASSAY OF PHOSPHORUS: CPT

## 2021-07-03 PROCEDURE — 99220 PR INITIAL OBSERVATION CARE,LEVL III: CPT | Performed by: STUDENT IN AN ORGANIZED HEALTH CARE EDUCATION/TRAINING PROGRAM

## 2021-07-03 PROCEDURE — G0378 HOSPITAL OBSERVATION PER HR: HCPCS

## 2021-07-03 PROCEDURE — 85025 COMPLETE CBC W/AUTO DIFF WBC: CPT

## 2021-07-03 PROCEDURE — 81001 URINALYSIS AUTO W/SCOPE: CPT

## 2021-07-03 PROCEDURE — 80053 COMPREHEN METABOLIC PANEL: CPT

## 2021-07-03 PROCEDURE — 99285 EMERGENCY DEPT VISIT HI MDM: CPT

## 2021-07-03 PROCEDURE — 93005 ELECTROCARDIOGRAM TRACING: CPT | Performed by: EMERGENCY MEDICINE

## 2021-07-03 PROCEDURE — 82550 ASSAY OF CK (CPK): CPT

## 2021-07-03 PROCEDURE — 84443 ASSAY THYROID STIM HORMONE: CPT

## 2021-07-03 PROCEDURE — 70450 CT HEAD/BRAIN W/O DYE: CPT | Mod: MG

## 2021-07-03 PROCEDURE — 83735 ASSAY OF MAGNESIUM: CPT

## 2021-07-03 RX ORDER — OMEPRAZOLE 20 MG/1
20 CAPSULE, DELAYED RELEASE ORAL DAILY
Status: DISCONTINUED | OUTPATIENT
Start: 2021-07-04 | End: 2021-07-22 | Stop reason: HOSPADM

## 2021-07-03 RX ORDER — ONDANSETRON 2 MG/ML
8 INJECTION INTRAMUSCULAR; INTRAVENOUS EVERY 8 HOURS PRN
Status: DISCONTINUED | OUTPATIENT
Start: 2021-07-03 | End: 2021-07-22 | Stop reason: HOSPADM

## 2021-07-03 RX ORDER — MORPHINE SULFATE 100 MG/5ML
20 SOLUTION ORAL
Status: DISCONTINUED | OUTPATIENT
Start: 2021-07-03 | End: 2021-07-22 | Stop reason: HOSPADM

## 2021-07-03 RX ORDER — MORPHINE SULFATE 100 MG/5ML
10 SOLUTION ORAL
Status: DISCONTINUED | OUTPATIENT
Start: 2021-07-03 | End: 2021-07-09

## 2021-07-03 RX ORDER — POLYVINYL ALCOHOL 14 MG/ML
2 SOLUTION/ DROPS OPHTHALMIC EVERY 6 HOURS PRN
Status: DISCONTINUED | OUTPATIENT
Start: 2021-07-03 | End: 2021-07-22 | Stop reason: HOSPADM

## 2021-07-03 RX ORDER — BISACODYL 10 MG
10 SUPPOSITORY, RECTAL RECTAL
Status: DISCONTINUED | OUTPATIENT
Start: 2021-07-03 | End: 2021-07-03

## 2021-07-03 RX ORDER — ATROPINE SULFATE 10 MG/ML
2 SOLUTION/ DROPS OPHTHALMIC EVERY 4 HOURS PRN
Status: DISCONTINUED | OUTPATIENT
Start: 2021-07-03 | End: 2021-07-22 | Stop reason: HOSPADM

## 2021-07-03 RX ORDER — ACETAMINOPHEN 325 MG/1
650 TABLET ORAL EVERY 4 HOURS PRN
Status: DISCONTINUED | OUTPATIENT
Start: 2021-07-03 | End: 2021-07-22 | Stop reason: HOSPADM

## 2021-07-03 RX ORDER — LEVOTHYROXINE SODIUM 0.05 MG/1
50 TABLET ORAL
Status: DISCONTINUED | OUTPATIENT
Start: 2021-07-04 | End: 2021-07-22 | Stop reason: HOSPADM

## 2021-07-03 RX ORDER — LACTULOSE 20 G/30ML
10 SOLUTION ORAL
Status: DISCONTINUED | OUTPATIENT
Start: 2021-07-03 | End: 2021-07-22 | Stop reason: HOSPADM

## 2021-07-03 RX ORDER — ACETAMINOPHEN 650 MG/1
650 SUPPOSITORY RECTAL EVERY 4 HOURS PRN
Status: DISCONTINUED | OUTPATIENT
Start: 2021-07-03 | End: 2021-07-22 | Stop reason: HOSPADM

## 2021-07-03 RX ORDER — BISACODYL 10 MG
10 SUPPOSITORY, RECTAL RECTAL
Status: DISCONTINUED | OUTPATIENT
Start: 2021-07-03 | End: 2021-07-22 | Stop reason: HOSPADM

## 2021-07-03 RX ORDER — LORAZEPAM 2 MG/ML
1 INJECTION INTRAMUSCULAR
Status: DISCONTINUED | OUTPATIENT
Start: 2021-07-03 | End: 2021-07-22 | Stop reason: HOSPADM

## 2021-07-03 RX ORDER — AMOXICILLIN 250 MG
2 CAPSULE ORAL 2 TIMES DAILY
Status: DISCONTINUED | OUTPATIENT
Start: 2021-07-03 | End: 2021-07-03

## 2021-07-03 RX ORDER — LORAZEPAM 2 MG/ML
1 CONCENTRATE ORAL
Status: DISCONTINUED | OUTPATIENT
Start: 2021-07-03 | End: 2021-07-22 | Stop reason: HOSPADM

## 2021-07-03 RX ORDER — ONDANSETRON 4 MG/1
8 TABLET, ORALLY DISINTEGRATING ORAL EVERY 8 HOURS PRN
Status: DISCONTINUED | OUTPATIENT
Start: 2021-07-03 | End: 2021-07-22 | Stop reason: HOSPADM

## 2021-07-03 RX ORDER — POLYETHYLENE GLYCOL 3350 17 G/17G
1 POWDER, FOR SOLUTION ORAL
Status: DISCONTINUED | OUTPATIENT
Start: 2021-07-03 | End: 2021-07-03

## 2021-07-03 ASSESSMENT — FIBROSIS 4 INDEX: FIB4 SCORE: 3.93

## 2021-07-03 ASSESSMENT — LIFESTYLE VARIABLES: DO YOU DRINK ALCOHOL: NO

## 2021-07-03 NOTE — ED TRIAGE NOTES
Chief Complaint   Patient presents with   • Fall     pt bib yesenia from home, came in as TBI, had glf x 2 in 24 hrs. has hx of Dementia. arrived aaox2 Baseline. has bump in the back of head. no laceration. -blood thinners -asa. pt has been increasing falls the last 6 months. has bgl of 79     Pt take to CT by this RN.

## 2021-07-03 NOTE — ED PROVIDER NOTES
ED Provider Note    CHIEF COMPLAINT  Chief Complaint   Patient presents with   • Fall     pt nicole remsa from home, came in as TBI, had glf x 2 in 24 hrs. has hx of Dementia. arrived aaox2 Baseline. has bump in the back of head. no laceration. -blood thinners -asa. pt has been increasing falls the last 6 months. has bgl of 79       HPI  Lamar Dangelo is a 89 y.o. female who presents to the emergency department after experiencing a ground-level fall and hitting her head.  Per EMS, she has had 2 falls in the last 2 days and today she fell and hit her head.  No loss of consciousness.  The patient not taking any coagulant currently.  The patient does have a history of dementia and per her POA, she has had increasing memory loss as well as garbled speech post after hitting her head.  Per EMS, the patient had normal speech pattern.  The patient denies fever, shakes, chills, sweats, nausea, vomiting, headache, hip pain, abdominal pain.  She normally walks without a walker but has been off balance recently.  More history is obtained from the POA and that she does report the fact that the patient has been falling and is unable to get up and her sister, who she lives with, is unable to help her as well.    REVIEW OF SYSTEMS  Positives as above. Pertinent negatives include fever, shakes, chills, sweats, nausea, vomiting, chest pain, back pain, neck pain.  All other 10 review of systems are negative    PAST MEDICAL HISTORY  Past Medical History:   Diagnosis Date   • Hyperlipidemia 7/2/2010   • Hypothyroid 7/2/2010       FAMILY HISTORY  Noncontributory    SOCIAL HISTORY  Social History     Socioeconomic History   • Marital status:      Spouse name: Not on file   • Number of children: Not on file   • Years of education: Not on file   • Highest education level: Not on file   Occupational History   • Not on file   Tobacco Use   • Smoking status: Never Smoker   • Smokeless tobacco: Never Used   Vaping Use   • Vaping Use:  Never used   Substance and Sexual Activity   • Alcohol use: No   • Drug use: No   • Sexual activity: Not on file     Comment: single, 0 kids.    Other Topics Concern   • Not on file   Social History Narrative   • Not on file     Social Determinants of Health     Financial Resource Strain: Low Risk    • Difficulty of Paying Living Expenses: Not hard at all   Food Insecurity: No Food Insecurity   • Worried About Running Out of Food in the Last Year: Never true   • Ran Out of Food in the Last Year: Never true   Transportation Needs: No Transportation Needs   • Lack of Transportation (Medical): No   • Lack of Transportation (Non-Medical): No   Physical Activity:    • Days of Exercise per Week:    • Minutes of Exercise per Session:    Stress:    • Feeling of Stress :    Social Connections:    • Frequency of Communication with Friends and Family:    • Frequency of Social Gatherings with Friends and Family:    • Attends Protestant Services:    • Active Member of Clubs or Organizations:    • Attends Club or Organization Meetings:    • Marital Status:    Intimate Partner Violence:    • Fear of Current or Ex-Partner:    • Emotionally Abused:    • Physically Abused:    • Sexually Abused:        SURGICAL HISTORY  Past Surgical History:   Procedure Laterality Date   • HIP CANNULATED SCREW Left 3/24/2018    Procedure: HIP CANNULATED SCREW;  Surgeon: Manish Franco M.D.;  Location: SURGERY [facility];  Service: Orthopedics   • COLONOSCOPY - ENDO N/A 9/3/2017    Procedure: COLONOSCOPY - ENDO;  Surgeon: Randi Sahni M.D.;  Location: ENDOSCOPY [facility];  Service: Gastroenterology   • GASTROSCOPY-ENDO N/A 9/3/2017    Procedure: GASTROSCOPY-ENDO;  Surgeon: Randi Sahni M.D.;  Location: ENDOSCOPY [facility];  Service: Gastroenterology   • APPENDECTOMY     • TONSILLECTOMY         CURRENT MEDICATIONS  Home Medications     Reviewed by Michael Mancia M.D. (Physician) on 07/03/21 at 1936  Med List Status:  "Complete   Medication Last Dose Status   carvedilol (COREG) 12.5 MG Tab unknown Active   Cyanocobalamin (VITAMIN B-12) 1000 MCG Tab unknown Active   FEROSUL 325 (65 Fe) MG tablet unknown Active   levothyroxine (SYNTHROID) 50 MCG Tab unknown Active   omeprazole (PRILOSEC) 20 MG delayed-release capsule unknown Active   potassium chloride SA (KDUR) 20 MEQ Tab CR unknown Active                ALLERGIES  Allergies   Allergen Reactions   • Nkda [No Known Drug Allergy]        PHYSICAL EXAM  VITAL SIGNS: /70   Pulse 71   Temp 36.8 °C (98.2 °F) (Temporal)   Resp 16   Ht 1.575 m (5' 2\")   Wt 47.6 kg (105 lb)   SpO2 92%   BMI 19.20 kg/m²      Constitutional: Well developed, Well nourished, No acute distress, Non-toxic appearance.   Eyes: PERRLA, EOMI, Conjunctiva normal, No discharge.   NECK: No cervical tenderness or step-off deformity  HENT: Hematoma in the occipital region, no hemotympanum a, no colon signs, no raccoon eyes  Cardiovascular: Normal heart rate, Normal rhythm, No murmurs, No rubs, No gallops, and intact distal pulses.   Thorax & Lungs:  No respiratory distress, no rales, no rhonchi, No wheezing, No chest wall tenderness.   Abdomen: Bowel sounds normal, Soft, No tenderness, No guarding, No rebound, No pulsatile masses.   Skin: Warm, Dry, No erythema, No rash.   Musculoskeletal: Pelvis is stable, no hip tenderness bilaterally  Extremities: Full range of motion, no deformity, no edema.  Neurologic: Alert & oriented x name, strength and sensation intact upper lower extremes bilaterally, negative finger-nose bilaterally, no pronator drift, no visual deficits, normal speech   Psychiatric: Affect normal for clinical presentation.      RADIOLOGY/PROCEDURES  CT-HEAD W/O   Final Result         1.  No acute intracranial process.      2. Diffuse atrophy and periventricular white matter changes consistent with chronic small vessel disease.        Results for orders placed or performed during the hospital " encounter of 07/03/21   CBC WITH DIFFERENTIAL   Result Value Ref Range    WBC 5.3 4.8 - 10.8 K/uL    RBC 3.50 (L) 4.20 - 5.40 M/uL    Hemoglobin 11.6 (L) 12.0 - 16.0 g/dL    Hematocrit 35.5 (L) 37.0 - 47.0 %    .4 (H) 81.4 - 97.8 fL    MCH 33.1 (H) 27.0 - 33.0 pg    MCHC 32.7 (L) 33.6 - 35.0 g/dL    RDW 50.6 (H) 35.9 - 50.0 fL    Platelet Count 162 (L) 164 - 446 K/uL    MPV 9.0 9.0 - 12.9 fL    Neutrophils-Polys 72.60 (H) 44.00 - 72.00 %    Lymphocytes 14.10 (L) 22.00 - 41.00 %    Monocytes 10.80 0.00 - 13.40 %    Eosinophils 1.70 0.00 - 6.90 %    Basophils 0.20 0.00 - 1.80 %    Immature Granulocytes 0.60 0.00 - 0.90 %    Nucleated RBC 0.00 /100 WBC    Neutrophils (Absolute) 3.82 2.00 - 7.15 K/uL    Lymphs (Absolute) 0.74 (L) 1.00 - 4.80 K/uL    Monos (Absolute) 0.57 0.00 - 0.85 K/uL    Eos (Absolute) 0.09 0.00 - 0.51 K/uL    Baso (Absolute) 0.01 0.00 - 0.12 K/uL    Immature Granulocytes (abs) 0.03 0.00 - 0.11 K/uL    NRBC (Absolute) 0.00 K/uL   COMP METABOLIC PANEL   Result Value Ref Range    Sodium 140 135 - 145 mmol/L    Potassium 3.7 3.6 - 5.5 mmol/L    Chloride 105 96 - 112 mmol/L    Co2 24 20 - 33 mmol/L    Anion Gap 11.0 7.0 - 16.0    Glucose 83 65 - 99 mg/dL    Bun 25 (H) 8 - 22 mg/dL    Creatinine 1.09 0.50 - 1.40 mg/dL    Calcium 8.7 8.5 - 10.5 mg/dL    AST(SGOT) 22 12 - 45 U/L    ALT(SGPT) 14 2 - 50 U/L    Alkaline Phosphatase 95 30 - 99 U/L    Total Bilirubin 1.0 0.1 - 1.5 mg/dL    Albumin 3.6 3.2 - 4.9 g/dL    Total Protein 5.7 (L) 6.0 - 8.2 g/dL    Globulin 2.1 1.9 - 3.5 g/dL    A-G Ratio 1.7 g/dL   URINALYSIS    Specimen: Urine   Result Value Ref Range    Color Yellow     Character Clear     Specific Gravity 1.018 <1.035    Ph 6.5 5.0 - 8.0    Glucose Negative Negative mg/dL    Ketones 15 (A) Negative mg/dL    Protein Negative Negative mg/dL    Bilirubin Negative Negative    Urobilinogen, Urine 0.2 Negative    Nitrite Negative Negative    Leukocyte Esterase Trace (A) Negative    Occult Blood  Negative Negative    Micro Urine Req Microscopic    MAGNESIUM   Result Value Ref Range    Magnesium 2.1 1.5 - 2.5 mg/dL   PHOSPHORUS   Result Value Ref Range    Phosphorus 3.3 2.5 - 4.5 mg/dL   CREATINE KINASE   Result Value Ref Range    CPK Total 85 0 - 154 U/L   TSH   Result Value Ref Range    TSH 1.930 0.380 - 5.330 uIU/mL   ESTIMATED GFR   Result Value Ref Range    GFR If  57 (A) >60 mL/min/1.73 m 2    GFR If Non  47 (A) >60 mL/min/1.73 m 2   URINE MICROSCOPIC (W/UA)   Result Value Ref Range    WBC 0-2 /hpf    RBC 0-2 /hpf    Bacteria Negative None /hpf    Epithelial Cells Few /hpf    Hyaline Cast 0-2 /lpf   EKG   Result Value Ref Range    Report       [facility] Emergency Dept.    Test Date:  2021  Pt Name:    NIYAH BANUELOS              Department: ER  MRN:        [id number]                      Room:        21  Gender:     Female                       Technician: LIVAN  :        [id number]                   Requested By:ANABELLA HUTCHISON  Order #:    [id number]                    Reading MD: ANABELLA HUTCHISON DO    Measurements  Intervals                                Axis  Rate:       69                           P:          55  UT:         226                          QRS:        5  QRSD:       100                          T:          -12  QT:         440  QTc:        472    Interpretive Statements  SINUS RHYTHM  BORDERLINE AV CONDUCTION DELAY  LEFT ATRIAL ABNORMALITY  BORDERLINE R WAVE PROGRESSION, ANTERIOR LEADS  BORDERLINE T ABNORMALITIES, DIFFUSE LEADS  Compared to ECG 2020 08:13:02  Atrial abnormality now present  Myocardial infarct finding no longer present  T-wave abnormality still p resent  Electronically Signed On 7-3-2021 20:34:10 PDT by ANABELLA HUTCHISON DO           COURSE & MEDICAL DECISION MAKING  Pertinent Labs & Imaging studies reviewed. (See chart for details)  This is a pleasant 89-year-old female extensive  ground-level fall.  I did discuss with the POA and she does endorse concern for the patient's increasing confusion and multiple falls.  For this reason, CT scan of the head was completed was negative, she has no evidence of metabolic encephalopathy, notes a urinary tract infection.  I do believe the patient require hospitalization is that she try to stand up she felt somewhat off balance and almost fell over again.  For this reason, I discussed the patient with Dr. Mancia who graciously excepts hospitalization of this patient for physical therapy and patient therapy.    FINAL IMPRESSION     1. Fall, initial encounter Active   2. Closed head injury, initial encounter Active              Electronically signed by: Chilo Bravo D.O., 7/3/2021 12:35 PM

## 2021-07-04 PROBLEM — R82.4 URINE KETONES: Status: ACTIVE | Noted: 2021-07-04

## 2021-07-04 PROBLEM — K21.9 GASTROESOPHAGEAL REFLUX DISEASE WITHOUT ESOPHAGITIS: Status: ACTIVE | Noted: 2021-07-04

## 2021-07-04 PROBLEM — D53.9 MACROCYTIC ANEMIA: Status: ACTIVE | Noted: 2018-03-07

## 2021-07-04 PROBLEM — E43 SEVERE PROTEIN-CALORIE MALNUTRITION (HCC): Status: ACTIVE | Noted: 2021-07-04

## 2021-07-04 PROBLEM — Z71.89 GOALS OF CARE, COUNSELING/DISCUSSION: Status: ACTIVE | Noted: 2021-07-04

## 2021-07-04 PROBLEM — F01.50 VASCULAR DEMENTIA WITHOUT BEHAVIORAL DISTURBANCE (HCC): Status: ACTIVE | Noted: 2017-09-12

## 2021-07-04 PROCEDURE — 99225 PR SUBSEQUENT OBSERVATION CARE,LEVEL II: CPT | Performed by: HOSPITALIST

## 2021-07-04 PROCEDURE — 96374 THER/PROPH/DIAG INJ IV PUSH: CPT

## 2021-07-04 PROCEDURE — A9270 NON-COVERED ITEM OR SERVICE: HCPCS | Performed by: STUDENT IN AN ORGANIZED HEALTH CARE EDUCATION/TRAINING PROGRAM

## 2021-07-04 PROCEDURE — G0378 HOSPITAL OBSERVATION PER HR: HCPCS

## 2021-07-04 PROCEDURE — 700102 HCHG RX REV CODE 250 W/ 637 OVERRIDE(OP): Performed by: STUDENT IN AN ORGANIZED HEALTH CARE EDUCATION/TRAINING PROGRAM

## 2021-07-04 PROCEDURE — 700111 HCHG RX REV CODE 636 W/ 250 OVERRIDE (IP): Performed by: STUDENT IN AN ORGANIZED HEALTH CARE EDUCATION/TRAINING PROGRAM

## 2021-07-04 RX ADMIN — LORAZEPAM 1 MG: 2 INJECTION INTRAMUSCULAR; INTRAVENOUS at 10:54

## 2021-07-04 RX ADMIN — OMEPRAZOLE 20 MG: 20 CAPSULE, DELAYED RELEASE ORAL at 06:41

## 2021-07-04 RX ADMIN — LEVOTHYROXINE SODIUM 50 MCG: 0.05 TABLET ORAL at 06:41

## 2021-07-04 RX ADMIN — LORAZEPAM 1 MG: 2 SOLUTION, CONCENTRATE ORAL at 20:00

## 2021-07-04 RX ADMIN — LORAZEPAM 1 MG: 2 SOLUTION, CONCENTRATE ORAL at 00:51

## 2021-07-04 RX ADMIN — MORPHINE SULFATE 10 MG: 100 SOLUTION ORAL at 19:59

## 2021-07-04 ASSESSMENT — PATIENT HEALTH QUESTIONNAIRE - PHQ9
2. FEELING DOWN, DEPRESSED, IRRITABLE, OR HOPELESS: NOT AT ALL
1. LITTLE INTEREST OR PLEASURE IN DOING THINGS: NOT AT ALL
SUM OF ALL RESPONSES TO PHQ9 QUESTIONS 1 AND 2: 0

## 2021-07-04 ASSESSMENT — COGNITIVE AND FUNCTIONAL STATUS - GENERAL
DRESSING REGULAR LOWER BODY CLOTHING: A LITTLE
EATING MEALS: A LITTLE
HELP NEEDED FOR BATHING: A LITTLE
MOVING TO AND FROM BED TO CHAIR: A LITTLE
MOVING FROM LYING ON BACK TO SITTING ON SIDE OF FLAT BED: A LITTLE
TOILETING: A LITTLE
STANDING UP FROM CHAIR USING ARMS: A LITTLE
DRESSING REGULAR UPPER BODY CLOTHING: A LITTLE
MOBILITY SCORE: 18
DAILY ACTIVITIY SCORE: 18
SUGGESTED CMS G CODE MODIFIER DAILY ACTIVITY: CK
WALKING IN HOSPITAL ROOM: A LITTLE
SUGGESTED CMS G CODE MODIFIER MOBILITY: CK
CLIMB 3 TO 5 STEPS WITH RAILING: A LITTLE
PERSONAL GROOMING: A LITTLE
TURNING FROM BACK TO SIDE WHILE IN FLAT BAD: A LITTLE

## 2021-07-04 ASSESSMENT — PAIN DESCRIPTION - PAIN TYPE
TYPE: ACUTE PAIN
TYPE: ACUTE PAIN

## 2021-07-04 NOTE — DIETARY
Nutrition services: Day 0 of admit.  Lamar Dangelo is a 89 y.o. female with admitting DX of vascular dementia.    Consult received for unsure wt loss, poor PO per admit screen (MST score of 3). Pt has since transitioned to comfort care.    RD available PRN. Consult RD as needed.

## 2021-07-04 NOTE — ED NOTES
Pt found attempting to get out of gurney unsafely, re-oriented and placed back in bed, provided additional warm blankets, on monitor, call light in reach.

## 2021-07-04 NOTE — PROGRESS NOTES
4 Eyes Skin Assessment Completed by STEFFANIE Almonte, RN MARLENA Rosas , RN.    Head Swelling  Ears WDL  Nose WDL  Mouth WDL  Neck WDL  Breast/Chest WDL  Shoulder Blades WDL  Spine Redness and Blanching  (R) Arm/Elbow/Hand WDL  (L) Arm/Elbow/Hand WDL  Abdomen WDL  Groin WDL  Scrotum/Coccyx/Buttocks Redness and Blanching  (R) Leg Abrasion  (L) Leg Abrasion  (R) Heel/Foot/Toe WDL  (L) Heel/Foot/Toe WDL    Hematoma to back of head; generalized bruising BUE and BLE      Devices In Places Blood Pressure Cuff      Interventions In Place Pillows    Possible Skin Injury No    Pictures Uploaded Into Epic N/A  Wound Consult Placed N/A  RN Wound Prevention Protocol Ordered Yes

## 2021-07-04 NOTE — ASSESSMENT & PLAN NOTE
CT head showed diffuse atrophy and microvascular disease.     requires placement in a memory care unit - awaiting acceptance

## 2021-07-04 NOTE — CARE PLAN
The patient is Stable - Low risk of patient condition declining or worsening    Shift Goals  Clinical Goals: rest, no falls  Patient Goals: rest    Progress made toward(s) clinical / shift goals:  no falls    Patient is not progressing towards the following goals:

## 2021-07-04 NOTE — CARE PLAN
The patient is Stable - Low risk of patient condition declining or worsening    Shift Goals  Clinical Goals: Safety  Patient Goals: Rest    Progress made toward(s) clinical / shift goals:    Problem: Knowledge Deficit - Standard  Goal: Patient and family/care givers will demonstrate understanding of plan of care, disease process/condition, diagnostic tests and medications  Outcome: Progressing  Note: Patient able to communicate needs effectively. Plan of care updated to patient and on whiteboard. All questions answered at this time.      Problem: Fall Risk  Goal: Patient will remain free from falls  Outcome: Progressing  Note: Educated on fall risk and ensured fall precautions in place. Bed in lowest position, treaded socks in place, call light in reach, bed alarm in place, room free of clutter.        Patient is not progressing towards the following goals:

## 2021-07-04 NOTE — ED NOTES
Med rec updated and complete. Allergies reviewed. Pt is unable to participate in an interview. Placed call to family . Family unable to clarify.    Updated med rec based on historical from  PCP visit ON 06/24/21 and reconcile outside medications.      Home pharmacy [facility] [phone]

## 2021-07-04 NOTE — ASSESSMENT & PLAN NOTE
Comfort care was discontinued, CODE STATUS changed to DNI/DN AR.  On 7/5  Discussed with patient DPOA  Neighbor on 7/9  Met with caretaker at bedside on 7/10

## 2021-07-05 PROCEDURE — G0378 HOSPITAL OBSERVATION PER HR: HCPCS

## 2021-07-05 PROCEDURE — 700102 HCHG RX REV CODE 250 W/ 637 OVERRIDE(OP): Performed by: HOSPITALIST

## 2021-07-05 PROCEDURE — A9270 NON-COVERED ITEM OR SERVICE: HCPCS | Performed by: HOSPITALIST

## 2021-07-05 PROCEDURE — 700102 HCHG RX REV CODE 250 W/ 637 OVERRIDE(OP): Performed by: STUDENT IN AN ORGANIZED HEALTH CARE EDUCATION/TRAINING PROGRAM

## 2021-07-05 PROCEDURE — 700101 HCHG RX REV CODE 250: Performed by: STUDENT IN AN ORGANIZED HEALTH CARE EDUCATION/TRAINING PROGRAM

## 2021-07-05 PROCEDURE — A9270 NON-COVERED ITEM OR SERVICE: HCPCS | Performed by: STUDENT IN AN ORGANIZED HEALTH CARE EDUCATION/TRAINING PROGRAM

## 2021-07-05 PROCEDURE — 99225 PR SUBSEQUENT OBSERVATION CARE,LEVEL II: CPT | Performed by: HOSPITALIST

## 2021-07-05 PROCEDURE — 92523 SPEECH SOUND LANG COMPREHEN: CPT

## 2021-07-05 RX ORDER — CHOLECALCIFEROL (VITAMIN D3) 125 MCG
1000 CAPSULE ORAL DAILY
Status: DISCONTINUED | OUTPATIENT
Start: 2021-07-05 | End: 2021-07-22 | Stop reason: HOSPADM

## 2021-07-05 RX ORDER — FOLIC ACID 1 MG/1
1 TABLET ORAL DAILY
Status: DISCONTINUED | OUTPATIENT
Start: 2021-07-05 | End: 2021-07-22 | Stop reason: HOSPADM

## 2021-07-05 RX ORDER — CARVEDILOL 12.5 MG/1
12.5 TABLET ORAL 2 TIMES DAILY WITH MEALS
Status: DISCONTINUED | OUTPATIENT
Start: 2021-07-05 | End: 2021-07-22 | Stop reason: HOSPADM

## 2021-07-05 RX ADMIN — LEVOTHYROXINE SODIUM 50 MCG: 0.05 TABLET ORAL at 08:44

## 2021-07-05 RX ADMIN — ASPIRIN 81 MG: 81 TABLET, COATED ORAL at 17:38

## 2021-07-05 RX ADMIN — ATROPINE SULFATE 2 DROP: 10 SOLUTION OPHTHALMIC at 05:08

## 2021-07-05 RX ADMIN — THERA TABS 1 TABLET: TAB at 17:37

## 2021-07-05 RX ADMIN — OMEPRAZOLE 20 MG: 20 CAPSULE, DELAYED RELEASE ORAL at 05:08

## 2021-07-05 RX ADMIN — CARVEDILOL 12.5 MG: 12.5 TABLET, FILM COATED ORAL at 17:39

## 2021-07-05 RX ADMIN — LORAZEPAM 1 MG: 2 SOLUTION, CONCENTRATE ORAL at 21:20

## 2021-07-05 RX ADMIN — CYANOCOBALAMIN TAB 500 MCG 1000 MCG: 500 TAB at 17:38

## 2021-07-05 RX ADMIN — FOLIC ACID 1 MG: 1 TABLET ORAL at 17:37

## 2021-07-05 ASSESSMENT — PAIN SCALES - PAIN ASSESSMENT IN ADVANCED DEMENTIA (PAINAD)
FACIALEXPRESSION: SMILING OR INEXPRESSIVE
BODYLANGUAGE: RELAXED
CONSOLABILITY: NO NEED TO CONSOLE
TOTALSCORE: 0
BREATHING: NORMAL

## 2021-07-05 NOTE — THERAPY
"Speech Language Pathology   Initial Assessment     Patient Name: Lamar Dangelo  AGE:  89 y.o., SEX:  female  Medical Record #: [id number]  Today's Date: 7/5/2021     Precautions  Precautions: Fall Risk    Assessment    Patient is 89 y.o. female who was BIB EMS as a TBI.  She had 2 GLFs within the 24 hours prior to admit, had a bump on the back of her head and had changes in speech.   \"1. No acute intracranial process. 2. Diffuse atrophy and periventricular white matter changes consistent with chronic small vessel disease..She has had increasing falls and changes in overall cognitive functioning. CT of head on admit:PMH: vascular dementia, hypothyroidism, and GERD.    Received orders for cognitive evaluation.  Currently, patient is on comfort care and thus spoke with Dr. Jaramillo regarding what exactly he was looking for with regards to this evaluation.  He indicated that he is not sure comfort care exactly why comfort care was ordered, and is wanting to know where the patient's cognitive functioning and language skills are at.      Given patient's confusion, called her MDPOA , Acacia Rider [phone].  She provided me with the following information:  Patient lives with her 85 year old sister who is not reallly able to see anymore. They live in a tri level type home with 3 steps from the garage to the kitchen.  Inside there are 4 steps to get from the kitchen to the living room and 4 more steps to get from the living room to the bedroom.  All of this area is tiled.  Patient does not use a walker, but her walking has been slower and she has been hunching over.  Patient's sister cooks or they go out and her sister (and sometimes a friend) does the grocery shopping.  [facility]. Pt's medications are in blister packs, and sister administers the medications. The POA lives across the street from the patient, and she reports although they have told the patient not to cross the street by herself, she " "continues to do so to come over to the [facility] home.  POA reported that last year, she found the patient on the steps outside of her home as she had fallen and cut her head open.  They did take her to the hospital.  Per POA, over the past months, patient's memory has declined, but over the last few days, and especially after the last 2 falls, speech has been more garbled, patient has been hallucinating \"more\" and thinking that people are in the home when they are not, and memory has significantly decreased.  POA is concerned about recurrent falls and the fact that patient is not safe to live in this situation anymore.      Upon arriving to patient's room, she was talking to the children in the room (there was no one in the room at all).  Lunch was sitting in front of her, but she did not even recognize it as food and needed max cues to initiate eating.  She as very confused.  Assisted her with eating before proceeding with evaluation.  Patient was seen for cognitive evaluation this date.  Patient was oriented to person and \"[facility],\" only, but later in the session she thought she was across the street at the dentist.  She did not know her birthdate, address or date.  She indicated she was 89 years old and was living some of the time with her sister, bur had friends to help.  She also reported she did the driving when her sister could not. The Cognistat as well as informal assessment were completed. On the Cognistat, results were as follows: ORIENTATION (score of 2) = >severe; ATTENTION score of 6 = borderline mild; LANGUAGE: Comprehension score 2 = severe, Repetition score 5 = severe, Naming score 4 = mild to moderate; CONSTRUCTION score 0 = severe., MEMORY score 2 = >severe, CALCULATIONS score 1 = moderate, REASONING: Similarities score 4 = mild and Judgement score 2 = moderate.  Further testing revealed moderate to severe difficulties with problem solving, thought organization, 3-4 step sequencing tasks, safety and " insight.  She was not able to generate a clock drawing at all, so [place] was drawn for her and she write the numbers 1-10 across the [place].  As the session progressed, she became more confused.  She does have a diagnosis of dementia per notes and per POA, but given her frequent falls, it is possible that she has had multiple concussions which has caused further cognitive decline from where she was 6 months ago.  SLP will follow for cognitive therapy on a trial basis, but given patient's current presentation., she would benefit from placement following DC from acute care as she will need 24 hour care and per POA, it sounds like patient's sister is not at a level to be able to provide this level of assistance. Discussed at length with RN and MD.     Plan    Recommend Speech Therapy 3 times per week until therapy goals are met for the following treatments:  Cognitive-Linguistic Training and Patient / Family / Caregiver Education.    Discharge Recommendations: Recommend post-acute placement for additional speech therapy services prior to discharge home (will need placement)     Objective       07/05/21 1419   Pain 0 - 10 Group   Therapist Pain Assessment Nurse Notified;Post Activity Pain Same as Prior to Activity  (denies pain)   Prior Living Situation   Prior Services None   Housing / Facility Unable To Determine At This Time   Lives with - Patient's Self Care Capacity Other (Comments)   Comments Per note, patient lives with sister and has a POA who is a neighbor. Pt very confused and stating she lives with her sister sometimes.  She reports she and her sister share the cooking and that they each do their own finances and share the shopping. She was all over the place in reporting how she functions at home.  PLEASE SEE SLP PROGRESS NOTE FOR FURTHER DETAILS    Prior Level Of Function   Communication Impaired   Swallow Within Functional Limits   Dentition Intact   Dentures None   Hearing Within Functional Limits for  Evaluation   Hearing Aid None   Vision Unknown   Patient's Primary Language English   Education High School Graduate or GED   Education Years Completed 12   Occupation (Pre-Hospital Vocational) Retired Due To Age   Comments Pt reports she is a retired  for the [employer].     Verbal Expression   Vocal Quality Clear   Verbal Output Automatic Minimal (4)   Verbal Output: Phrases Minimal (4)   Verbal Output Conversation Severe (2)   Verbal Output Functional Moderate (3)   Repetition: Single Words Within Functional Limits (6-7)   Repetition: Phrases Minimal (4)   Repetition: Sentences Moderate (3)   Naming Moderate (3)  (confrontation and generative: semantic paraphasias, neologis)   Dysarthria Within Functional Limits (6-7)   Apraxia: Oral Within Functional Limits (6-7)   Apraxia: Verbal Within Functional Limits (6-7)   Jargon Moderate (3)   Perseverations Moderate (3)   Word Finding Deficits Moderate (3)   Paraphasias Moderate (3)   Skilled Intervention Verbal Cueing   Auditory Comprehension   Yes / No Questions: Personal Information Minimal (4)   Yes / No Questions: General Information Moderate (3)   Yes / No Questions: Abstract Severe (2)   Identifies Objects Minimal (4)   Identifies Pictures Minimal (4)   Identifies Body Parts Within Functional Limits (6-7)   Follows One Unit Commands Minimal (4)   Follows Two Unit Commands Moderate (3)   Follows Three Unit Commands Severe (2)   Understands Paragraph Severe (2)   Understands Simple, Structured Conversation  Moderate (3)   Understands Complex Conversation Severe (2)   Skilled Intervention Verbal Cueing   Reading Comprehension   Comments to be assessed   Written Expression   Comments able to write first name, not able to write to dictation    Cognitive-Linguistic   Level of Consciousness Alert   Orientation Level Not Oriented to Age;Not Oriented to Address;Not Oriented to Year;Not Oriented to Month;Not Oriented to Day;Not Oriented to Time;Not Oriented to Place;Not  Oriented to Reason   Sustained Attention Moderate (3)   Alternating Attention Severe (2)   Divided Attention Severe (2)   Selective Attention Severe (2)   Visual Scanning / Cancellation Skills Minimal (4)   Short Term Memory Severe (2)   Immediate Memory Moderate (3)   Long Term Memory / Reminiscing Moderate (3)   Simple Reasoning / Problem Solving Moderate (3)   Complex Reasoning  / Problem Solving Severe (2)   [place] Reasoning Minimal (4)   Abstract Reasoning Severe (2)   Safety Awareness Severe (2)   Insight into Deficits Severe (2)   Executive Functioning / Organization Severe (2)   Verbal Sequencing (Simple) Moderate (3)   Verbal Sequencing (Complex) Severe (2)   Auditory Math Severe (2)   Functional Math / Financial Management Not Tested   Medication Management  Severe (2)   Clock Drawing Poor Planning;Disorganization;Numeric Errors;Omissions;Impaired Hand Placement;Perseveration   (wrote the numbers 1-10 across the [place])   Comments hallucinating, kept thinking there were people in the room and referring to them   Social / Pragmatic Communication   Comments polite and cooperative   Aphasia Compensatory Strategies   Compensatory Strategies Used To Communicate Yes / No Responses;Repetition / Imitation   Outcome Measures   Outcome Measures Utilized   (Portions of the COGNISTAT)   Short Term Goals   Short Term Goal # 1 Pt will be oriented in all 4 spheres with max assistance in 2/5 trials   Short Term Goal # 2 Pt will be able to follow 2 step directives with minimal assistance in 3/5 trials   Education Group   Education Provided Traumatic Brain Injury / Cognitive-Linguistic   TBI / Cog-Ling Patient Response Patient;Acceptance;Explanation;Demonstration;No Learning Evidence;Reinforcement Needed   Additional Comments ongoing reinforcement needed   Problem List   Problem List Dementia;Cognitive-Language Deficits;Cognitive-Linguistic Deficits   Anticipated Discharge Needs   Discharge Recommendations Recommend  post-acute placement for additional speech therapy services prior to discharge home  (will need placement)   Therapy Recommendations Upon DC Cognitive-Linguistic Training;Patient / Family / Caregiver Education;Community Re-Integration

## 2021-07-05 NOTE — CARE PLAN
The patient is Stable - Low risk of patient condition declining or worsening    Shift Goals  Clinical Goals: Safety  Patient Goals: Rest    Progress made toward(s) clinical / shift goals:  Pain and anxiety well controlled with PRN meds.    Patient is not progressing towards the following goals: Pt very confused, tried to get out of bed multiple times. Telesitter in place now at RN request. Pt is compliant with instructions to stay in bed, but unfortunately forgets almost instantly. Shows no sign of learning    Problem: Knowledge Deficit - Standard  Goal: Patient and family/care givers will demonstrate understanding of plan of care, disease process/condition, diagnostic tests and medications  Outcome: Progressing     Problem: Fall Risk  Goal: Patient will remain free from falls  Outcome: Progressing     Problem: Pain - Standard  Goal: Alleviation of pain or a reduction in pain to the patient’s comfort goal  Outcome: Progressing

## 2021-07-05 NOTE — PROGRESS NOTES
[facility] Daily Progress Note    Date of Service  7/4/2021    Chief Complaint  Lamar Dangelo is a 89 y.o. female admitted 7/3/2021 with multiple ground-level falls.    Hospital Course  No notes on file    Interval Problem Update  No acute overnight events, patient is fairly noncontributory to subjective exam.    Consultants/Specialty  Palliative care    Code Status  Comfort Care/DNR    Disposition  Patient may require admission to a memory care unit.  Awaiting evaluations from PT, OT, ST.    Review of Systems  All systems reviewed and negative except as noted per above.    Physical Exam  Temp:  [35.8 °C (96.5 °F)-37 °C (98.6 °F)] 36.8 °C (98.2 °F)  Pulse:  [62-76] 70  Resp:  [16-18] 16  BP: (122-195)/(62-85) 157/66  SpO2:  [92 %-94 %] 92 %    General: No acute distress, demented  HEENT atraumatic, normocephalic, pupils equal round reactive to light  Neck: No JVD  Chest: Respirations are unlabored  Cardiac: Physiologic S1 and S2  Abdomen: Soft, nontender, nondistended  Extremities: Without clubbing, cyanosis or edema  Neuro: Cranial nerves II through XII are grossly intact.  Psych: Demented      Current Facility-Administered Medications:   •  levothyroxine (SYNTHROID) tablet 50 mcg, 50 mcg, Oral, QAM AC, Michael Mancia M.D., 50 mcg at 07/04/21 0641  •  omeprazole (PRILOSEC) capsule 20 mg, 20 mg, Oral, DAILY, Michael Mancia M.D., 20 mg at 07/04/21 0641  •  atropine 1 % ophthalmic solution 2 Drop, 2 Drop, Sublingual, Q4HRS PRN, Michael Mancia M.D.  •  acetaminophen (Tylenol) tablet 650 mg, 650 mg, Oral, Q4HRS PRN **OR** acetaminophen (TYLENOL) suppository 650 mg, 650 mg, Rectal, Q4HRS PRN, Michael Mancia M.D.  •  morphine (ROXANOL) 20 MG/ML oral conc 10 mg, 10 mg, Oral, Q HOUR PRN, Michael Mancia M.D.  •  morphine (ROXANOL) 20 MG/ML oral conc 20 mg, 20 mg, Oral, Q HOUR PRN, Michael Mancia M.D.  •  ondansetron (ZOFRAN ODT) dispertab 8 mg, 8 mg, Oral, Q8HRS PRN **OR** ondansetron (ZOFRAN) syringe/vial injection  8 mg, 8 mg, Intravenous, Q8HRS PRN, Michael Mancia M.D.  •  LORazepam (ATIVAN) 2 MG/ML oral conc 1 mg, 1 mg, Sublingual, Q HOUR PRN, 1 mg at 07/04/21 0051 **OR** LORazepam (ATIVAN) injection 1 mg, 1 mg, Intravenous, Q HOUR PRN, Micahel Mancia M.D., 1 mg at 07/04/21 1054  •  lactulose 20 GM/30ML solution 10 g, 10 g, Oral, QDAY PRN **OR** bisacodyl (DULCOLAX) suppository 10 mg, 10 mg, Rectal, QDAY PRN, Michael Mancia M.D.  •  artificial tears ophthalmic solution 2 Drop, 2 Drop, Both Eyes, Q6HRS PRN, Michael Mancia M.D.      Fluids    Intake/Output Summary (Last 24 hours) at 7/4/2021 1732  Last data filed at 7/4/2021 0100  Gross per 24 hour   Intake 120 ml   Output --   Net 120 ml       Laboratory  Recent Labs     07/03/21  1430   WBC 5.3   RBC 3.50*   HEMOGLOBIN 11.6*   HEMATOCRIT 35.5*   .4*   MCH 33.1*   MCHC 32.7*   RDW 50.6*   PLATELETCT 162*   MPV 9.0     Recent Labs     07/03/21  1430   SODIUM 140   POTASSIUM 3.7   CHLORIDE 105   CO2 24   GLUCOSE 83   BUN 25*   CREATININE 1.09   CALCIUM 8.7                   Imaging  CT-HEAD W/O   Final Result         1.  No acute intracranial process.      2. Diffuse atrophy and periventricular white matter changes consistent with chronic small vessel disease.           Assessment/Plan  Goals of care, counseling/discussion- (present on admission)  Assessment & Plan  ACP documents reviewed:  POLST - Comfort-only measures  Advanced Directive - provide treatments only which ease suffering or preserve function  Unable to reach neighbor MDPOA during admission to clarify GOC  Initiated on comfort-only measures per POLST  Palliative care consulted    Urine ketones- (present on admission)  Assessment & Plan  Likely due to inadequate oral intake from dementia    Severe protein-calorie malnutrition (HCC)- (present on admission)  Assessment & Plan  Due to dementia  No RD consultation indicated for comfort-only measures.  Encouraged to eat for pleasure    Gastroesophageal reflux  disease without esophagitis- (present on admission)  Assessment & Plan  Continue omperazole    Recurrent falls- (present on admission)  Assessment & Plan  Reported by neighbor  Likely due to dementia causing severe protein-calorie malnutrition  PT evaluation if within GOC    Vitamin B12 deficiency- (present on admission)  Assessment & Plan  Treatment not indicated for comfort-only measures    Macrocytic anemia- (present on admission)  Assessment & Plan  No further evaluation nor treatment indicated for comfort-only measures    Essential hypertension- (present on admission)  Assessment & Plan  Carvedilol held on admission, antihypertensives not indicated for comfort-only measures    Vascular dementia without behavioral disturbance (HCC)- (present on admission)  Assessment & Plan  CTH demonstrates diffuse atrophy and microvascular disease  POLST indicates comfort-only measures while hospitalized    Acquired hypothyroidism- (present on admission)  Assessment & Plan  Continue levothyroxine       VTE prophylaxis: Not indicated    I have performed a physical exam and reviewed and updated ROS and Plan today (7/4/2021). In review of yesterday's note (7/3/2021), there are no changes except as documented above.

## 2021-07-06 LAB
ALBUMIN SERPL BCP-MCNC: 3.7 G/DL (ref 3.2–4.9)
ALBUMIN/GLOB SERPL: 1.4 G/DL
ALP SERPL-CCNC: 106 U/L (ref 30–99)
ALT SERPL-CCNC: 10 U/L (ref 2–50)
ANION GAP SERPL CALC-SCNC: 9 MMOL/L (ref 7–16)
AST SERPL-CCNC: 20 U/L (ref 12–45)
BASOPHILS # BLD AUTO: 0.2 % (ref 0–1.8)
BASOPHILS # BLD: 0.01 K/UL (ref 0–0.12)
BILIRUB SERPL-MCNC: 0.5 MG/DL (ref 0.1–1.5)
BUN SERPL-MCNC: 23 MG/DL (ref 8–22)
CALCIUM SERPL-MCNC: 9.6 MG/DL (ref 8.5–10.5)
CHLORIDE SERPL-SCNC: 105 MMOL/L (ref 96–112)
CO2 SERPL-SCNC: 25 MMOL/L (ref 20–33)
CREAT SERPL-MCNC: 0.96 MG/DL (ref 0.5–1.4)
EOSINOPHIL # BLD AUTO: 0.07 K/UL (ref 0–0.51)
EOSINOPHIL NFR BLD: 1.6 % (ref 0–6.9)
ERYTHROCYTE [DISTWIDTH] IN BLOOD BY AUTOMATED COUNT: 49 FL (ref 35.9–50)
GLOBULIN SER CALC-MCNC: 2.7 G/DL (ref 1.9–3.5)
GLUCOSE SERPL-MCNC: 97 MG/DL (ref 65–99)
HCT VFR BLD AUTO: 40.4 % (ref 37–47)
HGB BLD-MCNC: 13.4 G/DL (ref 12–16)
IMM GRANULOCYTES # BLD AUTO: 0.02 K/UL (ref 0–0.11)
IMM GRANULOCYTES NFR BLD AUTO: 0.5 % (ref 0–0.9)
LYMPHOCYTES # BLD AUTO: 0.73 K/UL (ref 1–4.8)
LYMPHOCYTES NFR BLD: 17 % (ref 22–41)
MCH RBC QN AUTO: 33.3 PG (ref 27–33)
MCHC RBC AUTO-ENTMCNC: 33.2 G/DL (ref 33.6–35)
MCV RBC AUTO: 100.5 FL (ref 81.4–97.8)
MONOCYTES # BLD AUTO: 0.54 K/UL (ref 0–0.85)
MONOCYTES NFR BLD AUTO: 12.6 % (ref 0–13.4)
NEUTROPHILS # BLD AUTO: 2.92 K/UL (ref 2–7.15)
NEUTROPHILS NFR BLD: 68.1 % (ref 44–72)
NRBC # BLD AUTO: 0 K/UL
NRBC BLD-RTO: 0 /100 WBC
PLATELET # BLD AUTO: 189 K/UL (ref 164–446)
PMV BLD AUTO: 8.9 FL (ref 9–12.9)
POTASSIUM SERPL-SCNC: 4.7 MMOL/L (ref 3.6–5.5)
PROT SERPL-MCNC: 6.4 G/DL (ref 6–8.2)
RBC # BLD AUTO: 4.02 M/UL (ref 4.2–5.4)
SODIUM SERPL-SCNC: 139 MMOL/L (ref 135–145)
VIT B12 SERPL-MCNC: 3998 PG/ML (ref 211–911)
WBC # BLD AUTO: 4.3 K/UL (ref 4.8–10.8)

## 2021-07-06 PROCEDURE — A9270 NON-COVERED ITEM OR SERVICE: HCPCS | Performed by: STUDENT IN AN ORGANIZED HEALTH CARE EDUCATION/TRAINING PROGRAM

## 2021-07-06 PROCEDURE — A9270 NON-COVERED ITEM OR SERVICE: HCPCS | Performed by: HOSPITALIST

## 2021-07-06 PROCEDURE — 700102 HCHG RX REV CODE 250 W/ 637 OVERRIDE(OP): Performed by: STUDENT IN AN ORGANIZED HEALTH CARE EDUCATION/TRAINING PROGRAM

## 2021-07-06 PROCEDURE — 97161 PT EVAL LOW COMPLEX 20 MIN: CPT

## 2021-07-06 PROCEDURE — 96375 TX/PRO/DX INJ NEW DRUG ADDON: CPT

## 2021-07-06 PROCEDURE — 700111 HCHG RX REV CODE 636 W/ 250 OVERRIDE (IP): Performed by: HOSPITALIST

## 2021-07-06 PROCEDURE — 700111 HCHG RX REV CODE 636 W/ 250 OVERRIDE (IP): Performed by: STUDENT IN AN ORGANIZED HEALTH CARE EDUCATION/TRAINING PROGRAM

## 2021-07-06 PROCEDURE — 82607 VITAMIN B-12: CPT

## 2021-07-06 PROCEDURE — 97165 OT EVAL LOW COMPLEX 30 MIN: CPT

## 2021-07-06 PROCEDURE — G0378 HOSPITAL OBSERVATION PER HR: HCPCS

## 2021-07-06 PROCEDURE — 85025 COMPLETE CBC W/AUTO DIFF WBC: CPT

## 2021-07-06 PROCEDURE — 99225 PR SUBSEQUENT OBSERVATION CARE,LEVEL II: CPT | Performed by: HOSPITALIST

## 2021-07-06 PROCEDURE — 36415 COLL VENOUS BLD VENIPUNCTURE: CPT

## 2021-07-06 PROCEDURE — 96376 TX/PRO/DX INJ SAME DRUG ADON: CPT

## 2021-07-06 PROCEDURE — 80053 COMPREHEN METABOLIC PANEL: CPT

## 2021-07-06 PROCEDURE — 700102 HCHG RX REV CODE 250 W/ 637 OVERRIDE(OP): Performed by: HOSPITALIST

## 2021-07-06 RX ORDER — HYDRALAZINE HYDROCHLORIDE 20 MG/ML
10 INJECTION INTRAMUSCULAR; INTRAVENOUS EVERY 6 HOURS PRN
Status: DISCONTINUED | OUTPATIENT
Start: 2021-07-06 | End: 2021-07-22 | Stop reason: HOSPADM

## 2021-07-06 RX ORDER — ENALAPRILAT 1.25 MG/ML
1.25 INJECTION INTRAVENOUS EVERY 6 HOURS PRN
Status: DISCONTINUED | OUTPATIENT
Start: 2021-07-06 | End: 2021-07-22 | Stop reason: HOSPADM

## 2021-07-06 RX ADMIN — ENALAPRILAT 1.25 MG: 1.25 INJECTION INTRAVENOUS at 17:12

## 2021-07-06 RX ADMIN — LORAZEPAM 1 MG: 2 INJECTION INTRAMUSCULAR; INTRAVENOUS at 23:22

## 2021-07-06 RX ADMIN — CARVEDILOL 12.5 MG: 12.5 TABLET, FILM COATED ORAL at 16:29

## 2021-07-06 RX ADMIN — HYDRALAZINE HYDROCHLORIDE 10 MG: 20 INJECTION INTRAMUSCULAR; INTRAVENOUS at 15:57

## 2021-07-06 RX ADMIN — CARVEDILOL 12.5 MG: 12.5 TABLET, FILM COATED ORAL at 10:31

## 2021-07-06 RX ADMIN — LEVOTHYROXINE SODIUM 50 MCG: 0.05 TABLET ORAL at 06:34

## 2021-07-06 RX ADMIN — ACETAMINOPHEN 650 MG: 325 TABLET, FILM COATED ORAL at 04:20

## 2021-07-06 RX ADMIN — FOLIC ACID 1 MG: 1 TABLET ORAL at 04:19

## 2021-07-06 RX ADMIN — LORAZEPAM 1 MG: 2 INJECTION INTRAMUSCULAR; INTRAVENOUS at 17:59

## 2021-07-06 RX ADMIN — THERA TABS 1 TABLET: TAB at 04:20

## 2021-07-06 RX ADMIN — OMEPRAZOLE 20 MG: 20 CAPSULE, DELAYED RELEASE ORAL at 04:20

## 2021-07-06 RX ADMIN — CYANOCOBALAMIN TAB 500 MCG 1000 MCG: 500 TAB at 04:19

## 2021-07-06 RX ADMIN — ASPIRIN 81 MG: 81 TABLET, COATED ORAL at 04:19

## 2021-07-06 ASSESSMENT — PAIN SCALES - PAIN ASSESSMENT IN ADVANCED DEMENTIA (PAINAD)
BREATHING: NORMAL
BREATHING: NORMAL
CONSOLABILITY: DISTRACTED OR REASSURED BY VOICE/TOUCH
FACIALEXPRESSION: SMILING OR INEXPRESSIVE
BODYLANGUAGE: RELAXED
CONSOLABILITY: NO NEED TO CONSOLE
CONSOLABILITY: NO NEED TO CONSOLE
FACIALEXPRESSION: SMILING OR INEXPRESSIVE
FACIALEXPRESSION: SMILING OR INEXPRESSIVE
BREATHING: NORMAL
TOTALSCORE: 1
TOTALSCORE: 1
TOTALSCORE: 2
BODYLANGUAGE: TENSE, DISTRESSED PACING, FIDGETING
BREATHING: NORMAL
TOTALSCORE: 0
CONSOLABILITY: NO NEED TO CONSOLE
FACIALEXPRESSION: SMILING OR INEXPRESSIVE

## 2021-07-06 ASSESSMENT — COGNITIVE AND FUNCTIONAL STATUS - GENERAL
MOBILITY SCORE: 20
DAILY ACTIVITIY SCORE: 18
STANDING UP FROM CHAIR USING ARMS: A LITTLE
DRESSING REGULAR UPPER BODY CLOTHING: A LITTLE
MOVING FROM LYING ON BACK TO SITTING ON SIDE OF FLAT BED: A LITTLE
PERSONAL GROOMING: A LITTLE
DRESSING REGULAR LOWER BODY CLOTHING: A LITTLE
WALKING IN HOSPITAL ROOM: A LITTLE
SUGGESTED CMS G CODE MODIFIER MOBILITY: CJ
SUGGESTED CMS G CODE MODIFIER DAILY ACTIVITY: CK
TOILETING: A LITTLE
HELP NEEDED FOR BATHING: A LITTLE
CLIMB 3 TO 5 STEPS WITH RAILING: A LITTLE
EATING MEALS: A LITTLE

## 2021-07-06 ASSESSMENT — GAIT ASSESSMENTS
GAIT LEVEL OF ASSIST: MINIMAL ASSIST
ASSISTIVE DEVICE: FRONT WHEEL WALKER
DISTANCE (FEET): 100

## 2021-07-06 ASSESSMENT — ACTIVITIES OF DAILY LIVING (ADL): TOILETING: REQUIRES ASSIST

## 2021-07-06 NOTE — DISCHARGE PLANNING
Anticipated Discharge Disposition: SNF    Action: LSW spoke with DPOA/neighbor, Acacia Rider (#[phone]) by phone to complete assessment and to discuss discharge plan. Acacia is the second DPOA listed and patient's sister, Ellie Chao (#[phone]) is first; however, Ellie is blind and usually refers all decisions to Acacia. Patient resides with Ellie and is independent with ADLs, but dependent with IADLs. Patients has a FWW available to her at home. Patient uses the [facility] off of [address]. Acacia or other friends usually assist with transportation needs. Acacia provided with list of SNF choices for her review. Acacia to call this LSW back with 3 SNF preferences. Acacia also agreeable to look into finances to determine if patient has funds to discharge to memory care facility.    Barriers to Discharge: Pending 3 SNF preferences from DPOA; medical clearance.    Plan: CM to continue to follow for discharge needs.    Care Transition Team Assessment    Information Source  Orientation Level: Oriented to person, Disoriented to place, Disoriented to time, Disoriented to situation  Information Given By: Neighbor, Other (Comments) (2nd DPOA)  Informant's Name:  (Acacia Rider (#[phone]))  Who is responsible for making decisions for patient? : POA  Name(s) of Primary Decision Maker:  (1st DPOA (Ellie Chao #[phone]) and 2nd Acacia Galdamez)    Readmission Evaluation  Is this a readmission?: No    Elopement Risk  Legal Hold: No  Ambulatory or Self Mobile in Wheelchair: Yes  Disoriented: No  Psychiatric Symptoms: None  History of Wandering: No  Elopement this Admit: No  Vocalizing Wanting to Leave: No  Displays Behaviors, Body Language Wanting to Leave: No-Not at Risk for Elopement  Elopement Risk: At Risk for Elopement  Wanderguard On: Unavailable  Personal Belongings: Hospital Clothing Only  Environmental Precautions: Sharp or Dangerous Items Removed  Picture of Patient on Inside Chart Front  Cover: No (See Comments)  Purple Armband on Patient: No (See Comments)    Interdisciplinary Discharge Planning  Does Admitting Nurse Feel This Could be a Complex Discharge?: No  Primary Care Physician:  (Mili, 06/24/2021)  Lives with - Patient's Self Care Capacity: Other (Comments) (86 y/o sister.)  Patient or legal guardian wants to designate a caregiver: Yes  Caregiver name: Acacia Rider, see emergency contact  Support Systems: Family Member(s), Other (Comments)  Housing / Facility: [address]  Do You Take your Prescribed Medications Regularly: Yes  Mobility Issues: Yes  Prior Services: None  Patient Prefers to be Discharged to::  (SNF)  Assistance Needed: Yes  Durable Medical Equipment: Walker    Discharge Preparedness  What is your plan after discharge?: Skilled nursing facility  What are your discharge supports?: Sibling, Other (comment)  Prior Functional Level: Independent with Activities of Daily Living, Needs Assist with Medication Management  Difficulity with ADLs: None  Difficulity with IADLs: Cooking, Driving, Keeping track of finances, Laundry, Managing medication, Shopping    Functional Assesment  Prior Functional Level: Independent with Activities of Daily Living, Needs Assist with Medication Management    Finances  Financial Barriers to Discharge: No  Prescription Coverage: Yes    Vision / Hearing Impairment  Vision Impairment :  (BOBBI, Pt confused, POA unavailable)  Hearing Impairment :  (BOBBI, Pt confused, POA unavailable)  Hearing Impairment: Both Ears    Values / Beliefs / Concerns  Values / Beliefs Concerns : No    Advance Directive  Advance Directive?: DPOA for Health Care  Durable Power of  Name and Contact :  (1st DPOA (Ellie Chao #[phone]) and 2nd Acacia Galdamez)    Domestic Abuse  Have you ever been the victim of abuse or violence?: Not Sure  Physical Abuse or Sexual Abuse: Unable to Assess due to Patient Condition  Verbal Abuse or Emotional Abuse: Unable to Assess due  to Patient Condition    Psychological Assessment  History of Substance Abuse: None  History of Psychiatric Problems: No    Discharge Risks or Barriers  Discharge risks or barriers?: No  Patient risk factors: Cognitive / sensory / physical deficit, Vulnerable adult

## 2021-07-06 NOTE — HOSPITAL COURSE
This a 89-year-old female past medical history significant for hypothyroidism hypertension, GERD, vascular dementia presented to the ER on 7/3/2021 after she was noted to have frequent ground-level fall.  Upon presentation in ER, she noted to have macrocytic anemia with hemoglobin of 11.6, clinically dehydrated, CT did not show any acute intracranial process, diffuse atrophy and periventricular white matter changes consistent with chronic small vessel disease.    It is noted that patient stays with her sister who is also demented; medical power of  being her neighbor.  Per her neighbor, patient CODE STATUS has been placed DNI/DNAR.

## 2021-07-06 NOTE — THERAPY
Occupational Therapy   Initial Evaluation     Patient Name: Lamar Dangelo  Age:  89 y.o., Sex:  female  Medical Record #: [id number]  Today's Date: 7/6/2021     Precautions: Fall Risk    Assessment  Patient is 89 y.o. female admitted for multiple GLFs (most recently admitted with garbled speech and worsened cognition post fall) with hx of severe dementia. Pt presented today with impaired cognition as her primarily limitation. Pt was able to follow 1-step commands for familiar tasks including LB dressing, toileting, and functional ADL txfs with FWW. Observed L sided visual deficits today with difficulty tracking to the L. As pt's primary concerns are related to her cognitive decline, I anticipate pt would benefit from DC to memory care unit. Pt will require 24/7 supervision post DC for safety with mobility and self cares.     Plan    Recommend Occupational Therapy DC needs only    DC Equipment Recommendations: (P) Front-Wheel Walker  Discharge Recommendations: (P) Other - (memory care with 24/7 supervision)        07/06/21 0921   Initial Contact Note    Initial Contact Note Order Received and Verified, Evaluation Only - Patient Does Not Require Further Acute Occupational Therapy at this Time.  However, May Benefit from Post Acute Therapy for Higher Level Functional Deficits.   Prior Living Situation   Prior Services None   Housing / Facility 3 [address] House   Steps Into Home 3   Steps In Home 8   Equipment Owned None   Lives with - Patient's Self Care Capacity   (85 year old sister)   Comments Per chart review pt lives with her 85 y.o. sister who also has memory concerns. Pt has had multiple falls in the home and sister is unable to provide physical assist   Prior Level of ADL Function   Self Feeding Independent   Grooming / Hygiene Independent   Bathing Independent   Dressing Independent   Toileting Independent   Comments unclear if assist was being provided at home; this is per chart review   Prior Level of IADL  Function   Medication Management Requires Assist   Laundry Requires Assist   Kitchen Mobility Requires Assist   Finances Requires Assist   Home Management Requires Assist   Shopping Requires Assist   Prior Level Of Mobility Supervision Without Device in Community;Supervision Without Device in Home   History of Falls   History of Falls Yes   Date of Last Fall   (multiple falls at home)   Precautions   Precautions Fall Risk   Non Verbal Descriptors   Non Verbal Scale  Calm;Unlabored Breathing   Cognition    Cognition / Consciousness X   Ability To Follow Commands 1 Step   Safety Awareness Impaired;Impulsive   New Learning Impaired   Attention Impaired   Comments Pt reported seeing ants on the floor during session. Pt was impulsive but easily redirectable. Pt was able to follow 1-step commands for familar tasks   Active ROM Upper Body   Active ROM Upper Body  WDL   Strength Upper Body   Upper Body Strength  WDL   Neurological Concerns   Neurological Concerns Yes  (L sided proprioceptive awareness deficits )   Balance Assessment   Sitting Balance (Static) Fair   Sitting Balance (Dynamic) Fair -   Standing Balance (Static) Poor +   Standing Balance (Dynamic) Poor +   Weight Shift Sitting Fair   Weight Shift Standing Poor   Comments improved with FWW; noted posterior lean with tactile feedback (improved when feedback was removed)   Bed Mobility    Supine to Sit Supervised   Sit to Supine Supervised   Scooting Supervised   Rolling Supervised   ADL Assessment   Grooming Supervision   Lower Body Dressing Supervision   Toileting Supervision   Comments required cues for sequencing all ADL steps but no physical assist required   How much help from another person does the patient currently need...   Putting on and taking off regular lower body clothing? 3   Bathing (including washing, rinsing, and drying)? 3   Toileting, which includes using a toilet, bedpan, or urinal? 3   Putting on and taking off regular upper body clothing?  3   Taking care of personal grooming such as brushing teeth? 3   Eating meals? 3   6 Clicks Daily Activity Score 18   Functional Mobility   Sit to Stand Minimal Assist  (CGA)   Toilet Transfers Minimal Assist  (CGA)   Transfer Method Stand Step   Mobility room distances with FWW   Visual Perception   Visual Perception  X   Comments Observed deficits in L visual field with difficulty tracking to the left as well as hallucinations (potentially a combination of cognitive and visual deficits)   Activity Tolerance   Sitting Edge of Bed 10 min   Standing 8 min   Patient / Family Goals   Patient / Family Goal #1 none stated   Education Group   Role of Occupational Therapist Patient Response Patient;Acceptance;Explanation   Anticipated Discharge Equipment and Recommendations   DC Equipment Recommendations Front-Wheel Walker   Discharge Recommendations Other -  (memory care with 24/7 supervision)   Interdisciplinary Plan of Care Collaboration   IDT Collaboration with  Nursing;Physical Therapist   Patient Position at End of Therapy In Bed;Phone within Reach;Tray Table within Reach;Call Light within Reach;Bed Alarm On   Collaboration Comments OT findings and recs   Session Information   Date / Session Number  7/6 DC needs only, SM   Priority 0

## 2021-07-06 NOTE — THERAPY
"Physical Therapy   Initial Evaluation     Patient Name: Lamar Dangelo  Age:  89 y.o., Sex:  female  Medical Record #: [id number]  Today's Date: 7/6/2021     Precautions: Fall Risk    Assessment  Patient is 89 y.o. female with a diagnosis of Vascular dementia.  Additional factors influencing patient status / progress : today, pt is found disheveled in bed, food in bed, hallucinating about her sister being in the room and ants on floor, climbing out of bed to look at the \"ants\". Education done as able. Pt was supervised to come to EOB, though unsteady in standing.  Pt began to hold lower bar of FWW with her L hand, unable to see the handle on higher bar, needed physical assist to place hand on this handle, but did not show L hand weakness, had no problems holding the walker once hand was placed there and does not show L neglect as she followed cues to take L turns as easily as R turns.  Pt is more stable with FWW, contact guard during ambulation for safety with no LOB observed. In standing, pt showed a posterior lean when in bathroom, presenting a fall risk. Pt's issues appear cognitive-memory related. Pt is unsafe to be unsupervised at any time, will benefit from a memory care setting. Pt's sister is unable to provide the level of care needed.      Plan    Recommend Physical Therapy for Evaluation only    DC Equipment Recommendations: Unable to determine at this time  Discharge Recommendations: Other - (will need 24/7 supervision due to cognitive/memory issues. )   Patient will not be actively followed for physical therapy services at this time, however may be seen if requested by physician for 1 more visit within 30 days to address any discharge or equipment needs     Objective       07/06/21 0926   Prior Living Situation   Prior Services None   Housing / Facility 3 Story House   Steps Into Home 3   Steps In Home 8   Equipment Owned None   Lives with - Patient's Self Care Capacity Other (Comments)  (86 yo sister) "   Comments All info is per chart, pt unable to answer questions, confused. Pt lives with sister who is almost blind and forgot that pt is in hospital, unable to provide the level of care pt needs.    Prior Level of Functional Mobility   Bed Mobility Unable To Determine At This Time   Comments pt unable to answer, but does not use assistive device per chart   History of Falls   History of Falls Yes   Date of Last Fall   (multiple falls per chart)   Cognition    Cognition / Consciousness X   Level of Consciousness Alert   Ability To Follow Commands 1 Step   Safety Awareness Impaired;Impulsive   New Learning Impaired   Attention Impaired   Comments pt needed step by step directions   Vision   Vision Comments Pt was showing a L visual field cut, unable to track well to her left and cannot see her L hand as she tries to place hand on FWW   Balance Assessment   Comments with FWW. posterior lean in standing at sink, at risk to fall.    Gait Analysis   Gait Level Of Assist Minimal Assist  (contact guard for safety but no LOB during ambulation)   Assistive Device Front Wheel Walker   Distance (Feet) 100   # of Times Distance was Traveled 1   Deviation   (flexed posture, short stride on R, but no foot drop.)   Comments needed physical assist to place L hand on FWW as she could not find the handle when pointed out to her.    Bed Mobility    Supine to Sit Supervised   Sit to Supine Supervised   Scooting Supervised   Rolling Supervised   Comments found disheveled in bed, linens a mess, food in bed.    Functional Mobility   Sit to Stand Minimal Assist  (contact guard from EOB)   Transfer Method Stand Pivot;Stand Step   Anticipated Discharge Equipment and Recommendations   DC Equipment Recommendations Unable to determine at this time   Discharge Recommendations Other -  (will need 24/7 supervision due to cognitive/memory issues. )

## 2021-07-06 NOTE — PROGRESS NOTES
Hospital Medicine Daily Progress Note    Date of Service  7/6/2021    Chief Complaint  Lamar Dangelo is a 89 y.o. female admitted 7/3/2021 with   Chief Complaint   Patient presents with   • Fall     pt bib remsa from home, came in as TBI, had glf x 2 in 24 hrs. has hx of Dementia. arrived aaox2 Baseline. has bump in the back of head. no laceration. -blood thinners -asa. pt has been increasing falls the last 6 months. has bgl of 79         Hospital Course  This a 89-year-old female past medical history significant for hypothyroidism hypertension, GERD, vascular dementia presented to the ER on 7/3/2021 after she was noted to have frequent ground-level fall.  Upon presentation in ER, she noted to have macrocytic anemia with hemoglobin of 11.6, clinically dehydrated, CT did not show any acute intracranial process, diffuse atrophy and periventricular white matter changes consistent with chronic small vessel disease.    It is noted that patient stays with her sister who is also demented; medical power of  being her neighbor.  Per her neighbor, patient CODE STATUS has been placed DNI/DNAR.    Interval Problem Update  No acute events overnight.  Patient is alert and oriented to her name, denies any complaint of, noted to be walking in the hallway  --Questions has been changed to DNI/DN AR.  Patient will need placement to memory care    I have personally seen and examined the patient at bedside. I discussed the plan of care with bedside RN.    Consultants/Specialty  None    Code Status  DNAR/DNI    Disposition  Patient is medically cleared.   Anticipate discharge to Memory care.  I have placed the appropriate orders for post-discharge needs.    Review of Systems  ROS   Unable to obtain 2/2 patient dementia/ mentation     Physical Exam  Temp:  [36.4 °C (97.6 °F)-37.1 °C (98.7 °F)] 36.4 °C (97.6 °F)  Pulse:  [63-71] 71  Resp:  [15-17] 17  BP: (138-158)/(70-79) 158/79  SpO2:  [91 %-93 %] 93 %    Physical Exam  Vitals  and nursing note reviewed.   Constitutional:       Comments: And awake    HENT:      Head: Normocephalic and atraumatic.   Cardiovascular:      Rate and Rhythm: Normal rate.      Pulses: Normal pulses.   Pulmonary:      Effort: No respiratory distress.      Breath sounds: Normal breath sounds.   Abdominal:      General: Abdomen is flat. There is no distension.      Tenderness: There is no abdominal tenderness.   Musculoskeletal:      Cervical back: Neck supple.      Right lower leg: No edema.   Skin:     General: Skin is warm.   Neurological:      Mental Status: She is alert.      Comments: Alert and awake , knows her name , does follow commands     Psychiatric:         Mood and Affect: Mood normal.         Fluids    Intake/Output Summary (Last 24 hours) at 7/6/2021 1401  Last data filed at 7/5/2021 2000  Gross per 24 hour   Intake 220 ml   Output 0 ml   Net 220 ml       Laboratory  Recent Labs     07/03/21  1430 07/06/21  0751   WBC 5.3 4.3*   RBC 3.50* 4.02*   HEMOGLOBIN 11.6* 13.4   HEMATOCRIT 35.5* 40.4   .4* 100.5*   MCH 33.1* 33.3*   MCHC 32.7* 33.2*   RDW 50.6* 49.0   PLATELETCT 162* 189   MPV 9.0 8.9*     Recent Labs     07/03/21  1430 07/06/21  0751   SODIUM 140 139   POTASSIUM 3.7 4.7   CHLORIDE 105 105   CO2 24 25   GLUCOSE 83 97   BUN 25* 23*   CREATININE 1.09 0.96   CALCIUM 8.7 9.6                   Imaging  CT-HEAD W/O   Final Result         1.  No acute intracranial process.      2. Diffuse atrophy and periventricular white matter changes consistent with chronic small vessel disease.           Assessment/Plan  Goals of care, counseling/discussion- (present on admission)  Assessment & Plan  Comfort care was discontinued, CODE STATUS changed to DNI/DN AR.  PT OT eval pending.  Will need memory care, discussed with case management    Urine ketones- (present on admission)  Assessment & Plan  Trace.  CTM.     Severe protein-calorie malnutrition (HCC)- (present on admission)  Assessment & Plan  Due  to dementia  Encouraged to eat , nutrition consult    Gastroesophageal reflux disease without esophagitis- (present on admission)  Assessment & Plan  Continue omperazole    Recurrent falls- (present on admission)  Assessment & Plan  Reported by neighbor, PT leo pending.     Vitamin B12 deficiency- (present on admission)  Assessment & Plan  Resolved, vitamin B12 at 3000    Macrocytic anemia- (present on admission)  Assessment & Plan  Vitamin B12 normal, supplement folic acid, TSH normal    Essential hypertension- (present on admission)  Assessment & Plan  Continue coreg with holding parameters    Vascular dementia without behavioral disturbance (HCC)- (present on admission)  Assessment & Plan  CT head showed diffuse atrophy and microvascular disease.      -- will  require placement in a memory care unit.  PT/OT leo pending.     Acquired hypothyroidism- (present on admission)  Assessment & Plan  Continue levothyroxine       VTE prophylaxis: enoxaparin ppx

## 2021-07-07 ENCOUNTER — HOSPICE ADMISSION (OUTPATIENT)
Dept: HOSPICE | Facility: HOSPICE | Age: 86
End: 2021-07-07
Payer: MEDICARE

## 2021-07-07 PROCEDURE — 700102 HCHG RX REV CODE 250 W/ 637 OVERRIDE(OP): Performed by: STUDENT IN AN ORGANIZED HEALTH CARE EDUCATION/TRAINING PROGRAM

## 2021-07-07 PROCEDURE — G0378 HOSPITAL OBSERVATION PER HR: HCPCS

## 2021-07-07 PROCEDURE — 700111 HCHG RX REV CODE 636 W/ 250 OVERRIDE (IP): Performed by: STUDENT IN AN ORGANIZED HEALTH CARE EDUCATION/TRAINING PROGRAM

## 2021-07-07 PROCEDURE — A9270 NON-COVERED ITEM OR SERVICE: HCPCS | Performed by: STUDENT IN AN ORGANIZED HEALTH CARE EDUCATION/TRAINING PROGRAM

## 2021-07-07 PROCEDURE — 700102 HCHG RX REV CODE 250 W/ 637 OVERRIDE(OP): Performed by: HOSPITALIST

## 2021-07-07 PROCEDURE — 700111 HCHG RX REV CODE 636 W/ 250 OVERRIDE (IP): Performed by: HOSPITALIST

## 2021-07-07 PROCEDURE — 96376 TX/PRO/DX INJ SAME DRUG ADON: CPT

## 2021-07-07 PROCEDURE — 99225 PR SUBSEQUENT OBSERVATION CARE,LEVEL II: CPT | Performed by: HOSPITALIST

## 2021-07-07 PROCEDURE — A9270 NON-COVERED ITEM OR SERVICE: HCPCS | Performed by: HOSPITALIST

## 2021-07-07 RX ORDER — LISINOPRIL 10 MG/1
10 TABLET ORAL
Status: DISCONTINUED | OUTPATIENT
Start: 2021-07-07 | End: 2021-07-22 | Stop reason: HOSPADM

## 2021-07-07 RX ORDER — AMLODIPINE BESYLATE 5 MG/1
5 TABLET ORAL
Status: DISCONTINUED | OUTPATIENT
Start: 2021-07-07 | End: 2021-07-22 | Stop reason: HOSPADM

## 2021-07-07 RX ADMIN — LORAZEPAM 1 MG: 2 INJECTION INTRAMUSCULAR; INTRAVENOUS at 09:55

## 2021-07-07 RX ADMIN — LEVOTHYROXINE SODIUM 50 MCG: 0.05 TABLET ORAL at 08:10

## 2021-07-07 RX ADMIN — LORAZEPAM 1 MG: 2 INJECTION INTRAMUSCULAR; INTRAVENOUS at 13:59

## 2021-07-07 RX ADMIN — AMLODIPINE BESYLATE 5 MG: 5 TABLET ORAL at 09:56

## 2021-07-07 RX ADMIN — CARVEDILOL 12.5 MG: 12.5 TABLET, FILM COATED ORAL at 08:10

## 2021-07-07 RX ADMIN — LORAZEPAM 1 MG: 2 SOLUTION, CONCENTRATE ORAL at 01:53

## 2021-07-07 RX ADMIN — HYDRALAZINE HYDROCHLORIDE 10 MG: 20 INJECTION INTRAMUSCULAR; INTRAVENOUS at 01:50

## 2021-07-07 RX ADMIN — ATROPINE SULFATE 2 DROP: 10 SOLUTION OPHTHALMIC at 02:00

## 2021-07-07 RX ADMIN — CARVEDILOL 12.5 MG: 12.5 TABLET, FILM COATED ORAL at 17:03

## 2021-07-07 RX ADMIN — LISINOPRIL 10 MG: 10 TABLET ORAL at 09:56

## 2021-07-07 ASSESSMENT — PAIN DESCRIPTION - PAIN TYPE: TYPE: ACUTE PAIN

## 2021-07-07 NOTE — HOSPICE
Call to Ariella, she wants to see of therapy at a SNF can make her stronger like it did when pt had a fracture in the past.  She is skeptical about it working.  Reviewed POLST on file with her & verified it is still appropriate.  She is open to hospice after therapy, given hospice 24h number.

## 2021-07-07 NOTE — DISCHARGE PLANNING
Anticipated Discharge Disposition: SNF     Action: LSW spoke with DPOA/neighbor, Acacia Rider (#[phone]) by phone who provided 3 SNF preferences. Acacia agreeable to sending referrals to (1st) [facility], (2nd) [facility] and (3rd) [facility]. DPA tasked with sending out SNF referrals.      Barriers to Discharge: Medical clearance; SNF acceptance.     Plan: CM to continue to follow for discharge needs.

## 2021-07-07 NOTE — DISCHARGE PLANNING
Received Choice form at 0988  Agency/Facility Name: [facility]. [facility]   Referral sent per Choice form @ [phone]

## 2021-07-07 NOTE — CARE PLAN
The patient is Stable - Low risk of patient condition declining or worsening    Shift Goals  Clinical Goals: safety  Patient Goals: Rest    Progress made toward(s) clinical / shift goals:    Problem: Fall Risk  Goal: Patient will remain free from falls  Outcome: Progressing  Note: Safety precautions are in place including bed locked and in lowest position, upper bed rails up, bed alarm on, call light within reach, treaded socks on, tray table and personal belongings within reach.        Patient is not progressing towards the following goals:

## 2021-07-08 PROCEDURE — 700102 HCHG RX REV CODE 250 W/ 637 OVERRIDE(OP): Performed by: HOSPITALIST

## 2021-07-08 PROCEDURE — A9270 NON-COVERED ITEM OR SERVICE: HCPCS | Performed by: HOSPITALIST

## 2021-07-08 PROCEDURE — G0378 HOSPITAL OBSERVATION PER HR: HCPCS

## 2021-07-08 PROCEDURE — 99225 PR SUBSEQUENT OBSERVATION CARE,LEVEL II: CPT | Performed by: HOSPITALIST

## 2021-07-08 PROCEDURE — 700111 HCHG RX REV CODE 636 W/ 250 OVERRIDE (IP): Performed by: HOSPITALIST

## 2021-07-08 PROCEDURE — 700102 HCHG RX REV CODE 250 W/ 637 OVERRIDE(OP): Performed by: STUDENT IN AN ORGANIZED HEALTH CARE EDUCATION/TRAINING PROGRAM

## 2021-07-08 PROCEDURE — 96372 THER/PROPH/DIAG INJ SC/IM: CPT

## 2021-07-08 PROCEDURE — A9270 NON-COVERED ITEM OR SERVICE: HCPCS | Performed by: STUDENT IN AN ORGANIZED HEALTH CARE EDUCATION/TRAINING PROGRAM

## 2021-07-08 RX ADMIN — ENOXAPARIN SODIUM 30 MG: 30 INJECTION SUBCUTANEOUS at 10:44

## 2021-07-08 RX ADMIN — THERA TABS 1 TABLET: TAB at 06:37

## 2021-07-08 RX ADMIN — CARVEDILOL 12.5 MG: 12.5 TABLET, FILM COATED ORAL at 16:47

## 2021-07-08 RX ADMIN — ASPIRIN 81 MG: 81 TABLET, COATED ORAL at 06:37

## 2021-07-08 RX ADMIN — LISINOPRIL 10 MG: 10 TABLET ORAL at 06:37

## 2021-07-08 RX ADMIN — OMEPRAZOLE 20 MG: 20 CAPSULE, DELAYED RELEASE ORAL at 06:37

## 2021-07-08 RX ADMIN — AMLODIPINE BESYLATE 5 MG: 5 TABLET ORAL at 06:37

## 2021-07-08 RX ADMIN — CYANOCOBALAMIN TAB 500 MCG 1000 MCG: 500 TAB at 06:37

## 2021-07-08 RX ADMIN — LEVOTHYROXINE SODIUM 50 MCG: 0.05 TABLET ORAL at 06:36

## 2021-07-08 RX ADMIN — FOLIC ACID 1 MG: 1 TABLET ORAL at 06:37

## 2021-07-08 ASSESSMENT — LIFESTYLE VARIABLES
ALCOHOL_USE: NO
CONSUMPTION TOTAL: NEGATIVE
AVERAGE NUMBER OF DAYS PER WEEK YOU HAVE A DRINK CONTAINING ALCOHOL: 0
DOES PATIENT WANT TO STOP DRINKING: NO
EVER HAD A DRINK FIRST THING IN THE MORNING TO STEADY YOUR NERVES TO GET RID OF A HANGOVER: NO
TOTAL SCORE: 0
ON A TYPICAL DAY WHEN YOU DRINK ALCOHOL HOW MANY DRINKS DO YOU HAVE: 0
HAVE PEOPLE ANNOYED YOU BY CRITICIZING YOUR DRINKING: NO
TOTAL SCORE: 0
HOW MANY TIMES IN THE PAST YEAR HAVE YOU HAD 5 OR MORE DRINKS IN A DAY: 0
EVER FELT BAD OR GUILTY ABOUT YOUR DRINKING: NO
HAVE YOU EVER FELT YOU SHOULD CUT DOWN ON YOUR DRINKING: NO
TOTAL SCORE: 0

## 2021-07-08 ASSESSMENT — PAIN DESCRIPTION - PAIN TYPE
TYPE: ACUTE PAIN

## 2021-07-08 NOTE — PROGRESS NOTES
Report received from Day RN at 1915. Assumed care of patient. Plan of care discussed, questions answered. Assessment completed. VSS, A&O x to self, denies chest pain or SOB at this time, denies pain. PRN med ordered. Call light in reach. Patient educated on use of call light for assistance.    Telesitter at bedside for safety.

## 2021-07-08 NOTE — PROGRESS NOTES
[facility] Medicine Daily Progress Note    Date of Service  7/8/2021    Chief Complaint  Lamar Dangelo is a 89 y.o. female admitted 7/3/2021 with   Chief Complaint   Patient presents with   • Fall     pt bib remsa from home, came in as TBI, had glf x 2 in 24 hrs. has hx of Dementia. arrived aaox2 Baseline. has bump in the back of head. no laceration. -blood thinners -asa. pt has been increasing falls the last 6 months. has bgl of 79         Hospital Course  This a 89-year-old female past medical history significant for hypothyroidism hypertension, GERD, vascular dementia presented to the ER on 7/3/2021 after she was noted to have frequent ground-level fall.  Upon presentation in ER, she noted to have macrocytic anemia with hemoglobin of 11.6, clinically dehydrated, CT did not show any acute intracranial process, diffuse atrophy and periventricular white matter changes consistent with chronic small vessel disease.    It is noted that patient stays with her sister who is also demented; medical power of  being her neighbor.  Per her neighbor, patient CODE STATUS has been placed DNI/DNAR.    Interval Problem Update  No acute events overnight.  Patient is alert and oriented to her name, denies any complaint of, noted to be walking in the hallway  --Questions has been changed to DNI/DN AR.  Patient will need placement to memory care    I have personally seen and examined the patient at bedside. I discussed the plan of care with bedside RN.    7/7:  --No acute events overnight.  Patient is alert and oriented x1.  She is confused, pleasant.  --PT/OT/evaluated the patient, patient medically cleared, pending dispo.  She might benefit back to memory care.    7/8:  No acute events overnight evaluated patient.  Patient alert oriented x1.  Per nursing staff request, will obtain PT/OT, discussed  with case management regarding the importance of transferring the patient to memory care  ---  Looks to me that patient doesn't  have hospice qualifying diagnosis    Consultants/Specialty  None    Code Status  DNAR/DNI    Disposition  Patient is medically cleared.   Anticipate discharge to Memory care.  I have placed the appropriate orders for post-discharge needs.    Review of Systems  ROS   Unable to obtain 2/2 patient dementia/ mentation     Physical Exam  Temp:  [36.1 °C (96.9 °F)-36.3 °C (97.3 °F)] 36.1 °C (97 °F)  Pulse:  [70-82] 82  Resp:  [18] 18  BP: (107-159)/(73-88) 107/80  SpO2:  [90 %-97 %] 90 %    Physical Exam  Vitals and nursing note reviewed.   Constitutional:       Appearance: Normal appearance.      Comments: And awake    HENT:      Head: Normocephalic and atraumatic.   Eyes:      Pupils: Pupils are equal, round, and reactive to light.   Cardiovascular:      Rate and Rhythm: Normal rate.   Pulmonary:      Effort: No respiratory distress.      Breath sounds: Normal breath sounds.   Abdominal:      General: Abdomen is flat. There is no distension.      Tenderness: There is no guarding.   Musculoskeletal:      Cervical back: Neck supple.      Right lower leg: No edema.      Left lower leg: No edema.   Skin:     General: Skin is warm.   Neurological:      Comments: Alert and awake , knows her name , does follow commands     Psychiatric:         Mood and Affect: Mood normal.         Fluids    Intake/Output Summary (Last 24 hours) at 7/8/2021 1341  Last data filed at 7/8/2021 1100  Gross per 24 hour   Intake 480 ml   Output --   Net 480 ml       Laboratory  Recent Labs     07/06/21  0751   WBC 4.3*   RBC 4.02*   HEMOGLOBIN 13.4   HEMATOCRIT 40.4   .5*   MCH 33.3*   MCHC 33.2*   RDW 49.0   PLATELETCT 189   MPV 8.9*     Recent Labs     07/06/21  0751   SODIUM 139   POTASSIUM 4.7   CHLORIDE 105   CO2 25   GLUCOSE 97   BUN 23*   CREATININE 0.96   CALCIUM 9.6                   Imaging  CT-HEAD W/O   Final Result         1.  No acute intracranial process.      2. Diffuse atrophy and periventricular white matter changes  consistent with chronic small vessel disease.           Assessment/Plan  Goals of care, counseling/discussion- (present on admission)  Assessment & Plan  Comfort care was discontinued, CODE STATUS changed to DNI/DN AR.  On 7/5    Urine ketones- (present on admission)  Assessment & Plan  Trace.  Encourage good po intake     Severe protein-calorie malnutrition (HCC)- (present on admission)  Assessment & Plan  Due to dementia  Encouraged to eat , nutrition consult    Gastroesophageal reflux disease without esophagitis- (present on admission)  Assessment & Plan  Continue omperazole, denied heartburn    Recurrent falls- (present on admission)  Assessment & Plan  Reported by neighbor, OLLIE bailey pending.     Vitamin B12 deficiency- (present on admission)  Assessment & Plan  Resolved, vitamin B12 at 3000    Macrocytic anemia- (present on admission)  Assessment & Plan  Vitamin B12 normal, supplement folic acid, TSH normal    Essential hypertension- (present on admission)  Assessment & Plan  Continue coreg , amlodipine along with pr anti-htn meds   -- Bp better controlled    Vascular dementia without behavioral disturbance (HCC)- (present on admission)  Assessment & Plan  CT head showed diffuse atrophy and microvascular disease.      -- will  require placement in a memory care unit.  PT/OT leo pending.     Acquired hypothyroidism- (present on admission)  Assessment & Plan  Continue levothyroxine  tsh 1.9       VTE prophylaxis: enoxaparin ppx     I have performed a physical exam and reviewed and updated ROS and Plan today (7/8/2021). In review of yesterday's note (7/7/2021), there are no changes except as documented above.

## 2021-07-08 NOTE — PROGRESS NOTES
[facility] Medicine Daily Progress Note    Date of Service  7/7/2021    Chief Complaint  Lamar Dangelo is a 89 y.o. female admitted 7/3/2021 with   Chief Complaint   Patient presents with   • Fall     pt bib remsa from home, came in as TBI, had glf x 2 in 24 hrs. has hx of Dementia. arrived aaox2 Baseline. has bump in the back of head. no laceration. -blood thinners -asa. pt has been increasing falls the last 6 months. has bgl of 79         Hospital Course  This a 89-year-old female past medical history significant for hypothyroidism hypertension, GERD, vascular dementia presented to the ER on 7/3/2021 after she was noted to have frequent ground-level fall.  Upon presentation in ER, she noted to have macrocytic anemia with hemoglobin of 11.6, clinically dehydrated, CT did not show any acute intracranial process, diffuse atrophy and periventricular white matter changes consistent with chronic small vessel disease.    It is noted that patient stays with her sister who is also demented; medical power of  being her neighbor.  Per her neighbor, patient CODE STATUS has been placed DNI/DNAR.    Interval Problem Update  No acute events overnight.  Patient is alert and oriented to her name, denies any complaint of, noted to be walking in the hallway  --Questions has been changed to DNI/DN AR.  Patient will need placement to memory care    I have personally seen and examined the patient at bedside. I discussed the plan of care with bedside RN.    7/7:  --No acute events overnight.  Patient is alert and oriented x1.  She is confused, pleasant.  --PT/OT/evaluated the patient, patient medically cleared, pending dispo.  She might benefit back to memory care.    Consultants/Specialty  None    Code Status  DNAR/DNI    Disposition  Patient is medically cleared.   Anticipate discharge to Memory care.  I have placed the appropriate orders for post-discharge needs.    Review of Systems  ROS   Unable to obtain 2/2 patient  dementia/ mentation     Physical Exam  Temp:  [36.2 °C (97.1 °F)-37 °C (98.6 °F)] 36.2 °C (97.1 °F)  Pulse:  [68-98] 76  Resp:  [17-19] 18  BP: (146-189)/() 159/87  SpO2:  [96 %-99 %] 96 %    Physical Exam  Vitals and nursing note reviewed.   Constitutional:       Appearance: Normal appearance.      Comments: And awake    HENT:      Head: Normocephalic and atraumatic.   Cardiovascular:      Rate and Rhythm: Normal rate.   Pulmonary:      Effort: No respiratory distress.      Breath sounds: Normal breath sounds.   Abdominal:      General: Abdomen is flat. There is no distension.      Tenderness: There is no guarding.   Musculoskeletal:      Cervical back: Neck supple.      Right lower leg: No edema.   Skin:     General: Skin is warm.   Neurological:      Comments: Alert and awake , knows her name , does follow commands     Psychiatric:         Mood and Affect: Mood normal.         Fluids    Intake/Output Summary (Last 24 hours) at 7/7/2021 2119  Last data filed at 7/7/2021 1200  Gross per 24 hour   Intake 220 ml   Output --   Net 220 ml       Laboratory  Recent Labs     07/06/21  0751   WBC 4.3*   RBC 4.02*   HEMOGLOBIN 13.4   HEMATOCRIT 40.4   .5*   MCH 33.3*   MCHC 33.2*   RDW 49.0   PLATELETCT 189   MPV 8.9*     Recent Labs     07/06/21  0751   SODIUM 139   POTASSIUM 4.7   CHLORIDE 105   CO2 25   GLUCOSE 97   BUN 23*   CREATININE 0.96   CALCIUM 9.6                   Imaging  CT-HEAD W/O   Final Result         1.  No acute intracranial process.      2. Diffuse atrophy and periventricular white matter changes consistent with chronic small vessel disease.           Assessment/Plan  Goals of care, counseling/discussion- (present on admission)  Assessment & Plan  Comfort care was discontinued, CODE STATUS changed to DNI/DN AR.  PT OT e evalutaed  Will need memory care, discussed with case management    Urine ketones- (present on admission)  Assessment & Plan  Trace.  CTM.     Severe protein-calorie  malnutrition (HCC)- (present on admission)  Assessment & Plan  Due to dementia  Encouraged to eat , nutrition consult    Gastroesophageal reflux disease without esophagitis- (present on admission)  Assessment & Plan  Continue omperazole, denied heartburn    Recurrent falls- (present on admission)  Assessment & Plan  Reported by neighbor, PT leo pending.     Vitamin B12 deficiency- (present on admission)  Assessment & Plan  Resolved, vitamin B12 at 3000    Macrocytic anemia- (present on admission)  Assessment & Plan  Vitamin B12 normal, supplement folic acid, TSH normal    Essential hypertension- (present on admission)  Assessment & Plan  Continue coreg , amlodipine along with pr anti-htn meds    Vascular dementia without behavioral disturbance (HCC)- (present on admission)  Assessment & Plan  CT head showed diffuse atrophy and microvascular disease.      -- will  require placement in a memory care unit.  PT/OT leo pending.     Acquired hypothyroidism- (present on admission)  Assessment & Plan  Continue levothyroxine  tsh 1.9       VTE prophylaxis: enoxaparin ppx     I have performed a physical exam and reviewed and updated ROS and Plan today (7/7/2021). In review of yesterday's note (7/6/2021), there are no changes except as documented above.

## 2021-07-08 NOTE — PROGRESS NOTES
4 Eyes Skin Assessment Completed by JOHNNY Bolivar and JOHNNY Montoya.    Head WDL  Ears Blanching  Nose Blanching  Mouth WDL  Neck WDL  Breast/Chest WDL  Shoulder Blades WDL  Spine WDL  (R) Arm/Elbow/Hand Redness, Blanching and Bruising  (L) Arm/Elbow/Hand Redness, Blanching and Bruising  Abdomen WDL  Groin WDL  Scrotum/Coccyx/Buttocks Blanching  (R) Leg Bruising  (L) Leg Bruising  (R) Heel/Foot/Toe Blanching  (L) Heel/Foot/Toe Blanching          Devices In Places Pulse Ox      Interventions In Place Waffle Overlay, Pillows, Elbow Mepilex, Q2 Turns, Barrier Cream, Heels Loaded W/Pillows and Pressure Redistribution Mattress, nutrition consult    Possible Skin Injury No    Pictures Uploaded Into Epic N/A  Wound Consult Placed N/A  RN Wound Prevention Protocol Ordered Yes

## 2021-07-08 NOTE — CARE PLAN
Problem: Psychosocial  Goal: Patient's level of anxiety will decrease  Outcome: Not Met  Note: Pt extremely confused today, impulsive and attempted to get out of bed. Telesitter at bedside. Bed alarm on. Yellow socks on. Frequent room checks. Ativan given x 2 with minimal results.    The patient is Stable - Low risk of patient condition declining or worsening    Shift Goals  Clinical Goals: safety  Patient Goals: Rest    Progress made toward(s) clinical / shift goals:      Patient is not progressing towards the following goals:      Problem: Psychosocial  Goal: Patient's level of anxiety will decrease  Outcome: Not Met  Note: Pt extremely confused today, impulsive and attempted to get out of bed. Telesitter at bedside. Bed alarm on. Yellow socks on. Frequent room checks. Ativan given x 2 with minimal results.

## 2021-07-08 NOTE — PROGRESS NOTES
9614 Page sent to Dr. Benoti via . Pt needs PT/OT orders for placement. PT/OT signed off when pt was comfort care.    1720 Report given to Juli TURNER in neuroscience. Awaiting transport.    1835  Pt ambulated to the bathroom with staff. When pt returned to bed. Pt pulled out IV.    1840  Pt transferred to S190 with 1 bag of belongings, dentures in mouth and bear. Juli TURNER notified that pt pulled out IV.

## 2021-07-08 NOTE — CARE PLAN
Problem: Psychosocial  Goal: Patient's level of anxiety will decrease  Outcome: Not Met  Note: Patient confused. Attempted to get out of bed multiple times. Telesitter at bedside, yellow socks on, yellow wrist band on, frequent checks.     The patient is Watcher - Medium risk of patient condition declining or worsening    Shift Goals  Clinical Goals: safety  Patient Goals: Rest    Progress made toward(s) clinical / shift goals:  safety risk    Patient is not progressing towards the following goals:

## 2021-07-08 NOTE — PROGRESS NOTES
Dr. Benoit notified via volte that pt keeps refusing SCDs and isn't on any anticoagulation at this time. Pt very impulsive. Ativan given x 2. Requested for something else to calm patient down. Telesitter at bedside. Frequent room checks. No new orders at this time.

## 2021-07-09 PROCEDURE — 96372 THER/PROPH/DIAG INJ SC/IM: CPT

## 2021-07-09 PROCEDURE — 700102 HCHG RX REV CODE 250 W/ 637 OVERRIDE(OP): Performed by: STUDENT IN AN ORGANIZED HEALTH CARE EDUCATION/TRAINING PROGRAM

## 2021-07-09 PROCEDURE — 97530 THERAPEUTIC ACTIVITIES: CPT

## 2021-07-09 PROCEDURE — 700111 HCHG RX REV CODE 636 W/ 250 OVERRIDE (IP): Performed by: HOSPITALIST

## 2021-07-09 PROCEDURE — 99225 PR SUBSEQUENT OBSERVATION CARE,LEVEL II: CPT | Performed by: HOSPITALIST

## 2021-07-09 PROCEDURE — A9270 NON-COVERED ITEM OR SERVICE: HCPCS | Performed by: HOSPITALIST

## 2021-07-09 PROCEDURE — 700102 HCHG RX REV CODE 250 W/ 637 OVERRIDE(OP): Performed by: HOSPITALIST

## 2021-07-09 PROCEDURE — A9270 NON-COVERED ITEM OR SERVICE: HCPCS | Performed by: STUDENT IN AN ORGANIZED HEALTH CARE EDUCATION/TRAINING PROGRAM

## 2021-07-09 PROCEDURE — G0378 HOSPITAL OBSERVATION PER HR: HCPCS

## 2021-07-09 RX ORDER — TRAMADOL HYDROCHLORIDE 50 MG/1
50 TABLET ORAL EVERY 12 HOURS PRN
Status: DISCONTINUED | OUTPATIENT
Start: 2021-07-09 | End: 2021-07-22 | Stop reason: HOSPADM

## 2021-07-09 RX ORDER — TRAMADOL HYDROCHLORIDE 50 MG/1
50 TABLET ORAL EVERY 6 HOURS PRN
Status: DISCONTINUED | OUTPATIENT
Start: 2021-07-09 | End: 2021-07-09

## 2021-07-09 RX ADMIN — LEVOTHYROXINE SODIUM 50 MCG: 0.05 TABLET ORAL at 06:36

## 2021-07-09 RX ADMIN — OMEPRAZOLE 20 MG: 20 CAPSULE, DELAYED RELEASE ORAL at 06:35

## 2021-07-09 RX ADMIN — THERA TABS 1 TABLET: TAB at 06:36

## 2021-07-09 RX ADMIN — CARVEDILOL 12.5 MG: 12.5 TABLET, FILM COATED ORAL at 18:00

## 2021-07-09 RX ADMIN — ENOXAPARIN SODIUM 30 MG: 30 INJECTION SUBCUTANEOUS at 06:36

## 2021-07-09 RX ADMIN — CYANOCOBALAMIN TAB 500 MCG 1000 MCG: 500 TAB at 06:36

## 2021-07-09 RX ADMIN — ASPIRIN 81 MG: 81 TABLET, COATED ORAL at 06:36

## 2021-07-09 RX ADMIN — FOLIC ACID 1 MG: 1 TABLET ORAL at 06:36

## 2021-07-09 ASSESSMENT — COGNITIVE AND FUNCTIONAL STATUS - GENERAL
PERSONAL GROOMING: A LITTLE
MOBILITY SCORE: 18
HELP NEEDED FOR BATHING: A LITTLE
DAILY ACTIVITIY SCORE: 18
TOILETING: A LITTLE
MOVING FROM LYING ON BACK TO SITTING ON SIDE OF FLAT BED: A LITTLE
DRESSING REGULAR LOWER BODY CLOTHING: A LITTLE
SUGGESTED CMS G CODE MODIFIER MOBILITY: CK
WALKING IN HOSPITAL ROOM: A LITTLE
CLIMB 3 TO 5 STEPS WITH RAILING: A LOT
MOVING TO AND FROM BED TO CHAIR: A LITTLE
SUGGESTED CMS G CODE MODIFIER DAILY ACTIVITY: CK
EATING MEALS: A LITTLE
STANDING UP FROM CHAIR USING ARMS: A LITTLE
DRESSING REGULAR UPPER BODY CLOTHING: A LITTLE

## 2021-07-09 ASSESSMENT — GAIT ASSESSMENTS
ASSISTIVE DEVICE: HAND HELD ASSIST
DISTANCE (FEET): 40
GAIT LEVEL OF ASSIST: MINIMAL ASSIST

## 2021-07-09 ASSESSMENT — PAIN DESCRIPTION - PAIN TYPE: TYPE: ACUTE PAIN

## 2021-07-09 ASSESSMENT — PAIN SCALES - WONG BAKER: WONGBAKER_NUMERICALRESPONSE: DOESN'T HURT AT ALL

## 2021-07-09 NOTE — DISCHARGE PLANNING
Met with Acacia ALTMAN, per RN's request. Acacia inquired options for long term placement. LSW e-mailed group home, assisted living/memory care options.   ([email or web address])

## 2021-07-09 NOTE — PROGRESS NOTES
[facility] Medicine Daily Progress Note    Date of Service  7/9/2021    Chief Complaint  Lamar Dangelo is a 89 y.o. female admitted 7/3/2021 with   Chief Complaint   Patient presents with   • Fall     pt bib remsa from home, came in as TBI, had glf x 2 in 24 hrs. has hx of Dementia. arrived aaox2 Baseline. has bump in the back of head. no laceration. -blood thinners -asa. pt has been increasing falls the last 6 months. has bgl of 79         Hospital Course  This a 89-year-old female past medical history significant for hypothyroidism hypertension, GERD, vascular dementia presented to the ER on 7/3/2021 after she was noted to have frequent ground-level fall.  Upon presentation in ER, she noted to have macrocytic anemia with hemoglobin of 11.6, clinically dehydrated, CT did not show any acute intracranial process, diffuse atrophy and periventricular white matter changes consistent with chronic small vessel disease.    It is noted that patient stays with her sister who is also demented; medical power of  being her neighbor.  Per her neighbor, patient CODE STATUS has been placed DNI/DNAR.    Interval Problem Update  No acute events overnight.  Patient is alert and oriented to her name, denies any complaint of, noted to be walking in the hallway  --Questions has been changed to DNI/DN AR.  Patient will need placement to memory care    I have personally seen and examined the patient at bedside. I discussed the plan of care with bedside RN.    7/7:  --No acute events overnight.  Patient is alert and oriented x1.  She is confused, pleasant.  --PT/OT/evaluated the patient, patient medically cleared, pending dispo.  She might benefit back to memory care.    7/8:  No acute events overnight evaluated patient.  Patient alert oriented x1.  Per nursing staff request, will obtain PT/OT, discussed  with case management regarding the importance of transferring the patient to memory care  ---  Looks to me that patient doesn't  have hospice qualifying diagnosis    7/9:  --No acute events overnight, laying in bed, denies any complaint.  Patient is alert and oriented x1.  Patient part of ; no bed was met at bedside.  Patient will likely need memory care, case management aware  Consultants/Specialty  None    Code Status  DNAR/DNI    Disposition  Patient is medically cleared.   Anticipate discharge to Memory care.  I have placed the appropriate orders for post-discharge needs.    Review of Systems  ROS   Unable to obtain 2/2 patient dementia/ mentation     Physical Exam  Temp:  [36.6 °C (97.8 °F)-37.2 °C (98.9 °F)] 37.2 °C (98.9 °F)  Pulse:  [84-91] 87  Resp:  [14-18] 17  BP: ()/(52-81) 104/69  SpO2:  [93 %-96 %] 93 %    Physical Exam  Vitals and nursing note reviewed.   Constitutional:       Appearance: Normal appearance.      Comments: And awake    HENT:      Head: Normocephalic and atraumatic.   Cardiovascular:      Rate and Rhythm: Normal rate.   Pulmonary:      Effort: No respiratory distress.      Breath sounds: Normal breath sounds.   Abdominal:      General: Abdomen is flat. There is no distension.      Tenderness: There is no guarding.   Musculoskeletal:      Cervical back: Neck supple.      Right lower leg: No edema.      Left lower leg: No edema.   Skin:     General: Skin is warm.   Neurological:      Comments: Alert and awake , knows her name , does follow commands     Psychiatric:         Mood and Affect: Mood normal.         Fluids    Intake/Output Summary (Last 24 hours) at 7/9/2021 1413  Last data filed at 7/8/2021 1800  Gross per 24 hour   Intake 240 ml   Output --   Net 240 ml       Laboratory                        Imaging  CT-HEAD W/O   Final Result         1.  No acute intracranial process.      2. Diffuse atrophy and periventricular white matter changes consistent with chronic small vessel disease.           Assessment/Plan  Goals of care, counseling/discussion- (present on admission)  Assessment &  Plan  Comfort care was discontinued, CODE STATUS changed to DNI/DN AR.  On 7/5  Discussed with patient DPOA  neighbor    Urine ketones- (present on admission)  Assessment & Plan  Trace.  Encourage good po intake of fluid    Severe protein-calorie malnutrition (HCC)- (present on admission)  Assessment & Plan  Due to dementia   Dietary consult    Gastroesophageal reflux disease without esophagitis- (present on admission)  Assessment & Plan  Continue omperazole, denied heartburn    Recurrent falls- (present on admission)  Assessment & Plan  Reported by neighbor, PT leo pending.     Vitamin B12 deficiency- (present on admission)  Assessment & Plan  Resolved, vitamin B12 at 3000    Macrocytic anemia- (present on admission)  Assessment & Plan  Vitamin B12 normal, supplement folic acid, TSH normal    Essential hypertension- (present on admission)  Assessment & Plan  Continue coreg , amlodipine along with pr anti-htn meds   -- Bp better controlled    Vascular dementia without behavioral disturbance (HCC)- (present on admission)  Assessment & Plan  CT head showed diffuse atrophy and microvascular disease.      -- will  require placement in a memory care unit.  PT/OT leo pending.     Acquired hypothyroidism- (present on admission)  Assessment & Plan  Continue levothyroxine  tsh 1.9       VTE prophylaxis: enoxaparin ppx     I have performed a physical exam and reviewed and updated ROS and Plan today (7/9/2021). In review of yesterday's note (7/8/2021), there are no changes except as documented above.

## 2021-07-09 NOTE — THERAPY
Occupational Therapy  Daily Treatment     Patient Name: Lamar Dangelo  Age:  89 y.o., Sex:  female  Medical Record #: [id number]  Today's Date: 7/9/2021     Precautions  Precautions: Fall Risk    Assessment     Pt currently limited by decreased functional mobility, activity tolerance, cognition, strength, balance, which are affecting pt's ability to complete ADLs/IADLs at baseline. Pt would benefit from OT services in the acute care setting to maximize functional recovery.     Plan    Treatment plan modified to 3 times per week until therapy goals are met for the following treatments:  Self Care/Activities of Daily Living and Therapeutic Activities.    DC Equipment Recommendations: Front-Wheel Walker  Discharge Recommendations: (P) Recommend post-acute placement for additional occupational therapy services prior to discharge home (will most likely need memory care)       07/09/21 0818   Activities of Daily Living   Grooming Supervision   Lower Body Dressing Supervision   Toileting Minimal Assist   Functional Mobility   Sit to Stand Minimal Assist   Bed, Chair, Wheelchair Transfer Moderate Assist   Toilet Transfers Minimal Assist   Short Term Goals   Short Term Goal # 1 supervised with LB dressing   Short Term Goal # 2 supervised with ADL txfs

## 2021-07-09 NOTE — CARE PLAN
Problem: Nutritional:  Goal: Achieve adequate nutritional intake  Description: Patient will consume >50% of meals and supplements.  Outcome: Progressing   See RD note.

## 2021-07-09 NOTE — THERAPY
Physical Therapy   Daily Treatment     Patient Name: Lamar Dangelo  Age:  89 y.o., Sex:  female  Medical Record #: [id number]  Today's Date: 7/9/2021     Precautions: Fall Risk    Assessment    Re-consult received, patient remains at same functional level as PT initial evaluation on 7/6/21.  Patient required slightly increased assist to come to edge of bed due to difficulty following commands.  Continue to recommend 24/7 supervision given patient's cognitive & memory deficits.  Acknowledge that patient no longer has sister to live with at home.  Patient may benefit from memory care, assisted living, or group home to have 24/7 supervision and required levels of assist.    Plan    DC Equipment Recommendations: Unable to determine at this time  Discharge Recommendations: Other - (24/7 Supervision due to cognitive deficits)       Objective     07/09/21 0838   Precautions   Precautions Fall Risk   Pain   Pain Scales Granda Okeefe Scale   Pain- Granda Okeefe Scale Group   Granda-Okeefe Scale  0    Cognition    Cognition / Consciousness X   Level of Consciousness Alert   Ability To Follow Commands 1 Step   Safety Awareness Impaired   New Learning Impaired   Attention Impaired   Comments Pleasant & cooperative, intermittent difficulty following commands.   Other Treatments   Other Treatments Provided Ambulation   Balance   Sitting Balance (Static) Fair   Sitting Balance (Dynamic) Fair -   Standing Balance (Static) Poor +   Standing Balance (Dynamic) Poor +   Weight Shift Sitting Fair   Weight Shift Standing Poor   Skilled Intervention Verbal Cuing   Comments HHA x 2 people   Gait Analysis   Gait Level Of Assist Minimal Assist   Assistive Device Hand Held Assist   Distance (Feet) 40   # of Times Distance was Traveled 2   Deviation (flexed posture, B knee flexion)   Weight Bearing Status No restrictions   Skilled Intervention Postural Facilitation;Sequencing;Verbal Cuing   Comments Patient intermittently taking high, long steps as if  she were stepping over an object.  When ambulating back to her room, patient with tandem gait, stepping on line between floor tiles   Bed Mobility    Supine to Sit Minimal Assist (due to decreased command follow)   Sit to Supine Supervised   Scooting Supervised   Functional Mobility   Sit to Stand Minimal Assist   Bed, Chair, Wheelchair Transfer Minimal Assist   Transfer Method Stand Step   Mobility Ambulation in hallway   Skilled Intervention Verbal Cuing;Postural Facilitation;Sequencing   Activity Tolerance   Sitting Edge of Bed 5 min   Standing 5 min   Anticipated Discharge Equipment and Recommendations   DC Equipment Recommendations Unable to determine at this time   Discharge Recommendations Other - (24/7 Supervision due to cognitive deficits)   Interdisciplinary Plan of Care Collaboration   IDT Collaboration with  Nursing   Patient Position at End of Therapy In Bed;Bed Alarm On;Call Light within Reach;Family / Friend in Room   Collaboration Comments RN updated   Session Information   Date / Session Number  7/9 - 1x only

## 2021-07-09 NOTE — CARE PLAN
Problem: Knowledge Deficit - Standard  Goal: Patient and family/care givers will demonstrate understanding of plan of care, disease process/condition, diagnostic tests and medications  Outcome: Not Met  Note: Plan of care discussed with patient. Pt only alert to self. PT/OT reordered for snf placement. Transferring pt to [facility] and in the same room as sister. Attempted to call report to RN but RN unavailable. Neuroscience RN to return call to this nurse.        Problem: Fall Risk  Goal: Patient will remain free from falls  Outcome: Met  Note: high fall risk. Bed in lowest and locked position. Upper side rails up. Yellow fall risk band on. Pt confused, impulsive and doesn't call appropriately. Pt has a telesitter at bedside. Frequent room checks.     The patient is Stable - Low risk of patient condition declining or worsening    Shift Goals  Clinical Goals: safety  Patient Goals: Rest    Progress made toward(s) clinical / shift goals:      Patient is not progressing towards the following goals:      Problem: Knowledge Deficit - Standard  Goal: Patient and family/care givers will demonstrate understanding of plan of care, disease process/condition, diagnostic tests and medications  Outcome: Not Met  Note: Plan of care discussed with patient. Pt only alert to self. PT/OT reordered for snf placement. Transferring pt to [facility] and in the same room as sister. Attempted to call report to RN but RN unavailable. Neuroscience RN to return call to this nurse.

## 2021-07-09 NOTE — DIETARY
Nutrition Services: Update   Day 6 of admit.  Lamar Dangelo is a 89 y.o. female with admitting DX of Vascular dementia with delirium.    Pt originally comfort care when screened by RD 7/4; code status now DNAR/DNI as of 7/5. Pt with malnutrition admit screen score 3 for unsure wt loss with poor PO intake 2' decreased appetite. Difficulty chewing noted on admit screen.    Evaluation:  1. PMHx includes: GERD, hypothyroid, HTN, vascular dementia. Lives with sister who also has dementia.  2. Pt is currently on Regular diet with Boost Plus 3x/day. Intake varies 0-100% over admit; % x 8 meals, 0-50% x 4 meals. 1 Boost supplement doc at %.   3. Wt 47.6 kg via other healthcare provider - updated wt requested from nursing team.  4. DOLORES Krishnan, neighbor of pt, in room and able to provide some pt background. Per H&P, pt lives w/ sister. Ariella reports sisters do not cook much for selves and go out to eat at [employer] 1x/week. Neighbors frequently cook and aid with groceries. Ariella reports attempted to set up Meals on Wheels for pt, but was refused.   5. Chopped foods per Ariella.  6. POC SNF vs Memory Care; medically cleared per MD note 7/8.  7. Physical: severe muscle wasting (scooping temporals, prominent/protruding clavicles, squared shoulders with protruding acromion); severe fat losses (sunken eyes with prominent brow bone).  8. Labs: BUN 23, GFR 54, alk phos 54 (slight trend up since admit)  9. MAR; B12 1000 mcg, folvite 1 mg, synthyroid, MVI, prilosec  10. I/Os: +2.12L (dehydrated on admit)    Malnutrition Risk: Severe malnutrition in context of chronic illness vs environmental circumstances related to possibly dementia with independent living as evidenced by documented severe muscle wasting and severe fat losses.    Recommendations/Plan:  1. Continue Boost supplements.   2. Modify foods to chopped per pt DOLORES Krishnan.  3. Encourage intake of meals and supplements.  4. Document intake of all meals and  supplements as % taken in ADL's to provide interdisciplinary communication across all shifts.   5. Monitor weight.  6. Nutrition rep will continue to see patient for ongoing meal and snack preferences.  7. Obtain supplement order per RD as needed.    RD following.

## 2021-07-09 NOTE — CARE PLAN
The patient is Stable - Low risk of patient condition declining or worsening    Shift Goals  Clinical Goals: No falls, safety, increase nutritional intake  Patient Goals: rest  Family Goals: discharge plan    Progress made toward(s) clinical / shift goals:  Patient tolerated 25-50% of meals with the help of family in the room. Tele sitter in place. Not challenging bed alarm.     Patient is not progressing towards the following goals: Medically stable for discharge. Pending placement.       Problem: Discharge Barriers/Planning  Goal: Patient's continuum of care needs are met  Outcome: Not Progressing  Flowsheets (Taken 7/9/2021 0233)  Continuum of Care Needs:   Involved patient/family/support system in discharge process   Assessed for discharge barriers   Communicated discharge barriers to interdisciplinary tream   Collaborated with Case Management/  Note: Discharge delayed, pending placement. Memory care vs SNF.      Problem: Self Care  Goal: Patient will have the ability to perform ADLs independently or with assistance (bathe, groom, dress, toilet and feed)  Outcome: Not Progressing  Note: Patient requiring 1-2 assist with ADL's.

## 2021-07-09 NOTE — DISCHARGE PLANNING
Anticipated Discharge Disposition: SNF     Action: Patient declined by 3 SNF preferences. LSW previously spoke with [facility] who spoke with DPOA to discuss goals of care. Attending physician not sure if patient would qualify for hospice. PT recommending 24/7 supervision.      Barriers to Discharge: Medical clearance; SNF acceptance.     Plan: CM to continue to follow for discharge needs.

## 2021-07-09 NOTE — CARE PLAN
Problem: Knowledge Deficit - Standard  Goal: Patient and family/care givers will demonstrate understanding of plan of care, disease process/condition, diagnostic tests and medications  Outcome: Not Progressing  Note: Pt does not demonstrate understanding of her plan of care.      Problem: Pain - Standard  Goal: Alleviation of pain or a reduction in pain to the patient’s comfort goal  Outcome: Progressing  Note: Pt reports no complaints of pain and shows no nonverbal signs of pain.      The patient is Stable - Low risk of patient condition declining or worsening    Shift Goals  Clinical Goals: Safety  Patient Goals: Rest    Progress made toward(s) clinical / shift goals: Pt remained free from injury and resting comfortably during shift.       Problem: Knowledge Deficit - Standard  Goal: Patient and family/care givers will demonstrate understanding of plan of care, disease process/condition, diagnostic tests and medications  Outcome: Not Progressing  Note: Pt does not demonstrate understanding of her plan of care.

## 2021-07-10 PROCEDURE — 700102 HCHG RX REV CODE 250 W/ 637 OVERRIDE(OP): Performed by: HOSPITALIST

## 2021-07-10 PROCEDURE — 99225 PR SUBSEQUENT OBSERVATION CARE,LEVEL II: CPT | Performed by: HOSPITALIST

## 2021-07-10 PROCEDURE — 96372 THER/PROPH/DIAG INJ SC/IM: CPT

## 2021-07-10 PROCEDURE — 700102 HCHG RX REV CODE 250 W/ 637 OVERRIDE(OP): Performed by: STUDENT IN AN ORGANIZED HEALTH CARE EDUCATION/TRAINING PROGRAM

## 2021-07-10 PROCEDURE — G0378 HOSPITAL OBSERVATION PER HR: HCPCS

## 2021-07-10 PROCEDURE — A9270 NON-COVERED ITEM OR SERVICE: HCPCS | Performed by: STUDENT IN AN ORGANIZED HEALTH CARE EDUCATION/TRAINING PROGRAM

## 2021-07-10 PROCEDURE — 700111 HCHG RX REV CODE 636 W/ 250 OVERRIDE (IP): Performed by: HOSPITALIST

## 2021-07-10 PROCEDURE — A9270 NON-COVERED ITEM OR SERVICE: HCPCS | Performed by: HOSPITALIST

## 2021-07-10 RX ADMIN — THERA TABS 1 TABLET: TAB at 04:33

## 2021-07-10 RX ADMIN — CYANOCOBALAMIN TAB 500 MCG 1000 MCG: 500 TAB at 04:33

## 2021-07-10 RX ADMIN — AMLODIPINE BESYLATE 5 MG: 5 TABLET ORAL at 04:34

## 2021-07-10 RX ADMIN — ENOXAPARIN SODIUM 30 MG: 30 INJECTION SUBCUTANEOUS at 04:34

## 2021-07-10 RX ADMIN — FOLIC ACID 1 MG: 1 TABLET ORAL at 04:33

## 2021-07-10 RX ADMIN — CARVEDILOL 12.5 MG: 12.5 TABLET, FILM COATED ORAL at 17:20

## 2021-07-10 RX ADMIN — LISINOPRIL 10 MG: 10 TABLET ORAL at 04:33

## 2021-07-10 RX ADMIN — OMEPRAZOLE 20 MG: 20 CAPSULE, DELAYED RELEASE ORAL at 04:33

## 2021-07-10 RX ADMIN — LEVOTHYROXINE SODIUM 50 MCG: 0.05 TABLET ORAL at 08:11

## 2021-07-10 RX ADMIN — ASPIRIN 81 MG: 81 TABLET, COATED ORAL at 04:33

## 2021-07-10 RX ADMIN — CARVEDILOL 12.5 MG: 12.5 TABLET, FILM COATED ORAL at 08:11

## 2021-07-10 ASSESSMENT — PAIN DESCRIPTION - PAIN TYPE: TYPE: ACUTE PAIN

## 2021-07-10 NOTE — CARE PLAN
The patient is Stable - Low risk of patient condition declining or worsening    Shift Goals  Clinical Goals: Assess orientation, Prevent falls, Case management  Patient Goals: Rest  Family Goals: n/a    Progress made toward(s) clinical / shift goals:    Problem: Psychosocial  Goal: Patient's level of anxiety will decrease  Outcome: Progressing  Flowsheets  Taken 7/10/2021 0012  Decrease Anxiety Level:   Implemented stimuli reduction, calming techniques   Pharmacologic management per MD order  Taken 7/9/2021 2000  Patient Behaviors:   Hallucination   Confused  Note: Patient is assessed appropriately. Patient is helped to identify factors for stress and anxiety. Patient is taught to deep breathe and other forms of stress/anxiety management. Patient verbalizes understanding. Will continue to monitor.       Problem: Communication  Goal: The ability to communicate needs accurately and effectively will improve  Outcome: Progressing  Flowsheets (Taken 7/10/2021 0012)  Communication:   Assessed patient's ability to understand and communicate   Oriented patient to call light   Reoriented patient to environment     Problem: Discharge Barriers/Planning  Goal: Patient's continuum of care needs are met  Outcome: Progressing  Flowsheets (Taken 7/9/2021 1653 by Juli Prince R.N.)  Continuum of Care Needs:   Involved patient/family/support system in discharge process   Assessed for discharge barriers   Communicated discharge barriers to interdisciplinary tream   Collaborated with Case Management/       Patient is not progressing towards the following goals: n/a

## 2021-07-10 NOTE — CARE PLAN
The patient is Stable - Low risk of patient condition declining or worsening    Shift Goals  Clinical Goals: safety  Patient Goals: Rest  Family Goals: n/a    Progress made toward(s) clinical / shift goals:  fall precautions in place, pt pleasantly confused, will continue hourly rounding    Patient is not progressing towards the following goals:

## 2021-07-10 NOTE — PROGRESS NOTES
[facility] Medicine Daily Progress Note    Date of Service  7/10/2021    Chief Complaint  Lamar Dangelo is a 89 y.o. female admitted 7/3/2021 with   Chief Complaint   Patient presents with   • Fall     pt bib remsa from home, came in as TBI, had glf x 2 in 24 hrs. has hx of Dementia. arrived aaox2 Baseline. has bump in the back of head. no laceration. -blood thinners -asa. pt has been increasing falls the last 6 months. has bgl of 79         Hospital Course  This a 89-year-old female past medical history significant for hypothyroidism hypertension, GERD, vascular dementia presented to the ER on 7/3/2021 after she was noted to have frequent ground-level fall.  Upon presentation in ER, she noted to have macrocytic anemia with hemoglobin of 11.6, clinically dehydrated, CT did not show any acute intracranial process, diffuse atrophy and periventricular white matter changes consistent with chronic small vessel disease.    It is noted that patient stays with her sister who is also demented; medical power of  being her neighbor.  Per her neighbor, patient CODE STATUS has been placed DNI/DNAR.    Interval Problem Update  No acute events overnight.  Patient is alert and oriented to her name, denies any complaint of, noted to be walking in the hallway  --Questions has been changed to DNI/DN AR.  Patient will need placement to memory care    I have personally seen and examined the patient at bedside. I discussed the plan of care with bedside RN.    7/7:  --No acute events overnight.  Patient is alert and oriented x1.  She is confused, pleasant.  --PT/OT/evaluated the patient, patient medically cleared, pending dispo.  She might benefit back to memory care.    7/8:  No acute events overnight evaluated patient.  Patient alert oriented x1.  Per nursing staff request, will obtain PT/OT, discussed  with case management regarding the importance of transferring the patient to memory care  ---  Looks to me that patient doesn't  have hospice qualifying diagnosis    7/9:  --No acute events overnight, laying in bed, denies any complaint.  Patient is alert and oriented x1.  Patient part of ; no bed was met at bedside.  Patient will likely need memory care, case management aware    7/9: No acute events overnight, laying in bed, denies any complaint. Patient is alert and oriented times 1. Met with patient caretaker at bedside.  -Plan is to discharge patient to memory care, case management aware.-  --Presents stable, plan of care has been discussed with the nursing staff and the charge nurse    Consultants/Specialty  None    Code Status  DNAR/DNI    Disposition  Patient is medically cleared.   Anticipate discharge to Memory care.  I have placed the appropriate orders for post-discharge needs.    Review of Systems  ROS   Unable to obtain 2/2 patient dementia/ mentation     Physical Exam  Temp:  [36.1 °C (96.9 °F)-37.2 °C (98.9 °F)] 36.5 °C (97.7 °F)  Pulse:  [78-93] 78  Resp:  [14-18] 17  BP: ()/(64-77) 123/65  SpO2:  [93 %-97 %] 97 %    Physical Exam  Vitals and nursing note reviewed.   Constitutional:       Appearance: Normal appearance.      Comments: And awake    HENT:      Head: Normocephalic and atraumatic.   Eyes:      Pupils: Pupils are equal, round, and reactive to light.   Cardiovascular:      Rate and Rhythm: Normal rate.   Pulmonary:      Effort: No respiratory distress.      Breath sounds: Normal breath sounds.   Abdominal:      General: Abdomen is flat. There is no distension.      Tenderness: There is no guarding.   Musculoskeletal:      Cervical back: Neck supple.      Right lower leg: No edema.      Left lower leg: No edema.   Skin:     General: Skin is warm.   Neurological:      Comments: Alert and awake , knows her name , does follow commands     Psychiatric:         Mood and Affect: Mood normal.         Fluids  No intake or output data in the 24 hours ending 07/10/21 1306    Laboratory                         Imaging  CT-HEAD W/O   Final Result         1.  No acute intracranial process.      2. Diffuse atrophy and periventricular white matter changes consistent with chronic small vessel disease.           Assessment/Plan  Goals of care, counseling/discussion- (present on admission)  Assessment & Plan  Comfort care was discontinued, CODE STATUS changed to DNI/DN AR.  On 7/5  Discussed with patient AGAPITO  Neighbor on 7/9  Met with caretaker at bedside    Urine ketones- (present on admission)  Assessment & Plan  Trace.  Encourage good po intake of fluid    Severe protein-calorie malnutrition (HCC)- (present on admission)  Assessment & Plan  Due to dementia   Dietary consult    Gastroesophageal reflux disease without esophagitis- (present on admission)  Assessment & Plan  Continue omperazole, denied heartburn    Recurrent falls- (present on admission)  Assessment & Plan  Reported by neighbor, PT leo pending.     Vitamin B12 deficiency- (present on admission)  Assessment & Plan  Resolved, vitamin B12 at 3000    Macrocytic anemia- (present on admission)  Assessment & Plan  Vitamin B12 normal, supplement folic acid, TSH normal    Essential hypertension- (present on admission)  Assessment & Plan  Continue coreg , amlodipine along with pr anti-htn meds   -- monitor BP    Vascular dementia without behavioral disturbance (HCC)- (present on admission)  Assessment & Plan  CT head showed diffuse atrophy and microvascular disease.      -- will  require placement in a memory care unit.  PT/OT leo pending.     Acquired hypothyroidism- (present on admission)  Assessment & Plan  Continue levothyroxine  tsh 1.9       VTE prophylaxis: enoxaparin ppx     I have performed a physical exam and reviewed and updated ROS and Plan today (7/10/2021). In review of yesterday's note (7/9/2021), there are no changes except as documented above.

## 2021-07-10 NOTE — PROGRESS NOTES
"Received report from Day shift RN. Assumed care of patient at 1900. Patient A/Ox1 -only oriented to self and states \"My sister and I were playing a game.\"  On RA, no signs of respiratory distress. No acute signs of pain or discomfort. Call light and belongings within reach. Bed in lowest locked position. Upper side rails raised. Tele sitter in place. Hourly rounding in place. Will continue to monitor.     "

## 2021-07-11 PROCEDURE — 700102 HCHG RX REV CODE 250 W/ 637 OVERRIDE(OP): Performed by: HOSPITALIST

## 2021-07-11 PROCEDURE — A9270 NON-COVERED ITEM OR SERVICE: HCPCS | Performed by: HOSPITALIST

## 2021-07-11 PROCEDURE — 99225 PR SUBSEQUENT OBSERVATION CARE,LEVEL II: CPT | Performed by: HOSPITALIST

## 2021-07-11 PROCEDURE — 700111 HCHG RX REV CODE 636 W/ 250 OVERRIDE (IP): Performed by: HOSPITALIST

## 2021-07-11 PROCEDURE — A9270 NON-COVERED ITEM OR SERVICE: HCPCS | Performed by: STUDENT IN AN ORGANIZED HEALTH CARE EDUCATION/TRAINING PROGRAM

## 2021-07-11 PROCEDURE — 700102 HCHG RX REV CODE 250 W/ 637 OVERRIDE(OP): Performed by: STUDENT IN AN ORGANIZED HEALTH CARE EDUCATION/TRAINING PROGRAM

## 2021-07-11 PROCEDURE — G0378 HOSPITAL OBSERVATION PER HR: HCPCS

## 2021-07-11 PROCEDURE — 96372 THER/PROPH/DIAG INJ SC/IM: CPT

## 2021-07-11 RX ADMIN — ASPIRIN 81 MG: 81 TABLET, COATED ORAL at 06:13

## 2021-07-11 RX ADMIN — LEVOTHYROXINE SODIUM 50 MCG: 0.05 TABLET ORAL at 08:25

## 2021-07-11 RX ADMIN — FOLIC ACID 1 MG: 1 TABLET ORAL at 06:13

## 2021-07-11 RX ADMIN — AMLODIPINE BESYLATE 5 MG: 5 TABLET ORAL at 06:13

## 2021-07-11 RX ADMIN — CARVEDILOL 12.5 MG: 12.5 TABLET, FILM COATED ORAL at 08:25

## 2021-07-11 RX ADMIN — THERA TABS 1 TABLET: TAB at 06:13

## 2021-07-11 RX ADMIN — LISINOPRIL 10 MG: 10 TABLET ORAL at 06:13

## 2021-07-11 RX ADMIN — OMEPRAZOLE 20 MG: 20 CAPSULE, DELAYED RELEASE ORAL at 06:13

## 2021-07-11 RX ADMIN — CARVEDILOL 12.5 MG: 12.5 TABLET, FILM COATED ORAL at 17:08

## 2021-07-11 RX ADMIN — ENOXAPARIN SODIUM 30 MG: 30 INJECTION SUBCUTANEOUS at 06:13

## 2021-07-11 RX ADMIN — CYANOCOBALAMIN TAB 500 MCG 1000 MCG: 500 TAB at 06:13

## 2021-07-11 ASSESSMENT — PAIN DESCRIPTION - PAIN TYPE
TYPE: ACUTE PAIN

## 2021-07-11 ASSESSMENT — FIBROSIS 4 INDEX: FIB4 SCORE: 3.08

## 2021-07-11 NOTE — PROGRESS NOTES
[facility] Medicine Daily Progress Note    Date of Service  7/11/2021    Chief Complaint  Lamar Dangelo is a 89 y.o. female admitted 7/3/2021 with   Chief Complaint   Patient presents with   • Fall     pt bib remsa from home, came in as TBI, had glf x 2 in 24 hrs. has hx of Dementia. arrived aaox2 Baseline. has bump in the back of head. no laceration. -blood thinners -asa. pt has been increasing falls the last 6 months. has bgl of 79         Hospital Course  This a 89-year-old female past medical history significant for hypothyroidism hypertension, GERD, vascular dementia presented to the ER on 7/3/2021 after she was noted to have frequent ground-level fall.  Upon presentation in ER, she noted to have macrocytic anemia with hemoglobin of 11.6, clinically dehydrated, CT did not show any acute intracranial process, diffuse atrophy and periventricular white matter changes consistent with chronic small vessel disease.    It is noted that patient stays with her sister who is also demented; medical power of  being her neighbor.  Per her neighbor, patient CODE STATUS has been placed DNI/DNAR.    Interval Problem Update  No acute events overnight.  Patient is alert and oriented to her name, denies any complaint of, noted to be walking in the hallway  --Questions has been changed to DNI/DN AR.  Patient will need placement to memory care    I have personally seen and examined the patient at bedside. I discussed the plan of care with bedside RN.    7/7:  --No acute events overnight.  Patient is alert and oriented x1.  She is confused, pleasant.  --PT/OT/evaluated the patient, patient medically cleared, pending dispo.  She might benefit back to memory care.    7/8:  No acute events overnight evaluated patient.  Patient alert oriented x1.  Per nursing staff request, will obtain PT/OT, discussed  with case management regarding the importance of transferring the patient to memory care  ---  Looks to me that patient doesn't  have hospice qualifying diagnosis    7/9:  --No acute events overnight, laying in bed, denies any complaint.  Patient is alert and oriented x1.  Patient part of ; no bed was met at bedside.  Patient will likely need memory care, case management aware    7/9: No acute events overnight, laying in bed, denies any complaint. Patient is alert and oriented times 1. Met with patient caretaker at bedside.  -Plan is to discharge patient to memory care, case management aware.-  --Presents stable, plan of care has been discussed with the nursing staff and the charge nurse    7/10:  No acute events overnight, laying in the bed, denied any complaint.  Oriented x1 vital signs stable, pending dispo to memory care    7/11:  No acute events overnight.  Vital signs stable, currently saturating well on room air.  Plan of care discussed with the nursing staff.     Consultants/Specialty  None    Code Status  DNAR/DNI    Disposition  Patient is medically cleared.   Anticipate discharge to Memory care.  I have placed the appropriate orders for post-discharge needs.    Review of Systems  ROS   Unable to obtain 2/2 patient dementia/ mentation     Physical Exam  Temp:  [36.1 °C (97 °F)-36.7 °C (98.1 °F)] 36.6 °C (97.9 °F)  Pulse:  [75-79] 79  Resp:  [16-18] 16  BP: (110-145)/(67-89) 145/89  SpO2:  [93 %-96 %] 93 %    Physical Exam  Vitals and nursing note reviewed.   Constitutional:       Appearance: Normal appearance.      Comments: And awake    HENT:      Head: Normocephalic and atraumatic.   Eyes:      Pupils: Pupils are equal, round, and reactive to light.   Cardiovascular:      Rate and Rhythm: Normal rate.   Pulmonary:      Effort: No respiratory distress.      Breath sounds: Normal breath sounds.   Abdominal:      General: Abdomen is flat. There is no distension.      Tenderness: There is no guarding.   Musculoskeletal:      Cervical back: Neck supple.      Right lower leg: No edema.      Left lower leg: No edema.   Skin:      General: Skin is warm.   Neurological:      Mental Status: She is alert.      Comments: Alert and awake , knows her name , does follow commands     Psychiatric:         Mood and Affect: Mood normal.         Fluids    Intake/Output Summary (Last 24 hours) at 7/11/2021 1431  Last data filed at 7/11/2021 0600  Gross per 24 hour   Intake 100 ml   Output 300 ml   Net -200 ml       Laboratory                        Imaging  CT-HEAD W/O   Final Result         1.  No acute intracranial process.      2. Diffuse atrophy and periventricular white matter changes consistent with chronic small vessel disease.           Assessment/Plan  Goals of care, counseling/discussion- (present on admission)  Assessment & Plan  Comfort care was discontinued, CODE STATUS changed to DNI/DN AR.  On 7/5  Discussed with patient AGAPITO  Neighbor on 7/9  Met with caretaker at bedside on n7/10    Urine ketones- (present on admission)  Assessment & Plan  Trace.  Encourage good po intake of fluid    Severe protein-calorie malnutrition (HCC)- (present on admission)  Assessment & Plan  Due to dementia   Dietary consult    Gastroesophageal reflux disease without esophagitis- (present on admission)  Assessment & Plan  Continue omperazole, denied heartburn    Recurrent falls- (present on admission)  Assessment & Plan  Reported by neighbor, OLLIE bailey    Vitamin B12 deficiency- (present on admission)  Assessment & Plan  Resolved, vitamin B12 at 3000    Macrocytic anemia- (present on admission)  Assessment & Plan  Vitamin B12 normal, supplement folic acid, TSH normal    Essential hypertension- (present on admission)  Assessment & Plan  Continue coreg , amlodipine along with pr anti-htn meds   -- monitor BP, last one being SBP at 149    Vascular dementia without behavioral disturbance (HCC)- (present on admission)  Assessment & Plan  CT head showed diffuse atrophy and microvascular disease.      -- will  require placement in a memory care unit.  PT/OT leo  pending.     Acquired hypothyroidism- (present on admission)  Assessment & Plan  Continue levothyroxine   --tsh 1.9, stable       VTE prophylaxis: enoxaparin ppx     I have performed a physical exam and reviewed and updated ROS and Plan today (7/11/2021). In review of yesterday's note (7/10/2021), there are no changes except as documented above.

## 2021-07-11 NOTE — CARE PLAN
The patient is Stable - Low risk of patient condition declining or worsening    Shift Goals  Clinical Goals: safety  Patient Goals: comfort  Family Goals: n/a    Progress made toward(s) clinical / shift goals:  fall preventions and tele sitter in place, will continue reorienting, monitoring, and hourly rounding    Patient is not progressing towards the following goals:

## 2021-07-11 NOTE — CARE PLAN
The patient is Stable - Low risk of patient condition declining or worsening    Shift Goals  Clinical Goals: safety  Patient Goals: comfort  Family Goals: n/a    Progress made toward(s) clinical / shift goals:  Patient is able to communicate needs, has fall precautions in place. Patient is able to mobilize self in bed with no assistance and up to the commode with one person assisting.    Problem: Communication  Goal: The ability to communicate needs accurately and effectively will improve  Outcome: Progressing     Problem: Mobility  Goal: Patient's capacity to carry out activities will improve  Outcome: Progressing       Patient is not progressing towards the following goals:

## 2021-07-12 LAB
ALBUMIN SERPL BCP-MCNC: 3.9 G/DL (ref 3.2–4.9)
BASOPHILS # BLD AUTO: 0.2 % (ref 0–1.8)
BASOPHILS # BLD: 0.01 K/UL (ref 0–0.12)
BUN SERPL-MCNC: 53 MG/DL (ref 8–22)
CALCIUM SERPL-MCNC: 10 MG/DL (ref 8.5–10.5)
CHLORIDE SERPL-SCNC: 105 MMOL/L (ref 96–112)
CO2 SERPL-SCNC: 22 MMOL/L (ref 20–33)
CREAT SERPL-MCNC: 0.95 MG/DL (ref 0.5–1.4)
EOSINOPHIL # BLD AUTO: 0.04 K/UL (ref 0–0.51)
EOSINOPHIL NFR BLD: 0.9 % (ref 0–6.9)
ERYTHROCYTE [DISTWIDTH] IN BLOOD BY AUTOMATED COUNT: 50 FL (ref 35.9–50)
GLUCOSE SERPL-MCNC: 102 MG/DL (ref 65–99)
HCT VFR BLD AUTO: 39.3 % (ref 37–47)
HGB BLD-MCNC: 12.8 G/DL (ref 12–16)
IMM GRANULOCYTES # BLD AUTO: 0.02 K/UL (ref 0–0.11)
IMM GRANULOCYTES NFR BLD AUTO: 0.5 % (ref 0–0.9)
LYMPHOCYTES # BLD AUTO: 0.91 K/UL (ref 1–4.8)
LYMPHOCYTES NFR BLD: 20.9 % (ref 22–41)
MCH RBC QN AUTO: 33.2 PG (ref 27–33)
MCHC RBC AUTO-ENTMCNC: 32.6 G/DL (ref 33.6–35)
MCV RBC AUTO: 102.1 FL (ref 81.4–97.8)
MONOCYTES # BLD AUTO: 0.43 K/UL (ref 0–0.85)
MONOCYTES NFR BLD AUTO: 9.9 % (ref 0–13.4)
NEUTROPHILS # BLD AUTO: 2.95 K/UL (ref 2–7.15)
NEUTROPHILS NFR BLD: 67.6 % (ref 44–72)
NRBC # BLD AUTO: 0 K/UL
NRBC BLD-RTO: 0 /100 WBC
PHOSPHATE SERPL-MCNC: 2.8 MG/DL (ref 2.5–4.5)
PLATELET # BLD AUTO: 223 K/UL (ref 164–446)
PMV BLD AUTO: 9.1 FL (ref 9–12.9)
POTASSIUM SERPL-SCNC: 3.7 MMOL/L (ref 3.6–5.5)
RBC # BLD AUTO: 3.85 M/UL (ref 4.2–5.4)
SODIUM SERPL-SCNC: 138 MMOL/L (ref 135–145)
WBC # BLD AUTO: 4.4 K/UL (ref 4.8–10.8)

## 2021-07-12 PROCEDURE — 86480 TB TEST CELL IMMUN MEASURE: CPT

## 2021-07-12 PROCEDURE — 96372 THER/PROPH/DIAG INJ SC/IM: CPT

## 2021-07-12 PROCEDURE — 700105 HCHG RX REV CODE 258: Performed by: HOSPITALIST

## 2021-07-12 PROCEDURE — A9270 NON-COVERED ITEM OR SERVICE: HCPCS | Performed by: HOSPITALIST

## 2021-07-12 PROCEDURE — 99225 PR SUBSEQUENT OBSERVATION CARE,LEVEL II: CPT | Performed by: HOSPITALIST

## 2021-07-12 PROCEDURE — 85025 COMPLETE CBC W/AUTO DIFF WBC: CPT

## 2021-07-12 PROCEDURE — 700102 HCHG RX REV CODE 250 W/ 637 OVERRIDE(OP): Performed by: HOSPITALIST

## 2021-07-12 PROCEDURE — G0378 HOSPITAL OBSERVATION PER HR: HCPCS

## 2021-07-12 PROCEDURE — A9270 NON-COVERED ITEM OR SERVICE: HCPCS | Performed by: STUDENT IN AN ORGANIZED HEALTH CARE EDUCATION/TRAINING PROGRAM

## 2021-07-12 PROCEDURE — 36415 COLL VENOUS BLD VENIPUNCTURE: CPT

## 2021-07-12 PROCEDURE — 700102 HCHG RX REV CODE 250 W/ 637 OVERRIDE(OP): Performed by: STUDENT IN AN ORGANIZED HEALTH CARE EDUCATION/TRAINING PROGRAM

## 2021-07-12 PROCEDURE — 700111 HCHG RX REV CODE 636 W/ 250 OVERRIDE (IP): Performed by: HOSPITALIST

## 2021-07-12 PROCEDURE — 80069 RENAL FUNCTION PANEL: CPT

## 2021-07-12 RX ORDER — OMEPRAZOLE 20 MG/1
20 CAPSULE, DELAYED RELEASE ORAL DAILY
Status: DISCONTINUED | OUTPATIENT
Start: 2021-07-12 | End: 2021-07-12

## 2021-07-12 RX ORDER — SODIUM CHLORIDE 9 MG/ML
INJECTION, SOLUTION INTRAVENOUS CONTINUOUS
Status: DISCONTINUED | OUTPATIENT
Start: 2021-07-12 | End: 2021-07-20

## 2021-07-12 RX ADMIN — SODIUM CHLORIDE: 9 INJECTION, SOLUTION INTRAVENOUS at 18:37

## 2021-07-12 RX ADMIN — CARVEDILOL 12.5 MG: 12.5 TABLET, FILM COATED ORAL at 07:37

## 2021-07-12 RX ADMIN — CYANOCOBALAMIN TAB 500 MCG 1000 MCG: 500 TAB at 05:48

## 2021-07-12 RX ADMIN — ASPIRIN 81 MG: 81 TABLET, COATED ORAL at 05:49

## 2021-07-12 RX ADMIN — FOLIC ACID 1 MG: 1 TABLET ORAL at 05:49

## 2021-07-12 RX ADMIN — AMLODIPINE BESYLATE 5 MG: 5 TABLET ORAL at 05:48

## 2021-07-12 RX ADMIN — LEVOTHYROXINE SODIUM 50 MCG: 0.05 TABLET ORAL at 07:37

## 2021-07-12 RX ADMIN — LISINOPRIL 10 MG: 10 TABLET ORAL at 05:48

## 2021-07-12 RX ADMIN — CARVEDILOL 12.5 MG: 12.5 TABLET, FILM COATED ORAL at 18:37

## 2021-07-12 RX ADMIN — OMEPRAZOLE 20 MG: 20 CAPSULE, DELAYED RELEASE ORAL at 05:48

## 2021-07-12 RX ADMIN — ENOXAPARIN SODIUM 30 MG: 30 INJECTION SUBCUTANEOUS at 05:49

## 2021-07-12 RX ADMIN — THERA TABS 1 TABLET: TAB at 05:48

## 2021-07-12 ASSESSMENT — PAIN DESCRIPTION - PAIN TYPE
TYPE: ACUTE PAIN
TYPE: ACUTE PAIN

## 2021-07-12 NOTE — PROGRESS NOTES
Hospital Medicine Daily Progress Note    Date of Service  7/12/2021    Chief Complaint  Lamar Dangelo is a 89 y.o. female admitted 7/3/2021 with   Chief Complaint   Patient presents with   • Fall     pt bib remsa from home, came in as TBI, had glf x 2 in 24 hrs. has hx of Dementia. arrived aaox2 Baseline. has bump in the back of head. no laceration. -blood thinners -asa. pt has been increasing falls the last 6 months. has bgl of 79         Hospital Course  This a 89-year-old female past medical history significant for hypothyroidism, hypertension, GERD, vascular dementia presented to the ER on 7/3/2021 after she was noted to have frequent ground-level fall.  Upon presentation in ER, she noted to have macrocytic anemia with hemoglobin of 11.6, clinically dehydrated, CT did not show any acute intracranial process, diffuse atrophy and periventricular white matter changes consistent with chronic small vessel disease.    It is noted that patient stays with her sister who was also demented and unfortunately now passed away on 7/9/2021 and patient cannot go back home.  Medical power of  being her neighbor.  Per her neighbor, patient CODE STATUS has been placed DNI/DNAR.  During the stay in hospital, patient continued to be monitored, her vital signs noted to be stable.  Patient alert and oriented x1 and pleasantly confused.    Patient noted to have macrocytic anemia but her vitamin B12 and TSH normal.  She does have severe protein calorie malnutrition, will increase p.o. intake of fluid, will provide IV fluid.    Interval Problem Update    7/10:  No acute events overnight, laying in the bed, denied any complaint.  Oriented x1 vital signs stable, pending dispo to memory care    7/11:  No acute events overnight.  Vital signs stable, currently saturating well on room air.  Plan of care discussed with the nursing staff.     7/12:  --No acute events overnight, noted to be sitting in a chair  Her BUN is elevated, will  continue IV fluid, encourage p.o. intake of fluid.  Patient has been medically cleared to be discharged to memory care, contacted in order, case management updated.      Consultants/Specialty  None    Code Status  DNAR/DNI    Disposition  Patient is medically cleared.   Anticipate discharge to Memory care.      Patient is to be discharged to memory care, case management updated    Review of Systems  ROS   Unable to obtain 2/2 patient dementia/ mentation     Physical Exam  Temp:  [36.4 °C (97.5 °F)-36.8 °C (98.3 °F)] 36.4 °C (97.5 °F)  Pulse:  [74-89] 81  Resp:  [16-18] 18  BP: (107-176)/(76-89) 176/89  SpO2:  [93 %-95 %] 94 %    Physical Exam  Vitals and nursing note reviewed.   Constitutional:       Appearance: Normal appearance.      Comments: And awake    HENT:      Head: Normocephalic and atraumatic.   Eyes:      Extraocular Movements: Extraocular movements intact.      Pupils: Pupils are equal, round, and reactive to light.   Cardiovascular:      Rate and Rhythm: Normal rate.   Pulmonary:      Effort: No respiratory distress.      Breath sounds: Normal breath sounds.   Abdominal:      General: Abdomen is flat. There is no distension.      Tenderness: There is no guarding.   Musculoskeletal:      Cervical back: Neck supple.      Right lower leg: No edema.      Left lower leg: No edema.   Skin:     General: Skin is warm.   Neurological:      Mental Status: She is alert.      Comments: Alert and awake , knows her name , does follow commands     Psychiatric:         Mood and Affect: Mood normal.         Fluids    Intake/Output Summary (Last 24 hours) at 7/12/2021 1351  Last data filed at 7/12/2021 0600  Gross per 24 hour   Intake 100 ml   Output --   Net 100 ml       Laboratory  Recent Labs     07/12/21  0301   WBC 4.4*   RBC 3.85*   HEMOGLOBIN 12.8   HEMATOCRIT 39.3   .1*   MCH 33.2*   MCHC 32.6*   RDW 50.0   PLATELETCT 223   MPV 9.1     Recent Labs     07/12/21  0301   SODIUM 138   POTASSIUM 3.7   CHLORIDE  105   CO2 22   GLUCOSE 102*   BUN 53*   CREATININE 0.95   CALCIUM 10.0                   Imaging  CT-HEAD W/O   Final Result         1.  No acute intracranial process.      2. Diffuse atrophy and periventricular white matter changes consistent with chronic small vessel disease.           Assessment/Plan  Goals of care, counseling/discussion- (present on admission)  Assessment & Plan  Comfort care was discontinued, CODE STATUS changed to DNI/DN AR.  On 7/5  Discussed with patient DPOA  Neighbor on 7/9  Met with caretaker at bedside on n7/10    Urine ketones- (present on admission)  Assessment & Plan  Trace.  Encourage good po intake of fluid    Severe protein-calorie malnutrition (HCC)- (present on admission)  Assessment & Plan  Due to dementia   Dietary consult    Gastroesophageal reflux disease without esophagitis- (present on admission)  Assessment & Plan  Continue omperazole, denied heartburn    Recurrent falls- (present on admission)  Assessment & Plan  Reported by neighbor, OLLIE bailey    Vitamin B12 deficiency- (present on admission)  Assessment & Plan  Resolved, vitamin B12 at 3000    Dehydration- (present on admission)  Assessment & Plan  BUN/creatinine 53/0.95,  liekly 2/2 clinical dehydration   Continue ivf     Macrocytic anemia- (present on admission)  Assessment & Plan  Vitamin B12 normal, supplement folic acid, TSH normal    Essential hypertension- (present on admission)  Assessment & Plan  Continue coreg , amlodipine along with pr anti-htn meds   -- monitor BP, last one being SBP at 149    Vascular dementia without behavioral disturbance (HCC)- (present on admission)  Assessment & Plan  CT head showed diffuse atrophy and microvascular disease.      -- will  require placement in a memory care unit.  PT/OT leo pending.     Acquired hypothyroidism- (present on admission)  Assessment & Plan  Continue levothyroxine   --tsh 1.9, stable       VTE prophylaxis: enoxaparin ppx     I have performed a physical exam  and reviewed and updated ROS and Plan today (7/12/2021). In review of yesterday's note (7/11/2021), there are no changes except as documented above.

## 2021-07-12 NOTE — CARE PLAN
Problem: Nutritional:  Goal: Achieve adequate nutritional intake  Description: Patient will consume avg >33% of meals and supplements.  Outcome: Progressing   Per flowsheets PO intake of meals/supplements is variable 0-100%. Avg x3 days = ~46%. Also noted with several snacks documented.   Per review of dietary system pt is receiving ~3000 kcal/day in meals/supplements. Pt's estimated nutrition needs are MSJ x1.2 = 905 kcal/day.     If pt consumes avg >33% of meals and supplements will be meeting estimated needs.     RD will follow for consistency, though appears pt is likely meeting estimated nutrition needs.

## 2021-07-12 NOTE — THERAPY
"Speech Therapy Contact Note    Patient Name: Lamar Dangelo  Age:  89 y.o., Sex:  female  Medical Record #: [id number]  Today's Date: 7/12/2021    Attempted to see pt for cognitive-linguistic intervention. Pt asleep upon entrance and remained lethargic with poor participation despite max verbal, tactile and thermo-tactile cues. Pt did not open eyes despite max cues. When asked her birthday she responded \"today\". When re-oriented to her birthday pt perseverated on \"June\" for all questions. Of note, pt repeating same phrase for >1 hour in hallway on 7/11 and was no redirectable. SLP following and will re-attempt as able and as pt appropriate.   "

## 2021-07-12 NOTE — DISCHARGE PLANNING
Anticipated Discharge Disposition:   Memory Care    Action:    Pt brought to [facility] for recurrent falls, vascular dementia.    RN CM spoke to patient's friend Acacia Rider via telephone.  She stated that patient was living with her sister, Ellie, however, she  this past Friday.  Pt cannot return home.  Acacia is patient's durable power of  for healthcare and finances.  Explained memory care.  She provided consent to request an onsite assessment from [facility].  JOHNNY SWENSON spoke with Kaylie with [facility] and she will have a nurse come to the hospital to assess patient for placement.    Request to Dr. Benoit for a quantiferon TB test.    Barriers to Discharge:    Placement acceptance    Plan:    F/U with [facility].  F/U with Acacia.

## 2021-07-12 NOTE — PROGRESS NOTES
While assisting pt to the bathroom, the handrail next to the toilet fell down hitting the pt on the left elbow causing a skin tear. Maintenance was called.

## 2021-07-12 NOTE — CARE PLAN
The patient is Stable - Low risk of patient condition declining or worsening    Shift Goals  Clinical Goals: safety  Patient Goals: comfort  Family Goals: n/a    Progress made toward(s) clinical / shift goals:  Patient is able to communicate needs, mobilize self in bed and walk to the bathroom with one person assistance. She was able to perform ADLs such as feeding herself and brushing her teeth with instruction. Safety sitter in place for impulsivity, report given at start of shift to sitter.    Problem: Communication  Goal: The ability to communicate needs accurately and effectively will improve  Outcome: Progressing     Problem: Mobility  Goal: Patient's capacity to carry out activities will improve  Outcome: Progressing     Problem: Self Care  Goal: Patient will have the ability to perform ADLs independently or with assistance (bathe, groom, dress, toilet and feed)  Outcome: Progressing         Patient is not progressing towards the following goals:

## 2021-07-13 ENCOUNTER — APPOINTMENT (OUTPATIENT)
Dept: RADIOLOGY | Facility: MEDICAL CENTER | Age: 86
End: 2021-07-13
Attending: STUDENT IN AN ORGANIZED HEALTH CARE EDUCATION/TRAINING PROGRAM
Payer: MEDICARE

## 2021-07-13 PROBLEM — K59.00 CONSTIPATION: Status: ACTIVE | Noted: 2021-07-13

## 2021-07-13 PROCEDURE — 700105 HCHG RX REV CODE 258: Performed by: HOSPITALIST

## 2021-07-13 PROCEDURE — 700102 HCHG RX REV CODE 250 W/ 637 OVERRIDE(OP): Performed by: HOSPITALIST

## 2021-07-13 PROCEDURE — 74018 RADEX ABDOMEN 1 VIEW: CPT

## 2021-07-13 PROCEDURE — A9270 NON-COVERED ITEM OR SERVICE: HCPCS | Performed by: HOSPITALIST

## 2021-07-13 PROCEDURE — 700111 HCHG RX REV CODE 636 W/ 250 OVERRIDE (IP): Performed by: HOSPITALIST

## 2021-07-13 PROCEDURE — 96372 THER/PROPH/DIAG INJ SC/IM: CPT

## 2021-07-13 PROCEDURE — A9270 NON-COVERED ITEM OR SERVICE: HCPCS | Performed by: STUDENT IN AN ORGANIZED HEALTH CARE EDUCATION/TRAINING PROGRAM

## 2021-07-13 PROCEDURE — 700102 HCHG RX REV CODE 250 W/ 637 OVERRIDE(OP): Performed by: STUDENT IN AN ORGANIZED HEALTH CARE EDUCATION/TRAINING PROGRAM

## 2021-07-13 PROCEDURE — 99225 PR SUBSEQUENT OBSERVATION CARE,LEVEL II: CPT | Performed by: STUDENT IN AN ORGANIZED HEALTH CARE EDUCATION/TRAINING PROGRAM

## 2021-07-13 PROCEDURE — U0005 INFEC AGEN DETEC AMPLI PROBE: HCPCS

## 2021-07-13 PROCEDURE — U0003 INFECTIOUS AGENT DETECTION BY NUCLEIC ACID (DNA OR RNA); SEVERE ACUTE RESPIRATORY SYNDROME CORONAVIRUS 2 (SARS-COV-2) (CORONAVIRUS DISEASE [COVID-19]), AMPLIFIED PROBE TECHNIQUE, MAKING USE OF HIGH THROUGHPUT TECHNOLOGIES AS DESCRIBED BY CMS-2020-01-R: HCPCS

## 2021-07-13 PROCEDURE — G0378 HOSPITAL OBSERVATION PER HR: HCPCS

## 2021-07-13 RX ADMIN — BISACODYL 10 MG: 10 SUPPOSITORY RECTAL at 16:11

## 2021-07-13 RX ADMIN — ASPIRIN 81 MG: 81 TABLET, COATED ORAL at 06:20

## 2021-07-13 RX ADMIN — CARVEDILOL 12.5 MG: 12.5 TABLET, FILM COATED ORAL at 16:11

## 2021-07-13 RX ADMIN — SODIUM CHLORIDE: 9 INJECTION, SOLUTION INTRAVENOUS at 20:20

## 2021-07-13 RX ADMIN — LISINOPRIL 10 MG: 10 TABLET ORAL at 06:20

## 2021-07-13 RX ADMIN — CYANOCOBALAMIN TAB 500 MCG 1000 MCG: 500 TAB at 06:23

## 2021-07-13 RX ADMIN — AMLODIPINE BESYLATE 5 MG: 5 TABLET ORAL at 06:22

## 2021-07-13 RX ADMIN — SODIUM CHLORIDE: 9 INJECTION, SOLUTION INTRAVENOUS at 01:37

## 2021-07-13 RX ADMIN — LEVOTHYROXINE SODIUM 50 MCG: 0.05 TABLET ORAL at 09:02

## 2021-07-13 RX ADMIN — ENOXAPARIN SODIUM 30 MG: 30 INJECTION SUBCUTANEOUS at 06:27

## 2021-07-13 RX ADMIN — THERA TABS 1 TABLET: TAB at 06:19

## 2021-07-13 RX ADMIN — OMEPRAZOLE 20 MG: 20 CAPSULE, DELAYED RELEASE ORAL at 06:24

## 2021-07-13 RX ADMIN — FOLIC ACID 1 MG: 1 TABLET ORAL at 06:22

## 2021-07-13 RX ADMIN — CARVEDILOL 12.5 MG: 12.5 TABLET, FILM COATED ORAL at 09:01

## 2021-07-13 ASSESSMENT — PAIN DESCRIPTION - PAIN TYPE: TYPE: ACUTE PAIN

## 2021-07-13 NOTE — PROGRESS NOTES
4 Eyes Skin Assessment Completed by JOHNNY Carpenter and JOHNNY Cardozo.    Head WDL  Ears WDL  Nose WDL  Mouth WDL  Neck WDL  Breast/Chest WDL  Shoulder Blades WDL  Spine Redness and Blanching  (R) Arm/Elbow/Hand WDL  (L) Arm/Elbow/Hand WDL  Abdomen WDL  Groin WDL  Scrotum/Coccyx/Buttocks WDL  (R) Leg WDL  (L) Leg WDL  (R) Heel/Foot/Toe Redness and Non-Blanching  (L) Heel/Foot/Toe Redness and Non-Blanching          Devices In Places None      Interventions In Place Heel Mepilex, Pillows, Q2 Turns, Barrier Cream, Heels Loaded W/Pillows and Pressure Redistribution Mattress, spine mepilex    Possible Skin Injury Yes    Pictures Uploaded Into Epic No, needs to be completed  Wound Consult Placed Yes  RN Wound Prevention Protocol Ordered Yes

## 2021-07-13 NOTE — CARE PLAN
The patient is Stable - Low risk of patient condition declining or worsening    Shift Goals  Clinical Goals: safety  Patient Goals: comfort  Family Goals: n/a    Progress made toward(s) clinical / shift goals:  Patient is able to communicate need such as bathroom needs appropriately. She is able to turn herself in bed and has mepilex on her sacrum. Safety sitter is able to use redirection to decrease fall risk, report given at beginning of shift.    Problem: Communication  Goal: The ability to communicate needs accurately and effectively will improve  Outcome: Progressing     Problem: Discharge Barriers/Planning  Goal: Patient's continuum of care needs are met  Outcome: Progressing     Problem: Skin Integrity  Goal: Skin integrity is maintained or improved  Outcome: Progressing         Patient is not progressing towards the following goals:

## 2021-07-13 NOTE — CARE PLAN
Problem: Knowledge Deficit - Standard  Goal: Patient and family/care givers will demonstrate understanding of plan of care, disease process/condition, diagnostic tests and medications  Outcome: Not Progressing  Note: AMS     Problem: Communication  Goal: The ability to communicate needs accurately and effectively will improve  Outcome: Not Progressing  Flowsheets (Taken 7/13/2021 1535)  Communication:   Assessed patient's ability to understand and communicate   Oriented patient to call light   Reoriented patient to environment  Note: AMS     Problem: Pain - Standard  Goal: Alleviation of pain or a reduction in pain to the patient’s comfort goal  Outcome: Progressing     Problem: Discharge Barriers/Planning  Goal: Patient's continuum of care needs are met  Outcome: Progressing     Problem: Hemodynamics  Goal: Patient's hemodynamics, fluid balance and neurologic status will be stable or improve  Outcome: Progressing     Problem: Nutrition  Goal: Patient's nutritional and fluid intake will be adequate or improve  Outcome: Progressing   The patient is Stable - Low risk of patient condition declining or worsening    Shift Goals  Clinical Goals: safety  Patient Goals: comfort  Family Goals: n/a          Problem: Knowledge Deficit - Standard  Goal: Patient and family/care givers will demonstrate understanding of plan of care, disease process/condition, diagnostic tests and medications  Outcome: Not Progressing  Note: AMS     Problem: Communication  Goal: The ability to communicate needs accurately and effectively will improve  Outcome: Not Progressing  Flowsheets (Taken 7/13/2021 1535)  Communication:   Assessed patient's ability to understand and communicate   Oriented patient to call light   Reoriented patient to environment  Note: AMS

## 2021-07-14 LAB
ANION GAP SERPL CALC-SCNC: 7 MMOL/L (ref 7–16)
BUN SERPL-MCNC: 33 MG/DL (ref 8–22)
CALCIUM SERPL-MCNC: 8.7 MG/DL (ref 8.5–10.5)
CHLORIDE SERPL-SCNC: 111 MMOL/L (ref 96–112)
CO2 SERPL-SCNC: 21 MMOL/L (ref 20–33)
CREAT SERPL-MCNC: 0.86 MG/DL (ref 0.5–1.4)
GAMMA INTERFERON BACKGROUND BLD IA-ACNC: 0.02 IU/ML
GLUCOSE SERPL-MCNC: 91 MG/DL (ref 65–99)
M TB IFN-G BLD-IMP: NEGATIVE
M TB IFN-G CD4+ BCKGRND COR BLD-ACNC: -0.01 IU/ML
MITOGEN IGNF BCKGRD COR BLD-ACNC: 8.77 IU/ML
POTASSIUM SERPL-SCNC: 3.7 MMOL/L (ref 3.6–5.5)
QFT TB2 - NIL TBQ2: -0.01 IU/ML
SARS-COV-2 RNA RESP QL NAA+PROBE: NOTDETECTED
SODIUM SERPL-SCNC: 139 MMOL/L (ref 135–145)
SPECIMEN SOURCE: NORMAL

## 2021-07-14 PROCEDURE — 97129 THER IVNTJ 1ST 15 MIN: CPT

## 2021-07-14 PROCEDURE — 700102 HCHG RX REV CODE 250 W/ 637 OVERRIDE(OP): Performed by: STUDENT IN AN ORGANIZED HEALTH CARE EDUCATION/TRAINING PROGRAM

## 2021-07-14 PROCEDURE — 700102 HCHG RX REV CODE 250 W/ 637 OVERRIDE(OP): Performed by: HOSPITALIST

## 2021-07-14 PROCEDURE — 700111 HCHG RX REV CODE 636 W/ 250 OVERRIDE (IP): Performed by: HOSPITALIST

## 2021-07-14 PROCEDURE — 700105 HCHG RX REV CODE 258: Performed by: HOSPITALIST

## 2021-07-14 PROCEDURE — 700101 HCHG RX REV CODE 250: Performed by: STUDENT IN AN ORGANIZED HEALTH CARE EDUCATION/TRAINING PROGRAM

## 2021-07-14 PROCEDURE — A9270 NON-COVERED ITEM OR SERVICE: HCPCS | Performed by: STUDENT IN AN ORGANIZED HEALTH CARE EDUCATION/TRAINING PROGRAM

## 2021-07-14 PROCEDURE — G0378 HOSPITAL OBSERVATION PER HR: HCPCS

## 2021-07-14 PROCEDURE — 99225 PR SUBSEQUENT OBSERVATION CARE,LEVEL II: CPT | Performed by: STUDENT IN AN ORGANIZED HEALTH CARE EDUCATION/TRAINING PROGRAM

## 2021-07-14 PROCEDURE — 36415 COLL VENOUS BLD VENIPUNCTURE: CPT

## 2021-07-14 PROCEDURE — A9270 NON-COVERED ITEM OR SERVICE: HCPCS | Performed by: HOSPITALIST

## 2021-07-14 PROCEDURE — 97530 THERAPEUTIC ACTIVITIES: CPT

## 2021-07-14 PROCEDURE — 80048 BASIC METABOLIC PNL TOTAL CA: CPT

## 2021-07-14 PROCEDURE — 96372 THER/PROPH/DIAG INJ SC/IM: CPT

## 2021-07-14 RX ORDER — DOCUSATE SODIUM 100 MG/1
100 CAPSULE, LIQUID FILLED ORAL 2 TIMES DAILY
Status: DISCONTINUED | OUTPATIENT
Start: 2021-07-14 | End: 2021-07-22 | Stop reason: HOSPADM

## 2021-07-14 RX ORDER — ENEMA 19; 7 G/133ML; G/133ML
1 ENEMA RECTAL ONCE
Status: COMPLETED | OUTPATIENT
Start: 2021-07-14 | End: 2021-07-14

## 2021-07-14 RX ORDER — POLYETHYLENE GLYCOL 3350 17 G/17G
1 POWDER, FOR SOLUTION ORAL DAILY
Status: DISCONTINUED | OUTPATIENT
Start: 2021-07-14 | End: 2021-07-22 | Stop reason: HOSPADM

## 2021-07-14 RX ADMIN — LACTULOSE 10 G: 20 SOLUTION ORAL at 05:32

## 2021-07-14 RX ADMIN — SODIUM PHOSPHATE 133 ML: 7; 19 ENEMA RECTAL at 17:03

## 2021-07-14 RX ADMIN — LISINOPRIL 10 MG: 10 TABLET ORAL at 05:17

## 2021-07-14 RX ADMIN — ASPIRIN 81 MG: 81 TABLET, COATED ORAL at 05:17

## 2021-07-14 RX ADMIN — DOCUSATE SODIUM 100 MG: 100 CAPSULE ORAL at 17:03

## 2021-07-14 RX ADMIN — SODIUM CHLORIDE: 9 INJECTION, SOLUTION INTRAVENOUS at 03:08

## 2021-07-14 RX ADMIN — ENOXAPARIN SODIUM 30 MG: 30 INJECTION SUBCUTANEOUS at 05:16

## 2021-07-14 RX ADMIN — CARVEDILOL 12.5 MG: 12.5 TABLET, FILM COATED ORAL at 08:05

## 2021-07-14 RX ADMIN — BISACODYL 10 MG: 10 SUPPOSITORY RECTAL at 10:30

## 2021-07-14 RX ADMIN — LEVOTHYROXINE SODIUM 50 MCG: 0.05 TABLET ORAL at 05:17

## 2021-07-14 RX ADMIN — DOCUSATE SODIUM 100 MG: 100 CAPSULE ORAL at 10:29

## 2021-07-14 RX ADMIN — SODIUM CHLORIDE: 9 INJECTION, SOLUTION INTRAVENOUS at 09:56

## 2021-07-14 RX ADMIN — FOLIC ACID 1 MG: 1 TABLET ORAL at 05:17

## 2021-07-14 RX ADMIN — POLYETHYLENE GLYCOL 3350 1 PACKET: 17 POWDER, FOR SOLUTION ORAL at 10:29

## 2021-07-14 RX ADMIN — THERA TABS 1 TABLET: TAB at 05:17

## 2021-07-14 RX ADMIN — OMEPRAZOLE 20 MG: 20 CAPSULE, DELAYED RELEASE ORAL at 05:17

## 2021-07-14 RX ADMIN — CYANOCOBALAMIN TAB 500 MCG 1000 MCG: 500 TAB at 05:17

## 2021-07-14 RX ADMIN — AMLODIPINE BESYLATE 5 MG: 5 TABLET ORAL at 05:17

## 2021-07-14 RX ADMIN — CARVEDILOL 12.5 MG: 12.5 TABLET, FILM COATED ORAL at 17:03

## 2021-07-14 ASSESSMENT — COGNITIVE AND FUNCTIONAL STATUS - GENERAL
SUGGESTED CMS G CODE MODIFIER DAILY ACTIVITY: CH
DAILY ACTIVITIY SCORE: 24

## 2021-07-14 ASSESSMENT — PAIN DESCRIPTION - PAIN TYPE
TYPE: ACUTE PAIN

## 2021-07-14 NOTE — CARE PLAN
The patient is Stable - Low risk of patient condition declining or worsening    Shift Goals  Clinical Goals: increase PO intake  Patient Goals: comfort  Family Goals: n/a    Progress made toward(s) clinical / shift goals:  Pt able to rest comfortably throughout night with sitter in place.     Patient is not progressing towards the following goals:      Problem: Knowledge Deficit - Standard  Goal: Patient and family/care givers will demonstrate understanding of plan of care, disease process/condition, diagnostic tests and medications  Outcome: Not Progressing

## 2021-07-14 NOTE — CARE PLAN
The patient is Stable - Low risk of patient condition declining or worsening    Shift Goals  Clinical Goals: Increase PO intake   Patient Goals: comfort   Family Goals: Placement in a longterm facility    Progress made toward(s) clinical / shift goals:  Pt encouraged to eat all meals. Pt medicated per MAR for constipated and encouraged mobilization and PO liquids.     Patient is not progressing towards the following goals:    N/A

## 2021-07-14 NOTE — PROGRESS NOTES
[facility] Medicine Daily Progress Note    Date of Service  7/13/2021    Chief Complaint  Lamar Dangelo is a 89 y.o. female admitted 7/3/2021 with   Chief Complaint   Patient presents with   • Fall     pt bib remsa from home, came in as TBI, had glf x 2 in 24 hrs. has hx of Dementia. arrived aaox2 Baseline. has bump in the back of head. no laceration. -blood thinners -asa. pt has been increasing falls the last 6 months. has bgl of 79         Hospital Course  This a 89-year-old female past medical history significant for hypothyroidism, hypertension, GERD, vascular dementia presented to the ER on 7/3/2021 after she was noted to have frequent ground-level fall.  Upon presentation in ER, she noted to have macrocytic anemia with hemoglobin of 11.6, clinically dehydrated, CT did not show any acute intracranial process, diffuse atrophy and periventricular white matter changes consistent with chronic small vessel disease.    It is noted that patient stays with her sister who was also demented and unfortunately now passed away on 7/9/2021 and patient cannot go back home.  Medical power of  being her neighbor.  Per her neighbor, patient CODE STATUS has been placed DNI/DNAR.  During the stay in hospital, patient continued to be monitored, her vital signs noted to be stable.  Patient alert and oriented x1 and pleasantly confused.    Patient noted to have macrocytic anemia but her vitamin B12 and TSH normal.  She does have severe protein calorie malnutrition, will increase p.o. intake of fluid, will provide IV fluid.    Interval Problem Update    7/10:  No acute events overnight, laying in the bed, denied any complaint.  Oriented x1 vital signs stable, pending dispo to memory care    7/11:  No acute events overnight.  Vital signs stable, currently saturating well on room air.  Plan of care discussed with the nursing staff.     7/12:  --No acute events overnight, noted to be sitting in a chair  Her BUN is elevated, will  continue IV fluid, encourage p.o. intake of fluid.  Patient has been medically cleared to be discharged to memory care, contacted in order, case management updated.      7/13:  No acute events overnight. Mildly distended abdomen without tenderness or guarding, KUB shows constipation, discussed with nurse to increase bowel care.    Consultants/Specialty  None    Code Status  DNAR/DNI    Disposition  Patient is medically cleared.   Anticipate discharge to Memory care.      Patient is to be discharged to memory care, case management updated    Review of Systems  ROS   Unable to obtain 2/2 patient dementia/ mentation     Physical Exam  Temp:  [36.1 °C (96.9 °F)-36.8 °C (98.2 °F)] 36.7 °C (98.1 °F)  Pulse:  [67-78] 67  Resp:  [16-18] 17  BP: (137-169)/(59-84) 144/60  SpO2:  [94 %-97 %] 95 %    Physical Exam  Vitals and nursing note reviewed.   Constitutional:       Appearance: Normal appearance.      Comments: And awake    HENT:      Head: Normocephalic and atraumatic.   Eyes:      Extraocular Movements: Extraocular movements intact.      Pupils: Pupils are equal, round, and reactive to light.   Cardiovascular:      Rate and Rhythm: Normal rate.   Pulmonary:      Effort: No respiratory distress.      Breath sounds: Normal breath sounds.   Abdominal:      General: Abdomen is flat. There is no distension.      Tenderness: There is no guarding.   Musculoskeletal:      Cervical back: Neck supple.      Right lower leg: No edema.      Left lower leg: No edema.   Skin:     General: Skin is warm.   Neurological:      Mental Status: She is alert.      Comments: Alert and awake , knows her name , does follow commands     Psychiatric:         Mood and Affect: Mood normal.         Fluids    Intake/Output Summary (Last 24 hours) at 7/13/2021 1741  Last data filed at 7/13/2021 1330  Gross per 24 hour   Intake 230 ml   Output --   Net 230 ml       Laboratory  Recent Labs     07/12/21  0301   WBC 4.4*   RBC 3.85*   HEMOGLOBIN 12.8    HEMATOCRIT 39.3   .1*   MCH 33.2*   MCHC 32.6*   RDW 50.0   PLATELETCT 223   MPV 9.1     Recent Labs     07/12/21  0301   SODIUM 138   POTASSIUM 3.7   CHLORIDE 105   CO2 22   GLUCOSE 102*   BUN 53*   CREATININE 0.95   CALCIUM 10.0                   Imaging  [id number] VIEW   Final Result      Moderate stool in the ascending and rectosigmoid colon, without obstruction.      CT-HEAD W/O   Final Result         1.  No acute intracranial process.      2. Diffuse atrophy and periventricular white matter changes consistent with chronic small vessel disease.           Assessment/Plan  Constipation  Assessment & Plan  Mild abdominal distention  KUB with constipation  Discussed with nurse to increase bowel care    Goals of care, counseling/discussion- (present on admission)  Assessment & Plan  Comfort care was discontinued, CODE STATUS changed to DNI/DN AR.  On 7/5  Discussed with patient DPOA  Neighbor on 7/9  Met with caretaker at bedside on 7/10    Urine ketones- (present on admission)  Assessment & Plan  Trace, likely starvation ketosis  Encourage good po intake of fluid    Severe protein-calorie malnutrition (HCC)- (present on admission)  Assessment & Plan  Due to dementia   Dietary consult    Gastroesophageal reflux disease without esophagitis- (present on admission)  Assessment & Plan  Continue omperazole, denied heartburn    Recurrent falls- (present on admission)  Assessment & Plan  Reported by neighbor, PT evals    Vitamin B12 deficiency- (present on admission)  Assessment & Plan  Resolved, vitamin B12 at 3000    Dehydration- (present on admission)  Assessment & Plan  BUN/creatinine 53/0.95,  liekly 2/2 clinical dehydration   Continue IVF and trend    Macrocytic anemia- (present on admission)  Assessment & Plan  Vitamin B12 normal, supplement folic acid, TSH normal    Essential hypertension- (present on admission)  Assessment & Plan  Continue coreg , amlodipine along with pr anti-htn meds  Monitor  BP    Vascular dementia without behavioral disturbance (HCC)- (present on admission)  Assessment & Plan  CT head showed diffuse atrophy and microvascular disease.     Will  require placement in a memory care unit.  PT/OT evals recommend post acute placement    Acquired hypothyroidism- (present on admission)  Assessment & Plan  Continue levothyroxine  TSH 1.9, stable       VTE prophylaxis: enoxaparin ppx     I have performed a physical exam and reviewed and updated ROS and Plan today (7/13/2021). In review of yesterday's note (7/12/2021), there are no changes except as documented above.

## 2021-07-14 NOTE — THERAPY
"Speech Language Pathology  Daily Treatment     Patient Name: Lamar Dangelo  Age:  89 y.o., Sex:  female  Medical Record #: [id number]  Today's Date: 7/14/2021     Precautions  Precautions: (P) Fall Risk    Assessment    Pt seen this date for cognitive-linguistic intervention targeting orientation and 2-step direction following. Pt followed 3/3 simple 2-step directions independently. Pt oriented to self and birthday with confusion to month, day, date, year, age place and reason. Clinician re-oriented pt x5 with use of external aids, which pt read with min cues. Despite multiple re-orientations pt did not demonstrate carry-over of information. By fifth time pt required mod cueing (reduced from max) to refer to external aid to determine date. When asked a questions pt would answer \"I don't know\", then clinician would provide answer to which pt responded \"I was just going to say that\".    Pt continues to present with severe deficits in memory and orientation. SLP will continue to follow for trial cognitive-linguistic intervention. Recommend 24/7 supervision at discharge.       Plan    Treatment plan modified to 2 times per week until therapy goals are met for the following treatments:  Cognitive-Linguistic Training and Patient / Family / Caregiver Education.    Discharge Recommendations: (P) Recommend post-acute placement for additional speech therapy services prior to discharge home (will need placement)    Subjective    Pt awake, disoriented and followed directions.      Objective       07/14/21 1100   Auditory Comprehension   Follows One Unit Commands Within Functional Limits (6-7)   Follows Two Unit Commands Within Functional Limits (6-7)   Cognitive-Linguistic   Cognitive-Linguistic (WDL) X   Level of Consciousness Alert   Orientation Level Not Oriented to Age;Not Oriented to Year;Not Oriented to Month;Not Oriented to Day;Not Oriented to Time;Not Oriented to Place;Not Oriented to Reason   Sustained Attention " "Supervision (5)   Short Term Memory Severe (2)   Immediate Memory Moderate (3)   Long Term Memory / Reminiscing Moderate (3)   Skilled Intervention External Aids;Compensatory Strategies;Verbal Cueing   Patient / Family Goals   Patient / Family Goal #1 \"Can I have a sandwich?\"   Goal #1 Outcome Progressing as expected   Short Term Goals   Short Term Goal # 1 Pt will be oriented in all 4 spheres with max assistance in 2/5 trials   Goal Outcome # 1 Progressing slower than expected   Short Term Goal # 2 Pt will be able to follow 2 step directives with minimal assistance in 3/5 trials   Goal Outcome # 2  Progressing as expected         "

## 2021-07-14 NOTE — PROGRESS NOTES
[facility] Medicine Daily Progress Note    Date of Service  7/14/2021    Chief Complaint  Lamar Dangelo is a 89 y.o. female admitted 7/3/2021 with   Chief Complaint   Patient presents with   • Fall     pt bib remsa from home, came in as TBI, had glf x 2 in 24 hrs. has hx of Dementia. arrived aaox2 Baseline. has bump in the back of head. no laceration. -blood thinners -asa. pt has been increasing falls the last 6 months. has bgl of 79         Hospital Course  This a 89-year-old female past medical history significant for hypothyroidism, hypertension, GERD, vascular dementia presented to the ER on 7/3/2021 after she was noted to have frequent ground-level fall.  Upon presentation in ER, she noted to have macrocytic anemia with hemoglobin of 11.6, clinically dehydrated, CT did not show any acute intracranial process, diffuse atrophy and periventricular white matter changes consistent with chronic small vessel disease.    It is noted that patient stays with her sister who was also demented and unfortunately now passed away on 7/9/2021 and patient cannot go back home.  Medical power of  being her neighbor.  Per her neighbor, patient CODE STATUS has been placed DNI/DNAR.  During the stay in hospital, patient continued to be monitored, her vital signs noted to be stable.  Patient alert and oriented x1 and pleasantly confused.    Patient noted to have macrocytic anemia but her vitamin B12 and TSH normal.  She does have severe protein calorie malnutrition, will increase p.o. intake of fluid, will provide IV fluid.    Interval Problem Update    7/10:  No acute events overnight, laying in the bed, denied any complaint.  Oriented x1 vital signs stable, pending dispo to memory care    7/11:  No acute events overnight.  Vital signs stable, currently saturating well on room air.  Plan of care discussed with the nursing staff.     7/12:  --No acute events overnight, noted to be sitting in a chair  Her BUN is elevated, will  continue IV fluid, encourage p.o. intake of fluid.  Patient has been medically cleared to be discharged to memory care, contacted in order, case management updated.      7/13:  No acute events overnight. Mildly distended abdomen without tenderness or guarding, KUB shows constipation, discussed with nurse to increase bowel care.    7/14:  No acute events overnight. Patient without bowel movement. Increased bowel care and will have nursing perform enema. COVID negative and quantiferon pending for placement in memory care.    Consultants/Specialty  None    Code Status  DNAR/DNI    Disposition  Patient is medically cleared.   Anticipate discharge to Memory care.      Patient is to be discharged to memory care, case management updated    Review of Systems  ROS   Unable to obtain 2/2 patient dementia/ mentation     Physical Exam  Temp:  [36.1 °C (97 °F)-36.2 °C (97.1 °F)] 36.2 °C (97.1 °F)  Pulse:  [68-71] 71  Resp:  [14-18] 18  BP: (139-145)/(59-70) 139/70  SpO2:  [93 %-98 %] 98 %    Physical Exam  Vitals and nursing note reviewed.   Constitutional:       Appearance: Normal appearance.   HENT:      Head: Normocephalic and atraumatic.      Mouth/Throat:      Mouth: Mucous membranes are dry.      Pharynx: Oropharynx is clear.   Eyes:      Extraocular Movements: Extraocular movements intact.      Pupils: Pupils are equal, round, and reactive to light.   Cardiovascular:      Rate and Rhythm: Normal rate.   Pulmonary:      Effort: No respiratory distress.      Breath sounds: Normal breath sounds.   Abdominal:      General: There is distension.      Palpations: Abdomen is soft.      Tenderness: There is no abdominal tenderness. There is no guarding.   Musculoskeletal:      Cervical back: Neck supple.      Right lower leg: No edema.      Left lower leg: No edema.   Skin:     General: Skin is warm.   Neurological:      Mental Status: She is alert.      Comments: Alert and awake , knows her name , does follow commands      Psychiatric:         Mood and Affect: Mood normal.         Fluids    Intake/Output Summary (Last 24 hours) at 7/14/2021 1640  Last data filed at 7/14/2021 0800  Gross per 24 hour   Intake 120 ml   Output --   Net 120 ml       Laboratory  Recent Labs     07/12/21  0301   WBC 4.4*   RBC 3.85*   HEMOGLOBIN 12.8   HEMATOCRIT 39.3   .1*   MCH 33.2*   MCHC 32.6*   RDW 50.0   PLATELETCT 223   MPV 9.1     Recent Labs     07/12/21  0301 07/14/21  0230   SODIUM 138 139   POTASSIUM 3.7 3.7   CHLORIDE 105 111   CO2 22 21   GLUCOSE 102* 91   BUN 53* 33*   CREATININE 0.95 0.86   CALCIUM 10.0 8.7                   Imaging  [id number] VIEW   Final Result      Moderate stool in the ascending and rectosigmoid colon, without obstruction.      CT-HEAD W/O   Final Result         1.  No acute intracranial process.      2. Diffuse atrophy and periventricular white matter changes consistent with chronic small vessel disease.           Assessment/Plan  Constipation  Assessment & Plan  Mild abdominal distention  KUB with constipation  Discussed with nurse to increase bowel care  Plan for enema    Goals of care, counseling/discussion- (present on admission)  Assessment & Plan  Comfort care was discontinued, CODE STATUS changed to DNI/DN AR.  On 7/5  Discussed with patient DPOA  Neighbor on 7/9  Met with caretaker at bedside on 7/10    Urine ketones- (present on admission)  Assessment & Plan  Trace, likely starvation ketosis  Encourage good po intake of fluid    Severe protein-calorie malnutrition (HCC)- (present on admission)  Assessment & Plan  Due to dementia   Dietary consult    Gastroesophageal reflux disease without esophagitis- (present on admission)  Assessment & Plan  Continue omperazole, denied heartburn    Recurrent falls- (present on admission)  Assessment & Plan  Reported by neighbor, PT evals    Vitamin B12 deficiency- (present on admission)  Assessment & Plan  Resolved, vitamin B12 at 3000    Dehydration- (present on  admission)  Assessment & Plan  BUN/creatinine 53/0.95,  liekly 2/2 clinical dehydration   Continue IVF and trend    Macrocytic anemia- (present on admission)  Assessment & Plan  Vitamin B12 normal, supplement folic acid, TSH normal    Essential hypertension- (present on admission)  Assessment & Plan  Continue coreg , amlodipine along with pr anti-htn meds  Monitor BP    Vascular dementia without behavioral disturbance (HCC)- (present on admission)  Assessment & Plan  CT head showed diffuse atrophy and microvascular disease.     Will  require placement in a memory care unit.  PT/OT evals recommend post acute placement    Acquired hypothyroidism- (present on admission)  Assessment & Plan  Continue levothyroxine  TSH 1.9, stable       VTE prophylaxis: enoxaparin ppx     I have performed a physical exam and reviewed and updated ROS and Plan today (7/14/2021). In review of yesterday's note (7/13/2021), there are no changes except as documented above.

## 2021-07-14 NOTE — THERAPY
Occupational Therapy  Daily Treatment     Patient Name: Lamar Dangelo  Age:  89 y.o., Sex:  female  Medical Record #: [id number]  Today's Date: 7/14/2021     Precautions  Precautions: Fall Risk    Assessment     Pt is at or near his/her functional baseline. Pt with no further skilled OT needs in the acute care setting at this time.      Plan    Discharge secondary to goals met.    DC Equipment Recommendations: Front-Wheel Walker  Discharge Recommendations: (P) Other - (likely need memory care, if family unable to care for pt)       07/14/21 0728   Activities of Daily Living   Grooming Supervision   Lower Body Dressing Supervision   Toileting Supervision   Functional Mobility   Sit to Stand Supervised   Bed, Chair, Wheelchair Transfer Supervised   Short Term Goals   Short Term Goal # 1 supervised with LB dressing   Goal Outcome # 1 Goal met   Short Term Goal # 2 supervised with ADL txfs   Goal Outcome # 2 Goal met

## 2021-07-15 LAB
APPEARANCE UR: CLEAR
BILIRUB UR QL STRIP.AUTO: NEGATIVE
COLOR UR: YELLOW
GLUCOSE UR STRIP.AUTO-MCNC: NEGATIVE MG/DL
KETONES UR STRIP.AUTO-MCNC: NEGATIVE MG/DL
LEUKOCYTE ESTERASE UR QL STRIP.AUTO: NEGATIVE
MICRO URNS: NORMAL
NITRITE UR QL STRIP.AUTO: NEGATIVE
PH UR STRIP.AUTO: 7.5 [PH] (ref 5–8)
PROT UR QL STRIP: NEGATIVE MG/DL
RBC UR QL AUTO: NEGATIVE
SP GR UR STRIP.AUTO: 1.01
UROBILINOGEN UR STRIP.AUTO-MCNC: 0.2 MG/DL

## 2021-07-15 PROCEDURE — A9270 NON-COVERED ITEM OR SERVICE: HCPCS | Performed by: HOSPITALIST

## 2021-07-15 PROCEDURE — 700102 HCHG RX REV CODE 250 W/ 637 OVERRIDE(OP): Performed by: HOSPITALIST

## 2021-07-15 PROCEDURE — A9270 NON-COVERED ITEM OR SERVICE: HCPCS | Performed by: STUDENT IN AN ORGANIZED HEALTH CARE EDUCATION/TRAINING PROGRAM

## 2021-07-15 PROCEDURE — 96372 THER/PROPH/DIAG INJ SC/IM: CPT

## 2021-07-15 PROCEDURE — 96375 TX/PRO/DX INJ NEW DRUG ADDON: CPT

## 2021-07-15 PROCEDURE — 700102 HCHG RX REV CODE 250 W/ 637 OVERRIDE(OP): Performed by: STUDENT IN AN ORGANIZED HEALTH CARE EDUCATION/TRAINING PROGRAM

## 2021-07-15 PROCEDURE — G0378 HOSPITAL OBSERVATION PER HR: HCPCS

## 2021-07-15 PROCEDURE — 81003 URINALYSIS AUTO W/O SCOPE: CPT

## 2021-07-15 PROCEDURE — 99225 PR SUBSEQUENT OBSERVATION CARE,LEVEL II: CPT | Performed by: STUDENT IN AN ORGANIZED HEALTH CARE EDUCATION/TRAINING PROGRAM

## 2021-07-15 PROCEDURE — 700111 HCHG RX REV CODE 636 W/ 250 OVERRIDE (IP): Performed by: HOSPITALIST

## 2021-07-15 PROCEDURE — 51798 US URINE CAPACITY MEASURE: CPT

## 2021-07-15 RX ORDER — QUETIAPINE FUMARATE 25 MG/1
25 TABLET, FILM COATED ORAL NIGHTLY
Status: DISCONTINUED | OUTPATIENT
Start: 2021-07-15 | End: 2021-07-22 | Stop reason: HOSPADM

## 2021-07-15 RX ADMIN — QUETIAPINE FUMARATE 25 MG: 25 TABLET ORAL at 21:40

## 2021-07-15 RX ADMIN — LISINOPRIL 10 MG: 10 TABLET ORAL at 05:00

## 2021-07-15 RX ADMIN — LEVOTHYROXINE SODIUM 50 MCG: 0.05 TABLET ORAL at 05:52

## 2021-07-15 RX ADMIN — CARVEDILOL 12.5 MG: 12.5 TABLET, FILM COATED ORAL at 05:52

## 2021-07-15 RX ADMIN — OMEPRAZOLE 20 MG: 20 CAPSULE, DELAYED RELEASE ORAL at 05:00

## 2021-07-15 RX ADMIN — ASPIRIN 81 MG: 81 TABLET, COATED ORAL at 05:00

## 2021-07-15 RX ADMIN — CARVEDILOL 12.5 MG: 12.5 TABLET, FILM COATED ORAL at 18:18

## 2021-07-15 RX ADMIN — ENALAPRILAT 1.25 MG: 1.25 INJECTION INTRAVENOUS at 10:10

## 2021-07-15 RX ADMIN — AMLODIPINE BESYLATE 5 MG: 5 TABLET ORAL at 05:00

## 2021-07-15 RX ADMIN — POLYETHYLENE GLYCOL 3350 1 PACKET: 17 POWDER, FOR SOLUTION ORAL at 05:00

## 2021-07-15 RX ADMIN — DOCUSATE SODIUM 100 MG: 100 CAPSULE ORAL at 05:00

## 2021-07-15 RX ADMIN — DOCUSATE SODIUM 100 MG: 100 CAPSULE ORAL at 18:17

## 2021-07-15 RX ADMIN — ENOXAPARIN SODIUM 30 MG: 30 INJECTION SUBCUTANEOUS at 05:00

## 2021-07-15 RX ADMIN — FOLIC ACID 1 MG: 1 TABLET ORAL at 05:00

## 2021-07-15 RX ADMIN — THERA TABS 1 TABLET: TAB at 05:00

## 2021-07-15 RX ADMIN — CYANOCOBALAMIN TAB 500 MCG 1000 MCG: 500 TAB at 05:00

## 2021-07-15 ASSESSMENT — PAIN DESCRIPTION - PAIN TYPE
TYPE: ACUTE PAIN
TYPE: ACUTE PAIN

## 2021-07-15 NOTE — DIETARY
"Nutrition Services Update: following for adequate nutritional intake    Day 12 of admit. Regular diet. ADL documentation shows intake has been variable, ranging from 0 - 100%.    Visited bedside, pt present and polite but unable to provide purposeful answers to questions regarding intake. However did say \"it depends on the day\" regarding Boost preference. Sitter at bedside states she has been trying to get pt to consume Boost, however pt will only take a few sips at a time and will often decline all together.     Of note, pt with very low estimated nutritional needs r/t age and low weight. REE per MSJ x 1.2 = 905 kcal/day. Pt very well may be meeting nutritional needs with variable intake.     Reviewed POLST on file and TF is not within plan of care. Therefore if PO intake continues to be poor, minimal interventions available. May consider appetite stimulant if pt consistently begins declining PO all together.     Plan/Recommend:   1. Continue Boost Plus TID  2. Encourage PO intake of meals and supplements   3. Please continue recording PO intake of meals and supplements  4. Nutrition Representative to see and obtain meal selection as able per pt mentation  5. RD will continue to monitor PO intake     RD following   "

## 2021-07-15 NOTE — PROGRESS NOTES
[facility] Medicine Daily Progress Note    Date of Service  7/15/2021    Chief Complaint  Lamar Dangelo is a 89 y.o. female admitted 7/3/2021 with   Chief Complaint   Patient presents with   • Fall     pt bib remsa from home, came in as TBI, had glf x 2 in 24 hrs. has hx of Dementia. arrived aaox2 Baseline. has bump in the back of head. no laceration. -blood thinners -asa. pt has been increasing falls the last 6 months. has bgl of 79         Hospital Course  This a 89-year-old female past medical history significant for hypothyroidism, hypertension, GERD, vascular dementia presented to the ER on 7/3/2021 after she was noted to have frequent ground-level fall.  Upon presentation in ER, she noted to have macrocytic anemia with hemoglobin of 11.6, clinically dehydrated, CT did not show any acute intracranial process, diffuse atrophy and periventricular white matter changes consistent with chronic small vessel disease.    It is noted that patient stays with her sister who was also demented and unfortunately now passed away on 7/9/2021 and patient cannot go back home.  Medical power of  being her neighbor.  Per her neighbor, patient CODE STATUS has been placed DNI/DNAR.  During the stay in hospital, patient continued to be monitored, her vital signs noted to be stable.  Patient alert and oriented x1 and pleasantly confused.    Patient noted to have macrocytic anemia but her vitamin B12 and TSH normal.  She does have severe protein calorie malnutrition, will increase p.o. intake of fluid, will provide IV fluid.    Interval Problem Update    7/10:  No acute events overnight, laying in the bed, denied any complaint.  Oriented x1 vital signs stable, pending dispo to memory care    7/11:  No acute events overnight.  Vital signs stable, currently saturating well on room air.  Plan of care discussed with the nursing staff.     7/12:  --No acute events overnight, noted to be sitting in a chair  Her BUN is elevated, will  continue IV fluid, encourage p.o. intake of fluid.  Patient has been medically cleared to be discharged to memory care, contacted in order, case management updated.      7/13:  No acute events overnight. Mildly distended abdomen without tenderness or guarding, KUB shows constipation, discussed with nurse to increase bowel care.    7/14:  No acute events overnight. Patient without bowel movement. Increased bowel care and will have nursing perform enema. COVID negative and quantiferon pending for placement in memory care.    7/15: Patient was agitated last night, had fall and did not hit head. Will start Seroquel nightly to re-orientate wake/sleep balance. Was increasingly somnolent this morning, however seems to be back to baseline. Poor PO intake today, continue IVF. Pending dispo to memory care.    Consultants/Specialty  None    Code Status  DNAR/DNI    Disposition  Patient is medically cleared.   Anticipate discharge to Memory care.      Patient is to be discharged to memory care, case management updated    Review of Systems  ROS   Unable to obtain 2/2 patient dementia/ mentation     Physical Exam  Temp:  [36.3 °C (97.3 °F)-36.9 °C (98.4 °F)] 36.3 °C (97.3 °F)  Pulse:  [60-76] 69  Resp:  [12-18] 18  BP: ()/(51-87) 68/51  SpO2:  [96 %-98 %] 98 %    Physical Exam  Vitals and nursing note reviewed.   Constitutional:       Appearance: Normal appearance.   HENT:      Head: Normocephalic and atraumatic.      Mouth/Throat:      Mouth: Mucous membranes are dry.      Pharynx: Oropharynx is clear.   Eyes:      Extraocular Movements: Extraocular movements intact.      Pupils: Pupils are equal, round, and reactive to light.   Cardiovascular:      Rate and Rhythm: Normal rate.   Pulmonary:      Effort: No respiratory distress.      Breath sounds: Normal breath sounds.   Abdominal:      General: There is distension.      Palpations: Abdomen is soft.      Tenderness: There is no abdominal tenderness. There is no guarding.    Musculoskeletal:      Cervical back: Neck supple.      Right lower leg: No edema.      Left lower leg: No edema.   Skin:     General: Skin is warm.   Neurological:      Mental Status: She is alert.      Comments: Alert and awake , knows her name , does follow commands     Psychiatric:         Mood and Affect: Mood normal.         Fluids    Intake/Output Summary (Last 24 hours) at 7/15/2021 1626  Last data filed at 7/15/2021 1200  Gross per 24 hour   Intake 240 ml   Output 500 ml   Net -260 ml       Laboratory      Recent Labs     07/14/21  0230   SODIUM 139   POTASSIUM 3.7   CHLORIDE 111   CO2 21   GLUCOSE 91   BUN 33*   CREATININE 0.86   CALCIUM 8.7                   Imaging  [id number]   Final Result      Moderate stool in the ascending and rectosigmoid colon, without obstruction.      CT-HEAD W/O   Final Result         1.  No acute intracranial process.      2. Diffuse atrophy and periventricular white matter changes consistent with chronic small vessel disease.           Assessment/Plan  Constipation  Assessment & Plan  Abdominal distention improved  Had few BMs last night after enema  Continue bowel care    Goals of care, counseling/discussion- (present on admission)  Assessment & Plan  Comfort care was discontinued, CODE STATUS changed to DNI/DN AR.  On 7/5  Discussed with patient DPOA  Neighbor on 7/9  Met with caretaker at bedside on 7/10    Urine ketones- (present on admission)  Assessment & Plan  Trace, likely starvation ketosis  Encourage good po intake of fluid    Severe protein-calorie malnutrition (HCC)- (present on admission)  Assessment & Plan  Due to dementia   Dietary consult    Gastroesophageal reflux disease without esophagitis- (present on admission)  Assessment & Plan  Continue omperazole, denied heartburn    Recurrent falls- (present on admission)  Assessment & Plan  Reported by neighbor, OLLIE bailey    Vitamin B12 deficiency- (present on admission)  Assessment & Plan  Resolved, vitamin  B12 at 3000    Dehydration- (present on admission)  Assessment & Plan  BUN/creatinine 53/0.95,  liekly 2/2 clinical dehydration   Continue IVF and trend    Macrocytic anemia- (present on admission)  Assessment & Plan  Vitamin B12 normal, supplement folic acid, TSH normal    Essential hypertension- (present on admission)  Assessment & Plan  Continue coreg , amlodipine along with pr anti-htn meds  Monitor BP    Vascular dementia without behavioral disturbance (HCC)- (present on admission)  Assessment & Plan  CT head showed diffuse atrophy and microvascular disease.     Will  require placement in a memory care unit.  PT/OT evals recommend post acute placement    Acquired hypothyroidism- (present on admission)  Assessment & Plan  Continue levothyroxine  TSH 1.9, stable       VTE prophylaxis: enoxaparin ppx     I have performed a physical exam and reviewed and updated ROS and Plan today (7/15/2021). In review of yesterday's note (7/14/2021), there are no changes except as documented above.

## 2021-07-15 NOTE — CARE PLAN
The patient is Watcher - Medium risk of patient condition declining or worsening    Shift Goals  Clinical Goals: Maintain patient safety  Patient Goals: Rest  Family Goals: Placement in a longterm facility    Progress made toward(s) clinical / shift goals:    Problem: Mobility  Goal: Patient's capacity to carry out activities will improve  Outcome: Progressing   Pt ambulates to and from bathroom often. Self turns in bed.    Patient is not progressing towards the following goals:  Problem: Skin Integrity  Goal: Skin integrity is maintained or improved  Outcome: Not Progressing   Pt fell this shift sustaining LLE skin tear.

## 2021-07-15 NOTE — CARE PLAN
Problem: Nutritional:  Goal: Achieve adequate nutritional intake  Description: Patient will consume avg >33% of meals and supplements.  Outcome: Progressing   PO variable, but given low estimated nutritional needs, suspect pt is having sufficient caloric intake - however will continue to monitor. RD following.

## 2021-07-15 NOTE — PROGRESS NOTES
At 2200 Pt attempted to get out of bed by herself to get to the bathroom and fell several steps away from the bed. Pt did not hit her head, fell onto left leg and hip. LLE skin tear present and patient reports left hip pain- achy at a 3-4/10.  JOHNNY Kidd was the first person able to run into the room and called a staff assist. Patient assisted into a standing position with the help of two people. Pt walked back to bed. New bedding and gown placed on patient. Lap belt added. Pt demonstrates ability to take lap belt off.     All fall risk measures where in place at the time of fall: yellow socks on patient, bed rails up x 3, bed alarm was on, walker out of sight in bathroom, team aware of pt's high fall risk status.     MD notified of fall and skin tear. Pharmacy called. Pt does not have any next of kin so Pt's neighbor Ariella (emergency contact) called and updated on pt's condition. Report filed.    Pt rearranged in bed and adjusted; pt states she feels okay, but has tolerable hip and leg pain to the left side. Fall risk measures in place.

## 2021-07-16 PROCEDURE — 96372 THER/PROPH/DIAG INJ SC/IM: CPT

## 2021-07-16 PROCEDURE — A9270 NON-COVERED ITEM OR SERVICE: HCPCS | Performed by: STUDENT IN AN ORGANIZED HEALTH CARE EDUCATION/TRAINING PROGRAM

## 2021-07-16 PROCEDURE — 99225 PR SUBSEQUENT OBSERVATION CARE,LEVEL II: CPT | Performed by: INTERNAL MEDICINE

## 2021-07-16 PROCEDURE — 700102 HCHG RX REV CODE 250 W/ 637 OVERRIDE(OP): Performed by: STUDENT IN AN ORGANIZED HEALTH CARE EDUCATION/TRAINING PROGRAM

## 2021-07-16 PROCEDURE — 700102 HCHG RX REV CODE 250 W/ 637 OVERRIDE(OP): Performed by: HOSPITALIST

## 2021-07-16 PROCEDURE — 700105 HCHG RX REV CODE 258: Performed by: STUDENT IN AN ORGANIZED HEALTH CARE EDUCATION/TRAINING PROGRAM

## 2021-07-16 PROCEDURE — A9270 NON-COVERED ITEM OR SERVICE: HCPCS | Performed by: HOSPITALIST

## 2021-07-16 PROCEDURE — G0378 HOSPITAL OBSERVATION PER HR: HCPCS

## 2021-07-16 PROCEDURE — 700111 HCHG RX REV CODE 636 W/ 250 OVERRIDE (IP): Performed by: HOSPITALIST

## 2021-07-16 RX ADMIN — ENOXAPARIN SODIUM 30 MG: 30 INJECTION SUBCUTANEOUS at 04:45

## 2021-07-16 RX ADMIN — POLYETHYLENE GLYCOL 3350 1 PACKET: 17 POWDER, FOR SOLUTION ORAL at 04:45

## 2021-07-16 RX ADMIN — QUETIAPINE FUMARATE 25 MG: 25 TABLET ORAL at 20:42

## 2021-07-16 RX ADMIN — ASPIRIN 81 MG: 81 TABLET, COATED ORAL at 04:44

## 2021-07-16 RX ADMIN — FOLIC ACID 1 MG: 1 TABLET ORAL at 04:44

## 2021-07-16 RX ADMIN — CARVEDILOL 12.5 MG: 12.5 TABLET, FILM COATED ORAL at 09:37

## 2021-07-16 RX ADMIN — CYANOCOBALAMIN TAB 500 MCG 1000 MCG: 500 TAB at 04:44

## 2021-07-16 RX ADMIN — SODIUM CHLORIDE: 9 INJECTION, SOLUTION INTRAVENOUS at 20:43

## 2021-07-16 RX ADMIN — LISINOPRIL 10 MG: 10 TABLET ORAL at 04:44

## 2021-07-16 RX ADMIN — THERA TABS 1 TABLET: TAB at 04:44

## 2021-07-16 RX ADMIN — AMLODIPINE BESYLATE 5 MG: 5 TABLET ORAL at 04:44

## 2021-07-16 RX ADMIN — LEVOTHYROXINE SODIUM 50 MCG: 0.05 TABLET ORAL at 04:45

## 2021-07-16 RX ADMIN — CARVEDILOL 12.5 MG: 12.5 TABLET, FILM COATED ORAL at 17:30

## 2021-07-16 ASSESSMENT — PAIN DESCRIPTION - PAIN TYPE
TYPE: ACUTE PAIN

## 2021-07-16 ASSESSMENT — FIBROSIS 4 INDEX: FIB4 SCORE: 2.61

## 2021-07-16 NOTE — WOUND TEAM
No injuries to bilateral heels. Skin boggy, but blanching per RN. Advanced wound care not needed. Continue offloading precautions.

## 2021-07-16 NOTE — DISCHARGE PLANNING
Anticipated Discharge Disposition: Memory Care     Action: Lsw spoke with Kaylie at [facility] ([phone]) to get clarification on dispo. Kaylie stated that working with Neuro RN MESSI, assessment was done and facility tour was completed with POA. [facility] has accepted pt and are awaiting POA response on if they would like to move forward with placement. Quantiferon TB test has been completed.     Barriers to Discharge: POA placement acceptance    Plan: F/u with [facility]

## 2021-07-16 NOTE — PROGRESS NOTES
4 Eyes Skin Assessment Completed by , RN and JOHNNY Richard.    Head WDL  Ears WDL  Nose WDL  Mouth WDL  Neck WDL  Breast/Chest WDL  Shoulder Blades WDL  Spine WDL  (R) Arm/Elbow/Hand Redness, Blanching, Bruising and Abrasion  (L) Arm/Elbow/Hand Redness, Blanching, Bruising and Abrasion  Abdomen WDL  Groin Redness and Blanching  Scrotum/Coccyx/Buttocks Redness, Blanching and Excoriation, large bruise from recent falls.   (R) Leg Bruising  (L) Leg Redness, Blanching, Bruising and Abrasion  (R) Heel/Foot/Toe Redness, Blanching and Boggy  (L) Heel/Foot/Toe Redness, Blanching and Boggy          Devices In Places Blood Pressure Cuff      Interventions In Place Heel Mepilex, Waffle Overlay, Pillows, Q2 Turns, Barrier Cream, Dri-Dimitry Pads, Heels Loaded W/Pillows and Pressure Redistribution Mattress    Possible Skin Injury No    Pictures Uploaded Into Epic N/A  Wound Consult Placed N/A, wound following  RN Wound Prevention Protocol Ordered Yes

## 2021-07-16 NOTE — PROGRESS NOTES
Hospital Medicine Daily Progress Note    Date of Service  7/16/2021    Chief Complaint  Lamar Dangelo is a 89 y.o. female admitted 7/3/2021 with   Chief Complaint   Patient presents with   • Fall     pt bib remsa from home, came in as TBI, had glf x 2 in 24 hrs. has hx of Dementia. arrived aaox2 Baseline. has bump in the back of head. no laceration. -blood thinners -asa. pt has been increasing falls the last 6 months. has bgl of 79         Hospital Course  This a 89-year-old female past medical history significant for hypothyroidism, hypertension, GERD, vascular dementia presented to the ER on 7/3/2021 after she was noted to have frequent ground-level fall.  Upon presentation in ER, she noted to have macrocytic anemia with hemoglobin of 11.6, clinically dehydrated, CT did not show any acute intracranial process, diffuse atrophy and periventricular white matter changes consistent with chronic small vessel disease.    It is noted that patient stays with her sister who was also demented and unfortunately now passed away on 7/9/2021 and patient cannot go back home.  Medical power of  being her neighbor.  Per her neighbor, patient CODE STATUS has been placed DNI/DNAR.  During the stay in hospital, patient continued to be monitored, her vital signs noted to be stable.  Patient alert and oriented x1 and pleasantly confused.    Patient noted to have macrocytic anemia but her vitamin B12 and TSH normal.  She does have severe protein calorie malnutrition, will increase p.o. intake of fluid, will provide IV fluid.    Had fall 7/14.    Interval Problem Update  - patient accepted to memory care at [facility] but POA wanted to think about it and get affairs in order  - Yesterday was hypertensive to the 190s was given Vasotec however became hypotensive to the 60s, increased IVF rate to 100 for 2.5L, BP improved      Consultants/Specialty  None    Code Status  DNAR/DNI    Disposition  Patient is medically cleared.    Anticipate discharge to Memory care.      Patient is to be discharged to memory care, case management updated    Review of Systems  ROS   Unable to obtain 2/2 patient dementia/ mentation     Physical Exam  Temp:  [36.2 °C (97.1 °F)-36.8 °C (98.3 °F)] 36.2 °C (97.1 °F)  Pulse:  [62-79] 62  Resp:  [14-16] 14  BP: (110-145)/(57-75) 145/57  SpO2:  [94 %-95 %] 94 %    Physical Exam  Vitals and nursing note reviewed.   Constitutional:       Appearance: Normal appearance.   HENT:      Head: Normocephalic and atraumatic.      Mouth/Throat:      Mouth: Mucous membranes are dry.      Pharynx: Oropharynx is clear.   Eyes:      Extraocular Movements: Extraocular movements intact.      Pupils: Pupils are equal, round, and reactive to light.   Cardiovascular:      Rate and Rhythm: Normal rate.   Pulmonary:      Effort: No respiratory distress.      Breath sounds: Normal breath sounds.   Abdominal:      General: Bowel sounds are normal. There is no distension.      Palpations: Abdomen is soft.      Tenderness: There is no abdominal tenderness. There is no guarding.   Musculoskeletal:      Cervical back: Neck supple.      Right lower leg: No edema.      Left lower leg: No edema.   Skin:     General: Skin is warm.   Neurological:      Mental Status: She is alert. She is disoriented.      Comments: Alert and awake , knows her name , does follow commands     Psychiatric:         Mood and Affect: Mood normal.         Fluids    Intake/Output Summary (Last 24 hours) at 7/16/2021 1601  Last data filed at 7/16/2021 1454  Gross per 24 hour   Intake --   Output 200 ml   Net -200 ml       Laboratory      Recent Labs     07/14/21  0230   SODIUM 139   POTASSIUM 3.7   CHLORIDE 111   CO2 21   GLUCOSE 91   BUN 33*   CREATININE 0.86   CALCIUM 8.7                   Imaging  [id number]   Final Result      Moderate stool in the ascending and rectosigmoid colon, without obstruction.      CT-HEAD W/O   Final Result         1.  No acute  intracranial process.      2. Diffuse atrophy and periventricular white matter changes consistent with chronic small vessel disease.           Assessment/Plan  Constipation  Assessment & Plan  Abdominal distention improved  Had few BMs last night after enema  Continue bowel care    Goals of care, counseling/discussion- (present on admission)  Assessment & Plan  Comfort care was discontinued, CODE STATUS changed to DNI/DN AR.  On 7/5  Discussed with patient DPOA  Neighbor on 7/9  Met with caretaker at bedside on 7/10    Urine ketones- (present on admission)  Assessment & Plan  Trace, likely starvation ketosis  Encourage good po intake of fluid    Severe protein-calorie malnutrition (HCC)- (present on admission)  Assessment & Plan  Due to dementia   Dietary consult    Gastroesophageal reflux disease without esophagitis- (present on admission)  Assessment & Plan  Continue omperazole, denied heartburn    Recurrent falls- (present on admission)  Assessment & Plan  Reported by neighbor, PT leo    Vitamin B12 deficiency- (present on admission)  Assessment & Plan  Resolved, vitamin B12 at 3000    Dehydration- (present on admission)  Assessment & Plan  BUN/creatinine 53/0.95,  liekly 2/2 clinical dehydration   Continue IVF and trend    Macrocytic anemia- (present on admission)  Assessment & Plan  Vitamin B12 normal, supplement folic acid, TSH normal    Essential hypertension- (present on admission)  Assessment & Plan  Continue coreg , amlodipine along with pr anti-htn meds  Monitor BP    Vascular dementia without behavioral disturbance (HCC)- (present on admission)  Assessment & Plan  CT head showed diffuse atrophy and microvascular disease.     Will  require placement in a memory care unit.  PT/OT luisals recommend post acute placement    Acquired hypothyroidism- (present on admission)  Assessment & Plan  Continue levothyroxine  TSH 1.9, stable       VTE prophylaxis: enoxaparin ppx     I have performed a physical exam and  reviewed and updated ROS and Plan today (7/16/2021). In review of yesterday's note (7/15/2021), there are no changes except as documented above.

## 2021-07-16 NOTE — DISCHARGE PLANNING
Anticipated Discharge Disposition: Memory Care    Action: Lsw called [facility]A [facility] to f/u on [facility] acceptance. Lsw explained that pt is medically cleared and has been accepted by [facility], move is awaiting POA response. [facility] stated that she needed some time to get legal things settled as well pt's finances. [facility] stated that she will have a better response either Monday and Tuesday.     Barriers to Discharge: POA placement acceptance    Plan: F/u with [facility]

## 2021-07-16 NOTE — PROGRESS NOTES
Patient transported to unit via bed with transport. All belongings at bedside. Patient A&Ox1 to self. Patient is medical and on room air. Did have a fall at previous unit yesterday, all fall risk precautions in place. Sitter at bedside. Call light within reach.

## 2021-07-16 NOTE — CARE PLAN
The patient is Stable - Low risk of patient condition declining or worsening    Shift Goals  Clinical Goals: Safety  Patient Goals: Rest        Problem: Skin Integrity  Goal: Skin integrity is maintained or improved  Outcome: Progressing  Note: Patient on Q2 turns, heel and sacral mepilex, waffle cushion, pillows, barrier paste.     Problem: Fall Risk  Goal: Patient will remain free from falls  Outcome: Progressing  Note: Bed locked and in lowest position. Bed alarm on. Treaded socks provided. Call light and belongings within reach.  Patient educated to call for assistance. Pt verbalized understanding. Hourly rounding in place.

## 2021-07-16 NOTE — PROGRESS NOTES
Assumed care of patient at bedside report from NOC RN. Updated on POC. Patient currently A & O x 1 to self ; on room air; up x1 handheld assist; without complaints of acute pain. Patient able to successfully demonstrate ability to remove lap belt. Safety sitter at bedside. Call light within reach. Whiteboard updated. Fall precautions in place. Bed locked and in lowest position. All questions answered. No other needs indicated at this time.

## 2021-07-17 PROCEDURE — G0378 HOSPITAL OBSERVATION PER HR: HCPCS

## 2021-07-17 PROCEDURE — 700102 HCHG RX REV CODE 250 W/ 637 OVERRIDE(OP): Performed by: STUDENT IN AN ORGANIZED HEALTH CARE EDUCATION/TRAINING PROGRAM

## 2021-07-17 PROCEDURE — A9270 NON-COVERED ITEM OR SERVICE: HCPCS | Performed by: STUDENT IN AN ORGANIZED HEALTH CARE EDUCATION/TRAINING PROGRAM

## 2021-07-17 PROCEDURE — 700111 HCHG RX REV CODE 636 W/ 250 OVERRIDE (IP): Performed by: HOSPITALIST

## 2021-07-17 PROCEDURE — A9270 NON-COVERED ITEM OR SERVICE: HCPCS | Performed by: HOSPITALIST

## 2021-07-17 PROCEDURE — 96372 THER/PROPH/DIAG INJ SC/IM: CPT

## 2021-07-17 PROCEDURE — 700102 HCHG RX REV CODE 250 W/ 637 OVERRIDE(OP): Performed by: HOSPITALIST

## 2021-07-17 PROCEDURE — 99225 PR SUBSEQUENT OBSERVATION CARE,LEVEL II: CPT | Performed by: INTERNAL MEDICINE

## 2021-07-17 RX ADMIN — ASPIRIN 81 MG: 81 TABLET, COATED ORAL at 06:27

## 2021-07-17 RX ADMIN — LEVOTHYROXINE SODIUM 50 MCG: 0.05 TABLET ORAL at 06:27

## 2021-07-17 RX ADMIN — AMLODIPINE BESYLATE 5 MG: 5 TABLET ORAL at 06:28

## 2021-07-17 RX ADMIN — CARVEDILOL 12.5 MG: 12.5 TABLET, FILM COATED ORAL at 09:06

## 2021-07-17 RX ADMIN — DOCUSATE SODIUM 100 MG: 100 CAPSULE ORAL at 06:27

## 2021-07-17 RX ADMIN — QUETIAPINE FUMARATE 25 MG: 25 TABLET ORAL at 22:15

## 2021-07-17 RX ADMIN — CARVEDILOL 12.5 MG: 12.5 TABLET, FILM COATED ORAL at 17:49

## 2021-07-17 RX ADMIN — OMEPRAZOLE 20 MG: 20 CAPSULE, DELAYED RELEASE ORAL at 06:27

## 2021-07-17 RX ADMIN — CYANOCOBALAMIN TAB 500 MCG 1000 MCG: 500 TAB at 06:27

## 2021-07-17 RX ADMIN — LISINOPRIL 10 MG: 10 TABLET ORAL at 06:28

## 2021-07-17 RX ADMIN — POLYETHYLENE GLYCOL 3350 1 PACKET: 17 POWDER, FOR SOLUTION ORAL at 06:27

## 2021-07-17 RX ADMIN — FOLIC ACID 1 MG: 1 TABLET ORAL at 06:28

## 2021-07-17 RX ADMIN — DOCUSATE SODIUM 100 MG: 100 CAPSULE ORAL at 17:48

## 2021-07-17 RX ADMIN — THERA TABS 1 TABLET: TAB at 06:28

## 2021-07-17 RX ADMIN — ENOXAPARIN SODIUM 30 MG: 30 INJECTION SUBCUTANEOUS at 06:27

## 2021-07-17 ASSESSMENT — COGNITIVE AND FUNCTIONAL STATUS - GENERAL
SUGGESTED CMS G CODE MODIFIER DAILY ACTIVITY: CI
WALKING IN HOSPITAL ROOM: A LITTLE
DAILY ACTIVITIY SCORE: 23
MOBILITY SCORE: 17
TURNING FROM BACK TO SIDE WHILE IN FLAT BAD: A LITTLE
SUGGESTED CMS G CODE MODIFIER MOBILITY: CK
MOVING TO AND FROM BED TO CHAIR: A LITTLE
CLIMB 3 TO 5 STEPS WITH RAILING: A LOT
MOVING FROM LYING ON BACK TO SITTING ON SIDE OF FLAT BED: A LITTLE
TOILETING: A LITTLE
STANDING UP FROM CHAIR USING ARMS: A LITTLE

## 2021-07-17 ASSESSMENT — FIBROSIS 4 INDEX: FIB4 SCORE: 2.61

## 2021-07-17 ASSESSMENT — PAIN DESCRIPTION - PAIN TYPE: TYPE: ACUTE PAIN

## 2021-07-17 NOTE — PROGRESS NOTES
Assumed care of patient at bedside report from NOC RN. Updated on POC. Patient currently A & O x 1; on room air; up x1 handheld assist; without complaints of acute pain.  Call light within reach. Whiteboard updated. Fall precautions in place. Bed locked and in lowest position. All questions answered. No other needs indicated at this time.     Eczema  Eczema is a broad term for a group of skin conditions that cause skin to become rough and inflamed. Each type of eczema has different triggers, symptoms, and treatments. Eczema of any type is usually itchy and symptoms range from mild to severe.  Eczema and its symptoms are not spread from person to person (are not contagious). It can appear on different parts of the body at different times. Your eczema may not look the same as someone else's eczema.  What are the types of eczema?  Atopic dermatitis  This is a long-term (chronic) skin disease that keeps coming back (recurring). Usual symptoms are dry skin and small, solid pimples that may swell and leak fluid (weep).  Contact dermatitis    This happens when something irritates the skin and causes a rash. The irritation can come from substances that you are allergic to (allergens), such as poison ivy, chemicals, or medicines that were applied to your skin.  Dyshidrotic eczema  This is a form of eczema on the hands and feet. It shows up as very itchy, fluid-filled blisters. It can affect people of any age, but is more common before age 40.  Hand eczema    This causes very itchy areas of skin on the palms and sides of the hands and fingers. This type of eczema is common in industrial jobs where you may be exposed to many different types of irritants.  Lichen simplex chronicus  This type of eczema occurs when a person constantly scratches one area of the body. Repeated scratching of the area leads to thickened skin (lichenification). Lichen simplex chronicus can occur along with other types of eczema. It is more common in adults, but may be seen in children as well.  Nummular eczema  This is a common type of eczema. It has no known cause. It typically causes a red, circular, crusty lesion (plaque) that may be itchy. Scratching may become a habit and can cause bleeding. Nummular eczema occurs most often in people of middle-age or older. It most often affects the  "hands.  Seborrheic dermatitis  This is a common skin disease that mainly affects the scalp. It may also affect any oily areas of the body, such as the face, sides of nose, eyebrows, ears, eyelids, and chest. It is marked by small scaling and redness of the skin (erythema). This can affect people of all ages. In infants, this condition is known as \"cradle cap.\"  Stasis dermatitis  This is a common skin disease that usually appears on the legs and feet. It most often occurs in people who have a condition that prevents blood from being pumped through the veins in the legs (chronic venous insufficiency). Stasis dermatitis is a chronic condition that needs long-term management.  How is eczema diagnosed?  Your health care provider will examine your skin and review your medical history. He or she may also give you skin patch tests. These tests involve taking patches that contain possible allergens and placing them on your back. He or she will then check in a few days to see if an allergic reaction occurred.  What are the common treatments?  Treatment for eczema is based on the type of eczema you have. Hydrocortisone steroid medicine can relieve itching quickly and help reduce inflammation. This medicine may be prescribed or obtained over-the-counter, depending on the strength of the medicine that is needed.  Follow these instructions at home:  · Take over-the-counter and prescription medicines only as told by your health care provider.  · Use creams or ointments to moisturize your skin. Do not use lotions.  · Learn what triggers or irritates your symptoms. Avoid these things.  · Treat symptom flare-ups quickly.  · Do not itch your skin. This can make your rash worse.  · Keep all follow-up visits as told by your health care provider. This is important.  Where to find more information  · The American Academy of Dermatology: www.aad.org  · The National Eczema Association: www.nationaleczema.org  Contact a health care provider " if:  · You have serious itching, even with treatment.  · You regularly scratch your skin until it bleeds.  · Your rash looks different than usual.  · Your skin is painful, swollen, or more red than usual.  · You have a fever.  Summary  · There are eight general types of eczema. Each type has different triggers.  · Eczema of any type causes itching that may range from mild to severe.  · Treatment varies based on the type of eczema you have. Hydrocortisone steroid medicine can help with itching and inflammation.  · Protecting your skin is the best way to prevent eczema. Use moisturizers and lotions. Avoid triggers and irritants, and treat flare-ups quickly.  This information is not intended to replace advice given to you by your health care provider. Make sure you discuss any questions you have with your health care provider.  Document Revised: 11/30/2018 Document Reviewed: 05/03/2018  Elsevier Patient Education © 2020 [employer]

## 2021-07-17 NOTE — CARE PLAN
The patient is Stable - Low risk of patient condition declining or worsening    Shift Goals  Clinical Goals: no falls  Patient Goals: Rest  Family Goals: Not at bedside       Problem: Knowledge Deficit - Standard  Goal: Patient and family/care givers will demonstrate understanding of plan of care, disease process/condition, diagnostic tests and medications  Outcome: Progressing     Problem: Psychosocial  Goal: Patient's level of anxiety will decrease  Outcome: Progressing     Problem: Communication  Goal: The ability to communicate needs accurately and effectively will improve  Outcome: Progressing     Problem: Urinary Elimination  Goal: Establish and maintain regular urinary output  Outcome: Progressing     Problem: Mobility  Goal: Patient's capacity to carry out activities will improve  Outcome: Progressing     Problem: Fall Risk  Goal: Patient will remain free from falls  Outcome: Progressing

## 2021-07-17 NOTE — CARE PLAN
The patient is Stable - Low risk of patient condition declining or worsening    Shift Goals  Clinical Goals: no falls  Patient Goals: Rest  Family Goals: Not at bedside     Progress made toward(s) clinical / shift goals:  no falls sitter at bedside    Patient is not progressing towards the following goals:

## 2021-07-17 NOTE — DISCHARGE PLANNING
Anticipated Discharge Disposition: Memory Care     Action: LCSW called POA Acacia to f/u on [facility] acceptance. LCSW explained that pt is medically cleared. Acacia stated that she needed some time to get legal things settled as well pt's finances.Pt's income is not enough to cover an assisted living and discharge maybe a group home would be more appropriate due to income level. Acacia was vague where the extra monies would come from and suggested to look at a cheaper option if she can not memory care.   Acacia would not tell this writer were an yother sources of would come from and reports pt does not qualify for Medicaid. Acacia stated that she will have a better response Tuesday.      Barriers to Discharge: POA placement acceptance     Plan: F/u with Acacia

## 2021-07-18 PROCEDURE — 700102 HCHG RX REV CODE 250 W/ 637 OVERRIDE(OP): Performed by: HOSPITALIST

## 2021-07-18 PROCEDURE — 99225 PR SUBSEQUENT OBSERVATION CARE,LEVEL II: CPT | Performed by: INTERNAL MEDICINE

## 2021-07-18 PROCEDURE — 700102 HCHG RX REV CODE 250 W/ 637 OVERRIDE(OP): Performed by: STUDENT IN AN ORGANIZED HEALTH CARE EDUCATION/TRAINING PROGRAM

## 2021-07-18 PROCEDURE — A9270 NON-COVERED ITEM OR SERVICE: HCPCS | Performed by: HOSPITALIST

## 2021-07-18 PROCEDURE — G0378 HOSPITAL OBSERVATION PER HR: HCPCS

## 2021-07-18 PROCEDURE — A9270 NON-COVERED ITEM OR SERVICE: HCPCS | Performed by: STUDENT IN AN ORGANIZED HEALTH CARE EDUCATION/TRAINING PROGRAM

## 2021-07-18 PROCEDURE — 700111 HCHG RX REV CODE 636 W/ 250 OVERRIDE (IP): Performed by: HOSPITALIST

## 2021-07-18 PROCEDURE — 96372 THER/PROPH/DIAG INJ SC/IM: CPT

## 2021-07-18 RX ADMIN — QUETIAPINE FUMARATE 25 MG: 25 TABLET ORAL at 23:38

## 2021-07-18 RX ADMIN — AMLODIPINE BESYLATE 5 MG: 5 TABLET ORAL at 06:32

## 2021-07-18 RX ADMIN — ENOXAPARIN SODIUM 30 MG: 30 INJECTION SUBCUTANEOUS at 06:31

## 2021-07-18 RX ADMIN — POLYETHYLENE GLYCOL 3350 1 PACKET: 17 POWDER, FOR SOLUTION ORAL at 06:32

## 2021-07-18 RX ADMIN — LISINOPRIL 10 MG: 10 TABLET ORAL at 06:32

## 2021-07-18 RX ADMIN — OMEPRAZOLE 20 MG: 20 CAPSULE, DELAYED RELEASE ORAL at 06:32

## 2021-07-18 RX ADMIN — DOCUSATE SODIUM 100 MG: 100 CAPSULE ORAL at 18:06

## 2021-07-18 RX ADMIN — CARVEDILOL 12.5 MG: 12.5 TABLET, FILM COATED ORAL at 18:06

## 2021-07-18 ASSESSMENT — FIBROSIS 4 INDEX: FIB4 SCORE: 2.61

## 2021-07-18 NOTE — PROGRESS NOTES
Hospital Medicine Daily Progress Note    Date of Service  7/17/2021    Chief Complaint  Lamar Dangelo is a 89 y.o. female admitted 7/3/2021 with   Chief Complaint   Patient presents with   • Fall     pt bib remsa from home, came in as TBI, had glf x 2 in 24 hrs. has hx of Dementia. arrived aaox2 Baseline. has bump in the back of head. no laceration. -blood thinners -asa. pt has been increasing falls the last 6 months. has bgl of 79         Hospital Course  This a 89-year-old female past medical history significant for hypothyroidism, hypertension, GERD, vascular dementia presented to the ER on 7/3/2021 after she was noted to have frequent ground-level fall.  Upon presentation in ER, she noted to have macrocytic anemia with hemoglobin of 11.6, clinically dehydrated, CT did not show any acute intracranial process, diffuse atrophy and periventricular white matter changes consistent with chronic small vessel disease.    It is noted that patient stays with her sister who was also demented and unfortunately now passed away on 7/9/2021 and patient cannot go back home.  Medical power of  being her neighbor.  Per her neighbor, patient CODE STATUS has been placed DNI/DNAR.  During the stay in hospital, patient continued to be monitored, her vital signs noted to be stable.  Patient alert and oriented x1 and pleasantly confused.    Patient noted to have macrocytic anemia but her vitamin B12 and TSH normal.  She does have severe protein calorie malnutrition, will increase p.o. intake of fluid, will provide IV fluid.    Had fall 7/14.    Interval Problem Update  - No acute overnight events  - FLORECITA called POA again to discuss dc planning but Acacia (POA) confirmed she doesn't want patient to go anywhere yet until she has a better idea about her finances      Consultants/Specialty  None    Code Status  DNAR/DNI    Disposition  Patient is medically cleared.   Anticipate discharge to Memory care vs group home?      Patient is  to be discharged to memory care, case management updated    Review of Systems  ROS   Unable to obtain 2/2 patient dementia/ mentation     Physical Exam  Temp:  [36.4 °C (97.5 °F)-37.5 °C (99.5 °F)] 36.4 °C (97.5 °F)  Pulse:  [56-80] 80  Resp:  [16] 16  BP: (108-160)/() 141/100  SpO2:  [93 %-96 %] 95 %    Physical Exam  Vitals and nursing note reviewed.   Constitutional:       Appearance: Normal appearance.   HENT:      Head: Normocephalic and atraumatic.      Mouth/Throat:      Mouth: Mucous membranes are dry.      Pharynx: Oropharynx is clear.   Eyes:      Extraocular Movements: Extraocular movements intact.      Pupils: Pupils are equal, round, and reactive to light.   Cardiovascular:      Rate and Rhythm: Normal rate.   Pulmonary:      Effort: No respiratory distress.      Breath sounds: Normal breath sounds.   Abdominal:      General: Bowel sounds are normal. There is no distension.      Palpations: Abdomen is soft.      Tenderness: There is no abdominal tenderness. There is no guarding.   Musculoskeletal:      Cervical back: Neck supple.      Right lower leg: No edema.      Left lower leg: No edema.   Skin:     General: Skin is warm.   Neurological:      Mental Status: She is alert. She is disoriented.      Comments: Alert and awake , knows her name , does follow commands     Psychiatric:         Mood and Affect: Mood normal.         Fluids    Intake/Output Summary (Last 24 hours) at 7/17/2021 1742  Last data filed at 7/17/2021 0900  Gross per 24 hour   Intake 220 ml   Output --   Net 220 ml       Laboratory                        Imaging  [id number] VIEW   Final Result      Moderate stool in the ascending and rectosigmoid colon, without obstruction.      CT-HEAD W/O   Final Result         1.  No acute intracranial process.      2. Diffuse atrophy and periventricular white matter changes consistent with chronic small vessel disease.           Assessment/Plan  Constipation  Assessment & Plan  Abdominal  distention improved  Had few BMs last night after enema  Continue bowel care    Goals of care, counseling/discussion- (present on admission)  Assessment & Plan  Comfort care was discontinued, CODE STATUS changed to DNI/DN AR.  On 7/5  Discussed with patient AGAPITO  Neighbor on 7/9  Met with caretaker at bedside on 7/10    Urine ketones- (present on admission)  Assessment & Plan  Trace, likely starvation ketosis  Encourage good po intake of fluid    Severe protein-calorie malnutrition (HCC)- (present on admission)  Assessment & Plan  Due to dementia   Dietary consult    Gastroesophageal reflux disease without esophagitis- (present on admission)  Assessment & Plan  Continue omperazole, denied heartburn    Recurrent falls- (present on admission)  Assessment & Plan  Reported by neighbor, PT leo    Vitamin B12 deficiency- (present on admission)  Assessment & Plan  Resolved, vitamin B12 at 3000    Dehydration- (present on admission)  Assessment & Plan  BUN/creatinine 53/0.95,  liekly 2/2 clinical dehydration   Continue IVF and trend    Macrocytic anemia- (present on admission)  Assessment & Plan  Vitamin B12 normal, supplement folic acid, TSH normal    Essential hypertension- (present on admission)  Assessment & Plan  Continue coreg , amlodipine along with pr anti-htn meds  Monitor BP    Vascular dementia without behavioral disturbance (HCC)- (present on admission)  Assessment & Plan  CT head showed diffuse atrophy and microvascular disease.     Will  require placement in a memory care unit.  PT/OT leo recommend post acute placement    Acquired hypothyroidism- (present on admission)  Assessment & Plan  Continue levothyroxine  TSH 1.9, stable       VTE prophylaxis: enoxaparin ppx     I have performed a physical exam and reviewed and updated ROS and Plan today (7/17/2021). In review of yesterday's note (7/16/2021), there are no changes except as documented above.

## 2021-07-18 NOTE — PROGRESS NOTES
[facility] Medicine Daily Progress Note    Date of Service  7/18/2021    Chief Complaint  Lamar Dangelo is a 89 y.o. female admitted 7/3/2021 with   Chief Complaint   Patient presents with   • Fall     pt bib remsa from home, came in as TBI, had glf x 2 in 24 hrs. has hx of Dementia. arrived aaox2 Baseline. has bump in the back of head. no laceration. -blood thinners -asa. pt has been increasing falls the last 6 months. has bgl of 79         Hospital Course  This a 89-year-old female past medical history significant for hypothyroidism, hypertension, GERD, vascular dementia presented to the ER on 7/3/2021 after she was noted to have frequent ground-level fall.  Upon presentation in ER, she noted to have macrocytic anemia with hemoglobin of 11.6, clinically dehydrated, CT did not show any acute intracranial process, diffuse atrophy and periventricular white matter changes consistent with chronic small vessel disease.    It is noted that patient stays with her sister who was also demented and unfortunately now passed away on 7/9/2021 and patient cannot go back home.  Medical power of  being her neighbor.  Per her neighbor, patient CODE STATUS has been placed DNI/DNAR.  During the stay in hospital, patient continued to be monitored, her vital signs noted to be stable.  Patient alert and oriented x1 and pleasantly confused.    Patient noted to have macrocytic anemia but her vitamin B12 and TSH normal.  She does have severe protein calorie malnutrition, will increase p.o. intake of fluid, will provide IV fluid.    Had fall 7/14.    Interval Problem Update  - No acute overnight events  - This morning refused meds  - afebrile  - working on dc planning with POA, said she'll have more info on Monday/Tuesday  - more distended today, had BM yesterday though      Consultants/Specialty  None    Code Status  DNAR/DNI    Disposition  Patient is medically cleared.   Anticipate discharge to Memory care vs group  home?      Patient is to be discharged to memory care, case management updated    Review of Systems  ROS   Unable to obtain 2/2 patient dementia/ mentation     Physical Exam  Temp:  [36.1 °C (97 °F)-36.8 °C (98.2 °F)] 36.7 °C (98.1 °F)  Pulse:  [74-80] 77  Resp:  [16] 16  BP: (108-146)/() 137/79  SpO2:  [91 %-95 %] 91 %    Physical Exam  Vitals and nursing note reviewed.   Constitutional:       Appearance: Normal appearance.   HENT:      Head: Normocephalic and atraumatic.      Mouth/Throat:      Mouth: Mucous membranes are dry.      Pharynx: Oropharynx is clear.   Eyes:      Extraocular Movements: Extraocular movements intact.      Pupils: Pupils are equal, round, and reactive to light.   Cardiovascular:      Rate and Rhythm: Normal rate.   Pulmonary:      Effort: No respiratory distress.      Breath sounds: Normal breath sounds.   Abdominal:      General: Bowel sounds are normal. There is distension.      Palpations: Abdomen is soft.      Tenderness: There is no abdominal tenderness. There is no guarding.   Musculoskeletal:      Cervical back: Neck supple.      Right lower leg: No edema.      Left lower leg: No edema.   Skin:     General: Skin is warm.   Neurological:      Mental Status: She is alert. She is disoriented.      Comments: Alert and awake , knows her name , does follow commands     Psychiatric:         Mood and Affect: Mood normal.         Fluids  No intake or output data in the 24 hours ending 07/18/21 1049    Laboratory                        Imaging  [id number] VIEW   Final Result      Moderate stool in the ascending and rectosigmoid colon, without obstruction.      CT-HEAD W/O   Final Result         1.  No acute intracranial process.      2. Diffuse atrophy and periventricular white matter changes consistent with chronic small vessel disease.           Assessment/Plan  Constipation  Assessment & Plan  Abdominal distention improved  Had few BMs last night after enema  Continue bowel  care    Goals of care, counseling/discussion- (present on admission)  Assessment & Plan  Comfort care was discontinued, CODE STATUS changed to DNI/DN AR.  On 7/5  Discussed with patient AGAPITO  Neighbor on 7/9  Met with caretaker at bedside on 7/10    Urine ketones- (present on admission)  Assessment & Plan  Trace, likely starvation ketosis  Encourage good po intake of fluid    Severe protein-calorie malnutrition (HCC)- (present on admission)  Assessment & Plan  Due to dementia   Dietary consult    Gastroesophageal reflux disease without esophagitis- (present on admission)  Assessment & Plan  Continue omperazole, denied heartburn    Recurrent falls- (present on admission)  Assessment & Plan  Reported by neighbor, OLLIE bailey    Vitamin B12 deficiency- (present on admission)  Assessment & Plan  Resolved, vitamin B12 at 3000    Dehydration- (present on admission)  Assessment & Plan  BUN/creatinine 53/0.95,  liekly 2/2 clinical dehydration   Continue IVF and trend    Macrocytic anemia- (present on admission)  Assessment & Plan  Vitamin B12 normal, supplement folic acid, TSH normal    Essential hypertension- (present on admission)  Assessment & Plan  Continue coreg , amlodipine along with pr anti-htn meds  Monitor BP    Vascular dementia without behavioral disturbance (HCC)- (present on admission)  Assessment & Plan  CT head showed diffuse atrophy and microvascular disease.     Will  require placement in a memory care unit.  PT/OT luisals recommend post acute placement    Acquired hypothyroidism- (present on admission)  Assessment & Plan  Continue levothyroxine  TSH 1.9, stable       VTE prophylaxis: enoxaparin ppx     I have performed a physical exam and reviewed and updated ROS and Plan today (7/18/2021). In review of yesterday's note (7/17/2021), there are no changes except as documented above.

## 2021-07-19 PROCEDURE — 700111 HCHG RX REV CODE 636 W/ 250 OVERRIDE (IP): Performed by: HOSPITALIST

## 2021-07-19 PROCEDURE — 700102 HCHG RX REV CODE 250 W/ 637 OVERRIDE(OP): Performed by: HOSPITALIST

## 2021-07-19 PROCEDURE — G0378 HOSPITAL OBSERVATION PER HR: HCPCS

## 2021-07-19 PROCEDURE — 96372 THER/PROPH/DIAG INJ SC/IM: CPT

## 2021-07-19 PROCEDURE — 700102 HCHG RX REV CODE 250 W/ 637 OVERRIDE(OP): Performed by: STUDENT IN AN ORGANIZED HEALTH CARE EDUCATION/TRAINING PROGRAM

## 2021-07-19 PROCEDURE — 99225 PR SUBSEQUENT OBSERVATION CARE,LEVEL II: CPT | Performed by: INTERNAL MEDICINE

## 2021-07-19 PROCEDURE — A9270 NON-COVERED ITEM OR SERVICE: HCPCS | Performed by: HOSPITALIST

## 2021-07-19 PROCEDURE — A9270 NON-COVERED ITEM OR SERVICE: HCPCS | Performed by: STUDENT IN AN ORGANIZED HEALTH CARE EDUCATION/TRAINING PROGRAM

## 2021-07-19 PROCEDURE — 94760 N-INVAS EAR/PLS OXIMETRY 1: CPT

## 2021-07-19 RX ADMIN — ENOXAPARIN SODIUM 30 MG: 30 INJECTION SUBCUTANEOUS at 07:01

## 2021-07-19 RX ADMIN — LISINOPRIL 10 MG: 10 TABLET ORAL at 07:01

## 2021-07-19 RX ADMIN — QUETIAPINE FUMARATE 25 MG: 25 TABLET ORAL at 21:05

## 2021-07-19 RX ADMIN — AMLODIPINE BESYLATE 5 MG: 5 TABLET ORAL at 07:01

## 2021-07-19 RX ADMIN — CYANOCOBALAMIN TAB 500 MCG 1000 MCG: 500 TAB at 07:04

## 2021-07-19 RX ADMIN — CARVEDILOL 12.5 MG: 12.5 TABLET, FILM COATED ORAL at 07:04

## 2021-07-19 RX ADMIN — ASPIRIN 81 MG: 81 TABLET, COATED ORAL at 07:01

## 2021-07-19 RX ADMIN — POLYETHYLENE GLYCOL 3350 1 PACKET: 17 POWDER, FOR SOLUTION ORAL at 07:01

## 2021-07-19 RX ADMIN — LEVOTHYROXINE SODIUM 50 MCG: 0.05 TABLET ORAL at 07:01

## 2021-07-19 ASSESSMENT — FIBROSIS 4 INDEX: FIB4 SCORE: 2.61

## 2021-07-19 NOTE — DISCHARGE PLANNING
Action:  [facility] admissions paperwork completed and signed by Dr. Quintana.  Forms given to DEXTER Yang.

## 2021-07-19 NOTE — PROGRESS NOTES
Hospital Medicine Daily Progress Note    Date of Service  7/19/2021    Chief Complaint  Lamar Dangelo is a 89 y.o. female admitted 7/3/2021 with   Chief Complaint   Patient presents with   • Fall     pt bib remsa from home, came in as TBI, had glf x 2 in 24 hrs. has hx of Dementia. arrived aaox2 Baseline. has bump in the back of head. no laceration. -blood thinners -asa. pt has been increasing falls the last 6 months. has bgl of 79         Hospital Course  This a 89-year-old female past medical history significant for hypothyroidism, hypertension, GERD, vascular dementia presented to the ER on 7/3/2021 after she was noted to have frequent ground-level fall.  Upon presentation in ER, she noted to have macrocytic anemia with hemoglobin of 11.6, clinically dehydrated, CT did not show any acute intracranial process, diffuse atrophy and periventricular white matter changes consistent with chronic small vessel disease.    It is noted that patient stays with her sister who was also demented and unfortunately now passed away on 7/9/2021 and patient cannot go back home.  Medical power of  being her neighbor.  Per her neighbor, patient CODE STATUS has been placed DNI/DNAR.  During the stay in hospital, patient continued to be monitored, her vital signs noted to be stable.  Patient alert and oriented x1 and pleasantly confused.    Patient noted to have macrocytic anemia but her vitamin B12 and TSH normal.  She does have severe protein calorie malnutrition, will increase p.o. intake of fluid, will provide IV fluid.    Had fall 7/14.    Interval Problem Update  - No acute overnight events  - afebrile  - working on dc planning with POA      Consultants/Specialty  None    Code Status  DNAR/DNI    Disposition  Patient is medically cleared.   Anticipate discharge to Memory care vs group home?      Patient is to be discharged to memory care, case management updated    Review of Systems  ROS   Unable to obtain 2/2 patient  dementia/ mentation     Physical Exam  Temp:  [36.1 °C (97 °F)-36.8 °C (98.2 °F)] 36.1 °C (97 °F)  Pulse:  [63-83] 83  Resp:  [16] 16  BP: (115-166)/(62-87) 133/87  SpO2:  [94 %-96 %] 96 %    Physical Exam  Vitals and nursing note reviewed.   Constitutional:       Appearance: Normal appearance.   HENT:      Head: Normocephalic and atraumatic.      Mouth/Throat:      Mouth: Mucous membranes are dry.      Pharynx: Oropharynx is clear.   Eyes:      Extraocular Movements: Extraocular movements intact.      Pupils: Pupils are equal, round, and reactive to light.   Cardiovascular:      Rate and Rhythm: Normal rate.   Pulmonary:      Effort: No respiratory distress.      Breath sounds: Normal breath sounds.   Abdominal:      General: Bowel sounds are normal. There is distension.      Palpations: Abdomen is soft.      Tenderness: There is no abdominal tenderness. There is no guarding.   Musculoskeletal:      Cervical back: Neck supple.      Right lower leg: No edema.      Left lower leg: No edema.   Skin:     General: Skin is warm.   Neurological:      Mental Status: She is alert. She is disoriented.      Comments: Alert and awake , knows her name , does follow commands     Psychiatric:         Mood and Affect: Mood normal.         Fluids  No intake or output data in the 24 hours ending 07/19/21 1654    Laboratory                        Imaging  [id number] VIEW   Final Result      Moderate stool in the ascending and rectosigmoid colon, without obstruction.      CT-HEAD W/O   Final Result         1.  No acute intracranial process.      2. Diffuse atrophy and periventricular white matter changes consistent with chronic small vessel disease.           Assessment/Plan  Constipation  Assessment & Plan  Abdominal distention improved  Had few BMs last night after enema  Continue bowel care    Goals of care, counseling/discussion- (present on admission)  Assessment & Plan  Comfort care was discontinued, CODE STATUS changed to  DNI/DN AR.  On 7/5  Discussed with patient AGAPITO  Neighbor on 7/9  Met with caretaker at bedside on 7/10    Urine ketones- (present on admission)  Assessment & Plan  Trace, likely starvation ketosis  Encourage good po intake of fluid    Severe protein-calorie malnutrition (HCC)- (present on admission)  Assessment & Plan  Due to dementia   Dietary consult    Gastroesophageal reflux disease without esophagitis- (present on admission)  Assessment & Plan  Continue omperazole, denied heartburn    Recurrent falls- (present on admission)  Assessment & Plan  Reported by neighbor, PT leo    Vitamin B12 deficiency- (present on admission)  Assessment & Plan  Resolved, vitamin B12 at 3000    Dehydration- (present on admission)  Assessment & Plan  BUN/creatinine 53/0.95,  liekly 2/2 clinical dehydration   Continue IVF and trend    Macrocytic anemia- (present on admission)  Assessment & Plan  Vitamin B12 normal, supplement folic acid, TSH normal    Essential hypertension- (present on admission)  Assessment & Plan  Continue coreg , amlodipine along with pr anti-htn meds  Monitor BP    Vascular dementia without behavioral disturbance (HCC)- (present on admission)  Assessment & Plan  CT head showed diffuse atrophy and microvascular disease.     Will  require placement in a memory care unit.  PT/OT evals recommend post acute placement    Acquired hypothyroidism- (present on admission)  Assessment & Plan  Continue levothyroxine  TSH 1.9, stable       VTE prophylaxis: enoxaparin ppx     I have performed a physical exam and reviewed and updated ROS and Plan today (7/19/2021). In review of yesterday's note (7/18/2021), there are no changes except as documented above.

## 2021-07-19 NOTE — CARE PLAN
The patient is Stable - Low risk of patient condition declining or worsening    Shift Goals  Clinical Goals: safety and dc planning  Patient Goals: rest  Family Goals: Not at bedside     Progress made toward(s) clinical / shift goals:    Problem: Knowledge Deficit - Standard  Goal: Patient and family/care givers will demonstrate understanding of plan of care, disease process/condition, diagnostic tests and medications  Outcome: Progressing     Problem: Pain - Standard  Goal: Alleviation of pain or a reduction in pain to the patient’s comfort goal  Outcome: Progressing     Problem: Psychosocial  Goal: Patient's level of anxiety will decrease  Outcome: Progressing  Goal: Patient's ability to verbalize feelings about condition will improve  Outcome: Progressing  Goal: Patient's ability to re-evaluate and adapt role responsibilities will improve  Outcome: Progressing  Goal: Patient and family will demonstrate ability to cope with life altering diagnosis and/or procedure  Outcome: Progressing  Goal: Spiritual and cultural needs incorporated into hospitalization  Outcome: Progressing     Problem: Communication  Goal: The ability to communicate needs accurately and effectively will improve  Outcome: Progressing     Problem: Discharge Barriers/Planning  Goal: Patient's continuum of care needs are met  Outcome: Progressing     Problem: Hemodynamics  Goal: Patient's hemodynamics, fluid balance and neurologic status will be stable or improve  Outcome: Progressing     Problem: Respiratory  Goal: Patient will achieve/maintain optimum respiratory ventilation and gas exchange  Outcome: Progressing     Problem: Chest Tube Management  Goal: Complications related to chest tube will be avoided or minimized  Outcome: Progressing     Problem: Fluid Volume  Goal: Fluid volume balance will be maintained  Outcome: Progressing     Problem: Dysphagia  Goal: Dysphagia will improve  Outcome: Progressing     Problem: Risk for Aspiration  Goal:  Patient's risk for aspiration will be absent or decrease  Outcome: Progressing     Problem: Nutrition  Goal: Patient's nutritional and fluid intake will be adequate or improve  Outcome: Progressing  Goal: Enteral nutrition will be maintained or improve  Outcome: Progressing  Goal: Enteral nutrition will be maintained or improve  Outcome: Progressing     Problem: Urinary Elimination  Goal: Establish and maintain regular urinary output  Outcome: Progressing     Problem: Bowel Elimination  Goal: Establish and maintain regular bowel function  Outcome: Progressing     Problem: Gastrointestinal Irritability  Goal: Nausea and vomiting will be absent or improve  Outcome: Progressing  Goal: Diarrhea will be absent or improved  Outcome: Progressing     Problem: Mobility  Goal: Patient's capacity to carry out activities will improve  Outcome: Progressing     Problem: Self Care  Goal: Patient will have the ability to perform ADLs independently or with assistance (bathe, groom, dress, toilet and feed)  Outcome: Progressing     Problem: Infection - Standard  Goal: Patient will remain free from infection  Outcome: Progressing     Problem: Wound/ / Incision Healing  Goal: Patient's wound/surgical incision will decrease in size and heals properly  Outcome: Progressing     Problem: Skin Integrity  Goal: Skin integrity is maintained or improved  Outcome: Progressing     Problem: Fall Risk  Goal: Patient will remain free from falls  Outcome: Progressing       Patient is not progressing towards the following goals:

## 2021-07-19 NOTE — CARE PLAN
The patient is Stable - Low risk of patient condition declining or worsening    Shift Goals  Clinical Goals: safety and dc planning  Patient Goals: rest  Family Goals: Not at bedside     Progress made toward(s) clinical / shift goals:  repeated reorientation - encouraging compliance    Patient is not progressing towards the following goals: impulsive - unable to recall need to call for assist before getting out of bed      Problem: Knowledge Deficit - Standard  Goal: Patient and family/care givers will demonstrate understanding of plan of care, disease process/condition, diagnostic tests and medications  Outcome: Not Progressing

## 2021-07-19 NOTE — DISCHARGE PLANNING
Anticipated Discharge Disposition: long-term    Action: pt acceped to [facility].     Barriers to Discharge: DPOA Acacia decision to move pt to [facility].    Plan: Will f/u with Acacia regarding [facility] acceptance and moving pt there.    1415 - Attempted to contact Acacia to discuss pt discharging to [facility] tomorrow, no answer, left message. Faxed [facility] admission paperwork to Kaylie with admissions at [phone].

## 2021-07-20 PROCEDURE — 700102 HCHG RX REV CODE 250 W/ 637 OVERRIDE(OP): Performed by: HOSPITALIST

## 2021-07-20 PROCEDURE — 700102 HCHG RX REV CODE 250 W/ 637 OVERRIDE(OP): Performed by: STUDENT IN AN ORGANIZED HEALTH CARE EDUCATION/TRAINING PROGRAM

## 2021-07-20 PROCEDURE — A9270 NON-COVERED ITEM OR SERVICE: HCPCS | Performed by: HOSPITALIST

## 2021-07-20 PROCEDURE — A9270 NON-COVERED ITEM OR SERVICE: HCPCS | Performed by: STUDENT IN AN ORGANIZED HEALTH CARE EDUCATION/TRAINING PROGRAM

## 2021-07-20 PROCEDURE — G0378 HOSPITAL OBSERVATION PER HR: HCPCS

## 2021-07-20 PROCEDURE — 99225 PR SUBSEQUENT OBSERVATION CARE,LEVEL II: CPT | Performed by: HOSPITALIST

## 2021-07-20 RX ADMIN — FOLIC ACID 1 MG: 1 TABLET ORAL at 06:15

## 2021-07-20 RX ADMIN — OMEPRAZOLE 20 MG: 20 CAPSULE, DELAYED RELEASE ORAL at 06:15

## 2021-07-20 RX ADMIN — DOCUSATE SODIUM 100 MG: 100 CAPSULE ORAL at 18:00

## 2021-07-20 RX ADMIN — AMLODIPINE BESYLATE 5 MG: 5 TABLET ORAL at 06:13

## 2021-07-20 RX ADMIN — LISINOPRIL 10 MG: 10 TABLET ORAL at 06:12

## 2021-07-20 RX ADMIN — QUETIAPINE FUMARATE 25 MG: 25 TABLET ORAL at 20:12

## 2021-07-20 RX ADMIN — CARVEDILOL 12.5 MG: 12.5 TABLET, FILM COATED ORAL at 08:41

## 2021-07-20 RX ADMIN — LEVOTHYROXINE SODIUM 50 MCG: 0.05 TABLET ORAL at 06:14

## 2021-07-20 RX ADMIN — POLYETHYLENE GLYCOL 3350 1 PACKET: 17 POWDER, FOR SOLUTION ORAL at 06:16

## 2021-07-20 RX ADMIN — ASPIRIN 81 MG: 81 TABLET, COATED ORAL at 06:16

## 2021-07-20 RX ADMIN — CARVEDILOL 12.5 MG: 12.5 TABLET, FILM COATED ORAL at 17:30

## 2021-07-20 ASSESSMENT — PAIN DESCRIPTION - PAIN TYPE: TYPE: ACUTE PAIN

## 2021-07-20 NOTE — CARE PLAN
Problem: Knowledge Deficit - Standard  Goal: Patient and family/care givers will demonstrate understanding of plan of care, disease process/condition, diagnostic tests and medications  Outcome: Progressing     Problem: Fall Risk  Goal: Patient will remain free from falls  Outcome: Progressing   The patient is Watcher - Medium risk of patient condition declining or worsening    Shift Goals  Clinical Goals: safety  Patient Goals: rest  Family Goals: Not at bedside     Progress made toward(s) clinical / shift goals:  Pt high fall risk with previous fall during this admission. Pt ambulated to wheel chair. Activity provided. Pt reoriented, and agreeable to POC at this time. Fall precautions in place.     Patient is not progressing towards the following goals:Pt at baselines of A&Ox1, to self. Pt pleasantly confused, able to reorient.

## 2021-07-20 NOTE — PROGRESS NOTES
Bedside report received from JOHNNY Clifford. Pt A&Ox1 to self. Pt reoriented. POC discussed with pt; all questions answered at this time. Fall precautions in place. Call light within reach. Will continue to monitor.

## 2021-07-20 NOTE — DISCHARGE PLANNING
Anticipated Discharge Disposition: [facility]    Action: [facility] ready to accept patient. LSW spoke to patient's POA Acacia. Acacia reports that  was on vacation on Friday so she couldn't reach him. Acacia said she is working with the  to get into patient's finances. Acacia said she will know more today after she goes to the bank. Acacia to call this LSW. Acacia still in agreement for patient to go to [facility] but she just needs to have finances for it.    Barriers to Discharge: POA getting control of finances    Plan: LSW to f/u with Acacia

## 2021-07-20 NOTE — PROGRESS NOTES
Pt resting this morning, did wake up to take meds and able to get pt to drink some juice/supplements, pt did not want any food this morning, denies pain, A&Ox1, bed/chair alarm in use, pending placement.

## 2021-07-20 NOTE — CARE PLAN
The patient is Stable - Low risk of patient condition declining or worsening    Shift Goals  Clinical Goals: safety  Patient Goals: rest  Family Goals: Not at bedside     Progress made toward(s) clinical / shift goals:  nutrition, activity    Patient is not progressing towards the following goals:knowledge      Problem: Knowledge Deficit - Standard  Goal: Patient and family/care givers will demonstrate understanding of plan of care, disease process/condition, diagnostic tests and medications  Outcome: Not Met

## 2021-07-20 NOTE — DISCHARGE SUMMARY
Discharge Summary    CHIEF COMPLAINT ON ADMISSION  Chief Complaint   Patient presents with   • Fall     pt bib remsa from home, came in as TBI, had glf x 2 in 24 hrs. has hx of Dementia. arrived aaox2 Baseline. has bump in the back of head. no laceration. -blood thinners -asa. pt has been increasing falls the last 6 months. has bgl of 79       Reason for Admission  Possible TBI     CODE STATUS  Prior    HPI & HOSPITAL COURSE  Patient is a 89-year-old female with past medical history significant for hypothyroidism, hypertension, GERD, vascular dementia and severe protein calorie malnutriton.  She was brought to the ER on 7/3/2021 after she was noted to have frequent ground-level fall.  Upon presentation in ER, she noted to have macrocytic anemia with hemoglobin of 11.6, clinically dehydrated, CT did not show any acute intracranial process, diffuse atrophy and periventricular white matter changes consistent with chronic small vessel disease.    Her neighbor is her POA and confirmed that patient is a DNR/ DNI.  She was initially placed on comfort care but as she was not imminently dying, comfort care measures were removed. She is being admitted to a memory care unit.     Therefore, she is discharged in fair and stable condition to skilled nursing facility.    The patient met 2-midnight criteria for an inpatient stay at the time of discharge.      FOLLOW UP ITEMS POST DISCHARGE  pcp    DISCHARGE DIAGNOSES  Active Problems:    Acquired hypothyroidism POA: Yes    Vascular dementia without behavioral disturbance (HCC) POA: Yes    Essential hypertension POA: Yes    Macrocytic anemia POA: Yes    Vitamin B12 deficiency POA: Yes    Recurrent falls POA: Yes    Gastroesophageal reflux disease without esophagitis POA: Yes    Severe protein-calorie malnutrition (HCC) POA: Yes    Goals of care, counseling/discussion POA: Yes  Resolved Problems:    Dehydration POA: Yes    Urine ketones POA: Yes    Constipation POA:  Unknown      FOLLOW UP  Future Appointments   Date Time Provider Department Center   12/22/2021  8:30 AM John Garces M.D. [id number], Emergency Dept  [address] [facility] [id number] [phone]          MEDICATIONS ON DISCHARGE     Medication List      START taking these medications      Instructions   amLODIPine 5 MG Tabs  Start taking on: July 23, 2021  Commonly known as: NORVASC   Doctor's comments: Hold sbp less 120  Take 1 tablet by mouth every day.  Dose: 5 mg     artificial tears 1.4 % Soln   Administer 2 Drops into both eyes every 6 hours as needed (Dry eyes   ).  Dose: 2 Drop     aspirin 81 MG EC tablet  Start taking on: July 23, 2021   Take 1 tablet by mouth every day.  Dose: 81 mg     docusate sodium 100 MG Caps   Take 100 mg by mouth 2 times a day.  Dose: 100 mg     folic acid 1 MG Tabs  Start taking on: July 23, 2021  Commonly known as: FOLVITE   Take 1 tablet by mouth every day.  Dose: 1 mg     lisinopril 10 MG Tabs  Start taking on: July 23, 2021  Commonly known as: PRINIVIL   Doctor's comments: Hold sbp less 110  Take 1 tablet by mouth every day.  Dose: 10 mg     multivitamin Tabs  Start taking on: July 23, 2021   Take 1 tablet by mouth every day.  Dose: 1 tablet     polyethylene glycol/lytes 17 g Pack  Start taking on: July 23, 2021  Commonly known as: MIRALAX   Take 1 Packet by mouth every day.  Dose: 17 g     QUEtiapine 25 MG Tabs  Commonly known as: Seroquel   Take 1 tablet by mouth at bedtime.  Dose: 25 mg        CONTINUE taking these medications      Instructions   carvedilol 12.5 MG Tabs  Commonly known as: COREG   TAKE 1 TABLET BY MOUTH TWICE DAILY.     FeroSul 325 (65 Fe) MG tablet  Generic drug: ferrous sulfate   TAKE 1 TABLET BY MOUTH ONCE DAILY.     levothyroxine 50 MCG Tabs  Commonly known as: SYNTHROID   TAKE 1 TABLET BY MOUTH EVERY MORNING 30 MINUTES BEFORE BREAKFAST.     omeprazole 20 MG delayed-release  capsule  Commonly known as: PRILOSEC   TAKE (1) CAPSULE BY MOUTH TWICE DAILY.     vitamin B-12 1000 MCG Tabs   TAKE 1 TABLET BY MOUTH ONCE DAILY.        STOP taking these medications    potassium chloride SA 20 MEQ Tbcr  Commonly known as: Kdur            Allergies  Allergies   Allergen Reactions   • Nkda [No Known Drug Allergy]        DIET  No orders of the defined types were placed in this encounter.      ACTIVITY  As tolerated and directed by skilled nursing.  Weight bearing as tolerated    LINES, DRAINS, AND WOUNDS  This is an automated list. Peripheral IVs will be removed prior to discharge.       Wound 08/03/20 Skin Tear Elbow Right (Active)       Wound 07/11/21 Skin Tear Elbow Lateral Left (Active)   Wound Image   07/11/21 1700   Site Assessment Black;Brown;Healing ridge 07/19/21 1930   Periwound Assessment Clean;Dry;Intact;Fragile;Red 07/19/21 1930   Margins Defined edges 07/19/21 1930   Drainage Amount None 07/19/21 1930   Treatments Offloading 07/19/21 1930   Dressing Options Open to Air 07/19/21 1930   Dressing Changed Observed 07/17/21 0900   Dressing Status Open to Air 07/19/21 1930       Wound 07/13/21 Pressure Injury Heel Bilateral Redness, non-blanching (Active)   Site Assessment Clean;Dry;Red 07/19/21 1930   Periwound Assessment Clean;Dry;Intact;Red 07/19/21 1930   Margins Attached edges 07/16/21 2006   Drainage Amount None 07/19/21 1930   Treatments Offloading 07/19/21 1930   Dressing Options Mepilex Heel 07/19/21 1930   Dressing Changed Observed 07/19/21 1930   Dressing Status Clean;Dry;Intact 07/19/21 1930   Dressing Change/Treatment Frequency Every 72 hrs, and As Needed 07/17/21 2015       Wound 07/14/21 Skin Tear Tibia Proximal Left LLE Skin tear (Active)   Site Assessment Clean;Pink;Red;Brown 07/19/21 1930   Periwound Assessment Clean;Dry;Intact 07/19/21 1930   Margins Unattached edges 07/18/21 0800   Closure Open to air 07/19/21 1930   Drainage Amount None 07/19/21 1930   Drainage Description  Other (Comment) 07/19/21 1930   Treatments Offloading;Site care 07/17/21 0900   Dressing Options Open to Air 07/19/21 1930   Dressing Changed Observed 07/17/21 2015   Dressing Status Open to Air 07/19/21 1930                  MENTAL STATUS ON TRANSFER  Axox to person only     CONSULTATIONS      PROCEDURES  [id number] VIEW   Final Result      Moderate stool in the ascending and rectosigmoid colon, without obstruction.      CT-HEAD W/O   Final Result         1.  No acute intracranial process.      2. Diffuse atrophy and periventricular white matter changes consistent with chronic small vessel disease.            LABORATORY  Lab Results   Component Value Date    SODIUM 139 07/14/2021    POTASSIUM 3.7 07/14/2021    CHLORIDE 111 07/14/2021    CO2 21 07/14/2021    GLUCOSE 91 07/14/2021    BUN 33 (H) 07/14/2021    CREATININE 0.86 07/14/2021        Lab Results   Component Value Date    WBC 4.4 (L) 07/12/2021    HEMOGLOBIN 12.8 07/12/2021    HEMATOCRIT 39.3 07/12/2021    PLATELETCT 223 07/12/2021        Total time of the discharge process exceeds 35 minutes.

## 2021-07-20 NOTE — PROGRESS NOTES
[facility] Medicine Daily Progress Note    Date of Service  7/20/2021    Chief Complaint  Lamar Dangelo is a 89 y.o. female admitted 7/3/2021 with   Chief Complaint   Patient presents with   • Fall     pt bib remsa from home, came in as TBI, had glf x 2 in 24 hrs. has hx of Dementia. arrived aaox2 Baseline. has bump in the back of head. no laceration. -blood thinners -asa. pt has been increasing falls the last 6 months. has bgl of 79         Hospital Course  Patient is a 89-year-old female with past medical history significant for hypothyroidism, hypertension, GERD, vascular dementia and severe protein calorie malnutriton.  She was brought to the ER on 7/3/2021 after she was noted to have frequent ground-level fall.  Upon presentation in ER, she noted to have macrocytic anemia with hemoglobin of 11.6, clinically dehydrated, CT did not show any acute intracranial process, diffuse atrophy and periventricular white matter changes consistent with chronic small vessel disease.    Her neighbor is her POA and confirmed that patient is a DNR/ DNI.  She was initially placed on comfort care but as she was not imminently dying, comfort care measures were removed     Interval Problem Update  She is axox person only, currently content play with a stuffed animal. Denies pain, following commands.   Apparently Healthcare POA is waiting for financial authorization for transfer to [facility]    Consultants/Specialty  None    Code Status  DNAR/DNI    Disposition  Patient is medically cleared.   Anticipate discharge to Memory care       Patient is to be discharged to memory care when accepted    Review of Systems  ROS   Unable to obtain 2/2 patient dementia/ mentation     Physical Exam  Temp:  [36.1 °C (97 °F)-36.6 °C (97.8 °F)] 36.2 °C (97.1 °F)  Pulse:  [68-83] 76  Resp:  [14-16] 14  BP: (129-142)/(68-87) 132/68  SpO2:  [94 %-97 %] 94 %    Physical Exam  Vitals and nursing note reviewed. Exam conducted with a chaperone present.    Constitutional:       General: She is not in acute distress.     Appearance: Normal appearance. She is not diaphoretic.   HENT:      Head: Normocephalic and atraumatic.      Nose: Nose normal.      Mouth/Throat:      Mouth: Mucous membranes are dry.      Pharynx: Oropharynx is clear.   Eyes:      Extraocular Movements: Extraocular movements intact.      Pupils: Pupils are equal, round, and reactive to light.   Cardiovascular:      Rate and Rhythm: Normal rate and regular rhythm.      Pulses: Normal pulses.      Heart sounds: Normal heart sounds.   Pulmonary:      Effort: Pulmonary effort is normal. No respiratory distress.      Breath sounds: Normal breath sounds.   Abdominal:      General: Abdomen is flat. Bowel sounds are normal. There is no distension.      Palpations: Abdomen is soft.      Tenderness: There is no abdominal tenderness. There is no guarding.   Musculoskeletal:         General: No swelling or deformity. Normal range of motion.      Cervical back: Neck supple.      Right lower leg: No edema.      Left lower leg: No edema.   Skin:     General: Skin is warm and dry.      Capillary Refill: Capillary refill takes less than 2 seconds.   Neurological:      General: No focal deficit present.      Mental Status: She is alert. She is disoriented.      Cranial Nerves: No cranial nerve deficit.      Comments: Alert and awake, following some simple commands     Psychiatric:         Mood and Affect: Mood normal.         Behavior: Behavior normal.         Fluids    Intake/Output Summary (Last 24 hours) at 7/20/2021 1358  Last data filed at 7/20/2021 0900  Gross per 24 hour   Intake 340 ml   Output --   Net 340 ml       Laboratory                        Imaging  [id number] VIEW   Final Result      Moderate stool in the ascending and rectosigmoid colon, without obstruction.      CT-HEAD W/O   Final Result         1.  No acute intracranial process.      2. Diffuse atrophy and periventricular white matter changes  consistent with chronic small vessel disease.           Assessment/Plan  Constipation  Assessment & Plan  Abdominal distention improved  Had few BMs last night after enema  Continue bowel care    Goals of care, counseling/discussion- (present on admission)  Assessment & Plan  Comfort care was discontinued, CODE STATUS changed to DNI/DN AR.  On 7/5  Discussed with patient AGAPITO  Neighbor on 7/9  Met with caretaker at bedside on 7/10    Urine ketones- (present on admission)  Assessment & Plan  Trace, likely starvation ketosis  Encourage good po intake of fluid    Severe protein-calorie malnutrition (HCC)- (present on admission)  Assessment & Plan  Due to dementia       Gastroesophageal reflux disease without esophagitis- (present on admission)  Assessment & Plan  Continue omperazole, denied heartburn    Recurrent falls- (present on admission)  Assessment & Plan  Reported by neighbor  Continue supportive care  Memory care placement pending    Vitamin B12 deficiency- (present on admission)  Assessment & Plan  Resolved, vitamin B12 at 3000    Dehydration- (present on admission)  Assessment & Plan  resolved    Macrocytic anemia- (present on admission)  Assessment & Plan  Vitamin B12 normal, supplement folic acid, TSH normal    Essential hypertension- (present on admission)  Assessment & Plan  Continue coreg , amlodipine along with pr anti-htn meds  Monitor BP    Vascular dementia without behavioral disturbance (HCC)- (present on admission)  Assessment & Plan  CT head showed diffuse atrophy and microvascular disease.     Will  require placement in a memory care unit - awaiting acceptance    Acquired hypothyroidism- (present on admission)  Assessment & Plan  Continue levothyroxine  TSH 1.9       VTE prophylaxis: enoxaparin ppx     I have performed a physical exam and reviewed and updated ROS and Plan today (7/20/2021). In review of yesterday's note (7/19/2021), there are no changes except as documented above.

## 2021-07-20 NOTE — CARE PLAN
Problem: Nutritional:  Goal: Achieve adequate nutritional intake  Description: Patient will consume avg >33% of meals and supplements.  Outcome: Not Met     See RD note

## 2021-07-20 NOTE — DIETARY
Nutrition Services: Update   Day 17 of admit.  Lamar Dangelo is a 89 y.o. female with admitting DX of Vascular dementia with delirium     Pt is currently on Regular diet. PO intake generally <25%; however CNA states she ate >50% lunch today. Refusing Boost today. Wt 41.9 kg via bed scale (trend up from 39 kg 7/11/21). Body mass index is 16.9 kg/m². Underweight. Enteral nutrition not generally recommended in patients with dementia, messaged MD requesting consideration of appetite stimulant as appropriate.     Malnutrition Risk: severe malnutrition per RD documentation 7/9.     Recommendations/Plan:  1. Regular diet, Boost Plus with meals, feeder/supervision as needed   2. Encourage intake of meals  3. Document intake of all meals as % taken in ADL's to provide interdisciplinary communication across all shifts.   4. Monitor weight.  5. Nutrition rep will continue to see patient for ongoing meal and snack preferences.    RD following

## 2021-07-21 PROCEDURE — 700111 HCHG RX REV CODE 636 W/ 250 OVERRIDE (IP): Performed by: HOSPITALIST

## 2021-07-21 PROCEDURE — 99225 PR SUBSEQUENT OBSERVATION CARE,LEVEL II: CPT | Performed by: HOSPITALIST

## 2021-07-21 PROCEDURE — 700102 HCHG RX REV CODE 250 W/ 637 OVERRIDE(OP): Performed by: HOSPITALIST

## 2021-07-21 PROCEDURE — 94760 N-INVAS EAR/PLS OXIMETRY 1: CPT

## 2021-07-21 PROCEDURE — A9270 NON-COVERED ITEM OR SERVICE: HCPCS | Performed by: HOSPITALIST

## 2021-07-21 PROCEDURE — A9270 NON-COVERED ITEM OR SERVICE: HCPCS | Performed by: STUDENT IN AN ORGANIZED HEALTH CARE EDUCATION/TRAINING PROGRAM

## 2021-07-21 PROCEDURE — G0378 HOSPITAL OBSERVATION PER HR: HCPCS

## 2021-07-21 PROCEDURE — 700102 HCHG RX REV CODE 250 W/ 637 OVERRIDE(OP): Performed by: STUDENT IN AN ORGANIZED HEALTH CARE EDUCATION/TRAINING PROGRAM

## 2021-07-21 RX ADMIN — FOLIC ACID 1 MG: 1 TABLET ORAL at 06:07

## 2021-07-21 RX ADMIN — CARVEDILOL 12.5 MG: 12.5 TABLET, FILM COATED ORAL at 08:30

## 2021-07-21 RX ADMIN — ACETAMINOPHEN 650 MG: 325 TABLET, FILM COATED ORAL at 11:53

## 2021-07-21 RX ADMIN — CYANOCOBALAMIN TAB 500 MCG 1000 MCG: 500 TAB at 06:07

## 2021-07-21 RX ADMIN — DOCUSATE SODIUM 100 MG: 100 CAPSULE ORAL at 17:02

## 2021-07-21 RX ADMIN — QUETIAPINE FUMARATE 25 MG: 25 TABLET ORAL at 21:55

## 2021-07-21 RX ADMIN — LISINOPRIL 10 MG: 10 TABLET ORAL at 06:07

## 2021-07-21 RX ADMIN — CARVEDILOL 12.5 MG: 12.5 TABLET, FILM COATED ORAL at 17:02

## 2021-07-21 RX ADMIN — POLYETHYLENE GLYCOL 3350 1 PACKET: 17 POWDER, FOR SOLUTION ORAL at 06:07

## 2021-07-21 RX ADMIN — DOCUSATE SODIUM 100 MG: 100 CAPSULE ORAL at 06:07

## 2021-07-21 RX ADMIN — OMEPRAZOLE 20 MG: 20 CAPSULE, DELAYED RELEASE ORAL at 06:07

## 2021-07-21 RX ADMIN — AMLODIPINE BESYLATE 5 MG: 5 TABLET ORAL at 06:07

## 2021-07-21 RX ADMIN — ASPIRIN 81 MG: 81 TABLET, COATED ORAL at 06:07

## 2021-07-21 RX ADMIN — LEVOTHYROXINE SODIUM 50 MCG: 0.05 TABLET ORAL at 08:30

## 2021-07-21 RX ADMIN — THERA TABS 1 TABLET: TAB at 06:07

## 2021-07-21 ASSESSMENT — PAIN SCALES - PAIN ASSESSMENT IN ADVANCED DEMENTIA (PAINAD)
BODYLANGUAGE: RELAXED
TOTALSCORE: 0
CONSOLABILITY: NO NEED TO CONSOLE
FACIALEXPRESSION: SMILING OR INEXPRESSIVE
BREATHING: NORMAL

## 2021-07-21 ASSESSMENT — PAIN DESCRIPTION - PAIN TYPE
TYPE: ACUTE PAIN
TYPE: ACUTE PAIN

## 2021-07-21 NOTE — CARE PLAN
The patient is Stable - Low risk of patient condition declining or worsening    Shift Goals  Clinical Goals: Pt will remain free from falls;rest.   Patient Goals: rest  Family Goals: Not at bedside     Progress made toward(s) clinical / shift goals:  Pt remained free from falls during shift.     Patient is not progressing towards the following goals: N/A      Problem: Knowledge Deficit - Standard  Goal: Patient and family/care givers will demonstrate understanding of plan of care, disease process/condition, diagnostic tests and medications  Outcome: Not Progressing  Note: Pt requires constant reorientation. A&OX1     Problem: Psychosocial  Goal: Patient's level of anxiety will decrease  Outcome: Progressing  Goal: Patient's ability to verbalize feelings about condition will improve  Outcome: Progressing  Goal: Patient's ability to re-evaluate and adapt role responsibilities will improve  Outcome: Progressing  Goal: Patient and family will demonstrate ability to cope with life altering diagnosis and/or procedure  Outcome: Progressing  Goal: Spiritual and cultural needs incorporated into hospitalization  Outcome: Progressing     Problem: Communication  Goal: The ability to communicate needs accurately and effectively will improve  Outcome: Progressing     Problem: Discharge Barriers/Planning  Goal: Patient's continuum of care needs are met  Outcome: Progressing     Problem: Fluid Volume  Goal: Fluid volume balance will be maintained  Outcome: Progressing     Problem: Dysphagia  Goal: Dysphagia will improve  Outcome: Progressing     Problem: Risk for Aspiration  Goal: Patient's risk for aspiration will be absent or decrease  Outcome: Progressing     Problem: Nutrition  Goal: Patient's nutritional and fluid intake will be adequate or improve  Outcome: Progressing  Goal: Enteral nutrition will be maintained or improve  Outcome: Progressing  Goal: Enteral nutrition will be maintained or improve  Outcome: Progressing      Problem: Urinary Elimination  Goal: Establish and maintain regular urinary output  Outcome: Progressing     Problem: Bowel Elimination  Goal: Establish and maintain regular bowel function  Outcome: Progressing     Problem: Mobility  Goal: Patient's capacity to carry out activities will improve  Outcome: Progressing     Problem: Self Care  Goal: Patient will have the ability to perform ADLs independently or with assistance (bathe, groom, dress, toilet and feed)  Outcome: Progressing     Problem: Infection - Standard  Goal: Patient will remain free from infection  Outcome: Progressing     Problem: Wound/ / Incision Healing  Goal: Patient's wound/surgical incision will decrease in size and heals properly  Outcome: Progressing     Problem: Skin Integrity  Goal: Skin integrity is maintained or improved  Outcome: Progressing     Problem: Fall Risk  Goal: Patient will remain free from falls  Outcome: Progressing

## 2021-07-21 NOTE — PROGRESS NOTES
Hospital Medicine Daily Progress Note    Date of Service  7/21/2021    Chief Complaint  Lamar Dangelo is a 89 y.o. female admitted 7/3/2021 with   Chief Complaint   Patient presents with   • Fall     pt bib remsa from home, came in as TBI, had glf x 2 in 24 hrs. has hx of Dementia. arrived aaox2 Baseline. has bump in the back of head. no laceration. -blood thinners -asa. pt has been increasing falls the last 6 months. has bgl of 79         Hospital Course  Patient is a 89-year-old female with past medical history significant for hypothyroidism, hypertension, GERD, vascular dementia and severe protein calorie malnutriton.  She was brought to the ER on 7/3/2021 after she was noted to have frequent ground-level fall.  Upon presentation in ER, she noted to have macrocytic anemia with hemoglobin of 11.6, clinically dehydrated, CT did not show any acute intracranial process, diffuse atrophy and periventricular white matter changes consistent with chronic small vessel disease.    Her neighbor is her POA and confirmed that patient is a DNR/ DNI.  She was initially placed on comfort care but as she was not imminently dying, comfort care measures were removed     Interval Problem Update  No significant change, she remains axox to person only, denies pain.  ROS otherwise negative.    Discussed with nursing and case management.    Apparently Healthcare POA is waiting for financial authorization for transfer to [facility]    Consultants/Specialty  None    Code Status  DNAR/DNI    Disposition  Patient is medically cleared.   Anticipate discharge to Memory care       Patient is to be discharged to memory care when accepted    Review of Systems  ROS   Unable to obtain 2/2 patient dementia/ mentation     Physical Exam  Temp:  [36.3 °C (97.4 °F)-37.3 °C (99.2 °F)] 37 °C (98.6 °F)  Pulse:  [64-78] 70  Resp:  [16-18] 18  BP: (106-131)/(58-70) 113/67  SpO2:  [94 %-97 %] 97 %    Physical Exam  Vitals and nursing note reviewed. Exam  conducted with a chaperone present.   Constitutional:       General: She is not in acute distress.     Appearance: Normal appearance. She is not diaphoretic.   HENT:      Head: Normocephalic and atraumatic.      Nose: Nose normal.      Mouth/Throat:      Pharynx: Oropharynx is clear.   Eyes:      Extraocular Movements: Extraocular movements intact.      Pupils: Pupils are equal, round, and reactive to light.   Cardiovascular:      Rate and Rhythm: Normal rate and regular rhythm.      Pulses: Normal pulses.      Heart sounds: Normal heart sounds.   Pulmonary:      Effort: Pulmonary effort is normal. No respiratory distress.      Breath sounds: Normal breath sounds.   Abdominal:      General: Abdomen is flat. Bowel sounds are normal. There is no distension.      Palpations: Abdomen is soft.      Tenderness: There is no abdominal tenderness. There is no guarding.   Musculoskeletal:         General: No swelling or deformity. Normal range of motion.      Cervical back: Neck supple.      Right lower leg: No edema.      Left lower leg: No edema.   Skin:     General: Skin is warm and dry.      Capillary Refill: Capillary refill takes less than 2 seconds.   Neurological:      General: No focal deficit present.      Mental Status: She is alert. She is disoriented.      Cranial Nerves: No cranial nerve deficit.      Comments: Alert and awake, following some simple commands     Psychiatric:         Mood and Affect: Mood normal.         Behavior: Behavior normal.         Fluids    Intake/Output Summary (Last 24 hours) at 7/21/2021 1314  Last data filed at 7/20/2021 2000  Gross per 24 hour   Intake 510 ml   Output --   Net 510 ml       Laboratory                        Imaging  [id number] VIEW   Final Result      Moderate stool in the ascending and rectosigmoid colon, without obstruction.      CT-HEAD W/O   Final Result         1.  No acute intracranial process.      2. Diffuse atrophy and periventricular white matter changes  consistent with chronic small vessel disease.           Assessment/Plan  Constipation  Assessment & Plan  Abdominal distention improved  Had few BMs last night after enema  Continue bowel care    Goals of care, counseling/discussion- (present on admission)  Assessment & Plan  Comfort care was discontinued, CODE STATUS changed to DNI/DN AR.  On 7/5  Discussed with patient AGAPITO  Neighbor on 7/9  Met with caretaker at bedside on 7/10    Urine ketones- (present on admission)  Assessment & Plan  Trace, likely starvation ketosis  Encourage good po intake of fluid    Severe protein-calorie malnutrition (HCC)- (present on admission)  Assessment & Plan  Due to dementia       Gastroesophageal reflux disease without esophagitis- (present on admission)  Assessment & Plan  Continue omperazole, denied heartburn    Recurrent falls- (present on admission)  Assessment & Plan  Reported by neighbor  Continue supportive care  Memory care placement pending    Vitamin B12 deficiency- (present on admission)  Assessment & Plan  Resolved, vitamin B12 at 3000    Dehydration- (present on admission)  Assessment & Plan  resolved    Macrocytic anemia- (present on admission)  Assessment & Plan  Vitamin B12 normal, supplement folic acid, TSH normal    Essential hypertension- (present on admission)  Assessment & Plan  Continue coreg , amlodipine along with pr anti-htn meds  Monitor BP    Vascular dementia without behavioral disturbance (HCC)- (present on admission)  Assessment & Plan  CT head showed diffuse atrophy and microvascular disease.     requires placement in a memory care unit - awaiting acceptance    Acquired hypothyroidism- (present on admission)  Assessment & Plan  Continue levothyroxine  TSH 1.9       VTE prophylaxis: enoxaparin ppx     I have performed a physical exam and reviewed and updated ROS and Plan today (7/21/2021). In review of yesterday's note (7/20/2021), there are no changes except as documented above.

## 2021-07-21 NOTE — PROGRESS NOTES
Assumed care of patient at bedside report from DAY RN. Updated on POC. Patient currently A & O x 1; on 0 L O2; up with one assist; without complaints of acute pain. Call light within reach. Whiteboard updated. Fall precautions in place. Bed locked and in lowest position. All questions answered. No other needs indicated at this time.

## 2021-07-22 ENCOUNTER — HOSPITAL ENCOUNTER (EMERGENCY)
Facility: MEDICAL CENTER | Age: 86
End: 2021-07-22
Attending: EMERGENCY MEDICINE
Payer: MEDICARE

## 2021-07-22 ENCOUNTER — APPOINTMENT (OUTPATIENT)
Dept: RADIOLOGY | Facility: MEDICAL CENTER | Age: 86
End: 2021-07-22
Attending: EMERGENCY MEDICINE
Payer: MEDICARE

## 2021-07-22 ENCOUNTER — PATIENT MESSAGE (OUTPATIENT)
Dept: MEDICAL GROUP | Facility: MEDICAL CENTER | Age: 86
End: 2021-07-22

## 2021-07-22 VITALS
BODY MASS INDEX: 16.93 KG/M2 | SYSTOLIC BLOOD PRESSURE: 139 MMHG | DIASTOLIC BLOOD PRESSURE: 60 MMHG | RESPIRATION RATE: 18 BRPM | OXYGEN SATURATION: 91 % | HEIGHT: 62 IN | HEART RATE: 79 BPM | WEIGHT: 92 LBS

## 2021-07-22 VITALS
OXYGEN SATURATION: 95 % | TEMPERATURE: 97.4 F | DIASTOLIC BLOOD PRESSURE: 63 MMHG | HEIGHT: 62 IN | WEIGHT: 92.37 LBS | BODY MASS INDEX: 17 KG/M2 | HEART RATE: 65 BPM | SYSTOLIC BLOOD PRESSURE: 121 MMHG | RESPIRATION RATE: 18 BRPM
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
## 2021-07-22 DIAGNOSIS — S01.81XA FACIAL LACERATION, INITIAL ENCOUNTER: ICD-10-CM

## 2021-07-22 PROBLEM — K59.00 CONSTIPATION: Status: RESOLVED | Noted: 2021-07-13 | Resolved: 2021-07-22

## 2021-07-22 PROBLEM — E86.0 DEHYDRATION: Status: RESOLVED | Noted: 2018-03-08 | Resolved: 2021-07-22

## 2021-07-22 PROBLEM — R82.4 URINE KETONES: Status: RESOLVED | Noted: 2021-07-04 | Resolved: 2021-07-22

## 2021-07-22 PROCEDURE — 96372 THER/PROPH/DIAG INJ SC/IM: CPT | Mod: XU

## 2021-07-22 PROCEDURE — 700101 HCHG RX REV CODE 250: Performed by: EMERGENCY MEDICINE

## 2021-07-22 PROCEDURE — A9270 NON-COVERED ITEM OR SERVICE: HCPCS | Performed by: STUDENT IN AN ORGANIZED HEALTH CARE EDUCATION/TRAINING PROGRAM

## 2021-07-22 PROCEDURE — 700102 HCHG RX REV CODE 250 W/ 637 OVERRIDE(OP): Performed by: HOSPITALIST

## 2021-07-22 PROCEDURE — 99217 PR OBSERVATION CARE DISCHARGE: CPT | Performed by: HOSPITALIST

## 2021-07-22 PROCEDURE — 700102 HCHG RX REV CODE 250 W/ 637 OVERRIDE(OP): Performed by: STUDENT IN AN ORGANIZED HEALTH CARE EDUCATION/TRAINING PROGRAM

## 2021-07-22 PROCEDURE — A9270 NON-COVERED ITEM OR SERVICE: HCPCS | Performed by: HOSPITALIST

## 2021-07-22 PROCEDURE — 99284 EMERGENCY DEPT VISIT MOD MDM: CPT

## 2021-07-22 PROCEDURE — G0378 HOSPITAL OBSERVATION PER HR: HCPCS

## 2021-07-22 PROCEDURE — 303747 HCHG EXTRA SUTURE

## 2021-07-22 PROCEDURE — 304217 HCHG IRRIGATION SYSTEM

## 2021-07-22 PROCEDURE — 70450 CT HEAD/BRAIN W/O DYE: CPT | Mod: MG

## 2021-07-22 PROCEDURE — 304999 HCHG REPAIR-SIMPLE/INTERMED LEVEL 1

## 2021-07-22 PROCEDURE — 700111 HCHG RX REV CODE 636 W/ 250 OVERRIDE (IP): Performed by: HOSPITALIST

## 2021-07-22 RX ORDER — LIDOCAINE HYDROCHLORIDE 10 MG/ML
20 INJECTION, SOLUTION INFILTRATION; PERINEURAL ONCE
Status: COMPLETED | OUTPATIENT
Start: 2021-07-22 | End: 2021-07-22

## 2021-07-22 RX ORDER — AMLODIPINE BESYLATE 5 MG/1
5 TABLET ORAL DAILY
Qty: 30 TABLET | Refills: 0 | Status: SHIPPED | OUTPATIENT
Start: 2021-07-23 | End: 2021-01-01

## 2021-07-22 RX ORDER — LISINOPRIL 10 MG/1
10 TABLET ORAL DAILY
Qty: 30 TABLET | Status: SHIPPED
Start: 2021-07-23 | End: 2021-07-22

## 2021-07-22 RX ORDER — ASPIRIN 81 MG/1
81 TABLET ORAL DAILY
Qty: 30 TABLET | Status: SHIPPED
Start: 2021-07-23 | End: 2021-07-22

## 2021-07-22 RX ORDER — FOLIC ACID 1 MG/1
1 TABLET ORAL DAILY
Qty: 30 TABLET | Refills: 0 | Status: SHIPPED | OUTPATIENT
Start: 2021-07-23 | End: 2021-01-01

## 2021-07-22 RX ORDER — PSEUDOEPHEDRINE HCL 30 MG
100 TABLET ORAL 2 TIMES DAILY
Qty: 60 CAPSULE | Status: SHIPPED
Start: 2021-07-22 | End: 2021-07-22

## 2021-07-22 RX ORDER — AMLODIPINE BESYLATE 5 MG/1
5 TABLET ORAL DAILY
Qty: 30 TABLET | Status: SHIPPED
Start: 2021-07-23 | End: 2021-07-22

## 2021-07-22 RX ORDER — QUETIAPINE FUMARATE 25 MG/1
25 TABLET, FILM COATED ORAL
Qty: 60 TABLET | Refills: 3 | Status: SHIPPED
Start: 2021-07-22 | End: 2021-07-22

## 2021-07-22 RX ORDER — LISINOPRIL 10 MG/1
10 TABLET ORAL DAILY
Qty: 30 TABLET | Refills: 0 | Status: SHIPPED | OUTPATIENT
Start: 2021-07-23 | End: 2021-01-01

## 2021-07-22 RX ORDER — POLYETHYLENE GLYCOL 3350 17 G/17G
17 POWDER, FOR SOLUTION ORAL DAILY
Refills: 3 | Status: SHIPPED
Start: 2021-07-23 | End: 2021-07-22

## 2021-07-22 RX ORDER — ASPIRIN 81 MG/1
81 TABLET ORAL DAILY
Qty: 30 TABLET | Refills: 0 | Status: SHIPPED | OUTPATIENT
Start: 2021-07-23 | End: 2021-01-01 | Stop reason: SDUPTHER

## 2021-07-22 RX ORDER — ATROPINE SULFATE 10 MG/ML
2 SOLUTION/ DROPS OPHTHALMIC EVERY 4 HOURS PRN
Status: SHIPPED
Start: 2021-07-22 | End: 2021-07-22

## 2021-07-22 RX ORDER — BISACODYL 10 MG
10 SUPPOSITORY, RECTAL RECTAL
Refills: 0 | Status: SHIPPED
Start: 2021-07-22 | End: 2021-07-22

## 2021-07-22 RX ORDER — PSEUDOEPHEDRINE HCL 30 MG
100 TABLET ORAL 2 TIMES DAILY
Qty: 60 CAPSULE | Refills: 0 | Status: SHIPPED | OUTPATIENT
Start: 2021-07-22 | End: 2021-01-01 | Stop reason: SDUPTHER

## 2021-07-22 RX ORDER — POLYVINYL ALCOHOL 14 MG/ML
2 SOLUTION/ DROPS OPHTHALMIC EVERY 6 HOURS PRN
Refills: 3 | Status: SHIPPED
Start: 2021-07-22 | End: 2021-07-22

## 2021-07-22 RX ORDER — BISACODYL 10 MG
10 SUPPOSITORY, RECTAL RECTAL
Qty: 5 SUPPOSITORY | Refills: 0 | Status: SHIPPED | OUTPATIENT
Start: 2021-07-22 | End: 2021-07-22

## 2021-07-22 RX ORDER — FOLIC ACID 1 MG/1
1 TABLET ORAL DAILY
Qty: 30 TABLET | Status: SHIPPED
Start: 2021-07-23 | End: 2021-07-22

## 2021-07-22 RX ORDER — POLYVINYL ALCOHOL 14 MG/ML
2 SOLUTION/ DROPS OPHTHALMIC EVERY 6 HOURS PRN
Qty: 1 ML | Refills: 0 | Status: SHIPPED | OUTPATIENT
Start: 2021-07-22

## 2021-07-22 RX ORDER — QUETIAPINE FUMARATE 25 MG/1
25 TABLET, FILM COATED ORAL
Qty: 60 TABLET | Refills: 3 | Status: SHIPPED | OUTPATIENT
Start: 2021-07-22 | End: 2021-01-01 | Stop reason: SDUPTHER

## 2021-07-22 RX ORDER — POLYETHYLENE GLYCOL 3350 17 G/17G
17 POWDER, FOR SOLUTION ORAL DAILY
Qty: 3 EACH | Refills: 3 | Status: SHIPPED | OUTPATIENT
Start: 2021-07-23 | End: 2021-01-01

## 2021-07-22 RX ADMIN — FOLIC ACID 1 MG: 1 TABLET ORAL at 06:51

## 2021-07-22 RX ADMIN — THERA TABS 1 TABLET: TAB at 06:51

## 2021-07-22 RX ADMIN — DOCUSATE SODIUM 100 MG: 100 CAPSULE ORAL at 06:00

## 2021-07-22 RX ADMIN — LIDOCAINE HYDROCHLORIDE 20 ML: 10 INJECTION, SOLUTION INFILTRATION; PERINEURAL at 21:45

## 2021-07-22 RX ADMIN — OMEPRAZOLE 20 MG: 20 CAPSULE, DELAYED RELEASE ORAL at 06:51

## 2021-07-22 RX ADMIN — CARVEDILOL 12.5 MG: 12.5 TABLET, FILM COATED ORAL at 06:51

## 2021-07-22 RX ADMIN — ENOXAPARIN SODIUM 30 MG: 30 INJECTION SUBCUTANEOUS at 06:50

## 2021-07-22 RX ADMIN — AMLODIPINE BESYLATE 5 MG: 5 TABLET ORAL at 06:51

## 2021-07-22 RX ADMIN — POLYETHYLENE GLYCOL 3350 1 PACKET: 17 POWDER, FOR SOLUTION ORAL at 06:51

## 2021-07-22 RX ADMIN — LEVOTHYROXINE SODIUM 50 MCG: 0.05 TABLET ORAL at 06:51

## 2021-07-22 RX ADMIN — LISINOPRIL 10 MG: 10 TABLET ORAL at 06:51

## 2021-07-22 RX ADMIN — ASPIRIN 81 MG: 81 TABLET, COATED ORAL at 06:51

## 2021-07-22 RX ADMIN — CYANOCOBALAMIN TAB 500 MCG 1000 MCG: 500 TAB at 06:51

## 2021-07-22 ASSESSMENT — FIBROSIS 4 INDEX: FIB4 SCORE: 2.61

## 2021-07-22 ASSESSMENT — PAIN SCALES - WONG BAKER: WONGBAKER_NUMERICALRESPONSE: DOESN'T HURT AT ALL

## 2021-07-22 NOTE — CARE PLAN
The patient is Stable - Low risk of patient condition declining or worsening    Shift Goals  Clinical Goals: no falls  Patient Goals: no falls  Family Goals: not at bed side     Progress made toward(s) clinical / shift goals:      Patient is not progressing towards the following goals:      Problem: Self Care  Goal: Patient will have the ability to perform ADLs independently or with assistance (bathe, groom, dress, toilet and feed)  Outcome: Not Met

## 2021-07-22 NOTE — DISCHARGE INSTRUCTIONS
Discharge Instructions    Discharged to other by medical transportation with self. Discharged via wheelchair, hospital escort: Yes.  Special equipment needed: Wheelchair    Be sure to schedule a follow-up appointment with your primary care doctor or any specialists as instructed.     Discharge Plan:   Diet Plan: Discussed  Activity Level: Discussed  Confirmed Symptoms Management: Discussed  Medication Reconciliation Updated: Yes    I understand that a diet low in cholesterol, fat, and sodium is recommended for good health. Unless I have been given specific instructions below for another diet, I accept this instruction as my diet prescription.   Other diet:regular    Special Instructions: None    · Is patient discharged on Warfarin / Coumadin?   No     Depression / Suicide Risk    As you are discharged from this [facility] facility, it is important to learn how to keep safe from harming yourself.    Recognize the warning signs:  · Abrupt changes in personality, positive or negative- including increase in energy   · Giving away possessions  · Change in eating patterns- significant weight changes-  positive or negative  · Change in sleeping patterns- unable to sleep or sleeping all the time   · Unwillingness or inability to communicate  · Depression  · Unusual sadness, discouragement and loneliness  · Talk of wanting to die  · Neglect of personal appearance   · Rebelliousness- reckless behavior  · Withdrawal from people/activities they love  · Confusion- inability to concentrate     If you or a loved one observes any of these behaviors or has concerns about self-harm, here's what you can do:  · Talk about it- your feelings and reasons for harming yourself  · Remove any means that you might use to hurt yourself (examples: pills, rope, extension cords, firearm)  · Get professional help from the community (Mental Health, Substance Abuse, psychological counseling)  · Do not be alone:Call your Safe Contact- someone whom  you trust who will be there for you.  · Call your local CRISIS HOTLINE 452-4542 or 228-067-7609  · Call your local Children's Mobile Crisis Response Team [place] (904) 831-7051 or www.[place]  · Call the toll free National Suicide Prevention Hotlines   · National Suicide Prevention Lifeline 832-695-AESA (0675)  · National [employer] Line Network 800-SUICIDE (532-7636)

## 2021-07-22 NOTE — DISCHARGE PLANNING
Anticipated Discharge Disposition: [facility]    Action: LSW spoke to Kaylie with [facility]. They are ready to take patient and received all necessary paperwork for AGAPITO Roger[facility].     LSW spoke to [facility] and confirmed information above. [facility] consents to transport at 1100.     LSW requested [facility] wheelchair transport from [facility] coordinator for 1100. LSW provided bedside RN with a facesheet for transport.     Barriers to Discharge: transport    [facility]: [facility]    1000: Transport scheduled and confirmed for 1100. LSW left a VM for Kaylie at [facility].    1015: LSW spoke to Kaylie. Kaylie requesting printed scripts. LSW notified bedside RN to put scripts in packet for patient. MD to print scripts. Kaylie also requesting Dc summary. LSW faxed summary to [phone].

## 2021-07-22 NOTE — DISCHARGE PLANNING
Received Transport Form @ 5037  Transport is scheduled for 7/22 @1100 going to [place].    SW and BS RN notified of scheduled transport via Voalte @0321.

## 2021-07-22 NOTE — NON-PROVIDER
Outcome: she is now living in living home    Please transfer to Patient Outreach Team at [phone] when patient returns call.        Attempt # 1

## 2021-07-22 NOTE — CARE PLAN
The patient is Stable - Low risk of patient condition declining or worsening    Shift Goals  Clinical Goals: no falls  Patient Goals: no falls  Family Goals: not at bed side     Problem: Self Care  Goal: Patient will have the ability to perform ADLs independently or with assistance (bathe, groom, dress, toilet and feed)  Outcome: Not Met

## 2021-07-22 NOTE — CARE PLAN
The patient is Stable - Low risk of patient condition declining or worsening    Shift Goals  Clinical Goals: no falls   Patient Goals: rest, no falls   Family Goals: not at bed side     Progress made toward(s) clinical / shift goals:  calling for assistance when needed    Patient is not progressing towards the following goals: ams        Problem: Knowledge Deficit - Standard  Goal: Patient and family/care givers will demonstrate understanding of plan of care, disease process/condition, diagnostic tests and medications  Outcome: Progressing     Problem: Psychosocial  Goal: Patient's level of anxiety will decrease  Outcome: Progressing  Goal: Patient's ability to verbalize feelings about condition will improve  Outcome: Progressing  Goal: Patient's ability to re-evaluate and adapt role responsibilities will improve  Outcome: Progressing  Goal: Patient and family will demonstrate ability to cope with life altering diagnosis and/or procedure  Outcome: Progressing  Goal: Spiritual and cultural needs incorporated into hospitalization  Outcome: Progressing     Problem: Communication  Goal: The ability to communicate needs accurately and effectively will improve  Outcome: Progressing     Problem: Discharge Barriers/Planning  Goal: Patient's continuum of care needs are met  Outcome: Progressing     Problem: Fluid Volume  Goal: Fluid volume balance will be maintained  Outcome: Progressing     Problem: Dysphagia  Goal: Dysphagia will improve  Outcome: Progressing     Problem: Risk for Aspiration  Goal: Patient's risk for aspiration will be absent or decrease  Outcome: Progressing     Problem: Nutrition  Goal: Patient's nutritional and fluid intake will be adequate or improve  Outcome: Progressing  Goal: Enteral nutrition will be maintained or improve  Outcome: Progressing  Goal: Enteral nutrition will be maintained or improve  Outcome: Progressing     Problem: Urinary Elimination  Goal: Establish and maintain regular urinary  output  Outcome: Progressing     Problem: Bowel Elimination  Goal: Establish and maintain regular bowel function  Outcome: Progressing     Problem: Mobility  Goal: Patient's capacity to carry out activities will improve  Outcome: Progressing     Problem: Self Care  Goal: Patient will have the ability to perform ADLs independently or with assistance (bathe, groom, dress, toilet and feed)  Outcome: Progressing     Problem: Infection - Standard  Goal: Patient will remain free from infection  Outcome: Progressing     Problem: Wound/ / Incision Healing  Goal: Patient's wound/surgical incision will decrease in size and heals properly  Outcome: Progressing     Problem: Skin Integrity  Goal: Skin integrity is maintained or improved  Outcome: Progressing     Problem: Fall Risk  Goal: Patient will remain free from falls  Outcome: Progressing

## 2021-07-23 NOTE — DISCHARGE PLANNING
Medical Social Work    FLORECITA received a call from the RN requesting SW assistacne with [facility] transport for the pt to get back to [facility] at [address] [facility]. FLORECITA called and spoke with Fatoumata and advised her the pt is medically clear and will be transported back to there facility. Fatoumata stated they will be waiting for the pt. FLORECITA faxed a PCS from to [facility] and set up transport for 2300. RN aware of transport time.     Plan: FLORECITA will remain available for pt support.

## 2021-07-23 NOTE — ED TRIAGE NOTES
Chief Complaint   Patient presents with   • T-5000 GLF     Pt reports falling and hitting head -LOC, +ASA     Pt BIBA from nursing home for above complaint. Pt has lac to left eyebrow, bleeding controlled. Hx dementia. Pt denies any other complaints at this time.

## 2021-07-23 NOTE — ED PROVIDER NOTES
ED Provider Note    CHIEF COMPLAINT  Chief Complaint   Patient presents with   • T-5000 GLF     Pt reports falling and hitting head -LOC, +ASA       HPI  Lamar Dangelo is a 89 y.o. female who presents to the emergency room after mechanical fall. Past medical history is documented below. She tripped on her slipper causing her to fall forward and hitting her face on the ground. No loss of consciousness. Tetanus shot up-to-date. She is on aspirin. Denies any change in vision. No nausea vomiting. No headache. Denies neck or back pain. Denies extremity injury.    REVIEW OF SYSTEMS  See HPI for further details. All other systems are negative.     PAST MEDICAL HISTORY   has a past medical history of Hyperlipidemia (7/2/2010) and Hypothyroid (7/2/2010).    SOCIAL HISTORY  Social History     Tobacco Use   • Smoking status: Never Smoker   • Smokeless tobacco: Never Used   Vaping Use   • Vaping Use: Never used   Substance and Sexual Activity   • Alcohol use: No   • Drug use: No   • Sexual activity: Not on file     Comment: single, 0 kids.        SURGICAL HISTORY   has a past surgical history that includes tonsillectomy; appendectomy; colonoscopy - endo (N/A, 9/3/2017); gastroscopy-endo (N/A, 9/3/2017); and hip cannulated screw (Left, 3/24/2018).    CURRENT MEDICATIONS  Home Medications     Reviewed by Jun New R.N. (Registered Nurse) on 07/22/21 at [facility]  Med List Status: Not Addressed   Medication Last Dose Status   amLODIPine (NORVASC) 5 MG Tab  Active   artificial tears 1.4 % Solution  Active   aspirin 81 MG EC tablet  Active   carvedilol (COREG) 12.5 MG Tab  Active   Cyanocobalamin (VITAMIN B-12) 1000 MCG Tab  Active   docusate sodium 100 MG Cap  Active   FEROSUL 325 (65 Fe) MG tablet  Active   folic acid (FOLVITE) 1 MG Tab  Active   levothyroxine (SYNTHROID) 50 MCG Tab  Active   lisinopril (PRINIVIL) 10 MG Tab  Active   multivitamin (THERAGRAN) Tab  Active   omeprazole (PRILOSEC) 20 MG delayed-release capsule   "Active   polyethylene glycol/lytes (MIRALAX) 17 g Pack  Active   QUEtiapine (SEROQUEL) 25 MG Tab  Active                ALLERGIES  Allergies   Allergen Reactions   • Nkda [No Known Drug Allergy]        PHYSICAL EXAM  VITAL SIGNS: /60   Pulse 79   Resp 18   Ht 1.575 m (5' 2\")   Wt 41.7 kg (92 lb)   SpO2 91%   BMI 16.83 kg/m²  @PASTOR[[id number]::@  Pulse ox interpretation: I interpret this pulse ox as normal.  Constitutional: Alert in no apparent distress.  HENT: Normocephalic 3 cm linear laceration to left lateral brow., Bilateral external ears normal. Nose normal. Partial thickness.  Eyes: Pupils are equal and reactive. No evidence of ocular entrapment  Heart: Regular rate and rythm, no murmurs.    Lungs: Clear to auscultation bilaterally.  Skin: Warm, Dry, No erythema, No rash.   Extremities: nontender full range of motion of all extremities  Neurologic: Alert, Grossly non-focal.   Psychiatric: Affect normal, Judgment normal, Mood normal, Appears appropriate and not intoxicated.         Laceration repair: wound irrigated and cleaned. 1% lidocaine without epinephrine injected wound edges for total of 3 mL. Patient prepped and draped in sterile fashion. For simple interrupted 6-0 absorbable sutures placed with good wound edge approximation. Patient tolerated well. No complications.    CT-HEAD W/O   Final Result         1.  No acute intracranial process.      2. Diffuse atrophy and periventricular white matter changes consistent with chronic small vessel disease.          COURSE & MEDICAL DECISION MAKING  Pertinent Labs & Imaging studies reviewed. (See chart for details)    89-year-old female presented emergency room after mechanical fall. History as above. Laceration to left lateral brow. Please see procedure note as above for repair. Patient is any return precautions here the ER as well as outpatient follow-up within the given time frame as provided.      The patient will return for worsening symptoms and is " stable at the time of discharge. The patient verbalizes understanding and will comply.    FINAL IMPRESSION  1. Fall from ground level    2. Facial laceration, initial encounter               Electronically signed by: Derick Martell M.D., 7/22/2021 8:29 PM

## 2021-07-25 ENCOUNTER — PATIENT MESSAGE (OUTPATIENT)
Dept: MEDICAL GROUP | Facility: MEDICAL CENTER | Age: 86
End: 2021-07-25

## 2021-07-26 NOTE — TELEPHONE ENCOUNTER
From: Lamar Dangelo  To: Physician John Garces  Sent: 7/25/2021 3:37 PM PDT  Subject: Non-Urgent Medical Question    Lamar Saldivar keeps falling and is unable to care for herself at home with her stairs.   Her dementia leaves her unable to remember if she has eaten, taken her medication, put on her cloths before going outside, attend to her finances and is not able to cook for herself.   Lamar's sister who attended to these things passed away 7/9/21.   Per her legal instructions 15 years ago as her POA I have placed her at [place] [facility] [address] and I am monitoring her care.     A document is being requested that she is in a memory care facility or a diagnosis from her doctor.   Please let me know how to proceed to receive a diagnosis from your office. I can FaceTime with you and Lamar if that is needed.   Thank you. [phone] Ariella Rider       ----- Message -----   From:Physician John Garces   Sent:7/25/2021 11:34 AM PDT   To:Lamar Dangelo   Subject:RE: Non-Urgent Medical Question    We need a face-to-face encounter(a doctor Visit) to decide if the patient is really incapacitated      ----- Message -----   From:Lamar Dangelo   Sent:7/22/2021 3:34 PM PDT   To:Physician John Garces   Subject:Non-Urgent Medical Question    Could I get a document of Lamar's incapacitation due to her dementia?

## 2021-07-30 ENCOUNTER — TELEPHONE (OUTPATIENT)
Dept: MEDICAL GROUP | Facility: MEDICAL CENTER | Age: 86
End: 2021-07-30

## 2021-07-30 NOTE — TELEPHONE ENCOUNTER
ESTABLISHED PATIENT PRE-VISIT PLANNING     Patient was NOT contacted to complete PVP.     Note: Patient will not be contacted if there is no indication to call.     1.  Reviewed notes from the last few office visits within the medical group: Yes    2.  If any orders were placed at last visit or intended to be done for this visit (i.e. 6 mos follow-up), do we have Results/Consult Notes?         •  Labs - Labs were not ordered at last office visit.  Note: If patient appointment is for lab review and patient did not complete labs, check with provider if OK to reschedule patient until labs completed.       •  Imaging - Imaging was not ordered at last office visit.       •  Referrals - No referrals were ordered at last office visit.    3. Is this appointment scheduled as a Hospital Follow-Up? Yes, visit was at [facility].     4.   AHA (Pulse8) form printed for Provider? Email sent to [facility] requesting form

## 2021-08-02 ENCOUNTER — APPOINTMENT (OUTPATIENT)
Dept: MEDICAL GROUP | Facility: MEDICAL CENTER | Age: 86
End: 2021-08-02
Payer: MEDICARE

## 2021-08-03 ENCOUNTER — HOME CARE VISIT (OUTPATIENT)
Dept: HOSPICE | Facility: HOSPICE | Age: 86
End: 2021-08-03
Payer: MEDICARE

## 2021-08-23 NOTE — PROGRESS NOTES
As a means of avoiding spread of COVID-19, this visit is being conducted by telephone. This telephone visit was initiated by the patient and they verbally consented.    Time at start of call:3:40 PM    Reason for Call:     Patient has hospitalized twice for frequent falls.  First time 7/3- 7/22.  The second time time was a ER visit on 7/22 patient has been failed.  Currently lives in assisted living, using a walker all the time.  Needs help with most of her ADLs.  As per caregiver she has been having normal appetite, she eats 70% of her meals.  No falls since last discharged.  Caregiver has been careful, has been helping her to go to the restroom and to have a shower or a bath.    /59   Pulse 74   Wt 42.6 kg (94 lb)   BMI 16.65 kg/m²   Gen.: Well-developed, well-nourished, no apparent distress,pleasant and cooperative with the examination      Assessment and plan.     1. Hospital discharge follow-up  Hospital discharge same reviewed.    2. Frequent falls  Currently lives in an assisted living, continue on using a walker. Caregiver has been watching for her, and helping with all needs .    3. Essential hypertension  Controlled.  Continue current medication       Time at end of call: 4:0 pm  Total Time Spent: 20 minutes

## 2021-12-20 NOTE — TELEPHONE ENCOUNTER
ESTABLISHED PATIENT PRE-VISIT PLANNING     Annual Wellness Visit Over Due      Patient was NOT contacted to complete PVP.     Note: Patient will not be contacted if there is no indication to call.     1.  Reviewed notes from the last few office visits within the medical group: Yes    2.  If any orders were placed at last visit or intended to be done for this visit (i.e. 6 mos follow-up), do we have Results/Consult Notes?         •  Labs - Labs were not ordered at last office visit. Last Office Visit with  was on 08/23/2021.  Note: If patient appointment is for lab review and patient did not complete labs, check with provider if OK to reschedule patient until labs completed.       •  Imaging - Imaging was not ordered at last office visit.       •  Referrals - No referrals were ordered at last office visit.    3. Is this appointment scheduled as a Hospital Follow-Up? No    4.  Immunizations were updated in Epic using Reconcile Outside Information activity? Yes    5.  Patient is due for the following Health Maintenance Topics:   Health Maintenance Due   Topic Date Due   • Annual Wellness Visit  Never done   • IMM ZOSTER VACCINES (1 of 2) Never done   • IMM INFLUENZA (1) 09/01/2021   • COVID-19 Vaccine (3 - Booster for Moderna series) 09/13/2021     6.  AHA (Pulse8) form printed for Provider? Yes

## 2021-12-22 NOTE — PROGRESS NOTES
Telemedicine Video Visit: Established Patient   This Remote Face to Face encounter was conducted via Zoom. Given the importance of social distancing and other strategies recommended to reduce the risk of COVID-19 transmission, I am providing medical care to this patient via audio/video visit in place of an in person visit at the request of the patient. Verbal consent to telehealth, risks, benefits, and consequences were discussed. Patient retains the right to withdraw at any time. All existing confidentiality protections apply. The patient has access to all transmitted medical information. No dissemination of any patient images or information to other entities without further written consent.  Subjective:     Chief Complaint   Patient presents with   • Follow-Up       Lamar Dangelo is a 89 y.o. female presenting for evaluation and management of:    Dementia without behavioral disturbance, unspecified dementia type (HCC)  Patient currently lives in [facility].  Needs help with most of her bed ADLs.  Daughter asked for a letter stating that the patient cannot handle her finances.  Discussed with daughter that the patient need to be evaluated probably in a face-to-face encounter.  I need to be referred to neurology for confirmation.  So she needs to statement from the primary and from neurology that she cannot handle her finances.    Essential hypertension  Blood pressure has been adequately controlled on carvedilol 12.5 mg twice a day, lisinopril 10 mg daily, amlodipine 5 mg daily.  No side effects    Acquired hypothyroidism  She has been tolerating Levothyroxine, no palpitation, no cold or heat intolerance, has been on levothyroxine 50 mcg daily.  Last TSH was normal    Pulmonary hypertension  Patient is currently asymptomatic.  Last echocardiogram was done in 2017 showed:  Left ventricular ejection fraction is visually estimated to be 60%.  Grade I diastolic dysfunction.  Estimated right  ventricular systolic pressure  is 65 mmHg.    However patient denies any shortness of breath or leg swelling.     ROS   Denies any recent fevers or chills. No nausea or vomiting. No chest pains or shortness of breath.     Allergies   Allergen Reactions   • Nkda [No Known Drug Allergy]        Current medicines (including changes today)  Current Outpatient Medications   Medication Sig Dispense Refill   • potassium chloride SA (KDUR) 20 MEQ Tab CR TAKE 1 TABLET BY MOUTH ONCE DAILY. 30 Tablet 0   • SM ASPIRIN ADULT LOW STRENGTH 81 MG EC tablet TAKE 1 TAB BY MOUTH EVERY DAY 30 Tablet 8   • docusate sodium (COLACE) 100 MG Cap TAKE 1 CAP BY MOUTH TWICE DAILY FOR CONSTIPATION 60 Capsule 11   • Omeprazole Magnesium 20.6 (20 Base) MG CAPSULE DELAYED RELEASE TAKE 1 CAP BY MOUTH TWICE DAILY 60 Capsule 11   • Multiple Vitamin (TAB-A-MEL) Tab TAKE 1 TAB BY MOUTH EVERY DAY 30 Tablet 11   • carvedilol (COREG) 12.5 MG Tab TAKE 1 TAB BY MOUTH TWICE DAILY FOR BLOOD PRESSURE 60 Tablet 11   • polyethylene glycol/lytes (MIRALAX) 17 g Pack GIVE ONE PACKET BY MOUTH EVERY DAY * MIX WITH WATER* 30 Each 3   • ferrous sulfate 325 (65 Fe) MG tablet TAKE 1 TAB BY MOUTH EVERY DAY 60 Tablet 11   • Cyanocobalamin (B-12) 1000 MCG Tab CR TAKE 1 TAB BY MOUTH EVERY DAY 30 Tablet 11   • levothyroxine (SYNTHROID) 50 MCG Tab TAKE 1 TAB BY MOUTH EVERY MORNING 30 Tablet 10   • QUEtiapine (SEROQUEL) 25 MG Tab TAKE 1 TAB BY MOUTH AT EACH BEDTIME 30 Tablet 10   • Cyanocobalamin (VITAMIN B-12) 1000 MCG Tab TAKE 1 TABLET BY MOUTH ONCE DAILY. 30 Tablet 0   • omeprazole (PRILOSEC) 20 MG delayed-release capsule TAKE (1) CAPSULE BY MOUTH TWICE DAILY. 60 Capsule 0   • amLODIPine (NORVASC) 5 MG Tab TAKE 1 TAB BY MOUTH EVERY  Tablet 0   • folic acid (FOLVITE) 1 MG Tab TAKE 1 TAB BY MOUTH EVERY  Tablet 0   • lisinopril (PRINIVIL) 10 MG Tab TAKE 1 TAB BY MOUTH EVERY DAY FOR BLOOD PRESSURE 100 Tablet 0   • artificial tears 1.4 % Solution Administer 2 Drops  into both eyes every 6 hours as needed (Dry eyes   ). 1 mL 0     No current facility-administered medications for this visit.       Patient Active Problem List    Diagnosis Date Noted   • Gastroesophageal reflux disease without esophagitis 07/04/2021   • Severe protein-calorie malnutrition (HCC) 07/04/2021   • Goals of care, counseling/discussion 07/04/2021   • Recurrent falls 08/03/2020   • Compression fracture of L5 vertebra with routine healing 08/03/2020   • Compression fracture of T8 vertebra with routine healing 08/03/2020   • Hyponatremia 08/03/2020   • Encephalopathy 03/26/2018   • DNR (do not resuscitate) 03/26/2018   • Vitamin D deficiency 03/24/2018   • CKD (chronic kidney disease), stage III ([facility]) 03/24/2018   • Cachexia ([facility]) 03/24/2018   • Fracture of neck of femur ([facility]) 03/23/2018   • Vitamin B12 deficiency 03/09/2018   • Near syncope 03/07/2018   • Macrocytic anemia 03/07/2018   • Vascular dementia without behavioral disturbance ([facility]) 09/12/2017   • Essential hypertension 09/12/2017   • Elevated troponin 09/12/2017   • Hypokalemia 09/11/2017   • Sundowning 09/03/2017   • 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  She  has a past surgical history that includes tonsillectomy; appendectomy; colonoscopy - endo (N/A, 9/3/2017); gastroscopy-endo (N/A, 9/3/2017); and hip cannulated screw (Left, 3/24/2018).       Objective:   Vitals obtained by patient:  /64   Pulse 67   Wt 44.9 kg (99 lb)   BMI 17.54 kg/m²     Physical Exam:  Constitutional: Alert, no distress, well-groomed.  Skin: No rashes in visible areas.  Eye: Round. Conjunctiva clear, lids normal. No icterus.   ENMT: Lips pink without  lesions, good dentition, moist mucous membranes. Phonation normal.  Neck: No masses, no thyromegaly. Moves freely without pain.  CV: Pulse as reported by patient  Respiratory: Unlabored respiratory effort, no cough or audible wheeze  Psych: Alert and oriented x3, normal affect and mood.       Assessment and Plan:   The following treatment plan was discussed:   89 y.o. female     1. Essential hypertension  Controlled.  Continue on carvedilol 12.5 mg twice a day, lisinopril 10 mg daily, amlodipine 5 mg daily.    2. Acquired hypothyroidism  He has been tolerating Levothyroxine, no palpitation, no cold or heat intolerance  Continue levothyroxine 50 mcg daily    3. Dementia without behavioral disturbance, unspecified dementia type (HCC)  Patient currently lives in [facility] assisted [facility] facility.  Needs help with most of her bed ADLs.  Daughter asked for a letter stating that the patient cannot handle her finances.  Discussed with daughter that the patient need to be evaluated probably in a face-to-face encounter.  I need to be referred to neurology for confirmation.  So she needs to statement from the primary and from neurology that she cannot handle her finances.    - Referral to Neurology    4. Pulmonary hypertension (HCC)  Stable.  A previous echocardiogram in 2017 showed right ventricular systolic pressure was 65 mmHg, however patient has been asymptomatic

## 2022-01-01 ENCOUNTER — HOME CARE VISIT (OUTPATIENT)
Dept: HOME HEALTH SERVICES | Facility: HOME HEALTHCARE | Age: 87
End: 2022-01-01
Payer: MEDICARE

## 2022-01-01 ENCOUNTER — APPOINTMENT (OUTPATIENT)
Dept: RADIOLOGY | Facility: MEDICAL CENTER | Age: 87
End: 2022-01-01
Attending: EMERGENCY MEDICINE
Payer: MEDICARE

## 2022-01-01 ENCOUNTER — APPOINTMENT (OUTPATIENT)
Dept: RADIOLOGY | Facility: MEDICAL CENTER | Age: 87
DRG: 177 | End: 2022-01-01
Attending: STUDENT IN AN ORGANIZED HEALTH CARE EDUCATION/TRAINING PROGRAM
Payer: MEDICARE

## 2022-01-01 ENCOUNTER — APPOINTMENT (OUTPATIENT)
Dept: RADIOLOGY | Facility: MEDICAL CENTER | Age: 87
DRG: 177 | End: 2022-01-01
Payer: MEDICARE

## 2022-01-01 ENCOUNTER — TELEPHONE (OUTPATIENT)
Dept: HEALTH INFORMATION MANAGEMENT | Facility: OTHER | Age: 87
End: 2022-01-01
Payer: MEDICARE

## 2022-01-01 ENCOUNTER — TELEPHONE (OUTPATIENT)
Dept: MEDICAL GROUP | Facility: MEDICAL CENTER | Age: 87
End: 2022-01-01
Payer: MEDICARE

## 2022-01-01 ENCOUNTER — APPOINTMENT (OUTPATIENT)
Dept: RADIOLOGY | Facility: MEDICAL CENTER | Age: 87
DRG: 177 | End: 2022-01-01
Attending: EMERGENCY MEDICINE
Payer: MEDICARE

## 2022-01-01 ENCOUNTER — HOSPITAL ENCOUNTER (OUTPATIENT)
Facility: MEDICAL CENTER | Age: 87
End: 2022-04-20
Attending: PHYSICIAN ASSISTANT
Payer: MEDICARE

## 2022-01-01 ENCOUNTER — HOME HEALTH ADMISSION (OUTPATIENT)
Dept: HOME HEALTH SERVICES | Facility: HOME HEALTHCARE | Age: 87
End: 2022-01-01
Payer: MEDICARE

## 2022-01-01 ENCOUNTER — HOSPITAL ENCOUNTER (EMERGENCY)
Facility: MEDICAL CENTER | Age: 87
End: 2022-02-21
Attending: EMERGENCY MEDICINE
Payer: MEDICARE

## 2022-01-01 ENCOUNTER — TELEPHONE (OUTPATIENT)
Dept: SOCIAL WORK | Facility: CLINIC | Age: 87
End: 2022-01-01
Payer: MEDICARE

## 2022-01-01 ENCOUNTER — OFFICE VISIT (OUTPATIENT)
Dept: NEUROLOGY | Facility: MEDICAL CENTER | Age: 87
End: 2022-01-01
Attending: PSYCHIATRY & NEUROLOGY
Payer: MEDICARE

## 2022-01-01 ENCOUNTER — HOSPITAL ENCOUNTER (EMERGENCY)
Facility: MEDICAL CENTER | Age: 87
End: 2022-07-31
Attending: EMERGENCY MEDICINE
Payer: MEDICARE

## 2022-01-01 ENCOUNTER — DOCUMENTATION (OUTPATIENT)
Dept: MEDICAL GROUP | Facility: PHYSICIAN GROUP | Age: 87
End: 2022-01-01
Payer: MEDICARE

## 2022-01-01 ENCOUNTER — HOSPITAL ENCOUNTER (EMERGENCY)
Facility: MEDICAL CENTER | Age: 87
End: 2022-02-20
Attending: EMERGENCY MEDICINE
Payer: MEDICARE

## 2022-01-01 ENCOUNTER — PATIENT MESSAGE (OUTPATIENT)
Dept: HEALTH INFORMATION MANAGEMENT | Facility: OTHER | Age: 87
End: 2022-01-01
Payer: MEDICARE

## 2022-01-01 ENCOUNTER — PATIENT MESSAGE (OUTPATIENT)
Dept: MEDICAL GROUP | Facility: MEDICAL CENTER | Age: 87
End: 2022-01-01

## 2022-01-01 ENCOUNTER — HOSPITAL ENCOUNTER (INPATIENT)
Facility: MEDICAL CENTER | Age: 87
LOS: 2 days | DRG: 177 | End: 2022-08-13
Attending: EMERGENCY MEDICINE | Admitting: STUDENT IN AN ORGANIZED HEALTH CARE EDUCATION/TRAINING PROGRAM
Payer: MEDICARE

## 2022-01-01 VITALS
DIASTOLIC BLOOD PRESSURE: 64 MMHG | HEART RATE: 72 BPM | RESPIRATION RATE: 16 BRPM | OXYGEN SATURATION: 96 % | TEMPERATURE: 97.8 F | SYSTOLIC BLOOD PRESSURE: 124 MMHG

## 2022-01-01 VITALS
HEART RATE: 66 BPM | SYSTOLIC BLOOD PRESSURE: 127 MMHG | OXYGEN SATURATION: 99 % | DIASTOLIC BLOOD PRESSURE: 67 MMHG | TEMPERATURE: 98.1 F | RESPIRATION RATE: 16 BRPM

## 2022-01-01 VITALS
RESPIRATION RATE: 16 BRPM | HEART RATE: 67 BPM | SYSTOLIC BLOOD PRESSURE: 120 MMHG | OXYGEN SATURATION: 98 % | DIASTOLIC BLOOD PRESSURE: 64 MMHG | TEMPERATURE: 98 F

## 2022-01-01 VITALS
HEIGHT: 63 IN | WEIGHT: 92 LBS | HEART RATE: 77 BPM | SYSTOLIC BLOOD PRESSURE: 191 MMHG | BODY MASS INDEX: 16.3 KG/M2 | TEMPERATURE: 97.7 F | RESPIRATION RATE: 17 BRPM | DIASTOLIC BLOOD PRESSURE: 93 MMHG | OXYGEN SATURATION: 93 %

## 2022-01-01 VITALS
DIASTOLIC BLOOD PRESSURE: 62 MMHG | SYSTOLIC BLOOD PRESSURE: 120 MMHG | RESPIRATION RATE: 16 BRPM | OXYGEN SATURATION: 98 % | TEMPERATURE: 97.5 F | HEART RATE: 73 BPM

## 2022-01-01 VITALS
OXYGEN SATURATION: 96 % | HEART RATE: 71 BPM | TEMPERATURE: 98.4 F | SYSTOLIC BLOOD PRESSURE: 110 MMHG | RESPIRATION RATE: 16 BRPM | DIASTOLIC BLOOD PRESSURE: 60 MMHG

## 2022-01-01 VITALS
HEART RATE: 63 BPM | DIASTOLIC BLOOD PRESSURE: 62 MMHG | TEMPERATURE: 97.9 F | RESPIRATION RATE: 16 BRPM | OXYGEN SATURATION: 93 % | SYSTOLIC BLOOD PRESSURE: 112 MMHG

## 2022-01-01 VITALS
DIASTOLIC BLOOD PRESSURE: 60 MMHG | HEART RATE: 71 BPM | OXYGEN SATURATION: 95 % | RESPIRATION RATE: 16 BRPM | SYSTOLIC BLOOD PRESSURE: 122 MMHG | TEMPERATURE: 97.5 F

## 2022-01-01 VITALS
TEMPERATURE: 98.1 F | WEIGHT: 92 LBS | OXYGEN SATURATION: 96 % | HEIGHT: 63 IN | BODY MASS INDEX: 16.3 KG/M2 | SYSTOLIC BLOOD PRESSURE: 141 MMHG | RESPIRATION RATE: 16 BRPM | HEART RATE: 64 BPM | DIASTOLIC BLOOD PRESSURE: 67 MMHG

## 2022-01-01 VITALS
HEART RATE: 65 BPM | SYSTOLIC BLOOD PRESSURE: 114 MMHG | DIASTOLIC BLOOD PRESSURE: 68 MMHG | OXYGEN SATURATION: 98 % | TEMPERATURE: 97.4 F | RESPIRATION RATE: 16 BRPM

## 2022-01-01 VITALS
OXYGEN SATURATION: 95 % | TEMPERATURE: 98.2 F | DIASTOLIC BLOOD PRESSURE: 60 MMHG | HEART RATE: 75 BPM | RESPIRATION RATE: 16 BRPM | SYSTOLIC BLOOD PRESSURE: 112 MMHG

## 2022-01-01 VITALS
RESPIRATION RATE: 16 BRPM | SYSTOLIC BLOOD PRESSURE: 128 MMHG | HEART RATE: 74 BPM | TEMPERATURE: 97.5 F | DIASTOLIC BLOOD PRESSURE: 62 MMHG | OXYGEN SATURATION: 97 %

## 2022-01-01 VITALS
HEART RATE: 82 BPM | RESPIRATION RATE: 16 BRPM | TEMPERATURE: 97.8 F | SYSTOLIC BLOOD PRESSURE: 118 MMHG | OXYGEN SATURATION: 95 % | DIASTOLIC BLOOD PRESSURE: 72 MMHG

## 2022-01-01 VITALS
OXYGEN SATURATION: 94 % | RESPIRATION RATE: 16 BRPM | TEMPERATURE: 98.2 F | DIASTOLIC BLOOD PRESSURE: 60 MMHG | SYSTOLIC BLOOD PRESSURE: 118 MMHG | HEART RATE: 67 BPM

## 2022-01-01 VITALS
RESPIRATION RATE: 16 BRPM | SYSTOLIC BLOOD PRESSURE: 104 MMHG | HEART RATE: 70 BPM | DIASTOLIC BLOOD PRESSURE: 54 MMHG | OXYGEN SATURATION: 96 % | TEMPERATURE: 98.4 F

## 2022-01-01 VITALS
SYSTOLIC BLOOD PRESSURE: 114 MMHG | TEMPERATURE: 97.6 F | RESPIRATION RATE: 16 BRPM | HEART RATE: 77 BPM | DIASTOLIC BLOOD PRESSURE: 60 MMHG | OXYGEN SATURATION: 95 %

## 2022-01-01 VITALS
DIASTOLIC BLOOD PRESSURE: 64 MMHG | HEART RATE: 68 BPM | SYSTOLIC BLOOD PRESSURE: 120 MMHG | OXYGEN SATURATION: 95 % | RESPIRATION RATE: 16 BRPM | TEMPERATURE: 97.6 F

## 2022-01-01 VITALS
SYSTOLIC BLOOD PRESSURE: 110 MMHG | TEMPERATURE: 97.9 F | DIASTOLIC BLOOD PRESSURE: 66 MMHG | HEART RATE: 70 BPM | RESPIRATION RATE: 17 BRPM | OXYGEN SATURATION: 96 %

## 2022-01-01 VITALS
OXYGEN SATURATION: 97 % | RESPIRATION RATE: 16 BRPM | SYSTOLIC BLOOD PRESSURE: 110 MMHG | DIASTOLIC BLOOD PRESSURE: 68 MMHG | TEMPERATURE: 98.3 F | HEART RATE: 77 BPM

## 2022-01-01 VITALS
RESPIRATION RATE: 16 BRPM | DIASTOLIC BLOOD PRESSURE: 52 MMHG | OXYGEN SATURATION: 98 % | TEMPERATURE: 98.5 F | HEART RATE: 89 BPM | SYSTOLIC BLOOD PRESSURE: 100 MMHG

## 2022-01-01 VITALS
DIASTOLIC BLOOD PRESSURE: 75 MMHG | OXYGEN SATURATION: 94 % | HEART RATE: 74 BPM | TEMPERATURE: 98.2 F | RESPIRATION RATE: 16 BRPM | SYSTOLIC BLOOD PRESSURE: 110 MMHG

## 2022-01-01 VITALS
TEMPERATURE: 98.1 F | HEART RATE: 70 BPM | SYSTOLIC BLOOD PRESSURE: 120 MMHG | OXYGEN SATURATION: 95 % | DIASTOLIC BLOOD PRESSURE: 68 MMHG | RESPIRATION RATE: 16 BRPM

## 2022-01-01 VITALS
DIASTOLIC BLOOD PRESSURE: 62 MMHG | RESPIRATION RATE: 16 BRPM | SYSTOLIC BLOOD PRESSURE: 112 MMHG | HEART RATE: 68 BPM | OXYGEN SATURATION: 96 % | TEMPERATURE: 97.4 F

## 2022-01-01 VITALS
HEART RATE: 67 BPM | SYSTOLIC BLOOD PRESSURE: 120 MMHG | DIASTOLIC BLOOD PRESSURE: 62 MMHG | OXYGEN SATURATION: 94 % | RESPIRATION RATE: 16 BRPM | TEMPERATURE: 97.7 F

## 2022-01-01 VITALS
RESPIRATION RATE: 16 BRPM | DIASTOLIC BLOOD PRESSURE: 70 MMHG | BODY MASS INDEX: 16.83 KG/M2 | OXYGEN SATURATION: 93 % | HEIGHT: 63 IN | HEART RATE: 59 BPM | TEMPERATURE: 97.2 F | WEIGHT: 95 LBS | SYSTOLIC BLOOD PRESSURE: 118 MMHG

## 2022-01-01 VITALS
HEART RATE: 65 BPM | WEIGHT: 95.9 LBS | DIASTOLIC BLOOD PRESSURE: 76 MMHG | HEIGHT: 63 IN | BODY MASS INDEX: 16.99 KG/M2 | OXYGEN SATURATION: 97 % | SYSTOLIC BLOOD PRESSURE: 124 MMHG

## 2022-01-01 VITALS
TEMPERATURE: 97.7 F | RESPIRATION RATE: 18 BRPM | OXYGEN SATURATION: 96 % | DIASTOLIC BLOOD PRESSURE: 66 MMHG | SYSTOLIC BLOOD PRESSURE: 122 MMHG | HEART RATE: 64 BPM

## 2022-01-01 VITALS
TEMPERATURE: 98.3 F | SYSTOLIC BLOOD PRESSURE: 118 MMHG | DIASTOLIC BLOOD PRESSURE: 60 MMHG | OXYGEN SATURATION: 94 % | RESPIRATION RATE: 16 BRPM | HEART RATE: 73 BPM

## 2022-01-01 VITALS
SYSTOLIC BLOOD PRESSURE: 72 MMHG | BODY MASS INDEX: 16.83 KG/M2 | HEART RATE: 82 BPM | HEIGHT: 63 IN | RESPIRATION RATE: 19 BRPM | WEIGHT: 95 LBS | DIASTOLIC BLOOD PRESSURE: 44 MMHG | TEMPERATURE: 97.9 F | OXYGEN SATURATION: 91 %

## 2022-01-01 VITALS
DIASTOLIC BLOOD PRESSURE: 60 MMHG | TEMPERATURE: 97.9 F | SYSTOLIC BLOOD PRESSURE: 110 MMHG | OXYGEN SATURATION: 97 % | HEART RATE: 69 BPM | RESPIRATION RATE: 17 BRPM

## 2022-01-01 VITALS
SYSTOLIC BLOOD PRESSURE: 112 MMHG | DIASTOLIC BLOOD PRESSURE: 62 MMHG | HEART RATE: 63 BPM | RESPIRATION RATE: 16 BRPM | TEMPERATURE: 97.9 F | OXYGEN SATURATION: 97 %

## 2022-01-01 VITALS
DIASTOLIC BLOOD PRESSURE: 70 MMHG | SYSTOLIC BLOOD PRESSURE: 140 MMHG | OXYGEN SATURATION: 96 % | TEMPERATURE: 97.8 F | HEART RATE: 62 BPM | RESPIRATION RATE: 16 BRPM

## 2022-01-01 VITALS
OXYGEN SATURATION: 98 % | TEMPERATURE: 98.7 F | HEART RATE: 68 BPM | SYSTOLIC BLOOD PRESSURE: 100 MMHG | DIASTOLIC BLOOD PRESSURE: 60 MMHG | RESPIRATION RATE: 14 BRPM

## 2022-01-01 DIAGNOSIS — R10.84 GENERALIZED ABDOMINAL PAIN: ICD-10-CM

## 2022-01-01 DIAGNOSIS — M54.50 ACUTE LOW BACK PAIN WITHOUT SCIATICA, UNSPECIFIED BACK PAIN LATERALITY: ICD-10-CM

## 2022-01-01 DIAGNOSIS — J96.01 ACUTE RESPIRATORY FAILURE WITH HYPOXIA (HCC): ICD-10-CM

## 2022-01-01 DIAGNOSIS — S09.90XA CLOSED HEAD INJURY, INITIAL ENCOUNTER: ICD-10-CM

## 2022-01-01 DIAGNOSIS — R41.0 DISORIENTATION: ICD-10-CM

## 2022-01-01 DIAGNOSIS — N39.0 URINARY TRACT INFECTION WITHOUT HEMATURIA, SITE UNSPECIFIED: ICD-10-CM

## 2022-01-01 DIAGNOSIS — F01.50 VASCULAR DEMENTIA WITHOUT BEHAVIORAL DISTURBANCE (HCC): ICD-10-CM
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
## 2022-01-01 DIAGNOSIS — U07.1 PNEUMONIA DUE TO COVID-19 VIRUS: ICD-10-CM

## 2022-01-01 DIAGNOSIS — J12.82 PNEUMONIA DUE TO COVID-19 VIRUS: ICD-10-CM

## 2022-01-01 DIAGNOSIS — S01.81XA FACIAL LACERATION, INITIAL ENCOUNTER: Primary | ICD-10-CM

## 2022-01-01 LAB
ALBUMIN SERPL BCP-MCNC: 3.9 G/DL (ref 3.2–4.9)
ALBUMIN SERPL BCP-MCNC: 4.1 G/DL (ref 3.2–4.9)
ALBUMIN/GLOB SERPL: 1.4 G/DL
ALBUMIN/GLOB SERPL: 1.6 G/DL
ALP SERPL-CCNC: 117 U/L (ref 30–99)
ALP SERPL-CCNC: 95 U/L (ref 30–99)
ALT SERPL-CCNC: 11 U/L (ref 2–50)
ALT SERPL-CCNC: 13 U/L (ref 2–50)
ANION GAP SERPL CALC-SCNC: 10 MMOL/L (ref 7–16)
ANION GAP SERPL CALC-SCNC: 12 MMOL/L (ref 7–16)
ANION GAP SERPL CALC-SCNC: 14 MMOL/L (ref 7–16)
ANION GAP SERPL CALC-SCNC: 18 MMOL/L (ref 7–16)
APPEARANCE UR: ABNORMAL
APPEARANCE UR: CLEAR
AST SERPL-CCNC: 19 U/L (ref 12–45)
AST SERPL-CCNC: 22 U/L (ref 12–45)
BACTERIA #/AREA URNS HPF: ABNORMAL /HPF
BACTERIA #/AREA URNS HPF: NEGATIVE /HPF
BACTERIA UR CULT: ABNORMAL
BACTERIA UR CULT: ABNORMAL
BACTERIA UR CULT: NORMAL
BASOPHILS # BLD AUTO: 0.1 % (ref 0–1.8)
BASOPHILS # BLD AUTO: 0.2 % (ref 0–1.8)
BASOPHILS # BLD AUTO: 0.2 % (ref 0–1.8)
BASOPHILS # BLD: 0.01 K/UL (ref 0–0.12)
BILIRUB SERPL-MCNC: 0.4 MG/DL (ref 0.1–1.5)
BILIRUB SERPL-MCNC: 0.7 MG/DL (ref 0.1–1.5)
BILIRUB UR QL STRIP.AUTO: ABNORMAL
BILIRUB UR QL STRIP.AUTO: NEGATIVE
BUN SERPL-MCNC: 20 MG/DL (ref 8–22)
BUN SERPL-MCNC: 24 MG/DL (ref 8–22)
BUN SERPL-MCNC: 57 MG/DL (ref 8–22)
BUN SERPL-MCNC: 64 MG/DL (ref 8–22)
CALCIUM SERPL-MCNC: 9.2 MG/DL (ref 8.5–10.5)
CALCIUM SERPL-MCNC: 9.6 MG/DL (ref 8.5–10.5)
CALCIUM SERPL-MCNC: 9.7 MG/DL (ref 8.5–10.5)
CALCIUM SERPL-MCNC: 9.8 MG/DL (ref 8.5–10.5)
CAOX CRY #/AREA URNS HPF: ABNORMAL /HPF
CHLORIDE SERPL-SCNC: 101 MMOL/L (ref 96–112)
CHLORIDE SERPL-SCNC: 106 MMOL/L (ref 96–112)
CHLORIDE SERPL-SCNC: 111 MMOL/L (ref 96–112)
CHLORIDE SERPL-SCNC: 112 MMOL/L (ref 96–112)
CO2 SERPL-SCNC: 20 MMOL/L (ref 20–33)
CO2 SERPL-SCNC: 22 MMOL/L (ref 20–33)
CO2 SERPL-SCNC: 22 MMOL/L (ref 20–33)
CO2 SERPL-SCNC: 23 MMOL/L (ref 20–33)
COLOR UR: ABNORMAL
COLOR UR: ABNORMAL
COLOR UR: YELLOW
COLOR UR: YELLOW
CREAT SERPL-MCNC: 0.87 MG/DL (ref 0.5–1.4)
CREAT SERPL-MCNC: 0.93 MG/DL (ref 0.5–1.4)
CREAT SERPL-MCNC: 1.29 MG/DL (ref 0.5–1.4)
CREAT SERPL-MCNC: 1.34 MG/DL (ref 0.5–1.4)
CRP SERPL HS-MCNC: 12.47 MG/DL (ref 0–0.75)
D DIMER PPP IA.FEU-MCNC: 3.63 UG/ML (FEU) (ref 0–0.5)
EKG IMPRESSION: NORMAL
EOSINOPHIL # BLD AUTO: 0.01 K/UL (ref 0–0.51)
EOSINOPHIL # BLD AUTO: 0.05 K/UL (ref 0–0.51)
EOSINOPHIL # BLD AUTO: 0.1 K/UL (ref 0–0.51)
EOSINOPHIL NFR BLD: 0.1 % (ref 0–6.9)
EOSINOPHIL NFR BLD: 0.8 % (ref 0–6.9)
EOSINOPHIL NFR BLD: 2 % (ref 0–6.9)
EPI CELLS #/AREA URNS HPF: ABNORMAL /HPF
EPI CELLS #/AREA URNS HPF: NEGATIVE /HPF
ERYTHROCYTE [DISTWIDTH] IN BLOOD BY AUTOMATED COUNT: 45.3 FL (ref 35.9–50)
ERYTHROCYTE [DISTWIDTH] IN BLOOD BY AUTOMATED COUNT: 48.3 FL (ref 35.9–50)
ERYTHROCYTE [DISTWIDTH] IN BLOOD BY AUTOMATED COUNT: 48.7 FL (ref 35.9–50)
FLUAV RNA SPEC QL NAA+PROBE: NEGATIVE
FLUBV RNA SPEC QL NAA+PROBE: NEGATIVE
GFR SERPLBLD CREATININE-BSD FMLA CKD-EPI: 37 ML/MIN/1.73 M 2
GFR SERPLBLD CREATININE-BSD FMLA CKD-EPI: 39 ML/MIN/1.73 M 2
GFR SERPLBLD CREATININE-BSD FMLA CKD-EPI: 57 ML/MIN/1.73 M 2
GLOBULIN SER CALC-MCNC: 2.6 G/DL (ref 1.9–3.5)
GLOBULIN SER CALC-MCNC: 2.8 G/DL (ref 1.9–3.5)
GLUCOSE SERPL-MCNC: 101 MG/DL (ref 65–99)
GLUCOSE SERPL-MCNC: 104 MG/DL (ref 65–99)
GLUCOSE SERPL-MCNC: 146 MG/DL (ref 65–99)
GLUCOSE SERPL-MCNC: 88 MG/DL (ref 65–99)
GLUCOSE UR STRIP.AUTO-MCNC: NEGATIVE MG/DL
HCT VFR BLD AUTO: 37 % (ref 37–47)
HCT VFR BLD AUTO: 37.5 % (ref 37–47)
HCT VFR BLD AUTO: 37.9 % (ref 37–47)
HGB BLD-MCNC: 12.4 G/DL (ref 12–16)
HGB BLD-MCNC: 12.5 G/DL (ref 12–16)
HGB BLD-MCNC: 13.1 G/DL (ref 12–16)
HYALINE CASTS #/AREA URNS LPF: ABNORMAL /LPF
HYALINE CASTS #/AREA URNS LPF: ABNORMAL /LPF
IMM GRANULOCYTES # BLD AUTO: 0.03 K/UL (ref 0–0.11)
IMM GRANULOCYTES # BLD AUTO: 0.03 K/UL (ref 0–0.11)
IMM GRANULOCYTES # BLD AUTO: 0.07 K/UL (ref 0–0.11)
IMM GRANULOCYTES NFR BLD AUTO: 0.5 % (ref 0–0.9)
IMM GRANULOCYTES NFR BLD AUTO: 0.6 % (ref 0–0.9)
IMM GRANULOCYTES NFR BLD AUTO: 0.8 % (ref 0–0.9)
KETONES UR STRIP.AUTO-MCNC: 15 MG/DL
KETONES UR STRIP.AUTO-MCNC: ABNORMAL MG/DL
KETONES UR STRIP.AUTO-MCNC: NEGATIVE MG/DL
KETONES UR STRIP.AUTO-MCNC: NEGATIVE MG/DL
LACTATE SERPL-SCNC: 1.9 MMOL/L (ref 0.5–2)
LEUKOCYTE ESTERASE UR QL STRIP.AUTO: ABNORMAL
LEUKOCYTE ESTERASE UR QL STRIP.AUTO: ABNORMAL
LEUKOCYTE ESTERASE UR QL STRIP.AUTO: NEGATIVE
LEUKOCYTE ESTERASE UR QL STRIP.AUTO: NEGATIVE
LIPASE SERPL-CCNC: 34 U/L (ref 11–82)
LYMPHOCYTES # BLD AUTO: 0.72 K/UL (ref 1–4.8)
LYMPHOCYTES # BLD AUTO: 0.74 K/UL (ref 1–4.8)
LYMPHOCYTES # BLD AUTO: 1.4 K/UL (ref 1–4.8)
LYMPHOCYTES NFR BLD: 12.5 % (ref 22–41)
LYMPHOCYTES NFR BLD: 27.7 % (ref 22–41)
LYMPHOCYTES NFR BLD: 8.6 % (ref 22–41)
MAGNESIUM SERPL-MCNC: 2.3 MG/DL (ref 1.5–2.5)
MCH RBC QN AUTO: 34 PG (ref 27–33)
MCH RBC QN AUTO: 34.2 PG (ref 27–33)
MCH RBC QN AUTO: 34.3 PG (ref 27–33)
MCHC RBC AUTO-ENTMCNC: 33 G/DL (ref 33.6–35)
MCHC RBC AUTO-ENTMCNC: 33.5 G/DL (ref 33.6–35)
MCHC RBC AUTO-ENTMCNC: 34.9 G/DL (ref 33.6–35)
MCV RBC AUTO: 102.2 FL (ref 81.4–97.8)
MCV RBC AUTO: 103 FL (ref 81.4–97.8)
MCV RBC AUTO: 97.9 FL (ref 81.4–97.8)
MICRO URNS: ABNORMAL
MICRO URNS: ABNORMAL
MICRO URNS: NORMAL
MICRO URNS: NORMAL
MONOCYTES # BLD AUTO: 0.54 K/UL (ref 0–0.85)
MONOCYTES # BLD AUTO: 0.62 K/UL (ref 0–0.85)
MONOCYTES # BLD AUTO: 0.81 K/UL (ref 0–0.85)
MONOCYTES NFR BLD AUTO: 12.3 % (ref 0–13.4)
MONOCYTES NFR BLD AUTO: 9.1 % (ref 0–13.4)
MONOCYTES NFR BLD AUTO: 9.7 % (ref 0–13.4)
NEUTROPHILS # BLD AUTO: 2.9 K/UL (ref 2–7.15)
NEUTROPHILS # BLD AUTO: 4.55 K/UL (ref 2–7.15)
NEUTROPHILS # BLD AUTO: 6.71 K/UL (ref 2–7.15)
NEUTROPHILS NFR BLD: 57.2 % (ref 44–72)
NEUTROPHILS NFR BLD: 76.9 % (ref 44–72)
NEUTROPHILS NFR BLD: 80.7 % (ref 44–72)
NITRITE UR QL STRIP.AUTO: NEGATIVE
NRBC # BLD AUTO: 0 K/UL
NRBC BLD-RTO: 0 /100 WBC
OSMOLALITY UR: 757 MOSM/KG H2O (ref 300–900)
PH UR STRIP.AUTO: 5.5 [PH] (ref 5–8)
PH UR STRIP.AUTO: 5.5 [PH] (ref 5–8)
PH UR STRIP.AUTO: 6.5 [PH] (ref 5–8)
PH UR STRIP.AUTO: 7 [PH] (ref 5–8)
PHOSPHATE SERPL-MCNC: 3.3 MG/DL (ref 2.5–4.5)
PLATELET # BLD AUTO: 199 K/UL (ref 164–446)
PLATELET # BLD AUTO: 241 K/UL (ref 164–446)
PLATELET # BLD AUTO: 326 K/UL (ref 164–446)
PMV BLD AUTO: 8.6 FL (ref 9–12.9)
PMV BLD AUTO: 8.7 FL (ref 9–12.9)
PMV BLD AUTO: 9 FL (ref 9–12.9)
POTASSIUM SERPL-SCNC: 3.7 MMOL/L (ref 3.6–5.5)
POTASSIUM SERPL-SCNC: 4.2 MMOL/L (ref 3.6–5.5)
POTASSIUM SERPL-SCNC: 4.8 MMOL/L (ref 3.6–5.5)
POTASSIUM SERPL-SCNC: 5.3 MMOL/L (ref 3.6–5.5)
PROCALCITONIN SERPL-MCNC: 0.12 NG/ML
PROT SERPL-MCNC: 6.7 G/DL (ref 6–8.2)
PROT SERPL-MCNC: 6.7 G/DL (ref 6–8.2)
PROT UR QL STRIP: 30 MG/DL
PROT UR QL STRIP: 30 MG/DL
PROT UR QL STRIP: NEGATIVE MG/DL
PROT UR QL STRIP: NEGATIVE MG/DL
RBC # BLD AUTO: 3.62 M/UL (ref 4.2–5.4)
RBC # BLD AUTO: 3.68 M/UL (ref 4.2–5.4)
RBC # BLD AUTO: 3.83 M/UL (ref 4.2–5.4)
RBC # URNS HPF: ABNORMAL /HPF
RBC # URNS HPF: ABNORMAL /HPF
RBC UR QL AUTO: NEGATIVE
RENAL EPI CELLS #/AREA URNS HPF: NEGATIVE /HPF
RSV RNA SPEC QL NAA+PROBE: NEGATIVE
SARS-COV-2 RNA RESP QL NAA+PROBE: DETECTED
SIGNIFICANT IND 70042: ABNORMAL
SIGNIFICANT IND 70042: NORMAL
SITE SITE: ABNORMAL
SITE SITE: NORMAL
SODIUM SERPL-SCNC: 135 MMOL/L (ref 135–145)
SODIUM SERPL-SCNC: 139 MMOL/L (ref 135–145)
SODIUM SERPL-SCNC: 148 MMOL/L (ref 135–145)
SODIUM SERPL-SCNC: 149 MMOL/L (ref 135–145)
SOURCE SOURCE: ABNORMAL
SOURCE SOURCE: NORMAL
SP GR UR STRIP.AUTO: 1.01
SP GR UR STRIP.AUTO: 1.02
SPECIMEN SOURCE: ABNORMAL
TSH SERPL DL<=0.005 MIU/L-ACNC: 1.11 UIU/ML (ref 0.38–5.33)
UROBILINOGEN UR STRIP.AUTO-MCNC: 0.2 MG/DL
UROBILINOGEN UR STRIP.AUTO-MCNC: 0.2 MG/DL
UROBILINOGEN UR STRIP.AUTO-MCNC: 1 MG/DL
UROBILINOGEN UR STRIP.AUTO-MCNC: 1 MG/DL
WBC # BLD AUTO: 5.1 K/UL (ref 4.8–10.8)
WBC # BLD AUTO: 5.9 K/UL (ref 4.8–10.8)
WBC # BLD AUTO: 8.3 K/UL (ref 4.8–10.8)
WBC #/AREA URNS HPF: ABNORMAL /HPF
WBC #/AREA URNS HPF: ABNORMAL /HPF

## 2022-01-01 PROCEDURE — 84100 ASSAY OF PHOSPHORUS: CPT

## 2022-01-01 PROCEDURE — G0493 RN CARE EA 15 MIN HH/HOSPICE: HCPCS

## 2022-01-01 PROCEDURE — G0299 HHS/HOSPICE OF RN EA 15 MIN: HCPCS

## 2022-01-01 PROCEDURE — 92610 EVALUATE SWALLOWING FUNCTION: CPT

## 2022-01-01 PROCEDURE — 700117 HCHG RX CONTRAST REV CODE 255: Performed by: EMERGENCY MEDICINE

## 2022-01-01 PROCEDURE — G0151 HHCP-SERV OF PT,EA 15 MIN: HCPCS

## 2022-01-01 PROCEDURE — 700101 HCHG RX REV CODE 250: Performed by: HOSPITALIST

## 2022-01-01 PROCEDURE — 72131 CT LUMBAR SPINE W/O DYE: CPT

## 2022-01-01 PROCEDURE — 96374 THER/PROPH/DIAG INJ IV PUSH: CPT

## 2022-01-01 PROCEDURE — 87086 URINE CULTURE/COLONY COUNT: CPT

## 2022-01-01 PROCEDURE — 700111 HCHG RX REV CODE 636 W/ 250 OVERRIDE (IP): Performed by: HOSPITALIST

## 2022-01-01 PROCEDURE — 0241U HCHG SARS-COV-2 COVID-19 NFCT DS RESP RNA 4 TRGT MIC: CPT

## 2022-01-01 PROCEDURE — 303353 HCHG DERMABOND SKIN ADHESIVE

## 2022-01-01 PROCEDURE — 665001 SOC-HOME HEALTH

## 2022-01-01 PROCEDURE — 304999 HCHG REPAIR-SIMPLE/INTERMED LEVEL 1

## 2022-01-01 PROCEDURE — 36415 COLL VENOUS BLD VENIPUNCTURE: CPT

## 2022-01-01 PROCEDURE — 84145 PROCALCITONIN (PCT): CPT

## 2022-01-01 PROCEDURE — 87040 BLOOD CULTURE FOR BACTERIA: CPT | Mod: 91

## 2022-01-01 PROCEDURE — 99212 OFFICE O/P EST SF 10 MIN: CPT | Performed by: PSYCHIATRY & NEUROLOGY

## 2022-01-01 PROCEDURE — 83935 ASSAY OF URINE OSMOLALITY: CPT

## 2022-01-01 PROCEDURE — 700111 HCHG RX REV CODE 636 W/ 250 OVERRIDE (IP): Performed by: EMERGENCY MEDICINE

## 2022-01-01 PROCEDURE — G0157 HHC PT ASSISTANT EA 15: HCPCS | Mod: CQ

## 2022-01-01 PROCEDURE — 83690 ASSAY OF LIPASE: CPT

## 2022-01-01 PROCEDURE — 8E0ZXY6 ISOLATION: ICD-10-PCS | Performed by: EMERGENCY MEDICINE

## 2022-01-01 PROCEDURE — 99285 EMERGENCY DEPT VISIT HI MDM: CPT

## 2022-01-01 PROCEDURE — 700111 HCHG RX REV CODE 636 W/ 250 OVERRIDE (IP): Performed by: STUDENT IN AN ORGANIZED HEALTH CARE EDUCATION/TRAINING PROGRAM

## 2022-01-01 PROCEDURE — 96372 THER/PROPH/DIAG INJ SC/IM: CPT

## 2022-01-01 PROCEDURE — 700117 HCHG RX CONTRAST REV CODE 255: Performed by: STUDENT IN AN ORGANIZED HEALTH CARE EDUCATION/TRAINING PROGRAM

## 2022-01-01 PROCEDURE — 700102 HCHG RX REV CODE 250 W/ 637 OVERRIDE(OP)

## 2022-01-01 PROCEDURE — 70450 CT HEAD/BRAIN W/O DYE: CPT | Mod: MG

## 2022-01-01 PROCEDURE — 74176 CT ABD & PELVIS W/O CONTRAST: CPT | Mod: MG

## 2022-01-01 PROCEDURE — 70486 CT MAXILLOFACIAL W/O DYE: CPT | Mod: MG

## 2022-01-01 PROCEDURE — 93010 ELECTROCARDIOGRAM REPORT: CPT | Performed by: STUDENT IN AN ORGANIZED HEALTH CARE EDUCATION/TRAINING PROGRAM

## 2022-01-01 PROCEDURE — 85025 COMPLETE CBC W/AUTO DIFF WBC: CPT

## 2022-01-01 PROCEDURE — 96376 TX/PRO/DX INJ SAME DRUG ADON: CPT

## 2022-01-01 PROCEDURE — C9113 INJ PANTOPRAZOLE SODIUM, VIA: HCPCS | Performed by: STUDENT IN AN ORGANIZED HEALTH CARE EDUCATION/TRAINING PROGRAM

## 2022-01-01 PROCEDURE — 96375 TX/PRO/DX INJ NEW DRUG ADDON: CPT

## 2022-01-01 PROCEDURE — 71045 X-RAY EXAM CHEST 1 VIEW: CPT

## 2022-01-01 PROCEDURE — 93005 ELECTROCARDIOGRAM TRACING: CPT | Performed by: STUDENT IN AN ORGANIZED HEALTH CARE EDUCATION/TRAINING PROGRAM

## 2022-01-01 PROCEDURE — 85379 FIBRIN DEGRADATION QUANT: CPT

## 2022-01-01 PROCEDURE — 87186 SC STD MICRODIL/AGAR DIL: CPT

## 2022-01-01 PROCEDURE — 72128 CT CHEST SPINE W/O DYE: CPT

## 2022-01-01 PROCEDURE — 80048 BASIC METABOLIC PNL TOTAL CA: CPT

## 2022-01-01 PROCEDURE — 80053 COMPREHEN METABOLIC PANEL: CPT

## 2022-01-01 PROCEDURE — 72125 CT NECK SPINE W/O DYE: CPT | Mod: MG

## 2022-01-01 PROCEDURE — 81003 URINALYSIS AUTO W/O SCOPE: CPT

## 2022-01-01 PROCEDURE — 304217 HCHG IRRIGATION SYSTEM

## 2022-01-01 PROCEDURE — 70450 CT HEAD/BRAIN W/O DYE: CPT

## 2022-01-01 PROCEDURE — 84443 ASSAY THYROID STIM HORMONE: CPT

## 2022-01-01 PROCEDURE — 99233 SBSQ HOSP IP/OBS HIGH 50: CPT | Performed by: HOSPITALIST

## 2022-01-01 PROCEDURE — 86140 C-REACTIVE PROTEIN: CPT

## 2022-01-01 PROCEDURE — 99239 HOSP IP/OBS DSCHRG MGMT >30: CPT | Performed by: HOSPITALIST

## 2022-01-01 PROCEDURE — 81001 URINALYSIS AUTO W/SCOPE: CPT

## 2022-01-01 PROCEDURE — C9803 HOPD COVID-19 SPEC COLLECT: HCPCS | Performed by: EMERGENCY MEDICINE

## 2022-01-01 PROCEDURE — 99284 EMERGENCY DEPT VISIT MOD MDM: CPT

## 2022-01-01 PROCEDURE — 770001 HCHG ROOM/CARE - MED/SURG/GYN PRIV*

## 2022-01-01 PROCEDURE — G0495 RN CARE TRAIN/EDU IN HH: HCPCS

## 2022-01-01 PROCEDURE — 72125 CT NECK SPINE W/O DYE: CPT | Mod: MF

## 2022-01-01 PROCEDURE — 700111 HCHG RX REV CODE 636 W/ 250 OVERRIDE (IP)

## 2022-01-01 PROCEDURE — 83605 ASSAY OF LACTIC ACID: CPT

## 2022-01-01 PROCEDURE — 71275 CT ANGIOGRAPHY CHEST: CPT | Mod: ME

## 2022-01-01 PROCEDURE — 87077 CULTURE AEROBIC IDENTIFY: CPT

## 2022-01-01 PROCEDURE — 99223 1ST HOSP IP/OBS HIGH 75: CPT | Performed by: STUDENT IN AN ORGANIZED HEALTH CARE EDUCATION/TRAINING PROGRAM

## 2022-01-01 PROCEDURE — 700105 HCHG RX REV CODE 258: Performed by: STUDENT IN AN ORGANIZED HEALTH CARE EDUCATION/TRAINING PROGRAM

## 2022-01-01 PROCEDURE — 74177 CT ABD & PELVIS W/CONTRAST: CPT | Mod: ME

## 2022-01-01 PROCEDURE — 83735 ASSAY OF MAGNESIUM: CPT

## 2022-01-01 PROCEDURE — A9270 NON-COVERED ITEM OR SERVICE: HCPCS

## 2022-01-01 PROCEDURE — 99204 OFFICE O/P NEW MOD 45 MIN: CPT | Performed by: PSYCHIATRY & NEUROLOGY

## 2022-01-01 RX ORDER — ATROPINE SULFATE 10 MG/ML
2 SOLUTION/ DROPS OPHTHALMIC EVERY 4 HOURS PRN
Status: DISCONTINUED | OUTPATIENT
Start: 2022-01-01 | End: 2022-01-01 | Stop reason: HOSPADM

## 2022-01-01 RX ORDER — CEFTRIAXONE 1 G/1
1 INJECTION, POWDER, FOR SOLUTION INTRAMUSCULAR; INTRAVENOUS ONCE
Status: COMPLETED | OUTPATIENT
Start: 2022-01-01 | End: 2022-01-01

## 2022-01-01 RX ORDER — DOCUSATE SODIUM 100 MG/1
CAPSULE, LIQUID FILLED ORAL
Qty: 60 CAPSULE | Refills: 0 | Status: SHIPPED | OUTPATIENT
Start: 2022-01-01 | End: 2022-01-01

## 2022-01-01 RX ORDER — OMEPRAZOLE 20 MG/1
CAPSULE, DELAYED RELEASE ORAL
Qty: 60 CAPSULE | Refills: 11 | Status: SHIPPED | OUTPATIENT
Start: 2022-01-01

## 2022-01-01 RX ORDER — LEVOTHYROXINE SODIUM 0.05 MG/1
50 TABLET ORAL
Status: DISCONTINUED | OUTPATIENT
Start: 2022-01-01 | End: 2022-01-01

## 2022-01-01 RX ORDER — DEXAMETHASONE SODIUM PHOSPHATE 4 MG/ML
6 INJECTION, SOLUTION INTRA-ARTICULAR; INTRALESIONAL; INTRAMUSCULAR; INTRAVENOUS; SOFT TISSUE DAILY
Status: DISCONTINUED | OUTPATIENT
Start: 2022-01-01 | End: 2022-01-01

## 2022-01-01 RX ORDER — LISINOPRIL 10 MG/1
10 TABLET ORAL DAILY
COMMUNITY
Start: 2022-01-01

## 2022-01-01 RX ORDER — SCOLOPAMINE TRANSDERMAL SYSTEM 1 MG/1
1 PATCH, EXTENDED RELEASE TRANSDERMAL
Status: DISCONTINUED | OUTPATIENT
Start: 2022-01-01 | End: 2022-01-01 | Stop reason: HOSPADM

## 2022-01-01 RX ORDER — SODIUM CHLORIDE 9 MG/ML
INJECTION, SOLUTION INTRAVENOUS CONTINUOUS
Status: DISCONTINUED | OUTPATIENT
Start: 2022-01-01 | End: 2022-01-01

## 2022-01-01 RX ORDER — ACETAMINOPHEN 325 MG/1
650 TABLET ORAL EVERY 6 HOURS PRN
Status: DISCONTINUED | OUTPATIENT
Start: 2022-01-01 | End: 2022-01-01

## 2022-01-01 RX ORDER — ALCOHOL 70.47 ML/100ML
GEL TOPICAL
Qty: 30 TABLET | Refills: 0 | Status: SHIPPED | OUTPATIENT
Start: 2022-01-01 | End: 2022-01-01

## 2022-01-01 RX ORDER — AMOXICILLIN 250 MG
2 CAPSULE ORAL 2 TIMES DAILY
Status: DISCONTINUED | OUTPATIENT
Start: 2022-01-01 | End: 2022-01-01

## 2022-01-01 RX ORDER — LORAZEPAM 2 MG/ML
1 INJECTION INTRAMUSCULAR
Status: DISCONTINUED | OUTPATIENT
Start: 2022-01-01 | End: 2022-01-01 | Stop reason: HOSPADM

## 2022-01-01 RX ORDER — DEXAMETHASONE 4 MG/1
6 TABLET ORAL DAILY
Status: DISCONTINUED | OUTPATIENT
Start: 2022-01-01 | End: 2022-01-01

## 2022-01-01 RX ORDER — GLYCOPYRROLATE 1 MG/1
1 TABLET ORAL 3 TIMES DAILY PRN
Status: DISCONTINUED | OUTPATIENT
Start: 2022-01-01 | End: 2022-01-01 | Stop reason: HOSPADM

## 2022-01-01 RX ORDER — OMEPRAZOLE 20 MG/1
20 CAPSULE, DELAYED RELEASE ORAL 2 TIMES DAILY
Status: DISCONTINUED | OUTPATIENT
Start: 2022-01-01 | End: 2022-01-01

## 2022-01-01 RX ORDER — PANTOPRAZOLE SODIUM 40 MG/10ML
40 INJECTION, POWDER, LYOPHILIZED, FOR SOLUTION INTRAVENOUS DAILY
Status: DISCONTINUED | OUTPATIENT
Start: 2022-01-01 | End: 2022-01-01

## 2022-01-01 RX ORDER — GUAIFENESIN 600 MG/1
600 TABLET, EXTENDED RELEASE ORAL 2 TIMES DAILY PRN
Status: DISCONTINUED | OUTPATIENT
Start: 2022-01-01 | End: 2022-01-01

## 2022-01-01 RX ORDER — FERROUS SULFATE 325(65) MG
TABLET ORAL
Qty: 30 TABLET | Refills: 0 | Status: SHIPPED | OUTPATIENT
Start: 2022-01-01 | End: 2022-01-01

## 2022-01-01 RX ORDER — LORAZEPAM 2 MG/ML
1 CONCENTRATE ORAL
Status: DISCONTINUED | OUTPATIENT
Start: 2022-01-01 | End: 2022-01-01 | Stop reason: HOSPADM

## 2022-01-01 RX ORDER — ACETYLCYSTEINE 200 MG/ML
3 SOLUTION ORAL; RESPIRATORY (INHALATION) EVERY 4 HOURS
Status: DISCONTINUED | OUTPATIENT
Start: 2022-01-01 | End: 2022-01-01

## 2022-01-01 RX ORDER — DEXTROSE MONOHYDRATE, SODIUM CHLORIDE, AND POTASSIUM CHLORIDE 50; 1.49; 4.5 G/1000ML; G/1000ML; G/1000ML
INJECTION, SOLUTION INTRAVENOUS CONTINUOUS
Status: DISCONTINUED | OUTPATIENT
Start: 2022-01-01 | End: 2022-01-01

## 2022-01-01 RX ORDER — LEVOTHYROXINE SODIUM 0.05 MG/1
TABLET ORAL
Qty: 30 TABLET | Refills: 0 | Status: SHIPPED | OUTPATIENT
Start: 2022-01-01 | End: 2022-01-01

## 2022-01-01 RX ORDER — DEXTROSE MONOHYDRATE 50 MG/ML
INJECTION, SOLUTION INTRAVENOUS CONTINUOUS
Status: DISCONTINUED | OUTPATIENT
Start: 2022-01-01 | End: 2022-01-01

## 2022-01-01 RX ORDER — QUETIAPINE FUMARATE 25 MG/1
TABLET, FILM COATED ORAL
Qty: 30 TABLET | Refills: 0 | Status: SHIPPED | OUTPATIENT
Start: 2022-01-01

## 2022-01-01 RX ORDER — POTASSIUM CHLORIDE 20 MEQ/1
TABLET, EXTENDED RELEASE ORAL
Qty: 30 TABLET | Refills: 0 | Status: SHIPPED | OUTPATIENT
Start: 2022-01-01 | End: 2022-01-01

## 2022-01-01 RX ORDER — POLYETHYLENE GLYCOL 3350 17 G/17G
1 POWDER, FOR SOLUTION ORAL
Status: DISCONTINUED | OUTPATIENT
Start: 2022-01-01 | End: 2022-01-01

## 2022-01-01 RX ORDER — DIVALPROEX SODIUM 125 MG/1
125 CAPSULE, COATED PELLETS ORAL 2 TIMES DAILY
Status: DISCONTINUED | OUTPATIENT
Start: 2022-01-01 | End: 2022-01-01

## 2022-01-01 RX ORDER — LANOLIN ALCOHOL/MO/W.PET/CERES
CREAM (GRAM) TOPICAL
Qty: 30 TABLET | Refills: 0 | Status: SHIPPED | OUTPATIENT
Start: 2022-01-01 | End: 2022-01-01

## 2022-01-01 RX ORDER — SULFAMETHOXAZOLE AND TRIMETHOPRIM 800; 160 MG/1; MG/1
1 TABLET ORAL 2 TIMES DAILY
Qty: 10 TABLET | Refills: 0 | Status: SHIPPED | OUTPATIENT
Start: 2022-01-01 | End: 2022-01-01

## 2022-01-01 RX ORDER — ASPIRIN 81 MG/1
TABLET, COATED ORAL
Qty: 30 TABLET | Refills: 0 | Status: SHIPPED | OUTPATIENT
Start: 2022-01-01 | End: 2022-01-01

## 2022-01-01 RX ORDER — GLYCOPYRROLATE 0.2 MG/ML
0.2 INJECTION INTRAMUSCULAR; INTRAVENOUS 3 TIMES DAILY PRN
Status: DISCONTINUED | OUTPATIENT
Start: 2022-01-01 | End: 2022-01-01 | Stop reason: HOSPADM

## 2022-01-01 RX ORDER — DEXAMETHASONE SODIUM PHOSPHATE 4 MG/ML
6 INJECTION, SOLUTION INTRA-ARTICULAR; INTRALESIONAL; INTRAMUSCULAR; INTRAVENOUS; SOFT TISSUE ONCE
Status: COMPLETED | OUTPATIENT
Start: 2022-01-01 | End: 2022-01-01

## 2022-01-01 RX ORDER — HEPARIN SODIUM 5000 [USP'U]/ML
5000 INJECTION, SOLUTION INTRAVENOUS; SUBCUTANEOUS EVERY 8 HOURS
Status: DISCONTINUED | OUTPATIENT
Start: 2022-01-01 | End: 2022-01-01

## 2022-01-01 RX ORDER — CARVEDILOL 12.5 MG/1
TABLET ORAL
Qty: 60 TABLET | Refills: 0 | Status: SHIPPED | OUTPATIENT
Start: 2022-01-01 | End: 2022-01-01

## 2022-01-01 RX ORDER — ONDANSETRON 4 MG/1
4 TABLET, ORALLY DISINTEGRATING ORAL EVERY 6 HOURS PRN
Status: DISCONTINUED | OUTPATIENT
Start: 2022-01-01 | End: 2022-01-01 | Stop reason: HOSPADM

## 2022-01-01 RX ORDER — ONDANSETRON 2 MG/ML
4 INJECTION INTRAMUSCULAR; INTRAVENOUS EVERY 6 HOURS PRN
Status: DISCONTINUED | OUTPATIENT
Start: 2022-01-01 | End: 2022-01-01 | Stop reason: HOSPADM

## 2022-01-01 RX ORDER — LABETALOL HYDROCHLORIDE 5 MG/ML
10 INJECTION, SOLUTION INTRAVENOUS EVERY 4 HOURS PRN
Status: DISCONTINUED | OUTPATIENT
Start: 2022-01-01 | End: 2022-01-01

## 2022-01-01 RX ORDER — BISACODYL 10 MG
10 SUPPOSITORY, RECTAL RECTAL
Status: DISCONTINUED | OUTPATIENT
Start: 2022-01-01 | End: 2022-01-01

## 2022-01-01 RX ORDER — ENOXAPARIN SODIUM 100 MG/ML
40 INJECTION SUBCUTANEOUS DAILY
Status: DISCONTINUED | OUTPATIENT
Start: 2022-01-01 | End: 2022-01-01

## 2022-01-01 RX ADMIN — HEPARIN SODIUM 5000 UNITS: 5000 INJECTION, SOLUTION INTRAVENOUS; SUBCUTANEOUS at 21:42

## 2022-01-01 RX ADMIN — IOHEXOL 80 ML: 350 INJECTION, SOLUTION INTRAVENOUS at 13:45

## 2022-01-01 RX ADMIN — DEXTROSE MONOHYDRATE: 50 INJECTION, SOLUTION INTRAVENOUS at 00:27

## 2022-01-01 RX ADMIN — HEPARIN SODIUM 5000 UNITS: 5000 INJECTION, SOLUTION INTRAVENOUS; SUBCUTANEOUS at 21:33

## 2022-01-01 RX ADMIN — LORAZEPAM 1 MG: 2 INJECTION INTRAMUSCULAR; INTRAVENOUS at 23:36

## 2022-01-01 RX ADMIN — CEFTRIAXONE SODIUM 2 G: 10 INJECTION, POWDER, FOR SOLUTION INTRAVENOUS at 00:26

## 2022-01-01 RX ADMIN — SCOPALAMINE 1 PATCH: 1 PATCH, EXTENDED RELEASE TRANSDERMAL at 06:29

## 2022-01-01 RX ADMIN — CEFTRIAXONE SODIUM 1 G: 10 INJECTION, POWDER, FOR SOLUTION INTRAVENOUS at 16:20

## 2022-01-01 RX ADMIN — DEXAMETHASONE SODIUM PHOSPHATE 6 MG: 4 INJECTION, SOLUTION INTRA-ARTICULAR; INTRALESIONAL; INTRAMUSCULAR; INTRAVENOUS; SOFT TISSUE at 05:21

## 2022-01-01 RX ADMIN — HEPARIN SODIUM 5000 UNITS: 5000 INJECTION, SOLUTION INTRAVENOUS; SUBCUTANEOUS at 14:48

## 2022-01-01 RX ADMIN — CEFTRIAXONE SODIUM 1 G: 1 INJECTION, POWDER, FOR SOLUTION INTRAMUSCULAR; INTRAVENOUS at 14:45

## 2022-01-01 RX ADMIN — DEXAMETHASONE SODIUM PHOSPHATE 6 MG: 4 INJECTION, SOLUTION INTRA-ARTICULAR; INTRALESIONAL; INTRAMUSCULAR; INTRAVENOUS; SOFT TISSUE at 17:32

## 2022-01-01 RX ADMIN — POTASSIUM CHLORIDE, DEXTROSE MONOHYDRATE AND SODIUM CHLORIDE: 150; 5; 450 INJECTION, SOLUTION INTRAVENOUS at 16:22

## 2022-01-01 RX ADMIN — IOHEXOL 30 ML: 350 INJECTION, SOLUTION INTRAVENOUS at 01:00

## 2022-01-01 RX ADMIN — HEPARIN SODIUM 5000 UNITS: 5000 INJECTION, SOLUTION INTRAVENOUS; SUBCUTANEOUS at 05:21

## 2022-01-01 RX ADMIN — PANTOPRAZOLE SODIUM 40 MG: 40 INJECTION, POWDER, FOR SOLUTION INTRAVENOUS at 05:21

## 2022-01-01 SDOH — ECONOMIC STABILITY: HOUSING INSECURITY: HOME SAFETY: WITH ALF STAFF

## 2022-01-01 ASSESSMENT — ENCOUNTER SYMPTOMS
CHILLS: 0
PAIN SEVERITY GOAL: 0/10
LOWEST PAIN SEVERITY IN PAST 24 HOURS: 0/10
DENIES PAIN: 1
DEPRESSED MOOD: 1
PAIN SEVERITY GOAL: 0/10
DIFFICULTY THINKING: 1
POOR JUDGMENT: 1
HEADACHES: 1
SUBJECTIVE PAIN PROGRESSION: UNCHANGED
DIFFICULTY THINKING: 1
POOR JUDGMENT: 1
POOR JUDGMENT: 1
DENIES PAIN: 1
PAIN: DENIES ANY PAIN OR DISCOMFORT AT TIME OF NURSING VISIT.
DENIES PAIN: 1
LIMITED RANGE OF MOTION: 1
NAUSEA: NO
HIGHEST PAIN SEVERITY IN PAST 24 HOURS: 0/10
VOMITING: DENIES
VOMITING: DENIES
LOWEST PAIN SEVERITY IN PAST 24 HOURS: 0/10
MUSCLE WEAKNESS: 1
LOSS OF CONSCIOUSNESS: 0
SUBJECTIVE PAIN PROGRESSION: GRADUALLY IMPROVING
VOMITING: DENIES
DIFFICULTY THINKING: 1
HIGHEST PAIN SEVERITY IN PAST 24 HOURS: 0/10
PAIN LOCATION: BACK
FATIGUE: 1
MUSCLE WEAKNESS: 1
NAUSEA: DENIES
LOWEST PAIN SEVERITY IN PAST 24 HOURS: 0/10
VOMITING: DENIES
UNABLE TO COMMUNICATE PAIN: 1
VOMITING: NO
VOMITING: NO
HIGHEST PAIN SEVERITY IN PAST 24 HOURS: 0/10
DENIES PAIN: 1
MUSCLE WEAKNESS: 1
DENIES PAIN: 1
DENIES PAIN: 1
PAIN: 1
POOR JUDGMENT: 1
DENIES PAIN: 1
PAIN SEVERITY GOAL: 0/10
PAIN PRESENCE EVALUATION: DENIES
POOR JUDGMENT: 1
PERSON REPORTING PAIN: PATIENT
PAIN: 1
HIGHEST PAIN SEVERITY IN PAST 24 HOURS: 0/10
LIMITED RANGE OF MOTION: 1
DIFFICULTY THINKING: 1
VOMITING: NO
NAUSEA: NO
VOMITING: PT DENIES ANY EMESIS AT THIS TIME
NAUSEA: DENIES
DENIES PAIN: 1
NAUSEA: DENIES
PAIN SEVERITY GOAL: 0/10
PERSON REPORTING PAIN: PATIENT
PAIN: 1
HIGHEST PAIN SEVERITY IN PAST 24 HOURS: 0/10
LIMITED RANGE OF MOTION: 1
DIFFICULTY THINKING: 1
MUSCLE WEAKNESS: 1
PERSON REPORTING PAIN: PATIENT
SHORTNESS OF BREATH: 1
PERSON REPORTING PAIN: PATIENT
DIFFICULTY THINKING: 1
SUBJECTIVE PAIN PROGRESSION: UNCHANGED
NAUSEA: DENIES
PAIN LOCATION: ABDOMINAL AREA
DIFFICULTY THINKING: 1
PERSON REPORTING PAIN: PATIENT
NAUSEA: DENIES
HIGHEST PAIN SEVERITY IN PAST 24 HOURS: 0/10
MUSCLE WEAKNESS: 1
DIFFICULTY THINKING: 1
DIFFICULTY THINKING: 1
LIMITED RANGE OF MOTION: 1
NAUSEA: DENIES
MUSCLE WEAKNESS: 1
VOMITING: DENIES
PAIN LOCATION: BACK
LOWEST PAIN SEVERITY IN PAST 24 HOURS: 0/10
LOWEST PAIN SEVERITY IN PAST 24 HOURS: 0/10
POOR JUDGMENT: 1
MUSCLE WEAKNESS: 1
PAIN SEVERITY GOAL: 0/10
DENIES PAIN: 1
PERSON REPORTING PAIN: PATIENT
DIFFICULTY THINKING: 1
FEVER: 0
FALLS: 1
LOWEST PAIN SEVERITY IN PAST 24 HOURS: 0/10
MUSCLE WEAKNESS: 1
PERSON REPORTING PAIN: PATIENT
PERSON REPORTING PAIN: PATIENT
NAUSEA: PT DENIES ANY NAUSEA AT THIS TIME
LIMITED RANGE OF MOTION: 1
DENIES PAIN: 1
HIGHEST PAIN SEVERITY IN PAST 24 HOURS: 4/10
DENIES PAIN: 1
VOMITING: NO
POOR JUDGMENT: 1
PERSON REPORTING PAIN: PATIENT
NAUSEA: DENIES
PERSON REPORTING PAIN: PATIENT
PERSON REPORTING PAIN: PATIENT
MUSCLE WEAKNESS: 1
DIFFICULTY THINKING: 1
MUSCLE WEAKNESS: 1
SUBJECTIVE PAIN PROGRESSION: UNCHANGED
VOMITING: DENIES
LOWEST PAIN SEVERITY IN PAST 24 HOURS: 0/10
MUSCLE WEAKNESS: 1
VOMITING: NO
PAIN LOCATION - EXACERBATING FACTORS: MOVEMENT
DIFFICULTY THINKING: 1
PAIN SEVERITY GOAL: 0/10
DIFFICULTY THINKING: 1
SUBJECTIVE PAIN PROGRESSION: UNCHANGED
PERSON REPORTING PAIN: PATIENT
DIFFICULTY THINKING: 1
DIFFICULTY THINKING: 1
VOMITING: DENIES
PERSON REPORTING PAIN: PATIENT
VOMITING: NO
VOMITING: DENIES
DENIES PAIN: 1
LOWEST PAIN SEVERITY IN PAST 24 HOURS: 0/10
LIMITED RANGE OF MOTION: 1
PERSON REPORTING PAIN: PATIENT
MUSCLE WEAKNESS: 1
DIFFICULTY THINKING: 1
NECK PAIN: 1
NAUSEA: NO
PAIN LOCATION - PAIN FREQUENCY: FREQUENT
DIFFICULTY THINKING: 1
PERSON REPORTING PAIN: PATIENT
DIFFICULTY THINKING: 1
NAUSEA: NO
PERSON REPORTING PAIN: PATIENT
PERSON REPORTING PAIN: PATIENT
HIGHEST PAIN SEVERITY IN PAST 24 HOURS: 0/10
POOR JUDGMENT: 1
DENIES PAIN: 1
NAUSEA: DENIES
PERSON REPORTING PAIN: PATIENT
HIGHEST PAIN SEVERITY IN PAST 24 HOURS: 3/10
PAIN SEVERITY GOAL: 0/10
DIFFICULTY THINKING: 1
LOWEST PAIN SEVERITY IN PAST 24 HOURS: 0/10
LIMITED RANGE OF MOTION: 1
POOR JUDGMENT: 1
NAUSEA: NO
PAIN LOCATION - RELIEVING FACTORS: PAIN MED
PAIN SEVERITY GOAL: 0/10
DENIES PAIN: 1
DIFFICULTY THINKING: 1
PAIN: 1
NAUSEA: DENIES
PERSON REPORTING PAIN: PATIENT
PAIN SEVERITY GOAL: 0/10
NAUSEA: NO
PAIN LOCATION: BACK
MUSCLE WEAKNESS: 1
LIMITED RANGE OF MOTION: 1
POOR JUDGMENT: 1
NAUSEA: NO
DENIES PAIN: 1
DYSPNEA ACTIVITY LEVEL: AFTER AMBULATING 10 - 20 FT
VOMITING: NO
DIFFICULTY THINKING: 1
DIFFICULTY THINKING: 1
MUSCLE WEAKNESS: 1
VOMITING: DENIES
PERSON REPORTING PAIN: PATIENT
DENIES PAIN: 1
MUSCLE WEAKNESS: 1

## 2022-01-01 ASSESSMENT — BALANCE ASSESSMENTS
TURNING 360 DEGREES STEPS: 1 - CONTINUOUS STEPS
ATTEMPTS TO ARISE: 2 - ABLE TO RISE, ONE ATTEMPT
ATTEMPTS TO ARISE: 2 - ABLE TO RISE, ONE ATTEMPT
SITTING DOWN: 1 - USES ARMS OR NOT SMOOTH MOTION
NUDGED: 2 - STEADY
TURNING 360 DEGREES STEPS: 0 - DISCONTINUOUS STEPS
SITTING BALANCE: 1 - STEADY, SAFE
ATTEMPTS TO ARISE: 1 - ABLE, REQUIRES MORE THAN ONE ATTEMPT
NUDGED: 0 - BEGINS TO FALL
SITTING DOWN: 1 - USES ARMS OR NOT SMOOTH MOTION
BALANCE SCORE: 7
TURNING 360 DEGREES STEPS: 0 - DISCONTINUOUS STEPS
NUDGED: 0 - BEGINS TO FALL
IMMEDIATE STANDING BALANCE FIRST 5 SECONDS: 1 - STEADY BUT USES WALKER OR OTHER SUPPORT
ARISES: 1 - ABLE, USES ARMS TO HELP
STANDING BALANCE: 2 - NARROW STANCE WITHOUT SUPPORT
EYES CLOSED AT MAXIMUM POSITION NUDGED: 1 - STEADY
ARISING SCORE: 1
STANDING BALANCE: 1 - STEADY BUT WIDE STANCE AND USES CANE OR OTHER SUPPORT
SITTING BALANCE: 1 - STEADY, SAFE
STANDING BALANCE: 1 - STEADY BUT WIDE STANCE AND USES CANE OR OTHER SUPPORT
EYES CLOSED AT MAXIMUM POSITION NUDGED: 0 - UNSTEADY
ARISES: 1 - ABLE, USES ARMS TO HELP
NUDGED SCORE: 2
ARISING SCORE: 1
IMMEDIATE STANDING BALANCE FIRST 5 SECONDS: 2 - STEADY WITHOUT WALKER OR OTHER SUPPORT
BALANCE SCORE: 7
NUDGED SCORE: 0
SITTING BALANCE: 1 - STEADY, SAFE
ARISES: 1 - ABLE, USES ARMS TO HELP
EYES CLOSED AT MAXIMUM POSITION NUDGED: 0 - UNSTEADY
IMMEDIATE STANDING BALANCE FIRST 5 SECONDS: 1 - STEADY BUT USES WALKER OR OTHER SUPPORT
ARISING SCORE: 1
NUDGED SCORE: 0
SITTING DOWN: 1 - USES ARMS OR NOT SMOOTH MOTION
BALANCE SCORE: 13

## 2022-01-01 ASSESSMENT — PAIN SCALES - PAIN ASSESSMENT IN ADVANCED DEMENTIA (PAINAD)
TOTALSCORE: 0
BODYLANGUAGE: 0 - RELAXED.
NEGVOCALIZATION: 0 - NONE.
CONSOLABILITY: 0 - NO NEED TO CONSOLE.
TOTALSCORE: 0
NEGVOCALIZATION: 0 - NONE.
BODYLANGUAGE: 0 - RELAXED.
FACIALEXPRESSION: 1 - SAD. FRIGHTENED. FROWN.
FACIALEXPRESSION: 0 - SMILING OR INEXPRESSIVE.
NEGVOCALIZATION: 0 - NONE.
CONSOLABILITY: 1 - DISTRACTED OR REASSURED BY VOICE OR TOUCH.
FACIALEXPRESSION: 0 - SMILING OR INEXPRESSIVE.
NEGVOCALIZATION: 1 - OCCASIONAL MOAN OR GROAN. LOW-LEVEL SPEECH WITH A NEGATIVE OR DISAPPROVING QUALITY.
BODYLANGUAGE: 0 - RELAXED.
FACIALEXPRESSION: 0 - SMILING OR INEXPRESSIVE.
NEGVOCALIZATION: 0 - NONE.
TOTALSCORE: 5
BODYLANGUAGE: 0 - RELAXED.
TOTALSCORE: 0
TOTALSCORE: 0
TOTALSCORE: 3
CONSOLABILITY: 0 - NO NEED TO CONSOLE.
TOTALSCORE: 0
CONSOLABILITY: 0 - NO NEED TO CONSOLE.
BODYLANGUAGE: 1 - TENSE. DISTRESSED PACING. FIDGETING.
FACIALEXPRESSION: 0 - SMILING OR INEXPRESSIVE.
BODYLANGUAGE: 0 - RELAXED.
BODYLANGUAGE: 0 - RELAXED.
CONSOLABILITY: 0 - NO NEED TO CONSOLE.
BODYLANGUAGE: 0 - RELAXED.
NEGVOCALIZATION: 0 - NONE.
BODYLANGUAGE: 0 - RELAXED.
NEGVOCALIZATION: 0 - NONE.
FACIALEXPRESSION: 0 - SMILING OR INEXPRESSIVE.
NEGVOCALIZATION: 0 - NONE.
TOTALSCORE: 0
BODYLANGUAGE: 0 - RELAXED.
CONSOLABILITY: 0 - NO NEED TO CONSOLE.
FACIALEXPRESSION: 0 - SMILING OR INEXPRESSIVE.
NEGVOCALIZATION: 0 - NONE.
NEGVOCALIZATION: 0 - NONE.
BODYLANGUAGE: 0 - RELAXED.
BODYLANGUAGE: 0 - RELAXED.
FACIALEXPRESSION: 0 - SMILING OR INEXPRESSIVE.
FACIALEXPRESSION: 0 - SMILING OR INEXPRESSIVE.
CONSOLABILITY: 1 - DISTRACTED OR REASSURED BY VOICE OR TOUCH.
CONSOLABILITY: 0 - NO NEED TO CONSOLE.
NEGVOCALIZATION: 0 - NONE.
CONSOLABILITY: 0 - NO NEED TO CONSOLE.
BODYLANGUAGE: 0 - RELAXED.
FACIALEXPRESSION: 0 - SMILING OR INEXPRESSIVE.
CONSOLABILITY: 0 - NO NEED TO CONSOLE.
FACIALEXPRESSION: 0 - SMILING OR INEXPRESSIVE.
NEGVOCALIZATION: 0 - NONE.
TOTALSCORE: 0
CONSOLABILITY: 0 - NO NEED TO CONSOLE.
FACIALEXPRESSION: 0 - SMILING OR INEXPRESSIVE.
TOTALSCORE: 0
FACIALEXPRESSION: 0 - SMILING OR INEXPRESSIVE.
TOTALSCORE: 0
FACIALEXPRESSION: 2 - FACIAL GRIMACING.
NEGVOCALIZATION: 1 - OCCASIONAL MOAN OR GROAN. LOW-LEVEL SPEECH WITH A NEGATIVE OR DISAPPROVING QUALITY.
TOTALSCORE: 0
CONSOLABILITY: 0 - NO NEED TO CONSOLE.
TOTALSCORE: 0
BODYLANGUAGE: 0 - RELAXED.

## 2022-01-01 ASSESSMENT — ACTIVITIES OF DAILY LIVING (ADL)
HOME_HEALTH_OASIS: 01
PHYSICAL TRANSFERS ASSESSED: 1
OASIS_M1830: 03
AMBULATION ASSISTANCE: STAND BY ASSIST
AMBULATION ASSISTANCE: CONTACT GUARD ASSIST
AMBULATION ASSISTANCE: 1
CURRENT_FUNCTION: STAND BY ASSIST
AMBULATION ASSISTANCE: ONE PERSON
AMBULATION ASSISTANCE ON FLAT SURFACES: 1
FEEDING_COMMENTS: NOT ASSESSED THIS DATE.
HOME_HEALTH_OASIS: 01
OASIS_M1830: 06
CURRENT_FUNCTION: STAND BY ASSIST
AMBULATION ASSISTANCE: 1
OASIS_M1830: 03
PHYSICAL_TRANSFERS_DEVICES: USE OF B UES TO ASSIST.
PHYSICAL TRANSFERS ASSESSED: 1
CURRENT_FUNCTION: ONE PERSON
CURRENT_FUNCTION: CONTACT GUARD ASSIST
AMBULATION ASSISTANCE: STAND BY ASSIST
OASIS_M1830: 06
PHYSICAL_TRANSFERS_DEVICES: USE OF B UES TO ASSIST.

## 2022-01-01 ASSESSMENT — COGNITIVE AND FUNCTIONAL STATUS - GENERAL
DAILY ACTIVITIY SCORE: 6
MOBILITY SCORE: 9
WALKING IN HOSPITAL ROOM: TOTAL
SUGGESTED CMS G CODE MODIFIER MOBILITY: CM
CLIMB 3 TO 5 STEPS WITH RAILING: TOTAL
DRESSING REGULAR UPPER BODY CLOTHING: TOTAL
TOILETING: TOTAL
PERSONAL GROOMING: TOTAL
EATING MEALS: TOTAL
TURNING FROM BACK TO SIDE WHILE IN FLAT BAD: A LITTLE
HELP NEEDED FOR BATHING: TOTAL
DRESSING REGULAR LOWER BODY CLOTHING: TOTAL
SUGGESTED CMS G CODE MODIFIER DAILY ACTIVITY: CN
MOVING FROM LYING ON BACK TO SITTING ON SIDE OF FLAT BED: UNABLE
STANDING UP FROM CHAIR USING ARMS: TOTAL
MOVING TO AND FROM BED TO CHAIR: A LOT

## 2022-01-01 ASSESSMENT — MONTREAL COGNITIVE ASSESSMENT (MOCA)
CATEGORY CUE (IF APPLICABLE): 0
DELAYED RECALL SUBSCORE: 0/5
CATEGORY CUE (IF APPLICABLE): 0
7. [VIGILENCE] TAP WHEN HEARING DESIGNATED LETTER: 0/1
4. NAME EACH OF THE THREE ANIMALS SHOWN: 3/3
8. SERIAL SUBTRACTION OF 7S: 0/5
3. DRAW A CLOCK: CONTOUR, NUMBERS, HANDS: 1/3
6. READ LIST OF DIGITS [FORWARD/BACKWARD]: 1/2
2. COPY DRAWING: 0/1
9. REPEAT EACH SENTENCE: 0/2
WHAT IS THE TOTAL SCORE (OUT OF 30): 5
11. FOR EACH PAIR OF WORDS, WHAT CATEGORY DO THEY BELONG TO (OUT OF 2): 0/2
ADD 1 POINT IF LESS THAN OR EQUAL TO 12 YR EDUCATION LEVEL: 0
1. ALTERNATING TRAIL MAKING: 0/1
10. [FLUENCY] NAME WORDS STARTING WITH DESIGNATED LETTER: 0/1

## 2022-01-01 ASSESSMENT — GAIT ASSESSMENTS
PATH: 1 - MILD/MODERATE DEVIATION OR USES WALKING AID
STEP SYMMETRY: 1 - RIGHT AND LEFT STEP LENGTH APPEAR EQUAL
GAIT SCORE: 8
STEP SYMMETRY: 1 - RIGHT AND LEFT STEP LENGTH APPEAR EQUAL
STEP SYMMETRY: 1 - RIGHT AND LEFT STEP LENGTH APPEAR EQUAL
WALKING STANCE: 0 - HEELS APART
GAIT SCORE: 8
PATH SCORE: 1
STEP CONTINUITY: 1 - STEPS APPEAR CONTINUOUS
TRUNK SCORE: 0
WALKING STANCE: 1 - HEELS ALMOST TOUCHING WHILE WALKING
GAIT SCORE: 8
STEP CONTINUITY: 0 - STOPPING OR DISCONTINUITY BETWEEN STEPS
PATH: 1 - MILD/MODERATE DEVIATION OR USES WALKING AID
TRUNK: 0 - MARKED SWAY OR USES WALKING AID
INITIATION OF GAIT IMMEDIATELY AFTER GO: 1 - NO HESITANCY
TRUNK SCORE: 0
PATH: 1 - MILD/MODERATE DEVIATION OR USES WALKING AID
BALANCE AND GAIT SCORE: 21
BALANCE AND GAIT SCORE: 15
PATH SCORE: 1
INITIATION OF GAIT IMMEDIATELY AFTER GO: 1 - NO HESITANCY
INITIATION OF GAIT IMMEDIATELY AFTER GO: 1 - NO HESITANCY
TRUNK: 0 - MARKED SWAY OR USES WALKING AID
TRUNK SCORE: 0
WALKING STANCE: 0 - HEELS APART
BALANCE AND GAIT SCORE: 15
STEP CONTINUITY: 1 - STEPS APPEAR CONTINUOUS
PATH SCORE: 1
TRUNK: 0 - MARKED SWAY OR USES WALKING AID

## 2022-01-01 ASSESSMENT — PATIENT HEALTH QUESTIONNAIRE - PHQ9
CLINICAL INTERPRETATION OF PHQ2 SCORE: 0

## 2022-01-01 ASSESSMENT — COPD QUESTIONNAIRES
DURING THE PAST 4 WEEKS HOW MUCH DID YOU FEEL SHORT OF BREATH: NONE/LITTLE OF THE TIME
DO YOU EVER COUGH UP ANY MUCUS OR PHLEGM?: NO/ONLY WITH OCCASIONAL COLDS OR INFECTIONS

## 2022-01-01 ASSESSMENT — FIBROSIS 4 INDEX
FIB4 SCORE: 2.56
FIB4 SCORE: 2.53
FIB4 SCORE: 3.1
FIB4 SCORE: 2.53
FIB4 SCORE: 2.61

## 2022-01-01 ASSESSMENT — PAIN DESCRIPTION - PAIN TYPE: TYPE: ACUTE PAIN

## 2022-02-20 NOTE — DISCHARGE INSTRUCTIONS
CT scan shows old fractures of the lumbar spine and pelvis.  No acute fractures noted.    There is no signs of infection or obstruction on the CT scan.    Urinalysis shows a mild urinary tract infection for which she is being treated with antibiotics.    Use Tylenol for discomfort.

## 2022-02-20 NOTE — ED PROVIDER NOTES
ED Provider  Scribed for Dada Galvan D.O. by Laila Greene. 2/20/2022  12:00 PM    Means of arrival: Ambulance  History obtained from: patient  History limited by: confusion    CHIEF COMPLAINT  Chief Complaint   Patient presents with    Low Back Pain     From [facility] for chronic but increasing lower back pain, hx dementia pt is poor historian, per EMS/staff no recent trauma but pt has been sitting in her wheelchair more often, sitting upright is her position of discomfort. Given heat pack by EMS with some relief of back pain.      HPI  Lamar Dangelo is a 89 y.o. female with history of dementia who presents via ambulance for evaluation of worsening chronic lower back paim. Per triage note, staff from [facility] report she has no recent trauma but has been sitting in her wheelchair more often and that sitting upright is her position of discomfort. No alleviating factors were reported.     Per Derick at [facility] facility, she is having new onset bilateral flank pain and nonspecific abdominal discomfort onset today.     History limited by: sd    REVIEW OF SYSTEMS  See HPI for further details.    ROS limited by: confusion    PAST MEDICAL HISTORY   has a past medical history of Hyperlipidemia (7/2/2010) and Hypothyroid (7/2/2010).    SOCIAL HISTORY  Social History     Tobacco Use    Smoking status: Never Smoker    Smokeless tobacco: Never Used   Vaping Use    Vaping Use: Never used   Substance and Sexual Activity    Alcohol use: No    Drug use: No    Sexual activity: Not Currently     Comment: single, 0 kids.      SURGICAL HISTORY   has a past surgical history that includes tonsillectomy; appendectomy; colonoscopy - endo (N/A, 9/3/2017); gastroscopy-endo (N/A, 9/3/2017); and hip cannulated screw (Left, 3/24/2018).    CURRENT MEDICATIONS  Home Medications       Reviewed by Jaciel Honeycutt R.N. (Registered Nurse) on 02/20/22 at 1159  Med List Status: <None>     Medication Last Dose  "Status   amLODIPine (NORVASC) 5 MG Tab  Active   artificial tears 1.4 % Solution  Active   ASPIRIN LOW DOSE 81 MG EC tablet  Active   carvedilol (COREG) 12.5 MG Tab  Active   Cyanocobalamin (B-12) 1000 MCG Tab CR  Active   Cyanocobalamin (VITAMIN B-12) 1000 MCG Tab  Active   docusate sodium (COLACE) 100 MG Cap  Active   ferrous sulfate 325 (65 Fe) MG tablet  Active   folic acid (FOLVITE) 1 MG Tab  Active   levothyroxine (SYNTHROID) 50 MCG Tab  Active   lisinopril (PRINIVIL) 10 MG Tab  Active   Multiple Vitamin (THEREMS) Tab  Active   omeprazole (PRILOSEC) 20 MG delayed-release capsule  Active   Omeprazole Magnesium 20.6 (20 Base) MG CAPSULE DELAYED RELEASE  Active   polyethylene glycol/lytes (MIRALAX) 17 g Pack  Active   potassium chloride SA (KDUR) 20 MEQ Tab CR  Active   QUEtiapine (SEROQUEL) 25 MG Tab  Active                  ALLERGIES  Allergies   Allergen Reactions    Nkda [No Known Drug Allergy]      PHYSICAL EXAM  VITAL SIGNS: /76   Pulse 68   Temp 36.4 °C (97.6 °F) (Temporal)   Resp 16   Ht 1.6 m (5' 3\")   Wt 41.7 kg (92 lb)   SpO2 97%   BMI 16.30 kg/m²   Constitutional: Alert in no apparent distress.  HENT: No signs of trauma, mucous membranes are moist  Eyes: Conjunctiva normal, Non-icteric.   Neck: Normal range of motion, No tenderness, Supple.  Lymphatic: No lymphadenopathy noted.   Cardiovascular: Regular rate and rhythm, no murmurs.   Thorax & Lungs: Normal breath sounds, No respiratory distress, No wheezing, No chest tenderness.   Abdomen: Bowel sounds normal, Soft, No tenderness, No masses, No pulsatile masses. No peritoneal signs.  Skin: Warm, Dry, normal color.   Back: No bony tenderness, No CVA tenderness.   Extremities: No edema, No tenderness, No cyanosis  Musculoskeletal: Good range of motion in all major joints. No tenderness to palpation or major deformities noted.   Neurologic: Alert, confused to place, time, and recent events. Moving all extremities spontaneously. Normal motor " function, Normal sensory function, No focal deficits noted.   Psychiatric: Affect normal, Judgment normal, Mood normal.     PE limited by: confusion    DIAGNOSTIC STUDIES / PROCEDURES    LABS  Results for orders placed or performed during the hospital encounter of 02/20/22   CBC WITH DIFFERENTIAL   Result Value Ref Range    WBC 5.9 4.8 - 10.8 K/uL    RBC 3.83 (L) 4.20 - 5.40 M/uL    Hemoglobin 13.1 12.0 - 16.0 g/dL    Hematocrit 37.5 37.0 - 47.0 %    MCV 97.9 (H) 81.4 - 97.8 fL    MCH 34.2 (H) 27.0 - 33.0 pg    MCHC 34.9 33.6 - 35.0 g/dL    RDW 45.3 35.9 - 50.0 fL    Platelet Count 241 164 - 446 K/uL    MPV 8.6 (L) 9.0 - 12.9 fL    Neutrophils-Polys 76.90 (H) 44.00 - 72.00 %    Lymphocytes 12.50 (L) 22.00 - 41.00 %    Monocytes 9.10 0.00 - 13.40 %    Eosinophils 0.80 0.00 - 6.90 %    Basophils 0.20 0.00 - 1.80 %    Immature Granulocytes 0.50 0.00 - 0.90 %    Nucleated RBC 0.00 /100 WBC    Neutrophils (Absolute) 4.55 2.00 - 7.15 K/uL    Lymphs (Absolute) 0.74 (L) 1.00 - 4.80 K/uL    Monos (Absolute) 0.54 0.00 - 0.85 K/uL    Eos (Absolute) 0.05 0.00 - 0.51 K/uL    Baso (Absolute) 0.01 0.00 - 0.12 K/uL    Immature Granulocytes (abs) 0.03 0.00 - 0.11 K/uL    NRBC (Absolute) 0.00 K/uL   COMP METABOLIC PANEL   Result Value Ref Range    Sodium 135 135 - 145 mmol/L    Potassium 4.2 3.6 - 5.5 mmol/L    Chloride 101 96 - 112 mmol/L    Co2 22 20 - 33 mmol/L    Anion Gap 12.0 7.0 - 16.0    Glucose 104 (H) 65 - 99 mg/dL    Bun 20 8 - 22 mg/dL    Creatinine 0.87 0.50 - 1.40 mg/dL    Calcium 9.6 8.5 - 10.5 mg/dL    AST(SGOT) 22 12 - 45 U/L    ALT(SGPT) 11 2 - 50 U/L    Alkaline Phosphatase 117 (H) 30 - 99 U/L    Total Bilirubin 0.4 0.1 - 1.5 mg/dL    Albumin 4.1 3.2 - 4.9 g/dL    Total Protein 6.7 6.0 - 8.2 g/dL    Globulin 2.6 1.9 - 3.5 g/dL    A-G Ratio 1.6 g/dL   LIPASE   Result Value Ref Range    Lipase 34 11 - 82 U/L   URINALYSIS CULTURE, IF INDICATED    Specimen: Urine, Straight Cath   Result Value Ref Range    Color DK  Yellow     Character Clear     Specific Gravity 1.025 <1.035    Ph 5.5 5.0 - 8.0    Glucose Negative Negative mg/dL    Ketones Trace (A) Negative mg/dL    Protein 30 (A) Negative mg/dL    Bilirubin Small (A) Negative    Urobilinogen, Urine 1.0 Negative    Nitrite Negative Negative    Leukocyte Esterase Trace (A) Negative    Occult Blood Negative Negative    Micro Urine Req Microscopic    URINE MICROSCOPIC (W/UA)   Result Value Ref Range    WBC 10-20 (A) /hpf    RBC 2-5 (A) /hpf    Bacteria Negative None /hpf    Epithelial Cells Few /hpf    Epithelial Cells Renal Negative /hpf    Ca Oxalate Crystal Few /hpf    Hyaline Cast 3-5 (A) /lpf   ESTIMATED GFR   Result Value Ref Range    GFR If African American >60 >60 mL/min/1.73 m 2    GFR If Non African American >60 >60 mL/min/1.73 m 2   URINE CULTURE(NEW)    Specimen: Urine   Result Value Ref Range    Significant Indicator NEG     Source UR     Site -     Culture Result -        All labs reviewed by me.    RADIOLOGY  CT-ABDOMEN-PELVIS WITH   Final Result      1.  There is no acute inflammatory process in the abdomen or pelvis.   2.  There is no bowel obstruction. There is moderate colonic stool.   3.  There are chronic appearing mild compression deformities in the lower thoracic and lumbar spine. Bones are osteopenic.   4.  There are old fractures of the pubic rami.        The radiologist's interpretations of all radiological studies have been reviewed by me.    Films have been independently by me      COURSE  Pertinent Labs & Imaging studies reviewed. (See chart for details)    12:00 PM - Patient seen and examined at bedside. Patient seems confused.    12:07 PM - [facility] why the patient was brought in to the ED today. Per Derick at care facility, she is having new onset bilateral flank pain and nonspecific abdominal discomfort onset today. Ordered for CT-Abdomen-Pelvis with, CBC w/ diff, CMP, lipase, and UA w/ culture if indicated to  evaluate her symptoms.    12:29 PM - Ordered eGFR and urine microscopic w/ UA.    2:22 PM -I reevaluated the patient at bedside. I discussed the patient's diagnostic study results. Patient will be treated with Rocephin 1 g injection prior to discharge. I discussed plan for discharge and follow up as outlined below. The patient verbalizes they feel comfortable going home. The patient is stable for discharge at this time and will return for any new or worsening symptoms. Patient verbalizes understanding and support with my plan for discharge.       MEDICAL DECISION MAKING  This is a 89 y.o. female who presents with chronic back pain, also having some abdominal pain.  Urinalysis showed a mild UTI.  CT shows no obstructive or inflammatory process.    She was started on IV antibiotics and I discharged home with antibiotics at this time she is hemodynamically stable no signs of sepsis and stable continue care at her care facility.      The patient will return for new or worsening symptoms and is stable at the time of discharge.    The patient is referred to a primary physician for blood pressure management, diabetic screening, and for all other preventative health concerns.      DISPOSITION:  Patient will be discharged home in stable condition.    FOLLOW UP:  Matilde Garcia P.A.-C.  [address] [place] [id number]  [phone]    In 1 week    OUTPATIENT MEDICATIONS:  Discharge Medication List as of 2/20/2022  3:45 PM        START taking these medications    Details   sulfamethoxazole-trimethoprim (BACTRIM DS) 800-160 MG tablet Take 1 Tablet by mouth 2 times a day., Disp-10 Tablet, R-0, Normal           FINAL IMPRESSION  1. Acute low back pain without sciatica, unspecified back pain laterality    2. Urinary tract infection without hematuria, site unspecified    3. Generalized abdominal pain         ILaila (Scribe), am scribing for, and in the presence of, Dada Galvan D.O..    Electronically signed by:  Laila Greene (Scribe), 2/20/2022    IDada D.O. personally performed the services described in this documentation, as scribed by Laila Greene in my presence, and it is both accurate and complete.    The note accurately reflects work and decisions made by me.  Dada Galvan D.O.  2/20/2022  8:12 PM

## 2022-02-20 NOTE — ED TRIAGE NOTES
"Chief Complaint   Patient presents with   • Low Back Pain     From [facility] for chronic but increasing lower back pain, hx dementia pt is poor historian, per EMS/staff no recent trauma but pt has been sitting in her wheelchair more often, sitting upright is her position of discomfort. Given heat pack by EMS with some relief of back pain.      Pt BIB EMS for above complaint, GCS 14 baseline dementia, VSS on RA, NAD. .    Denies all s/sx of covid, denies recent travel, denies fevers.    /76   Pulse 68   Temp 36.4 °C (97.6 °F) (Temporal)   Resp 16   Ht 1.6 m (5' 3\")   Wt 41.7 kg (92 lb)   SpO2 97%   BMI 16.30 kg/m²      "

## 2022-02-21 NOTE — DISCHARGE INSTRUCTIONS
You had 2 facial lacerations or cuts, they were closed with glue.  Please keep them clean dry and intact.  Please follow-up with your primary care doctor about your blood pressure.  If you have worsening symptoms, please return to the emerge department for further evaluation and treatment.  Thank you for coming in today.

## 2022-02-21 NOTE — ED TRIAGE NOTES
Chief Complaint   Patient presents with   • GLF     BIB [facility] from [facility]. Pt was on the floor of her room. Pt has a lac to the bridge of her nose. Unknown LOC +ASA +head trauma pt only oriented to self at baseline

## 2022-02-21 NOTE — DISCHARGE PLANNING
Medical Social Work    MSW received a voalte message from bedside RN that pt is ready to return to [facility].  MSW contacted Earlene with [facility] who will accept pt back; pt is in room 37A.  MSW faxed PCS and facesheet to [facility] and made follow up phone call to Norma with [facility] to arrange transport for 0400.  Bedside RN aware of transport time.  Per Norma with [facility] they are low staff right now and their units are slammed with 911 calls so the transport time will likely be longer than 0400.  Bedside RN aware of possible delay.

## 2022-02-26 NOTE — Clinical Note
Primary dx/Skilled need:  Frequent falls, vascular dementia  SN frequency:  2W2, 1W7 3 PRN  Zip code: [address]  Disciplines ordered: PT  Insurance & authorization:  SCP  Certification period: 02/26/22 - 04/26/22  Special consideration: Pt lives in [facility]

## 2022-02-28 NOTE — CASE COMMUNICATION
CM noted     ----- Message -----  From: Elizabeth Hollingsworth R.N.  Sent: 2/26/2022  11:17 AM PST  To: Antonio Hernandez R.N.      Primary dx/Skilled need:  Frequent falls, vascular dementia  SN frequency:  2W2, 1W7 3 PRN  Zip code: [address]  Disciplines ordered: PT  Insurance & authorization:  SCP  Certification period: 02/26/22 - 04/26/22  Special consideration: Pt lives in [facility]

## 2022-02-28 NOTE — PROGRESS NOTES
Medication chart review for [facility] services    PCP:  Matilde Garcia P.A.-C.  [address] [address]  Fax: [phone]    Current medication list     Current Outpatient Medications:   •  Lidocaine, 1 Patch, Topical, DAILY  •  divalproex, 125 mg, Oral, BID  •  acetaminophen, 650 mg, Oral, Q6HRS PRN  •  Aspirin Low Dose, TAKE 1 TABLET BY MOUTH ONCE DAILY.  •  docusate sodium, TAKE (1) CAPSULE BY MOUTH TWICE DAILY.  •  omeprazole, TAKE (1) CAPSULE BY MOUTH TWICE DAILY.  •  vitamin B-12, TAKE 1 TABLET BY MOUTH ONCE DAILY.  •  Therems, TAKE 1 TABLET BY MOUTH ONCE DAILY.  •  potassium chloride SA, TAKE 1 TABLET BY MOUTH ONCE DAILY.  •  carvedilol, TAKE 1 TAB BY MOUTH TWICE DAILY FOR BLOOD PRESSURE  •  polyethylene glycol/lytes, GIVE ONE PACKET BY MOUTH EVERY DAY * MIX WITH WATER*  •  ferrous sulfate, TAKE 1 TAB BY MOUTH EVERY DAY  •  levothyroxine, TAKE 1 TAB BY MOUTH EVERY MORNING  •  QUEtiapine, TAKE 1 TAB BY MOUTH AT EACH BEDTIME  •  amLODIPine, TAKE 1 TAB BY MOUTH EVERY DAY  •  folic acid, TAKE 1 TAB BY MOUTH EVERY DAY  •  lisinopril, TAKE 1 TAB BY MOUTH EVERY DAY FOR BLOOD PRESSURE  •  artificial tears, 2 Drop, Both Eyes, Q6HRS PRN    Allergies   Allergen Reactions   • Nkda [No Known Drug Allergy]        Labs     Lab Results   Component Value Date/Time    SODIUM 135 02/20/2022 12:29 PM    POTASSIUM 4.2 02/20/2022 12:29 PM    CHLORIDE 101 02/20/2022 12:29 PM    CO2 22 02/20/2022 12:29 PM    GLUCOSE 104 (H) 02/20/2022 12:29 PM    BUN 20 02/20/2022 12:29 PM    CREATININE 0.87 02/20/2022 12:29 PM     Lab Results   Component Value Date/Time    ALKPHOSPHAT 117 (H) 02/20/2022 12:29 PM    ASTSGOT 22 02/20/2022 12:29 PM    ALTSGPT 11 02/20/2022 12:29 PM    TBILIRUBIN 0.4 02/20/2022 12:29 PM    INR 1.09 03/23/2018 05:42 PM    ALBUMIN 4.1 02/20/2022 12:29 PM        Assessment and Plan:   • Received referral from [facility]. Medications reviewed.       Gustavo Fitzgerald, PharmD, MS, BCACP, [facility]  Phone [phone] fax [phone]    This note was created using voice recognition software (Dragon). The accuracy of the dictation is limited by the abilities of the software. I have reviewed the note prior to signing, however some errors in grammar and context are still possible. If you have any questions related to this note please do not hesitate to contact our office.

## 2022-03-03 NOTE — CASE COMMUNICATION
CM noted. Thank you.   ----- Message -----  From: Pauline Kc, PT  Sent: 2/28/2022   9:47 PM PST  To: Antonio Hernandez R.N.      PT eval completed, requesting auth for 2w3, 1w1 effective 2/28/2022.

## 2022-03-03 NOTE — CASE COMMUNICATION
Lamar does very well with instruction for direction She has noted decrease cognitive and doesn't intiate task. Guidance is needed for everything. She overall did well with amb today with RW. Left her in DR per staff as she was going to sit for lunch Will cont with POC to increase her functional mob and ind to prevent further decline in ind. S/B Malissa Goyal PT

## 2022-03-07 NOTE — CASE COMMUNICATION
Quality Review Completed for 2/26 SOC OASIS by RAFFI Rdz, RN on 3/7/2022:  Edits completed by RAFFI Rdz RN:  1.  changed to #3 per EMR chronic back pain with compression fx's and per PT eval on 2/28.  changed date for collaboration  2.  changed to no for depression screen w/dementia  3. ,  changed to #3, per GG needs max assist.  changed to #6 for GG bathing is max assist.  changed to #2 PT reports pa tient does not initiate tasks and BMI is 16  4.  changed to 7 per therapy  5. [id number] changed to #88, per PT ambulates 110' at time of eval  6. Added exercises prescribed to Activities Permitted, proper med use to Safety Measures and F2F to 485 forms

## 2022-03-08 NOTE — CASE COMMUNICATION
I agree with these changes  Elizabeth Hollingsworth RN  ----- Message -----  From: Elisa Rdz R.N.  Sent: 3/7/2022   7:57 AM PST  To: Elizabeth Hollingsworth R.N.      Quality Review Completed for 2/26 SOC OASIS by RAFFI Rdz RN on 3/7/2022:  Edits completed by RAFFI Rdz RN:  1.  changed to #3 per EMR chronic back pain with compression fx's and per PT eval on 2/28.  changed date for collaboration  2.  changed to no for depressi on screen w/dementia  3. ,  changed to #3, per GG needs max assist.  changed to #6 for GG bathing is max assist.  changed to #2 PT reports patient does not initiate tasks and BMI is 16  4.  changed to 7 per therapy  5. [id number] changed to #88, per PT ambulates 110' at time of eval  6. Added exercises prescribed to Activities Permitted, proper med use to Safety Measures and F2F to 485 forms

## 2022-03-09 NOTE — CASE COMMUNICATION
Lamar will have limited care over due to altered mental state. She follows all commands plesantly and does very well with task. She just requires guidance and cuiing to perform as she tends to get off of task and just needs rerouting. She reports that she likes to do her exercise and ask freq if she is doing them right. Will cont with POC to increase her functional mob and ind to prevent further decline in function. S/B Malissa Goyal PT

## 2022-03-09 NOTE — CASE COMMUNICATION
Lamar did very well today and able to sanford increase amb up to 300' Discussed with caregiver that she needs to be cued to go out and sit in living area or would benefit from chair in room to encourage her to stay up. She cont to have decrease memery and needs to direction for all task but is mod I with act Will cont with POC to increase her functional mob and ind to prevent further decline in function. S/B Malissa Goyal PT

## 2022-03-17 NOTE — CASE COMMUNICATION
Lamar is cont to progress with her ablity to increase distance with amb and with standing sanford. She has more ind with RW in the room. She is limtied with carry over as there is no chair in her room to encourage sitting up time and staff doesn't encourage out of room act. She just requires cuiing and encouragement to increase her functional ind. Will cont with POC to increase her functional mob and ind to prevent further decline in functio n. S/B Bozena Dorantes PT

## 2022-03-20 NOTE — CASE COMMUNICATION
Lamar cont to show imrovement with amb and strenght however most of her day is in bed. She would cont to beneift from cont cuiing to get out of room. Today assisted her with dressing and cuiing to complete the task She is mod I with task but requires cuing to encourage. Let her sitting up in dining room to eat breakfast Will cont with POC to increase her functional mob and ind to prevent further decline in function S/B Bozena Dorantes PT

## 2022-03-23 NOTE — Clinical Note
PT Discipline Discharge completed, effective 3/23/22, all goals are met, pt is at her max potential for mobility.

## 2022-04-01 NOTE — CASE COMMUNICATION
Thank you. All her wounds healed. Plan to DC from agency next week. NOMNC mailed to [facility].   ----- Message -----  From: Malissa Goyal PT  Sent: 3/28/2022  12:52 AM PDT  To: Antonio Hernandez R.N.      PT Discipline Discharge completed, effective 3/23/22, all goals are met, pt is at her max potential for mobility.

## 2022-04-04 NOTE — CASE COMMUNICATION
CM noted. Thank you.  ----- Message -----  From: Ciarra Cates R.N.  Sent: 3/22/2022   2:30 PM PDT  To: Antonio Hernandez R.N.      Report:  Patient lying in bed upon SN arrival. Wound care supplies obtained from "[employer] returned at end of visit. No dressing in place to LLE, no wounds noted, both legs and feet assessed, skin intact, wound to LLE healed, resolved on care plan. Wound to bridge of nose healed, resolved on care plan. No dres sing in place to wound on forehead, suspect patient is removing dressing after SN visit. Measurements obtained, picture sent to [facility]. Wound care completed, patient tolerated without complaints. Patient repeatedly asked nurse if she should leave wound alone, while touching dressing. SN instructed patient to leave dressing in place, although nees re-assessment due to cognitive deficits. Patient noted to have dried feces on hands, and under  fingernails. SN assisted patient with washing hands with soap and water, and under nail care to remove feces. Hands clean at end of SN visit. Written report provided to "[employer] verbal report provided as well.

## 2022-04-07 NOTE — CASE COMMUNICATION
Quality Review for 4.5.22 MA OASIS performed on by MELVINA Anglin RN on 4.7.2022:    Edits completed by MELVINA Anglin RN:  1. Changed  to 1, per new guidance option #2 should only be chosen if the pt will be on service with other HHA.   2. Changed  to 3 per narrative pt needs supervision with ambulation and per [id number] I-M responses of 4 supervision.

## 2022-04-11 NOTE — CASE COMMUNICATION
I agree with these changes.   ----- Message -----  From: Demetrice Anglin R.N.  Sent: 4/7/2022  11:41 AM PDT  To: Antonio Hernandez R.N.      Quality Review for 4.5.22 DC OASIS performed on by MELVINA Anglin RN on 4.7.2022:    Edits completed by MELVINA Anglin RN:  1. Changed  to 1, per new guidance option #2 should only be chosen if the pt will be on service with other HHA.   2. Changed  to 3 per narrative pt needs supervision with ambulation  and per [id number] I-M responses of 4 supervision.

## 2022-05-17 NOTE — PROGRESS NOTES
[facility] Medicine Daily Progress Note    Date of Service  7/5/2021    Chief Complaint  Lamar Dangelo is a 89 y.o. female admitted 7/3/2021 with multiple ground-level falls.    Hospital Course  No notes on file    Interval Problem Update  No acute overnight events, patient is fairly noncontributory to subjective exam.    Consultants/Specialty  Palliative care    Code Status  Comfort Care/DNR    Disposition  Patient may require admission to a memory care unit.  Awaiting evaluations from PT, OT. ST input appreciated. Admitted on comfort measures, however patient does not appear to be imminent and I don't see a clearly terminal diagnosis here.      Review of Systems  All systems reviewed and negative except as noted per above.    Physical Exam  Temp:  [35.8 °C (96.5 °F)-36.7 °C (98.1 °F)] 36.7 °C (98.1 °F)  Pulse:  [61-69] 67  Resp:  [16-17] 17  BP: (143-196)/(72-80) 143/74  SpO2:  [93 %-94 %] 93 %    General: No acute distress, demented  HEENT atraumatic, normocephalic, pupils equal round reactive to light  Neck: No JVD  Chest: Respirations are unlabored  Cardiac: Physiologic S1 and S2  Abdomen: Soft, nontender, nondistended  Extremities: Without clubbing, cyanosis or edema  Neuro: Cranial nerves II through XII are grossly intact.  Psych: Demented      Current Facility-Administered Medications:   •  aspirin EC (ECOTRIN) tablet 81 mg, 81 mg, Oral, DAILY, Juan M Jaramillo M.D.  •  levothyroxine (SYNTHROID) tablet 50 mcg, 50 mcg, [facility], [facility] Michael [facility] M.[facility]., 50 mcg at 07/05/21 0844  •  omeprazole (PRILOSEC) capsule 20 mg, 20 mg, Oral, DAILY, Michael Mancia M.D., 20 mg at 07/05/21 0508  •  atropine 1 % ophthalmic solution 2 Drop, 2 Drop, Sublingual, Q4HRS PRN, Michael Mancia M.D., 2 Drop at 07/05/21 0508  •  acetaminophen (Tylenol) tablet 650 mg, 650 mg, Oral, Q4HRS PRN **OR** acetaminophen (TYLENOL) suppository 650 mg, 650 mg, Rectal, Q4HRS PRN, Michael Mancia M.D.  •  morphine (ROXANOL) 20 MG/ML oral conc 10 mg,  10 mg, Oral, Q HOUR PRN, Michael Mancia M.D., 10 mg at 07/04/21 1959  •  morphine (ROXANOL) 20 MG/ML oral conc 20 mg, 20 mg, Oral, Q HOUR PRN, Michael Mancia M.D.  •  ondansetron (ZOFRAN ODT) dispertab 8 mg, 8 mg, Oral, Q8HRS PRN **OR** ondansetron (ZOFRAN) syringe/vial injection 8 mg, 8 mg, Intravenous, Q8HRS PRN, Michael Mancia M.D.  •  LORazepam (ATIVAN) 2 MG/ML oral conc 1 mg, 1 mg, Sublingual, Q HOUR PRN, 1 mg at 07/04/21 2000 **OR** LORazepam (ATIVAN) injection 1 mg, 1 mg, Intravenous, Q HOUR PRN, Michael Mancia M.D., 1 mg at 07/04/21 1054  •  lactulose 20 GM/30ML solution 10 g, 10 g, Oral, QDAY PRN **OR** bisacodyl (DULCOLAX) suppository 10 mg, 10 mg, Rectal, QDAY PRN, Michael Mancia M.D.  •  artificial tears ophthalmic solution 2 Drop, 2 Drop, Both Eyes, Q6HRS PRN, Michael Mancia M.D.      Fluids    Intake/Output Summary (Last 24 hours) at 7/5/2021 1708  Last data filed at 7/5/2021 1437  Gross per 24 hour   Intake 360 ml   Output --   Net 360 ml       Laboratory  Recent Labs     07/03/21  1430   WBC 5.3   RBC 3.50*   HEMOGLOBIN 11.6*   HEMATOCRIT 35.5*   .4*   MCH 33.1*   MCHC 32.7*   RDW 50.6*   PLATELETCT 162*   MPV 9.0     Recent Labs     07/03/21  1430   SODIUM 140   POTASSIUM 3.7   CHLORIDE 105   CO2 24   GLUCOSE 83   BUN 25*   CREATININE 1.09   CALCIUM 8.7                   Imaging  CT-HEAD W/O   Final Result         1.  No acute intracranial process.      2. Diffuse atrophy and periventricular white matter changes consistent with chronic small vessel disease.           Assessment/Plan  Goals of care, counseling/discussion- (present on admission)  Assessment & Plan  ACP documents reviewed:  POLST - Comfort-only measures  Advanced Directive - provide treatments only which ease suffering or preserve function  Unable to reach neighbor MDPOA during admission to clarify GOC  Admitting MD Initiated on comfort-only measures per POLST, however she is not imminently dying.  I will d/c comfort measures,  [NI] : Normal initiate if/when these become appropriate.   Palliative care consulted.       Urine ketones- (present on admission)  Assessment & Plan  Trace.  CTM.     Severe protein-calorie malnutrition (HCC)- (present on admission)  Assessment & Plan  Due to dementia  Encouraged to eat.    Gastroesophageal reflux disease without esophagitis- (present on admission)  Assessment & Plan  Continue omperazole    Recurrent falls- (present on admission)  Assessment & Plan  Reported by neighbor, PT evverito pending.     Vitamin B12 deficiency- (present on admission)  Assessment & Plan  PO replacements would not be heroic.      Macrocytic anemia- (present on admission)  Assessment & Plan  Likely related to B12.  Replace B12.  Emperic folate.     Essential hypertension- (present on admission)  Assessment & Plan  Con't home medications.      Vascular dementia without behavioral disturbance (HCC)- (present on admission)  Assessment & Plan  CTH demonstrates diffuse atrophy and microvascular disease.   May require placement in a memory care unit.  PT/OT evals pending.     Acquired hypothyroidism- (present on admission)  Assessment & Plan  Continue levothyroxine       VTE prophylaxis: Not indicated    I have performed a physical exam and reviewed and updated ROS and Plan today (7/5/2021). In review of yesterday's note (7/4/2021), there are no changes except as documented above.       [de-identified] : b/l breast flaps soft and symmetrical\par skin paddles viable \par incisions c/d/i\par right breast wound measuring 3 cm x 1 cm , beefy granulation tissue at base, no signs of infection \par no palpable fluid collections [de-identified] : abdomen soft and non tender\par umbilicus viable\par incisions c/d/i

## 2022-05-24 NOTE — LETTER
May 24, 2022        Lamar Dangelo  : 1929    To whom it may concern:    Ms. Trejo suffers from a severe dementia, a progressive disorder, that has rendered her incompetent to participate in personal, financial, and health affairs.  Her condition is permanent.    Sincerely:        Mikey Harris MD       n/a

## 2022-05-24 NOTE — PROGRESS NOTES
Subjective     Lamar Dangelo is a 89 y.o. female who presents with her good friend and power of , Ariella Rider, from the office of Matilde Garcia PA-C, for consultation with a history of severe dementia.  The history is gotten mostly through discussions with Ariella as well as review of the electronic health record.     SALVATORE Newton is a pleasant 89-year-old right-handed woman who states that she is here to see me because of back pain.  When asked about the possibility of memory problems, she denies this and insists that it is her back which is problematic for her.  Beyond that she cannot be more specific.    According to Ariella, the cognitive deficits have been present for quite a while though they seem to have stabilized for the last couple of years.  There are fluctuations depending on infection, circumstance, etc.  She is presently a resident at a local assisted living facility, the move they are occurring in July 2021 after her sister, with whom she was living at the time, passed away.  She was depended on her sister for most of her cares, thus the moved to assisted living.    By report, she is doing well where she lives.  The behavioral problems are still there, she still sundown's, but things really go south when she suffers from an infection.  She is on Depakote and Seroquel which seem to be helping.  She eats regularly, has actually been putting on weight.  There are no reports of sleep distortion.  She requires some type of assistance with all of her ADLs.  She can fall, walks with a walker but does so well.  The issue is that she may take off with a walker left behind.  She even required some assistance with bowel and bladder control.    She socializes with other residents, but she also has difficulty remembering who they are in their names.  She still confuses Acacia and her  team on an occasion.  Conversation is more limited.  She is very perseverative, remembers very little from moment to  "moment.  Medication compliance is at times difficult, she can be a bit paranoid though the staff will call Ariella and with reassurance from the latter, the patient does comply.    Review of the electronic health record indicates imaging was done back in 2019, I reviewed the MRI scan, revealing an extensive degree of chronic white matter ischemic change as well as generalized atrophy.  There is no evidence of acute insult or NPH.  Blood work at the time including TSH, B12 and folate levels all were normal.  She has been on no other symptomatic relief, is on Seroquel 25 mg nightly and Depakote sprinkles 125 mg twice daily.    Medical, surgical and family histories are reviewed in the electronic health record.  She has a history of hypertension, hypothyroidism, GERD, vitamin D and B12 deficiencies, and mild dyslipidemia.  There is no history of clinical stroke event, CAD, PVD, malignancy, autoimmune disease, psychiatric disease, migraine, seizure, or Parkinson's disease.    There is no surgical history of note from my standpoint.    Very little is known about her family history, the patient certainly is not helpful in this regard.  They on it is not sure at all.    She has no history of tobacco or alcohol use.  She has her own room at the assisted living facility.  Ariella needs a letter indicating her lack of competency so that bank will allow her access to the patient's finances for which she is power of .    She is on long list of medications including Depakote and Seroquel, K-Dur, MiraLAX, Prilosec, Prinivil, Synthroid, folic acid and iron, Colace, vitamin B12, Coreg, baby aspirin and Norvasc.    Review of Systems   Unable to perform ROS: Dementia     Objective     /76 (BP Location: Right arm, Patient Position: Sitting, BP Cuff Size: Adult)   Pulse 65   Ht 1.6 m (5' 3\")   Wt 43.5 kg (95 lb 14.4 oz)   SpO2 97%   BMI 16.99 kg/m²      Physical Exam    She appears in no acute distress.  She is " cooperative, though there is a limit as to what can be accomplished.  Vital signs are stable.  There is no malar rash.  Her neck is supple.  Carotid pulses are present without asymmetry.  Cardiac evaluation is unremarkable.     Neurological Exam    She is not oriented, she has no idea other than she is in a doctor's office as to why and where my office is located, let alone the date itself.  She does name and repeat though there were word finding issues, there are no paraphasic errors use.  She demonstrates the use of several implements such as a comb and pen, though there is hand-object substitution.  There is no inattention or right/left confusion.  She does not know her date of birth or her age, she even has difficulty identifying her parents by name.  She recognizes Ariella when she looks at her.  She is very perseverative and field dependent.  Frontal release signs are present, there is rostrocaudal extinction on double simultaneous stimulation.  MoCA is 5/30.    PERRLA/EOMI, visual fields are full, facial movements are symmetric.  Jaw jerk is present, but the tongue and uvula are midline without bulbar dysfunction.  There is no hypophonia or bradykinesia.  Sensory exam is intact to temperature.  Shoulder shrug and head rotation are normal.    Musculoskeletal exam reveals normal tone without tremor, asterixis, or drift.  She moves all 4 extremities without asymmetry, strength is 5/5 throughout.  Reflexes are present throughout, they are brisk without asymmetry, none are dropped.  Both toes are downgoing.    She stands easily but slowly, there is an apractic quality as she walks, doing so flat-footed.  Stride length is diminished, she does not shuffle.  She handles her walker easily.  There is no appendicular dystaxia.  Repetitive movements are actually only minimally slowed in the feet, these are quite good in the hands.    Sensory exam is intact to vibration and temperature.    Assessment & Plan     1. Vascular  dementia without behavioral disturbance (HCC)  Certainly subcortical in nature, whether or not this is combined with a cortical process such as Alzheimer's disease is more of an academic point than anything else.  Her disease is severe, she is not competent, letter dictated indicating the same, and there will be slow and steady deterioration.  She is in the right type of facility where her personal needs can be provided for her.  We will continue the Depakote and Seroquel to help with the behavioral issues that she seems to be still suffering from. There is no need for additional diagnostics. Given her stability, we can simply follow-up in 1 year.         Time: 50 minutes in total spent on patient care including free charting, record review, discussion with healthcare staff and documentation.  This includes face-to-face time with the patient for exam, review, discussion, as well as counseling and coordinating care.

## 2022-06-02 NOTE — CASE COMMUNICATION
Patient toenails are long and thick. Can you please send a referral to podiatry?     Also, patient does not have the Miralax prescription on file with [facility]. Can you please fax them a prescription/order?     Thank you

## 2022-06-02 NOTE — CASE COMMUNICATION
Start of Care to [facility] 6/2/2022  Primary Diagnosis: Frequent falls with recent head laceration, vascular dementia  Skilled Need: Patient has Right forehead small laceration with bruising sustained in recent fall. Skilled nurses to evaluate and observe for several weeks to ensure healing. It is open to air with no dressing required presently. PT will be asked to evaluate and treat for strengthening to prevent falls. The cruz ntia is going to be a limiting factor as patient does not remember to use her walker for safe ambulation.  Zip Code: [address]  SN Frequency: 1xweek for 1week, 2x week for 1 week and 1x week to evaluate or dc  PT Frequency: 1x week to eval and treat  [employer] insurance  Certification Period: June 2/2022 to July 31/2022

## 2022-06-02 NOTE — CASE COMMUNICATION
Patient admit to [facility] today. Lives at [facility], meds are managed by staff. Patient has a POA Mary Rider [phone]. No major drug interactions noted. Please review. Thank you

## 2022-06-03 NOTE — CASE COMMUNICATION
CM noted.   ----- Message -----  From: Elisa Rdz R.N.  Sent: 6/2/2022   3:59 PM PDT  To: Margarita Herrera R.N., Eveline Ayala, *      Start of Care to [facility] 6/2/2022  Primary Diagnosis: Frequent falls with recent head laceration, vascular dementia  Skilled Need: Patient has Right forehead small laceration with bruising sustained in recent fall. Skilled nurses to evaluate and observe for several weeks to ensure healing.  It is open to air with no dressing required presently. PT will be asked to evaluate and treat for strengthening to prevent falls. The dementia is going to be a limiting factor as patient does not remember to use her walker for safe ambulation.  Zip Code: [phone]   Frequency: 1xweek for 1week, 2x week for 1 week and 1x week to evaluate or dc  PT Frequency: 1x week to eval and treat  [employer] insurance  Certification Period: June 2/2022 to Jul y 31/2022

## 2022-06-03 NOTE — CASE COMMUNICATION
Noted. Thank you.   ----- Message -----  From: Elisa Rdz R.N.  Sent: 6/3/2022   7:48 AM PDT  To: Antonio Hernandez R.N.      CC response from Matilde Brewster PA-C:  Matilde Garcia P.A.-C.  Elisa Rdz R.N.; JENNIFER March, please call Chris at [facility] and have patient put on the visiting podiatry list.  Follow up with [facility] about MiraLax order.  It looks like it was discontinued.  Please see if it needs to be restar samy.  Thank you

## 2022-06-03 NOTE — PROGRESS NOTES
Medication chart review for [facility] services        Current medication list     Current Outpatient Medications:   •  carvedilol, TAKE 1 TABLET BY MOUTH TWICE DAILY.  •  levothyroxine, TAKE 1 TABLET BY MOUTH EVERY MORNING 30 MINUTES BEFORE BREAKFAST.  •  docusate sodium, TAKE (1) CAPSULE BY MOUTH TWICE DAILY.  •  amLODIPine, TAKE 1 TABLET BY MOUTH ONCE DAILY.  •  FeroSul, TAKE 1 TABLET BY MOUTH ONCE DAILY.  •  folic acid, TAKE 1 TABLET BY MOUTH ONCE DAILY.  •  lisinopril, TAKE 1 TABLET BY MOUTH ONCE DAILY.  •  Aspirin Low Dose, TAKE 1 TABLET BY MOUTH ONCE DAILY.  •  potassium chloride SA, TAKE 1 TABLET BY MOUTH ONCE DAILY.  •  Therems, TAKE 1 TABLET BY MOUTH ONCE DAILY.  •  vitamin B-12, TAKE 1 TABLET BY MOUTH ONCE DAILY.  •  Lidocaine, 1 Patch, Topical, DAILY  •  divalproex, 125 mg, Oral, BID  •  acetaminophen, 650 mg, Oral, Q6HRS PRN  •  omeprazole, TAKE (1) CAPSULE BY MOUTH TWICE DAILY.  •  QUEtiapine, TAKE 1 TAB BY MOUTH AT EACH BEDTIME  •  artificial tears, 2 Drop, Both Eyes, Q6HRS PRN    Pt recently discharged from:   No    Allergies   Allergen Reactions   • Nkda [No Known Drug Allergy]            Labs     Lab Results   Component Value Date/Time    SODIUM 135 02/20/2022 12:29 PM    POTASSIUM 4.2 02/20/2022 12:29 PM    CHLORIDE 101 02/20/2022 12:29 PM    CO2 22 02/20/2022 12:29 PM    GLUCOSE 104 (H) 02/20/2022 12:29 PM    BUN 20 02/20/2022 12:29 PM    CREATININE 0.87 02/20/2022 12:29 PM     Lab Results   Component Value Date/Time    ALKPHOSPHAT 117 (H) 02/20/2022 12:29 PM    ASTSGOT 22 02/20/2022 12:29 PM    ALTSGPT 11 02/20/2022 12:29 PM    TBILIRUBIN 0.4 02/20/2022 12:29 PM    INR 1.09 03/23/2018 05:42 PM    ALBUMIN 4.1 02/20/2022 12:29 PM        Assessment and Plan:   • Received referral from [facility]. Medications reviewed, and compared with discharge summary if available.        CC   Matilde Garcia P.A.-C.  [address] [facility] [address]  Fax: [phone]    [facility]  Phone [phone] fax [phone]    This note was created using voice recognition software (Dragon). The accuracy of the dictation is limited by the abilities of the software. I have reviewed the note prior to signing, however some errors in grammar and context are still possible. If you have any questions related to this note please do not hesitate to contact our office.

## 2022-06-03 NOTE — CASE COMMUNICATION
CC response from Matilde Brewster PA-C:  Matilde Garcia P.A.-C.  Elisa Rdz R.N.; JENNIFER March, please call Chris at [place] and have patient put on the visiting podiatry list.  Follow up with Raul about MiraLax order.  It looks like it was discontinued.  Please see if it needs to be restarted.  Thank you

## 2022-06-07 NOTE — CASE COMMUNICATION
FYI    Staff reports no new falls. Pt denies any stomach pain today. Bowel sounds normoactive. Mediation reconciled against facility MAR. Pt takes Colace 100 mg BID but no Miralax as of 6/6. Right forehead bruise subsided and no open wound is found. Right 5th digit covered by callus, stable, with no open wound or redness. No wound dressing needed today.

## 2022-06-08 NOTE — CASE COMMUNICATION
I agree with CDI revisions  ----- Message -----  From: Ayaka Munoz R.N.  Sent: 6/8/2022  11:47 AM PDT  To: Elisa Rdz R.N.      Quality Review for SOC OASIS by JOVITA Munoz RN on  June 8, 2022    Edits completed by JOVITA Munoz RN:  1.  is 6/1/22, date of valid referral (date papers received and signed by POA)  2.  is 3 per care plan therapy sets.

## 2022-06-08 NOTE — CASE COMMUNICATION
Quality Review for SOC OASIS by JOVITA Munoz RN on  June 8, 2022    Edits completed by JOVITA Munoz RN:  1.  is 6/1/22, date of valid referral (date papers received and signed by POA)  2.  is 3 per care plan therapy sets.

## 2022-06-15 NOTE — Clinical Note
PT discipline discharge completed 6/15/2022.  Pt unable to participate in PT sessions due to advanced dementia.

## 2022-06-16 NOTE — CASE COMMUNICATION
noted   ----- Message -----  From: Pauline Kc, PT  Sent: 6/15/2022   4:21 PM PDT  To: Antonio Hernandez R.N.      PT discipline discharge completed 6/15/2022.  Pt unable to participate in PT sessions due to advanced dementia.

## 2022-06-16 NOTE — CASE COMMUNICATION
"For Information Only  Per notes by SHANNON Clayton, \"S/p falls x 2. Unwitnessed. One on 6/5/22, another 5/27/22\". Pt came on HH service on 6/2/22. This RN spoke with Jeanne, [facility] staff at [facility] for fall f/u. Director Chris is currently on vacation. Jeanne read facility chart and said no fall since HH admission. There's an incident reported on 6/2/22 that pt \"bumped her head by accident\" per Jeanne and no injury resulted.  Matilde also saw pt on 6/9.   Head-to-toe assessment performed today and no new injury noted. No open wounds any more. Spoke with Mary ALTMAN after this visit and Mary agreed with  agency  DC next week on 6/14 due to no further skilled needs. Per Jeanne. no plan for hopice now.     "

## 2022-06-21 NOTE — TELEPHONE ENCOUNTER
[facility]/APPOINTMENT SCHEDULING  [facility] Patient: Provider Matilde Garcia P.A.-C. No call placed to patient to schedule appointment.

## 2022-06-23 NOTE — CASE COMMUNICATION
agency DC on 6/22/22 is canceled. Patient has new skin tear wound on dorsal left hand. Per [facility] staff, pt rolled out of her bed during sleep over the weekend. Staff found her on the flood with active bleeding from back of her left hand.  SN will see pt twice weekly for wound care starting 6/22/22. Pt is alert but disoriented at all times. She is not able to comprehend or remember any fall prevention techniques. Pt got up multiple ti mes from bed without walker while SN was trying to perform wound care. She is at very high fall risk. Facility is aware. Pt has been recently dc'd from  PT discipline due to limited learning capacity. She is not a good candidate for PT training since pt unable to follow instructions due to poor memory and cognition. Memory care to provide 24/7 safety supervision. Educated staff (Shahla and Jeanne) on fall prevention. Pt needs safety supe rvision with ambulation and transfer all the time. Discussed on placing soft cushion on the floor next to her bed at night to prevention injury in case pt rolls out of bed again however, if pt gets up by herself at night time the cushion may post higher fall hazard. SN removed old Band-Aid applied by facility Cleansed wound with NS. Applied new adhesive silicone foam. Reviewed wound care with facility cg. Wound photo is taken and upload ed to pt's chart.

## 2022-06-23 NOTE — CASE COMMUNICATION
Falls Template         Date & Time of fall: 6/19/22. Time unknown           Cause of fall:  unknown           Location:  Bedroom           Was the fall witnessed?                  If yes, by who? No            Actions taken by patient:  No. Pt didn't call for help. Staff found her on the floor in the morning. She probably rolled out of bed per cg.             Any new injury?                   If yes, what kind?  Yes, Details: skin te ar of dorsal left hand             Any recent medication changes?    No            Reviewed Post Fall Questionnaire:  Yes                 Post fall instructions:  Patient needs safety supervision for transfer and ambulation at alll times.             Actions Taken: Notified care team, Notified MD and Referred patient to ER/urgent care but they did not go

## 2022-06-24 NOTE — Clinical Note
Thank you. Noted.  ----- Message -----  From: Antonio Hernandez R.N.  Sent: 6/27/2022  10:11 AM PDT  To: Debbie Baird R.N.  Subject: RE:                                                The other cg Jeanne reported to this RN that pt rolled out of bed on 6/19/22 which led to the hand skin tear. Will double check with staff there.   ----- Message -----  From: Debbie Baird R.N.  Sent: 6/24/2022   2:25 PM PDT  To: Elisa Rdz R.N., Margarita Herrera RHUNTER, *      Clinical co coordinator states that patient last fell on 6-5-22 and patient states that she fell last night and lacerated her arm.   Co coordinator states he will review patient case and get back to this CM at next visit.

## 2022-06-24 NOTE — Clinical Note
Clinical co coordinator states that patient last fell on 6-5-22 and patient states that she fell last night and lacerated her arm.   Co coordinator states he will review patient case and get back to this CM at next visit.

## 2022-06-27 NOTE — CASE COMMUNICATION
The other cg Jeanne reported to this RN that pt rolled out of bed on 6/19/22 which led to the hand skin tear. Will double check with staff there.   ----- Message -----  From: Debbie Baird R.N.  Sent: 6/24/2022   2:25 PM PDT  To: Elisa Rdz R.N., Margarita Herrera R.N., *      Clinical co coordinator states that patient last fell on 6-5-22 and patient states that she fell last night and lacerated her arm.   Co coordinat or states he will review patient case and get back to this CM at next visit.

## 2022-07-05 NOTE — Clinical Note
History pertinent to today's visit: skin tear secondary to falls  VS: /67 Site Right arm Position Supine Cuff Size Adult Resp 16 SpO2 99% Pulse 66 Temp 36.7 C ( 98.1 F) Temp Source Temporal  PAIN: denies any current pain  APPETITE/FLUID: appetite good, eating 3 meals daily  GI/: incontinent- wearing depends  MEDICATION CHANGES: denies  FALLS: demes  RESP: lungs clear  OXYGEN USE: none  EDEMA: none  SKIN: scab to   NEXT NURSING VISIT: 7/8- last visit scheduled -will need additional visits scheduled for wound care  CC: MESSI

## 2022-07-06 NOTE — CASE COMMUNICATION
Visits added. Thank you.   ----- Message -----  From: Mindy Garay R.N.  Sent: 7/5/2022  11:22 AM PDT  To: Antonio Hernandez R.N.      History pertinent to today's visit: skin tear secondary to falls  VS: /67 Site Right arm Position Supine Cuff Size Adult Resp 16 SpO2 99% Pulse 66 Temp 36.7 C ( 98.1 F) Temp Source Temporal  PAIN: denies any current pain  APPETITE/FLUID: appetite good, eating 3 meals daily  GI/: incontinent- wearing  depends  MEDICATION CHANGES: denies  FALLS: demes  RESP: lungs clear  OXYGEN USE: none  EDEMA: none  SKIN: scab to   NEXT NURSING VISIT: 7/8- last visit scheduled -will need additional visits scheduled for wound care  CC: MESSI

## 2022-07-08 NOTE — Clinical Note
" assessment and vital signs taken, wnl. pt lying in bed alert and oriented x1, pt answers most of the time , I\"dont remember \"   states she knows she had eaten breakfast but can't remember what she ate . denies  pain. no new medications per staff.. wound care done per poc. reenforced safety/fall precautions, change position slowly , use assistive device when transferring or ambulating. always ask for assistance. pt just said ok . Pt/Cg response to the services provided: no reports of falls "

## 2022-07-13 NOTE — CASE COMMUNICATION
"noted   ----- Message -----  From: Natalie Sandoval R.N.  Sent: 7/8/2022   5:50 PM PDT  To: Margarita Herrera R.N., Antonio Hernandez R.N.       assessment and vital signs taken, wnl. pt lying in bed alert and oriented x1, pt answers most of the time , I\"dont remember \"   states she knows she had eaten breakfast but can't remember what she ate . denies  pain. no new medications per staff.. wound care done per poc. reenforced safety/fall preca utions, change position slowly , use assistive device when transferring or ambulating. always ask for assistance. pt just said ok . Pt/Cg response to the services provided: no reports of falls "

## 2022-07-13 NOTE — Clinical Note
[facility] mailed visa certified mail to KAUSHIK Rider today for home health discharge on 7/20/22.

## 2022-07-20 NOTE — CASE COMMUNICATION
Thank you. Seeing pt today for agency DC.   ----- Message -----  From: Margarita Herrera R.N.  Sent: 7/13/2022   2:58 PM PDT  To: Antonio Dumas R.N., *      [facility] mailed visa certified mail to RODGERSOFI Krishnan Tereso today for home health discharge on 7/20/22.

## 2022-07-24 NOTE — CASE COMMUNICATION
Patient is discharged from [facility] on 7/20/22 due to all wounds healed. No further Skilled Nursing needs.

## 2022-07-26 NOTE — CASE COMMUNICATION
Quality Review for DC OASIS by JOVITA Munoz, RN on  July 26, 2022    Edits completed by JOVITA Munoz RN:  1.  is 3 per chart review  2. Changed  to 1, per new guidance option #2 should only be chosen if the pt will be on service with other HHA.

## 2022-07-27 NOTE — CASE COMMUNICATION
I agree with these changes.   ----- Message -----  From: Ayaka Munoz R.N.  Sent: 7/26/2022   2:55 PM PDT  To: Antonio Hernandez R.N.      Quality Review for DC OASIS by JOVITA Munoz, JOHNNY on  July 26, 2022    Edits completed by JOVITA Munoz RN:  1.  is 3 per chart review  2. Changed  to 1, per new guidance option #2 should only be chosen if the pt will be on service with other HHA.

## 2022-07-31 NOTE — DISCHARGE PLANNING
FLORECITA set up ride for [facility]. Transport time will be about 1045 to 1100. BJORN spoke with Gabino with [facility].

## 2022-07-31 NOTE — ED NOTES
Pt provided with discharge instructions. Pt verbalized understanding. Pt assisted out of ED via [facility].

## 2022-07-31 NOTE — ED PROVIDER NOTES
ED Provider Note    Scribed for Pauline Andres M.D. by Carri Kelley. 7/31/2022, 5:58 AM.    Primary care provider: Matilde Garcia P.A.-C.  Means of arrival: EMS  History obtained from: patient  History limited by: none    CHIEF COMPLAINT  Chief Complaint   Patient presents with   • T-5000     Brought in by [facility] from [facility] C/o GLF. Per report patient fell out of bed and hit left side of head at bedside table. Denies loc. Hx of Dementia.  Also c/o neck pain. C- collar in placed upon arrival to ED. Patient on daily Aspirin.       HPI  Lamar Dangelo is a 89 y.o. female who has a history of dementia, hyperlipidemia, and hypothyroidism presents to the Emergency Department for evaluation of injuries following a ground level fall onset today.  Patient resides at [facility].  This morning Lamar fell and hit the top of her head against her bedside table.  She denies any loss of consciousness.  Patient denies any neck pain on my exam, c-collar is in place by EMS  As patient had initially complained of neck pain.  Right now she has a moderate generalized headache. She denies recent fever or chills. No alleviating or exacerbating factors were reported. She lives at [facility]. Lamar takes a daily aspirin but does not take any blood thinners.     REVIEW OF SYSTEMS  Review of Systems   Constitutional: Negative for chills and fever.   Musculoskeletal: Positive for falls and neck pain.   Neurological: Positive for headaches. Negative for loss of consciousness.   All other systems reviewed and are negative.    PAST MEDICAL HISTORY   has a past medical history of Dementia (HCC), GERD (gastroesophageal reflux disease), HTN (hypertension), Hyperlipidemia (07/02/2010), and Hypothyroid (07/02/2010).    SURGICAL HISTORY   has a past surgical history that includes tonsillectomy; appendectomy; colonoscopy - endo (N/A, 9/3/2017); gastroscopy-endo (N/A, 9/3/2017); and hip cannulated screw (Left,  "3/24/2018).    SOCIAL HISTORY  Social History     Tobacco Use   • Smoking status: Never Smoker   • Smokeless tobacco: Never Used   Vaping Use   • Vaping Use: Never used   Substance Use Topics   • Alcohol use: No   • Drug use: No      Social History     Substance and Sexual Activity   Drug Use No       FAMILY HISTORY  Family History   Problem Relation Age of Onset   • Heart Disease Father    • Hypertension Sister        CURRENT MEDICATIONS  Home Medications     Reviewed by Nicole Martinez R.N. (Registered Nurse) on 07/31/22 at 0755  Med List Status: Partial   Medication Last Dose Status   acetaminophen (TYLENOL) 650 MG CR tablet  Active   amLODIPine (NORVASC) 2.5 MG Tab  Active   artificial tears 1.4 % Solution  Active   ASPIRIN LOW DOSE 81 MG EC tablet  Active   carvedilol (COREG) 12.5 MG Tab  Active   Cyanocobalamin (VITAMIN B-12) 1000 MCG Tab  Active   divalproex (DEPAKOTE SPRINKLE) 125 MG Capsule Delayed Release Sprinkle  Active   docusate sodium (COLACE) 100 MG Cap  Active   FEROSUL 325 (65 Fe) MG tablet  Active   folic acid (FOLVITE) 1 MG Tab  Active   levothyroxine (SYNTHROID) 50 MCG Tab  Active   Lidocaine 4 % Patch  Active   lisinopril (PRINIVIL) 10 MG Tab  Active   omeprazole (PRILOSEC) 20 MG delayed-release capsule  Active   potassium chloride SA (KDUR) 20 MEQ Tab CR  Active   QUEtiapine (SEROQUEL) 25 MG Tab  Active   THERAPEUTIC MULTIVITAMIN Tab  Active                ALLERGIES  Allergies   Allergen Reactions   • Nkda [No Known Drug Allergy]        PHYSICAL EXAM  VITAL SIGNS: /58   Pulse 65   Temp 36.7 °C (98.1 °F) (Temporal)   Resp 15   Ht 1.6 m (5' 3\")   Wt 43.1 kg (95 lb)   SpO2 95%   BMI 16.83 kg/m²   Vitals reviewed by myself.  Physical Exam  Nursing note and vitals reviewed.  Constitutional: Well-developed and well-nourished. No acute distress.   HENT: Head is normocephalic. Lump on top of head, no wound, no bleeding. Placed in C-collar.   Eyes: extra-ocular movements intact  Neck: No " midline C-spine tenderness  Cardiovascular: Regular rate and regular rhythm. No murmur heard.  Pulmonary/Chest: Breath sounds normal. No wheezes or rales.   Abdominal: Soft and non-tender. No distention.    Musculoskeletal: Extremities exhibit normal range of motion without edema or tenderness.   Neurological: Awake and alert, baseline confusion from dementia, cranial nerves II through XII intact, no focal neurologic deficits, patient moves all extremities  Skin: Skin is warm and dry. No rash.     DIAGNOSTIC STUDIES /  LABS  Labs Reviewed   CBC WITH DIFFERENTIAL - Abnormal; Notable for the following components:       Result Value    RBC 3.62 (*)     .2 (*)     MCH 34.3 (*)     MCHC 33.5 (*)     MPV 8.7 (*)     All other components within normal limits   BASIC METABOLIC PANEL - Abnormal; Notable for the following components:    Bun 24 (*)     All other components within normal limits   ESTIMATED GFR - Abnormal; Notable for the following components:    GFR (CKD-EPI) 57 (*)     All other components within normal limits   URINALYSIS,CULTURE IF INDICATED    Narrative:     Indication for culture:->Patient WITHOUT an indwelling Smith  catheter in place with new onset of Dysuria, Frequency,  Urgency, and/or Suprapubic pain     All labs reviewed by me.    RADIOLOGY  CT-HEAD W/O   Final Result      1.  Mild localized soft tissue swelling over the left frontal scalp.   2.  Advanced cerebral atrophy.   3.  Moderate-advanced white matter lucencies consistent with microvascular ischemic change.   4.  No acute hemorrhage or other acute finding is evident.         CT-CSPINE WITHOUT PLUS RECONS   Final Result      1.  Multilevel degenerative changes in the cervical spine with no evidence of acute fracture on the current exam.   2.  Findings consistent with healing/subacute or chronic fractures of T3 and T4. The severe T5 collapse is seen on the prior CT cervical spine study from 2/21/2022 is outside the field of view on the  current exam.        The radiologist's interpretation of all radiological studies have been reviewed by me.    REASSESSMENT  6:23 AM Patient was evaluated at bedside. Ordered for CT-Head, CT-Cspine, UA, BMP, and CBC with diff. I informed the patient of my plan to run diagnostic studies to evaluate their symptoms. Patient verbalizes understanding and support with my plan of care.     7:09 AM Patient was reevaluated at bedside. Discussed lab and radiology results with the patient.     7:31 AM Spoke to the patient's emergency contact regarding results and plan of care      COURSE & MEDICAL DECISION MAKING  Nursing notes, VS, PMSFHx reviewed in chart.    Patient is a 89-year-old female who comes in for evaluation of fall out of bed.  Differential diagnosis includes closed injury, spinal injury, electrolyte derangement, UTI.  Diagnostic work-up includes labs, urinalysis and CT of the head and neck.    Patient's initial vitals are within normal limits and she is well-appearing on exam.  She is neurologically intact.  Patient has no acute complaints and is resting comfortably.  CT returns demonstrates no acute traumatic injuries.  She has prior injuries noted to the T-spine, she is not having any pain over this area acutely.  C-collar is removed.  Labs are otherwise unremarkable.  Urinalysis demonstrates no signs of infection.  Discussed results with patient's guardian.  Patient is safe for discharge back to her facility.  She is discharged in stable condition.    The patient will return for new or worsening symptoms and is stable at the time of discharge. The patient is referred to a primary physician for blood pressure management, diabetic screening, and for all other preventative health concerns.    DISPOSITION:  Patient will be discharged home in stable condition.    FOLLOW UP:  Matilde Garcia P.A.-C.  [address] [place] [id number]  [phone]          FINAL IMPRESSION  1. Closed head injury, initial encounter    2.  Fall, initial encounter          I, Carri Kelley (Scribe), am scribing for, and in the presence of, Pauline Andres M.D..    Electronically signed by: Carri Kelley (Joelibtalat), 7/31/2022    IPauline M.D. personally performed the services described in this documentation, as scribed by Carri Kelley in my presence, and it is both accurate and complete.    The note accurately reflects work and decisions made by me.  Pauline Andres M.D.  7/31/2022  3:24 PM

## 2022-07-31 NOTE — ED TRIAGE NOTES
Lamar Dangelo  89 y.o.  female  Chief Complaint   Patient presents with   • T-5000     Brought in by yesenia from [facility] C/o GLF. Per report patient fell out of bed and hit left side of head at bedside table. Denies loc. Hx of Dementia.  Also c/o neck pain. C- collar in placed upon arrival to ED. Patient on daily Aspirin.

## 2022-07-31 NOTE — ED NOTES
Pt report to JOHNNY Loomis.  Pt care transferred.  Pt currently asleep, side rails up x2, call light w/in reach, BP cuff on blanket.  Pt removed Pulse ox probe and pulled own IV out earlier.

## 2022-07-31 NOTE — ED NOTES
[facility] documents reviewed.  Pt emergency contact: Ariella Arron (friend), tel: [phone].  NKDA.

## 2022-07-31 NOTE — ED NOTES
Per Dr Andres, pt to be DC'd back to [facility] and will need a ride.  ED , Margarita Garcias, notified.

## 2022-07-31 NOTE — ED NOTES
Called Pt emergency contact: Ariella Heller (friend), tel: [phone].  Ariella able to ID pt; states she's Lamar's POA.  States Lamar uses a walker.  Discussed transportation back to [place].  Ariella not currently available; will notify .

## 2022-07-31 NOTE — ED NOTES
Pt continually stating she fell out of bed.  Pt here from memory care facility. Oriented to her name and .  Hematoma to LT parietal area.  IV 20G LT FA, inserted per JOHNNY Arguelles earlier.  Resp even & unlabored, speech clear.

## 2022-08-04 NOTE — TELEPHONE ENCOUNTER
ED Follow-up  Call Attempts: 1  Date of ED visit: 07/31/22  Call Outcome: Reviewed ED visit with patient  Since you've been home, how have you been feeling?: Better  Were you prescribed any medications?: No  Do you have any questions about your discharge instructions?: No  RN Recommendations: Follow-up appointment scheduled  Total time spent (mins): 5

## 2022-08-11 PROBLEM — N18.30 ACUTE RENAL FAILURE SUPERIMPOSED ON STAGE 3 CHRONIC KIDNEY DISEASE (HCC): Status: ACTIVE | Noted: 2018-03-07

## 2022-08-11 PROBLEM — J96.00 ACUTE RESPIRATORY FAILURE DUE TO COVID-19 (HCC): Status: ACTIVE | Noted: 2022-01-01

## 2022-08-11 PROBLEM — U07.1 ACUTE RESPIRATORY FAILURE DUE TO COVID-19 (HCC): Status: ACTIVE | Noted: 2022-01-01

## 2022-08-11 PROBLEM — N30.00 ACUTE CYSTITIS WITHOUT HEMATURIA: Status: ACTIVE | Noted: 2022-01-01

## 2022-08-11 PROBLEM — R13.10 DYSPHAGIA: Status: ACTIVE | Noted: 2022-01-01

## 2022-08-11 NOTE — ED TRIAGE NOTES
"Chief Complaint   Patient presents with    Shortness of Breath    ALOC     Pt VALDEZ FINLEY from [facility]. Pt tested positive for COVID on Monday and per staff at facility, has been increasingly confused and weak. Pt normally is able to speak in full sentences but is now unable to without coughing. Unclear what pt's baseline mentation is, but pt is currently A&Ox1 to self only. Pt is 89% on RA and saturating above 93% on 3L NC. Per staff at [facility], pt has a DNR on file at [facility].     /81   Pulse 75   Temp 36.5 °C (97.7 °F) (Temporal)   Resp 16   Ht 1.6 m (5' 3\")   Wt 43.1 kg (95 lb)   SpO2 93%   BMI 16.83 kg/m²       "

## 2022-08-11 NOTE — ED NOTES
Per ERP, obtain UA via straight cath. Urine sample collected and sent to lab. COVID swab collected and sent to lab.

## 2022-08-11 NOTE — ED PROVIDER NOTES
ED Provider Note    Scribed for Con Abraham M.D. by Hamilton Brown. 8/11/2022  4:07 PM    Primary care provider: Matilde Garcia P.A.-C.  Means of arrival: EMS  History obtained from: Patient  History limited by: ALOC    CHIEF COMPLAINT  Chief Complaint   Patient presents with    Shortness of Breath    ALOC       HPI  Lamar Dangelo is a 89 y.o. female who presents to the Emergency Department for evaluation of shortness of breath. Patient is uncomfortable and will not respond to questions at this time. History limited by altered level of consciousness       REVIEW OF SYSTEMS  Pertinent positives include ALOC, discomfort, and confusion. Pertinent negatives include no fever.  History limited by altered level of consciousness.    PAST MEDICAL HISTORY   has a past medical history of Dementia (HCC), GERD (gastroesophageal reflux disease), HTN (hypertension), Hyperlipidemia (07/02/2010), Hypothyroid (07/02/2010), and Walker as ambulation aid.    SURGICAL HISTORY   has a past surgical history that includes tonsillectomy; appendectomy; colonoscopy - endo (N/A, 9/3/2017); gastroscopy-endo (N/A, 9/3/2017); and hip cannulated screw (Left, 3/24/2018).    SOCIAL HISTORY  Social History     Tobacco Use    Smoking status: Never    Smokeless tobacco: Never   Vaping Use    Vaping Use: Never used   Substance Use Topics    Alcohol use: No    Drug use: No      Social History     Substance and Sexual Activity   Drug Use No       FAMILY HISTORY  Family History   Problem Relation Age of Onset    Heart Disease Father     Hypertension Sister        CURRENT MEDICATIONS  Home Medications       Reviewed by Rodolfo Kline (Pharmacy Tech) on 08/11/22 at 1635  Med List Status: Complete     Medication Last Dose Status   acetaminophen (TYLENOL) 650 MG CR tablet PRN Active   amLODIPine (NORVASC) 2.5 MG Tab 8/11/2022 Active   artificial tears 1.4 % Solution PRN Active   ASPIRIN LOW DOSE 81 MG EC tablet 8/11/2022 Active   carvedilol (COREG)  "12.5 MG Tab 8/11/2022 Active   Cyanocobalamin (VITAMIN B-12) 1000 MCG Tab 8/11/2022 Active   divalproex (DEPAKOTE SPRINKLE) 125 MG Capsule Delayed Release Sprinkle 8/11/2022 Active   docusate sodium (COLACE) 100 MG Cap 8/11/2022 Active   ferrous sulfate (FEROSUL) 325 (65 Fe) MG tablet 8/11/2022 Active   folic acid (FOLVITE) 1 MG Tab 8/11/2022 Active   levothyroxine (SYNTHROID) 50 MCG Tab 8/11/2022 Active   Lidocaine 4 % Patch 8/11/2022 Active   lisinopril (PRINIVIL) 10 MG Tab 8/11/2022 Active   omeprazole (PRILOSEC) 20 MG delayed-release capsule 8/11/2022 Active   potassium chloride SA (KDUR) 20 MEQ Tab CR 8/11/2022 Active   QUEtiapine (SEROQUEL) 25 MG Tab 8/10/2022 Active   THERAPEUTIC MULTIVITAMIN Tab 8/11/2022 Active                    ALLERGIES  Allergies   Allergen Reactions    Nkda [No Known Drug Allergy]        PHYSICAL EXAM  VITAL SIGNS: /81   Pulse 75   Temp 36.5 °C (97.7 °F) (Temporal)   Resp 16   Ht 1.6 m (5' 3\")   Wt 43.1 kg (95 lb)   SpO2 93%   BMI 16.83 kg/m²     Constitutional: Well developed, Well nourished, Moderate distress, Non-toxic appearance.   HENT: Normocephalic, Atraumatic, Bilateral external ears normal, Dry mucus membranes, No oral exudates.   Eyes: PERRLA, EOMI, Conjunctiva normal, No discharge.   Neck: No tenderness, Supple, No stridor.   Lymphatic: No lymphadenopathy noted.   Cardiovascular: Normal heart rate, Normal rhythm.   Thorax & Lungs:  No wheezing, No crackles. Lungs with tachypnea chronic coughing decreased breath sounds throughout   Abdomen: Soft, No tenderness, No masses, No pulsatile masses.   Skin: Warm, Dry, No erythema, No rash.   Extremities:, No edema No cyanosis.   Musculoskeletal: No tenderness to palpation or major deformities noted.  Intact distal pulses  Neurologic: alert. Moves all extremities spontaneously, awake, will not follow commands, confused.  Psychiatric: Affect normal, Judgment normal, Mood normal.     LABS  Results for orders placed or " performed during the hospital encounter of 08/11/22   Lactic acid (lactate)   Result Value Ref Range    Lactic Acid 1.9 0.5 - 2.0 mmol/L   CBC WITH DIFFERENTIAL   Result Value Ref Range    WBC 8.3 4.8 - 10.8 K/uL    RBC 3.68 (L) 4.20 - 5.40 M/uL    Hemoglobin 12.5 12.0 - 16.0 g/dL    Hematocrit 37.9 37.0 - 47.0 %    .0 (H) 81.4 - 97.8 fL    MCH 34.0 (H) 27.0 - 33.0 pg    MCHC 33.0 (L) 33.6 - 35.0 g/dL    RDW 48.3 35.9 - 50.0 fL    Platelet Count 326 164 - 446 K/uL    MPV 9.0 9.0 - 12.9 fL    Neutrophils-Polys 80.70 (H) 44.00 - 72.00 %    Lymphocytes 8.60 (L) 22.00 - 41.00 %    Monocytes 9.70 0.00 - 13.40 %    Eosinophils 0.10 0.00 - 6.90 %    Basophils 0.10 0.00 - 1.80 %    Immature Granulocytes 0.80 0.00 - 0.90 %    Nucleated RBC 0.00 /100 WBC    Neutrophils (Absolute) 6.71 2.00 - 7.15 K/uL    Lymphs (Absolute) 0.72 (L) 1.00 - 4.80 K/uL    Monos (Absolute) 0.81 0.00 - 0.85 K/uL    Eos (Absolute) 0.01 0.00 - 0.51 K/uL    Baso (Absolute) 0.01 0.00 - 0.12 K/uL    Immature Granulocytes (abs) 0.07 0.00 - 0.11 K/uL    NRBC (Absolute) 0.00 K/uL   COMP METABOLIC PANEL   Result Value Ref Range    Sodium 148 (H) 135 - 145 mmol/L    Potassium 4.8 3.6 - 5.5 mmol/L    Chloride 112 96 - 112 mmol/L    Co2 22 20 - 33 mmol/L    Anion Gap 14.0 7.0 - 16.0    Glucose 101 (H) 65 - 99 mg/dL    Bun 57 (H) 8 - 22 mg/dL    Creatinine 1.34 0.50 - 1.40 mg/dL    Calcium 9.7 8.5 - 10.5 mg/dL    AST(SGOT) 19 12 - 45 U/L    ALT(SGPT) 13 2 - 50 U/L    Alkaline Phosphatase 95 30 - 99 U/L    Total Bilirubin 0.7 0.1 - 1.5 mg/dL    Albumin 3.9 3.2 - 4.9 g/dL    Total Protein 6.7 6.0 - 8.2 g/dL    Globulin 2.8 1.9 - 3.5 g/dL    A-G Ratio 1.4 g/dL   URINALYSIS    Specimen: Urine   Result Value Ref Range    Color DK Yellow     Character Cloudy (A)     Specific Gravity 1.022 <1.035    Ph 5.5 5.0 - 8.0    Glucose Negative Negative mg/dL    Ketones 15 (A) Negative mg/dL    Protein 30 (A) Negative mg/dL    Bilirubin Negative Negative    Urobilinogen,  Urine 1.0 Negative    Nitrite Negative Negative    Leukocyte Esterase Moderate (A) Negative    Occult Blood Negative Negative    Micro Urine Req Microscopic    CoV-2, FLU A/B, and RSV by PCR (2-4 Hours [facility]) : Collect NP swab in VTM    Specimen: Respirate   Result Value Ref Range    Influenza virus A RNA Negative Negative    Influenza virus B, PCR Negative Negative    RSV, PCR Negative Negative    SARS-CoV-2 by PCR DETECTED (AA)     SARS-CoV-2 Source NP Swab    ESTIMATED GFR   Result Value Ref Range    GFR (CKD-EPI) 37 (A) >60 mL/min/1.73 m 2   URINE MICROSCOPIC (W/UA)   Result Value Ref Range    WBC 20-50 (A) /hpf    RBC 0-2 /hpf    Bacteria Many (A) None /hpf    Epithelial Cells Negative /hpf    Hyaline Cast 6-10 (A) /lpf   D-Dimer   Result Value Ref Range    D-Dimer Screen 3.63 (H) 0.00 - 0.50 ug/mL (FEU)   CRP Quantitative (Non-Cardiac)   Result Value Ref Range    Stat C-Reactive Protein 12.47 (H) 0.00 - 0.75 mg/dL   Procalcitonin   Result Value Ref Range    Procalcitonin 0.12 <0.25 ng/mL   MAGNESIUM   Result Value Ref Range    Magnesium 2.3 1.5 - 2.5 mg/dL   PHOSPHORUS   Result Value Ref Range    Phosphorus 3.3 2.5 - 4.5 mg/dL        RADIOLOGY  DX-CHEST-PORTABLE (1 VIEW)   Final Result      1.  Retrocardiac opacity likely represents atelectasis/collapse of the left lower lobe. Further evaluation can be performed with CT as an endobronchial is not excluded.   2.  Small left pleural effusion is not excluded.   3.  Prominence of the aortic arch with prominent atherosclerotic plaque.        The radiologist's interpretation of all radiological studies have been reviewed by me.      COURSE & MEDICAL DECISION MAKING  Pertinent Labs & Imaging studies reviewed. (See chart for details)    I reviewed the patient's medical records which showed a history of dementia.    4:07 PM - Patient seen and examined at bedside. Patient has a history of dementia and is unable to respond to questions at this time. Ordered Blood  culture, Urine culture, UA, CMP, CBC w/ diff., CoV, Flu A/B and RSV by PCR, Lactic acid, and DX-Chest to evaluate her symptoms. The differential diagnoses include but are not limited to: Sepsis, pneumonia, COVID.    5:24 PM - Patient will be treated with Decadron 6 mg for their symptoms.     5:27 PM - Paged Hospitalist.    5:48 PM - PM I discussed the patient's case and the above findings with Dr. Almonte (hospitalist) who agrees to admit.     CRITICAL CARE  The very real possibility of a deterioration of this patient's condition required the highest level of my preparedness for sudden, emergent intervention.  I provided critical care services, which included medication orders, frequent reevaluations of the patient's condition and response to treatment, ordering and reviewing test results, and discussing the case with various consultants.  The critical care time associated with the care of the patient was 33 minutes. Review chart for interventions. This time is exclusive of any other billable procedures.        Decision Making:  Patient with respiratory failure secondary to COVID-19, give the patient some steroids, the patient will need to be admitted to the hospital, give the patient to the hospitalist for hospitalization.        DISPOSITION:  Patient will be hospitalized by Dr. Almonte in critical condition.     FINAL IMPRESSION  1. Acute respiratory failure with hypoxia (HCC)    2. Pneumonia due to COVID-19 virus    3. Disorientation          Hamilton CULLEN (Scribe), am scribing for, and in the presence of, Con Abraham M.D..    Electronically signed by: Hamilton Brown (Scribe), 8/11/2022    Con CULLEN M.D. personally performed the services described in this documentation, as scribed by Hamilton Brown in my presence, and it is both accurate and complete.    The note accurately reflects work and decisions made by me.  Con Abraham M.D.  8/11/2022  10:50 PM

## 2022-08-11 NOTE — ED NOTES
Pt placed on cardiac monitor, pulse ox, and automatic BP. VSS, saturating above 90% on 3L NC, SR in the 80s. Call light within reach and chart up for ERP.

## 2022-08-12 PROBLEM — E87.0 HYPERNATREMIA: Status: ACTIVE | Noted: 2022-01-01

## 2022-08-12 NOTE — PROGRESS NOTES
Hospital Medicine Daily Progress Note    Date of Service  8/12/2022    Chief Complaint  Lamar Dangelo is a 89 y.o. female admitted 8/11/2022 with shortness of breath    Hospital Course  89-year-old previous medical history of dementia, hypertension, dyslipidemia, GERD, hypothyroidism, chronic kidney disease, severe protein calorie malnutrition.  Patient is a resident of a group home.  Patient presented with shortness of breath and worsening mentation, and found to be COVID-positive on August 8, and again on presentation.  In ED 89% on room air    Interval Problem Update  ROS unobtainable due to pt's mental status    DW pt's friend and DPEMMANUEL Krishnan by phone.  Reviewed pt's current clinical situation and poor prognosis, risk of aspiration, GOC, code status, feeding tubes etc.  After discussion Ariella feels that Lamar would want to have medical support but would not want feed tubes, vent, code etc.  We there for will work to support her with medications, IVFs etc.  We will allow her to eat a modified diet as safely as we can understanding that if she aspirates and worsens we would change to Comfort Care status  Total conversation 32mins    I have discussed this patient's plan of care and discharge plan at IDT rounds today with Case Management, Nursing, Nursing leadership, and other members of the IDT team.    Consultants/Specialty      Code Status  DNAR/DNI    Disposition  Patient is not medically cleared for discharge.   Anticipate discharge to to skilled nursing facility.  I have placed the appropriate orders for post-discharge needs.    Review of Systems  Review of Systems   Unable to perform ROS: Mental status change      Physical Exam  Temp:  [36.5 °C (97.7 °F)-37.2 °C (99 °F)] 37.2 °C (99 °F)  Pulse:  [] 92  Resp:  [14-32] 28  BP: ()/() 115/60  SpO2:  [88 %-97 %] 93 %    Physical Exam  Constitutional:       General: She is not in acute distress.     Appearance: She is well-developed. She is  cachectic. She is not diaphoretic.   HENT:      Head: Normocephalic and atraumatic.   Neck:      Vascular: No JVD.   Cardiovascular:      Rate and Rhythm: Normal rate and regular rhythm.      Heart sounds: Murmur heard.   Pulmonary:      Effort: Pulmonary effort is normal. No respiratory distress.      Breath sounds: No stridor. No wheezing or rales.   Abdominal:      Palpations: Abdomen is soft.      Tenderness: There is no abdominal tenderness. There is no guarding or rebound.   Musculoskeletal:         General: No tenderness.      Right lower leg: No edema.      Left lower leg: No edema.   Skin:     General: Skin is warm and dry.      Capillary Refill: Capillary refill takes less than 2 seconds.      Findings: No rash.   Neurological:      General: No focal deficit present.      Mental Status: She is alert. She is disoriented.      Comments: Opens eyes to physical stim and is briefly alert.  Speech low volume.  Purposeful but does not follow       Fluids  No intake or output data in the 24 hours ending 08/12/22 0743    Laboratory  Recent Labs     08/11/22  1600   WBC 8.3   RBC 3.68*   HEMOGLOBIN 12.5   HEMATOCRIT 37.9   .0*   MCH 34.0*   MCHC 33.0*   RDW 48.3   PLATELETCT 326   MPV 9.0     Recent Labs     08/11/22  1620 08/12/22  0534   SODIUM 148* 149*   POTASSIUM 4.8 3.7   CHLORIDE 112 111   CO2 22 20   GLUCOSE 101* 146*   BUN 57* 64*   CREATININE 1.34 1.29   CALCIUM 9.7 9.2                   Imaging  CT-CTA CHEST PULMONARY ARTERY W/ RECONS   Final Result         1.  No pulmonary embolus appreciated.   2.  Consolidation in the left lower lobe, there is bronchial plugging suggesting mucous plugging, endobronchial lesion not excluded. Further evaluation with bronchoscopy recommended.   3.  Scattered patchy left pulmonary infiltrates   4.  Trace right pleural effusion with linear atelectasis in the right lung base.   5.  Diverticulosis   6.  Peripherally calcified structure abutting the anterior spleen,  could represent aneurysm or changes related to prior trauma or infection.      DX-CHEST-PORTABLE (1 VIEW)   Final Result      1.  Retrocardiac opacity likely represents atelectasis/collapse of the left lower lobe. Further evaluation can be performed with CT as an endobronchial is not excluded.   2.  Small left pleural effusion is not excluded.   3.  Prominence of the aortic arch with prominent atherosclerotic plaque.           Assessment/Plan  * Acute respiratory failure due to COVID-19 (HCC)- (present on admission)  Assessment & Plan  Continue with the following therapies while monitoring closely:  Decadron: 6 mg daily  Diuresis: Receiving gentle hydration 600cc D5W for hypernatremia & herson  Antitussives  check inflammatory markers, D-dimer, procalcitonin  Maintain strict isolation precautions  Chemical DVT prophylaxis        Hypernatremia  Assessment & Plan  Start D5 0.45%NS  Follow BMP daily    Acute cystitis without hematuria- (present on admission)  Assessment & Plan  UA indicating infection, ceftriaxone started for 3 days, old urine culture shows E. coli resistant to ampicillin and Bactrim, intermediate piperacillin resistance.  Follow-up urine and blood cultures    Dysphagia- (present on admission)  Assessment & Plan  Failed bedside RN swallow eval.  See above    Severe protein-calorie malnutrition (HCC)- (present on admission)  Assessment & Plan  Given aspiration risk and pt's wishe to not have feeding tube it's a dificult situation  Work with SLP to encourage PO intake in as safe a manner as possible    Gastroesophageal reflux disease without esophagitis- (present on admission)  Assessment & Plan  Continue PPI    Recurrent falls- (present on admission)  Assessment & Plan  Fall precautions  Should she recover will consult therapies    DNR (do not resuscitate)- (present on admission)  Assessment & Plan  D/w AGAPITO Krishnan by phone.    She feels pt would still want to be supported with medications but would not  want feeding tubes, code, vent etc.  She understands that feeding the pt carries risk of aspiration and subsequent death.  We will try feeding tomorrow with SLP guidance.  If she were to worsen from aspiration or any other etiology would convert to Comfort Care    Encephalopathy- (present on admission)  Assessment & Plan  Acute metabolic encephalopathy in setting of advanced vascular dementia  Addressing infectious and metabolic etiologies  Holding CNS active medications  Keep shades open  Mobilize as able  Encourage friend Ariella to visit           Acute renal failure superimposed on stage 3 chronic kidney disease (HCC)- (present on admission)  Assessment & Plan  Likely pre-renal  Cont IVFs  Renally dose medications as appropriate  Follow UOP and dialy BMP      Essential hypertension- (present on admission)  Assessment & Plan  Resume lisinopril and coreg as pressures dictate    Vascular dementia without behavioral disturbance (HCC)- (present on admission)  Assessment & Plan  Holding Depakote sprinkles and Seroquel due mental status      Acquired hypothyroidism- (present on admission)  Assessment & Plan  TSH 1.9, one year ago  Cont replacement  Check TSH       VTE prophylaxis: heparin ppx    I have performed a physical exam and reviewed and updated ROS and Plan today (8/12/2022). In review of yesterday's note (8/11/2022), there are no changes except as documented above.

## 2022-08-12 NOTE — ED NOTES
Pt resting, airway patent, rr even and unlabored, equal chest rise and fall. Nad noted.   Call light within reach.

## 2022-08-12 NOTE — PROGRESS NOTES
4 Eyes Skin Assessment Completed by Demetra RN and JOHNNY Peoples.    Head WDL  Ears WDL  Nose WDL  Mouth WDL  Neck WDL  Breast/Chest WDL  Shoulder Blades WDL  Spine WDL  (R) Arm/Elbow/Hand WDL  (L) Arm/Elbow/Hand WDL  Abdomen WDL  Groin WDL  Scrotum/Coccyx/Buttocks WDL  (R) Leg WDL  (L) Leg Bruise L hip  (R) Heel/Foot/Toe WDL  (L) Heel/Foot/Toe WDL    Overall, fragile and thin skin     Devices In Places Oxy Mask      Interventions In Place Waffle Overlay    Possible Skin Injury No    Pictures Uploaded Into Epic N/A  Wound Consult Placed N/A  RN Wound Prevention Protocol Ordered No

## 2022-08-12 NOTE — ASSESSMENT & PLAN NOTE
Acute metabolic encephalopathy in setting of advanced vascular dementia  Addressing infectious and metabolic etiologies  Holding CNS active medications  Keep shades open  Mobilize as able  Encourage friend Ariella to visit

## 2022-08-12 NOTE — ED NOTES
Report from Peyton, will assume care at this time. Patient is lying on hospital bed resting with eyes closed, even and unlabored respirations visualized.

## 2022-08-12 NOTE — DISCHARGE PLANNING
[facility]P TCN chart review completed. Collaborated with MESSI Cannon prior to meeting with the pt. The most current review of medical record, knowledge of pt's PLOF and social support, LACE+ score of 79, 6 clicks scores of (none entered)  mobility were considered.      Pt seen at bedside., AOx1, O2 mask in place.Offered warm blanket for comfort.   Given member's current mentation, TCN unable to effectively introduce TCN program and provide education on post-acute services.      1:04pm. TCN made a non-emergency courtesy outreach tel call to ALEKSANDRA ORDONEZ using tel# on file.  Per Epic, its ok to discuss treatment and billing.Verified HIPPA. Introduced TCN program. Provided education regarding post acute levels of care. Discussed [facility] plan benefits (Meds to Beds, medical uber and [facility]). Aleksandra osei to [facility] services, sending referral to [facility].    I asked Dr Jorgensen ( via voalte) whether mbr will need post-acute placement versus DC back to [facility].   Requested provider consults for: PT OT eval  from Dr Jorgensen  via voalte.   Pro[facility] ( [facility]. Gave SNF choice to MESSI Cannon    Palliative recs pending.    TCN will continue to follow and collaborate with discharge planning team as additional post acute needs arise. Thank you.     Completed today:  Requested MD order PT OT eval 8/12/2022  Choice obtained: SNF  [facility] referral ( sent)  8/12/2022

## 2022-08-12 NOTE — ED NOTES
Report to JOHNNY Esparza. Pt awake and breathing with even, unlabored respirations on 3L NC at time of report.

## 2022-08-12 NOTE — ED NOTES
Pt increased to 6lpm oxymask. Dr. Bunch notified of pt increased oxygen demand from 3lpm via NC to 6lpm via oxymask. Also noted to  that there were not any morning labs ordered for pt. Awaiting new orders.

## 2022-08-12 NOTE — ED NOTES
Report received from JOHNNY Lee. Pt resting in bed, moved from 3lpm to 4lpm oxygen. Airway patent, rr even and unlabored. Aox1. Cough noted.

## 2022-08-12 NOTE — THERAPY
"Speech Language Pathology   Clinical Swallow Evaluation     Patient Name: Lamar Dangelo  AGE:  89 y.o., SEX:  female  Medical Record #: [id number]  Today's Date: 2022     Precautions  Precautions: Fall Risk, Swallow Precautions ( See Comments)  Comments: Dementia    HPI: Pt is a 89 y.o. female who presented 2022 with dyspnea and encephalopathy, found to be COVID-positive. She has been progressively weak, dyspneic, encephalopathic. Normally able to speak full sentences now unable to without coughing and secondary to dyspnea. Presented to ER 22 for ground level fall. Cognitive evaluation w/ SLP on 7/3/21 reports need for 24-hour care.    PMHx: HTN, GERD, dysphagia, dementia w/ sundowning, CKD, HLD, recurrent falls    CXR 22:  \"1.  Retrocardiac opacity likely represents atelectasis/collapse of the left lower lobe. Further evaluation can be performed with CT as an endobronchial is not excluded.  2.  Small left pleural effusion is not excluded.  3.  Prominence of the aortic arch with prominent atherosclerotic plaque.\"    CT Chest 22:  \"1.  No pulmonary embolus appreciated.  2.  Consolidation in the left lower lobe, there is bronchial plugging suggesting mucous plugging, endobronchial lesion not excluded. Further evaluation with bronchoscopy recommended.  3.  Scattered patchy left pulmonary infiltrates  4.  Trace right pleural effusion with linear atelectasis in the right lung base.  5.  Diverticulosis  6.  Peripherally calcified structure abutting the anterior spleen, could represent aneurysm or changes related to prior trauma or infection.\"    Level of Consciousness: Awake, Lethargic  Affect/Behavior: Cooperative  Follows Directives: Yes - simple commands only  Orientation: Self, , General place, Current year  Hearing: Functional hearing  Vision: Functional vision    Prior Living Situation & Level of Function:  Per chart review, pt lives at [facility]. Pt not reliable historian. " Dementia at baseline.    Oral Mechanism Evaluation  Facial Symmetry: Equal  Facial Sensation: Pt did not follow commands to assess  Labial Observations: Pt did not follow commands to assess  Lingual Observations: Pt did not follow commands for assessment  Dentition: Good, Pt did not follow commands to assess  Comments: Pt unable to demonstrate oral protrusion despite effort, appropriate oral excursion, unable to lateralize to command, weakened lateralization w/ PO movement. Pt dentition good, not able to assess if natural dentition or dentures given pt participation and cognitive status.     Voice  Quality: Breathy continuous, Limited voice demonstrated for speech  Resonance: WFL  Intensity: Soft  Cough: Perceptually weak - present before and after PO, no change in quality      Current Method of Nutrition   NPO until cleared by speech pathology      Subjective  Pt awakened to SLP arrival, required consistent, repetitive stimulation to participate in evaluation. Pt with increasing O2 needs before PO intake. Pt significantly confused, empty speech w/ mild dysarthria  noted throughout evaluation. Pt able to answer Y/N questions w/ repetition to address wants and needs w/ care.     Assessment  Positioning: Bain's (60-90 degrees)  Bolus Administration: SLP  Oxygen Requirements:  8 L Nasal Cannula - of note, Pt O2 needs increased during SLP visit prior to PO trials.   Factor(s) Affecting Performance: Impaired endurance, Impaired mental status, Impaired command following    Swallowing Trials  Ice: Impaired  Thin Liquid (TN0): Not tested  Mildly Thick Liquid (MT2): Not tested  Liquidised (LQ3): Not tested    Comments:  Pt w/ significant desat from mid 90s to high 80s prior to PO trials. Took several minutes w/ O2 adjustment from RN and prompts to take deep breaths to improve O2 saturation. PO trial of ice chip x1 administer. Pt will prolonged oral prep time, anterior leakage, suspected delayed initiation of swallow given  "partial clearance of oral cavity w/o observable swallow. Sustained cough present after observable initiation of swallow. Significant desaturation noticed w/ increased WOB. PO trials discontinued at this time 2' to pt safety.       Clinical Impressions  Pt presents w/ s/sx of oropharyngeal dysphagia, evidenced by suspected delayed swallow, change in respiratory status following PO, and poor bolus control in oral cavity. Given mentation, lung status, and O2 sats in the low 90s/high 80s w/o PO, pt is at high risk for silent aspiration. PO not safe at this time. DO aware, will discuss POC w/ family to make further recommendations.     Recommendations  1.  Strict NPO. Consider eating despite risk vs. alternative means of nutrition given GOC. DO aware, will speak to family.  2.  Instrumentation: None indicated at this time  3.  Swallowing Instructions & Precautions:   Medication: Non Oral   Oral Care: Q4h    Plan  Recommend Speech Therapy 3 times per week until therapy goals are met for the following treatments:  Dysphagia Training and Patient / Family / Caregiver Education.    Discharge Recommendations: Recommend post-acute placement for additional speech therapy services prior to discharge home (Pending GOC)     Objective   08/12/22 0906   Initial Contact Note    Initial Contact Note  Order Received and Verified, Speech Therapy Evaluation in Progress with Full Report to Follow.   Precautions   Precautions Fall Risk;Swallow Precautions ( See Comments)   Comments Dementia   Vitals   O2 (LPM) 8  (RN elevated O2 requirements during SLP visit prior to PO trials)   O2 Delivery Device Oxymask   Pain 0 - 10 Group   Therapist Pain Assessment   (No pain reported during PO trials, s/sx of pain evident w/ repositioning in bed)   Prior Level Of Function   Communication Impaired   Swallow Impaired   Patient / Family Goals   Patient / Family Goal #1 \"I would like ice\"   Short Term Goals   Short Term Goal # 1 Pt or family will identify " 3 strategies to reduce risk of aspiration given min cues from SLP.   Education Group   Education Provided Dysphagia;Role of Speech Therapy   Dysphagia Patient Response Patient;Explanation;Reinforcement Needed;No Learning Evidence   Role of SLP Patient Response Patient;Acceptance;Reinforcement Needed;No Learning Evidence   Problem List   Problem List Dysphagia;Dementia;Dysarthia   Anticipated Discharge Needs   Discharge Recommendations Recommend post-acute placement for additional speech therapy services prior to discharge home  (Pending GOC)   Therapy Recommendations Upon DC Dysphagia Training;Patient / Family / Caregiver Education  (Pending GOC)   Interdisciplinary Plan of Care Collaboration   IDT Collaboration with  Nursing;Physician   Patient Position at End of Therapy In Bed;Bed Alarm On;Call Light within Reach  (All verbalized needs met)   Collaboration Comments RN, DO aware of recommendations and aspiration risk

## 2022-08-12 NOTE — ED NOTES
Patient lying in hospital bed, resting with eyes closed, responds to verbal stimuli. Even and unlabored respirations visualized.

## 2022-08-12 NOTE — ED NOTES
Upon reassessment, pt found to have removed R forearm PIV. Bruise noted to R forearm. New PIV placed with coband. Pt now resting in hospital bed, breathing with even, unlabored respirations on 3L NC.

## 2022-08-12 NOTE — ED NOTES
Pt failed bedside swallow eval. Dr. Almonte notified and aware pt cannot take PO medications. SLP eval ordered.

## 2022-08-12 NOTE — H&P
Hospital Medicine History & Physical Note    Date of Service  8/11/2022    Primary Care Physician  Matilde Garcia P.A.-C.    Consultants  None    Code Status  DNAR/DNI    Chief Complaint  Chief Complaint   Patient presents with    Shortness of Breath    ALOC       History of Presenting Illness  Lamar Dangelo is a 89 y.o. female with vascular dementia lives in [facility], hypertension, hyperlipidemia, GERD, hypothyroidism, CKD, severe protein calorie malnutrition, recurrent falls, who presented 8/11/2022 with dyspnea and encephalopathy found to be COVID-positive.  Ms. Dangelo was brought to the ED by EMS from [facility], she tested positive for COVID on 8/8/2022, she has been progressively weak, dyspneic, encephalopathic.  Normally able to speak full sentences now unable to without coughing and secondary to dyspnea.  She was alert and oriented to herself only.  When she arrived she was 89% on room air placed on 3 L nasal cannula saturating 93%.  Additional history is limited given patient condition and dementia.  No other reports of headache or vision changes, chest pain, nausea vomiting, abdominal pain dysuria or diarrhea however history is limited.  Symptoms started acutely 3 days prior to presentation and have been progressive.  Symptoms aggravated with exertion.    Initial vitals in the ED /81 pulse 75 she was afebrile, respiratory rate 16 she was 89% on room air.  CBC shows macrocytosis, CMP sodium 148 BUN 57 serum creatinine 1.34 normal liver function, lactic acid 1.9, D-dimer 3.63, UA with moderate leukocyte esterase 20-50 WBCs many bacteria, COVID-positive, chest x-ray shows retrocardiac opacity likely represents atelectasis collapse of left lower lobe further evaluation can be performed with CT as endobronchial lesion not excluded, small left pleural effusion not excluded prominence aortic arch with prominent atherosclerotic plaque.  She was started on Decadron subsequently  referred to hospitalist for admission.    I discussed the plan of care with patient, bedside RN, and pharmacy.    Review of Systems  Review of Systems   Unable to perform ROS: Dementia     Past Medical History   has a past medical history of Dementia (HCC), GERD (gastroesophageal reflux disease), HTN (hypertension), Hyperlipidemia (07/02/2010), Hypothyroid (07/02/2010), and Walker as ambulation aid.    Surgical History   has a past surgical history that includes tonsillectomy; appendectomy; colonoscopy - endo (N/A, 9/3/2017); gastroscopy-endo (N/A, 9/3/2017); and hip cannulated screw (Left, 3/24/2018).     Family History  family history includes Heart Disease in her father; Hypertension in her sister.        Social History   reports that she has never smoked. She has never used smokeless tobacco. She reports that she does not drink alcohol and does not use drugs.    Allergies  Allergies   Allergen Reactions    Nkda [No Known Drug Allergy]        Medications  Prior to Admission Medications   Prescriptions Last Dose Informant Patient Reported? Taking?   ASPIRIN LOW DOSE 81 MG EC tablet 8/11/2022 at 0700 MAR from Other Facility No No   Sig: TAKE 1 TABLET BY MOUTH ONCE DAILY.   Patient taking differently: Take 81 mg by mouth every day.   Cyanocobalamin (VITAMIN B-12) 1000 MCG Tab 8/11/2022 at 0700 MAR from Other Facility No No   Sig: TAKE 1 TABLET BY MOUTH ONCE DAILY.   Patient taking differently: Take 1,000 mcg by mouth every day. TAKE 1 TABLET BY MOUTH ONCE DAILY.   Lidocaine 4 % Patch 8/11/2022 at 0700 MAR from Other Facility Yes No   Sig: Apply 1 Patch topically every day. Apply at 0700 remove at 1900   QUEtiapine (SEROQUEL) 25 MG Tab 8/10/2022 at 2000 MAR from Other Facility No No   Sig: TAKE 1 TABLET BY MOUTH AT BEDTIME.   Patient taking differently: Take 25 mg by mouth at bedtime.   THERAPEUTIC MULTIVITAMIN Tab 8/11/2022 at 0700 MAR from Other Facility No No   Sig: TAKE 1 TABLET BY MOUTH ONCE DAILY.   Patient  taking differently: Take 1 Tablet by mouth every day.   acetaminophen (TYLENOL) 650 MG CR tablet PRN at PEN MAR from Other Facility Yes No   Sig: Take 650 mg by mouth every 6 hours as needed. Indications: Pain   amLODIPine (NORVASC) 2.5 MG Tab 8/11/2022 at 0700 MAR from Other Facility Yes No   Sig: Take 2.5 mg by mouth every day.   artificial tears 1.4 % Solution PRN at PRN MAR from Other Facility No No   Sig: Administer 2 Drops into both eyes every 6 hours as needed (Dry eyes   ).   carvedilol (COREG) 12.5 MG Tab 8/11/2022 at 0700 MAR from Other Facility No No   Sig: TAKE 1 TABLET BY MOUTH TWICE DAILY.   Patient taking differently: Take 12.5 mg by mouth 2 times a day. TAKE 1 TABLET BY MOUTH TWICE DAILY.   divalproex (DEPAKOTE SPRINKLE) 125 MG Capsule Delayed Release Sprinkle 8/11/2022 at 0700 MAR from Other Facility Yes No   Sig: Take 125 mg by mouth 2 times a day.   docusate sodium (COLACE) 100 MG Cap 8/11/2022 at 0700 MAR from Other Facility No No   Sig: TAKE (1) CAPSULE BY MOUTH TWICE DAILY.   Patient taking differently: Take 100 mg by mouth 2 times a day.   ferrous sulfate (FEROSUL) 325 (65 Fe) MG tablet 8/11/2022 at 0700 MAR from Other Facility No No   Sig: TAKE 1 TABLET BY MOUTH ONCE DAILY.   Patient taking differently: Take 325 mg by mouth every day.   folic acid (FOLVITE) 1 MG Tab 8/11/2022 at 0700 MAR from Other Facility No No   Sig: TAKE 1 TABLET BY MOUTH ONCE DAILY.   Patient taking differently: Take 1 mg by mouth every day.   levothyroxine (SYNTHROID) 50 MCG Tab 8/11/2022 at 0500 MAR from Other Facility No No   Sig: TAKE 1 TABLET BY MOUTH EVERY MORNING 30 MINUTES BEFORE BREAKFAST.   Patient taking differently: Take 50 mcg by mouth every morning on an empty stomach. TAKE 1 TABLET BY MOUTH EVERY MORNING 30 MINUTES BEFORE BREAKFAST.   lisinopril (PRINIVIL) 10 MG Tab 8/11/2022 at 0700 MAR from Other Facility Yes No   Sig: Take 10 mg by mouth every day.   omeprazole (PRILOSEC) 20 MG delayed-release capsule  8/11/2022 at 0700 MAR from Other Facility No No   Sig: TAKE (1) CAPSULE BY MOUTH TWICE DAILY.   Patient taking differently: Take 20 mg by mouth 2 times a day. TAKE (1) CAPSULE BY MOUTH TWICE DAILY.   potassium chloride SA (KDUR) 20 MEQ Tab CR 8/11/2022 at 0700 MAR from Other Facility No No   Sig: TAKE 1 TABLET BY MOUTH ONCE DAILY.   Patient taking differently: Take 20 mEq by mouth every day.      Facility-Administered Medications: None       Physical Exam  Temp:  [36.5 °C (97.7 °F)] 36.5 °C (97.7 °F)  Pulse:  [75-88] 88  Resp:  [14-29] 29  BP: (129-152)/() 150/76  SpO2:  [88 %-94 %] 88 %  Blood Pressure : (!) 150/76   Temperature: 36.5 °C (97.7 °F)   Pulse: 88   Respiration: (!) 29   Pulse Oximetry: 88 %       Physical Exam  Vitals and nursing note reviewed.   Constitutional:       Appearance: She is ill-appearing. She is not diaphoretic.      Comments: Frail and cachectic appearing 89-year-old female, appears chronically ill, appears stated age, alert, only able to speak a few words before becoming breathless, endorsing dyspnea and myalgias, slightly increased work of breathing   HENT:      Head: Normocephalic and atraumatic.      Nose: Nose normal. No rhinorrhea.      Mouth/Throat:      Mouth: Mucous membranes are dry.      Pharynx: Oropharynx is clear.   Eyes:      General: No scleral icterus.     Extraocular Movements: Extraocular movements intact.      Conjunctiva/sclera: Conjunctivae normal.      Pupils: Pupils are equal, round, and reactive to light.   Cardiovascular:      Rate and Rhythm: Normal rate and regular rhythm.      Pulses: Normal pulses.   Pulmonary:      Effort: Pulmonary effort is normal.      Breath sounds: Rales present. No wheezing or rhonchi.      Comments: Poor inspiratory effort, poor cough, slightly increased work of breathing, bibasilar rales, diminished breath sounds throughout  Abdominal:      Palpations: Abdomen is soft.      Tenderness: There is no abdominal tenderness. There is  no guarding or rebound.   Musculoskeletal:         General: No tenderness. Normal range of motion.      Cervical back: Normal range of motion and neck supple. No rigidity or tenderness.      Right lower leg: No edema.      Left lower leg: No edema.   Skin:     General: Skin is warm and dry.      Capillary Refill: Capillary refill takes less than 2 seconds.   Neurological:      General: No focal deficit present.      Mental Status: She is alert. She is disoriented.      Cranial Nerves: No cranial nerve deficit.      Sensory: No sensory deficit.      Motor: Weakness (global) present.      Comments: Face symmetrical, tongue midline, able to move all 4 extremities although she globally is weak, no hemineglect or gaze preference, she is following commands, she is only oriented to self, she is confused although generally responding to questions appropriately, too weak to perform coordination/gait testing   Psychiatric:         Mood and Affect: Mood normal.         Behavior: Behavior normal.         Thought Content: Thought content normal.         Judgment: Judgment normal.       Laboratory:  Recent Labs     08/11/22  1600   WBC 8.3   RBC 3.68*   HEMOGLOBIN 12.5   HEMATOCRIT 37.9   .0*   MCH 34.0*   MCHC 33.0*   RDW 48.3   PLATELETCT 326   MPV 9.0     Recent Labs     08/11/22  1620   SODIUM 148*   POTASSIUM 4.8   CHLORIDE 112   CO2 22   GLUCOSE 101*   BUN 57*   CREATININE 1.34   CALCIUM 9.7     Recent Labs     08/11/22  1620   ALTSGPT 13   ASTSGOT 19   ALKPHOSPHAT 95   TBILIRUBIN 0.7   GLUCOSE 101*         No results for input(s): NTPROBNP in the last 72 hours.      No results for input(s): TROPONINT in the last 72 hours.    Imaging:  DX-CHEST-PORTABLE (1 VIEW)   Final Result      1.  Retrocardiac opacity likely represents atelectasis/collapse of the left lower lobe. Further evaluation can be performed with CT as an endobronchial is not excluded.   2.  Small left pleural effusion is not excluded.   3.  Prominence of  the aortic arch with prominent atherosclerotic plaque.      CT-CTA CHEST PULMONARY ARTERY W/ RECONS    (Results Pending)       X-Ray:  I have personally reviewed the images and compared with prior images. and My impression is: chest x-ray shows retrocardiac opacity likely atelectasis but collapse of left lower lobe not excluded, endobronchial lesion not excluded, small left pleural effusion not excluded prominence aortic arch with atherosclerotic plaque.  EKG:  ordered by me and pending    Assessment/Plan:  Justification for Admission Status  I anticipate this patient will require at least two midnights for appropriate medical management, necessitating inpatient admission because acute respiratory failure secondary to COVID-19      * Acute respiratory failure due to COVID-19 (HCC)- (present on admission)  Assessment & Plan  Continue with the following therapies while monitoring closely:  Decadron: 6 mg daily  Diuresis: Receiving gentle hydration 600cc D5W for hypernatremia & herson  Antitussives  check inflammatory markers, D-dimer, procalcitonin  Maintain strict isolation precautions        Acute cystitis without hematuria- (present on admission)  Assessment & Plan  UA indicating infection, ceftriaxone started for 3 days, old urine culture shows E. coli resistant to ampicillin and Bactrim, intermediate piperacillin resistance.  Follow-up urine and blood cultures    Dysphagia- (present on admission)  Assessment & Plan  Failed bedside RN swallow eval.  SLP evaluation ordered, p.o. meds switched to IV for now, low-dose levothyroxine 50 mcg discontinued, reassess need for NG tube following swallow evaluation and discuss with family prior to proceeding as it appears she has been comfort care in the past and I am concerned she will not do well during this admission as she is high risk for decompensation.  Palliative consult placed for goals of care discussion with patient and family.    Severe protein-calorie malnutrition  (HCC)- (present on admission)  Assessment & Plan  Nutrition consult ordered, swallow evaluation ordered.  Monitor and replace electrolytes    Gastroesophageal reflux disease without esophagitis- (present on admission)  Assessment & Plan  Continue PPI    Recurrent falls- (present on admission)  Assessment & Plan  Fall precautions    DNR (do not resuscitate)- (present on admission)  Assessment & Plan  Encephalopathy and acute illness precluding extensive discussion, she has been DNR/I on all 5 admissions in the past 2 years and has been comfort care during a July 2021 admission, will proceed with DNR which appears to be in line with patient wishes.  Palliative care consult, goals of care discussion with DPOA recommended.    Encephalopathy- (present on admission)  Assessment & Plan  Baseline advanced dementia in setting of covid19 acute infection with hypoxic respiratory failure, she lives in memory care group home, friends are DPOA.  -vitals and neuro checks q4h  -IVF  -Correct electrolytes and metabolic derangements/perfusion/shock  -Limit sedatives, attempt to minimize risk of delirium such as avoiding day time napping and promote night time sleep, frequent reorientation, monitor for constipation, remove lines/tubing not needed, avoid early lab draws and vital checks, limit polypharmacy as able, and keep close to the window  -Aspiration precautions           Acute renal failure superimposed on stage 3 chronic kidney disease (HCC)- (present on admission)  Assessment & Plan  Suspecting pre-renal etiology with encephalopathy, decreased PO intake, Covid positive  U/a  Rule out post obstruction  Judicious IVF given covid+  Renal dose meds and avoid nephrotoxins  Monitor I&O's  Follow renal function      Essential hypertension- (present on admission)  Assessment & Plan  She is n.p.o. due to failed bedside swallow eval.  IV antihypertensives ordered with parameters    Vascular dementia without behavioral disturbance (HCC)-  (present on admission)  Assessment & Plan  Holding Depakote sprinkles and Seroquel due to n.p.o. status.      Acquired hypothyroidism- (present on admission)  Assessment & Plan  Levothyroxine 50 mcg daily held due to patient's n.p.o. status, if she is going to be prolonged n.p.o. status consider NG tube or IV levothyroxine.  Last TSH 1 year ago 1.9      VTE prophylaxis: enoxaparin ppx

## 2022-08-12 NOTE — ED NOTES
Patient lying in hospital bed, continues to fidget and remove monitoring equipment and oxymask. All of above items have been reapplied. Pt re adjusted in hospital bed. Will continue to monitor.

## 2022-08-12 NOTE — ASSESSMENT & PLAN NOTE
Continue with the following therapies while monitoring closely:  Decadron: 6 mg daily  Diuresis: Receiving gentle hydration 600cc D5W for hypernatremia & herson  Antitussives  check inflammatory markers, D-dimer, procalcitonin  Maintain strict isolation precautions  Chemical DVT prophylaxis

## 2022-08-12 NOTE — ED NOTES
Patient pulls oxymask, ECG leads, and gown off.   All the above have been re-applied, will continue to monitor.

## 2022-08-12 NOTE — PROGRESS NOTES
Pt admitted to unit from ED. On 8L OM. Pt attempts to pull at mask. Bed alarm on. CPOX on. Purewick in place.

## 2022-08-12 NOTE — ASSESSMENT & PLAN NOTE
Given aspiration risk and pt's wishe to not have feeding tube it's a dificult situation  Work with SLP to encourage PO intake in as safe a manner as possible

## 2022-08-12 NOTE — ASSESSMENT & PLAN NOTE
UA indicating infection, ceftriaxone started for 3 days, old urine culture shows E. coli resistant to ampicillin and Bactrim, intermediate piperacillin resistance.  Follow-up urine and blood cultures

## 2022-08-12 NOTE — ASSESSMENT & PLAN NOTE
D/w AGAPITO Krishnan by phone.    She feels pt would still want to be supported with medications but would not want feeding tubes, code, vent etc.  She understands that feeding the pt carries risk of aspiration and subsequent death.  We will try feeding tomorrow with SLP guidance.  If she were to worsen from aspiration or any other etiology would convert to Comfort Care

## 2022-08-13 NOTE — DISCHARGE SUMMARY
Death Summary    Cause of Death  Cardiopulmonary arrest due to acute respiratory failure with hypoxia due to COVID-19    Comorbid Conditions at the Time of Death  Principal Problem:    Acute respiratory failure due to COVID-19 (HCC) POA: Yes  Active Problems:    Acquired hypothyroidism POA: Yes    Vascular dementia without behavioral disturbance (HCC) POA: Yes    Essential hypertension POA: Yes    Acute renal failure superimposed on stage 3 chronic kidney disease (HCC) POA: Yes    Encephalopathy POA: Yes    DNR (do not resuscitate) POA: Yes      Overview: IMO load March 2020    Recurrent falls POA: Yes    Gastroesophageal reflux disease without esophagitis POA: Yes    Severe protein-calorie malnutrition (HCC) POA: Yes    Dysphagia POA: Yes    Acute cystitis without hematuria POA: Yes    Hypernatremia POA: Unknown  Resolved Problems:    * No resolved hospital problems. *      History of Presenting Illness and Hospital Course  Lamar Dangelo is a 89 y.o. female with vascular dementia lives in [facility], hypertension, hyperlipidemia, GERD, hypothyroidism, CKD, severe protein calorie malnutrition, recurrent falls, who presented 8/11/2022 with dyspnea and encephalopathy found to be COVID-positive.  Ms. Dangelo was brought to the ED by EMS from [facility], she tested positive for COVID on 8/8/2022, she has been progressively weak, dyspneic, encephalopathic.  Normally able to speak full sentences now unable to without coughing and secondary to dyspnea.  She was alert and oriented to herself only.  When she arrived she was 89% on room air placed on 3 L nasal cannula saturating 93%.  Additional history is limited given patient condition and dementia.  No other reports of headache or vision changes, chest pain, nausea vomiting, abdominal pain dysuria or diarrhea however history is limited.  Symptoms started acutely 3 days prior to presentation and have been progressive.  Symptoms aggravated with  exertion.     Initial vitals in the ED /81 pulse 75 she was afebrile, respiratory rate 16 she was 89% on room air.  CBC shows macrocytosis, CMP sodium 148 BUN 57 serum creatinine 1.34 normal liver function, lactic acid 1.9, D-dimer 3.63, UA with moderate leukocyte esterase 20-50 WBCs many bacteria, COVID-positive, chest x-ray shows retrocardiac opacity likely represents atelectasis collapse of left lower lobe further evaluation can be performed with CT as endobronchial lesion not excluded, small left pleural effusion not excluded prominence aortic arch with prominent atherosclerotic plaque.  She was started on Decadron subsequently referred to hospitalist for admission    =======================================    In the hospital patient was given supportive care however patient respiratory failure status and agitation worsen.  Case discussed with family and patient was transferred to comfort care.  Patient  on 2022.  2 RNs pronounced    Death Date: 22   Death Time: 759         Pronounced By (RN1): Demetra  Pronounced By (RN2): Nina

## 2022-08-13 NOTE — CARE PLAN
The patient is Stable - Low risk of patient condition declining or worsening    Shift Goals  Clinical Goals: determine plan of care (CMO vs treatment)  Patient Goals: BOBBI  Family Goals: BOBBI    Progress made toward(s) clinical / shift goals:  Progression limited this shift. Pt placed on comfort care due to decline in condition. A&O x 1. Skin intact. NPO diet. Continuing with 15L O2.      Problem: Knowledge Deficit - Standard  Goal: Patient and family/care givers will demonstrate understanding of plan of care, disease process/condition, diagnostic tests and medications  Outcome: Progressing     Problem: Skin Integrity  Goal: Skin integrity is maintained or improved  Outcome: Progressing  Note: Skin intact, no sings of breakdown. Pt turning in bed on her own frequently.      Problem: Fall Risk  Goal: Patient will remain free from falls  Outcome: Progressing  Note: Bed alarm in place. Pt has not attempted to get out of bed on her own this shift.      Problem: Knowledge Deficit - Comfort Care  Goal: Patient and family/care givers will demonstrate understanding of dying process and grieving  Outcome: Progressing  Note: KAUSHIK Hernandez was updated on pt status. PT has no known family. Pt unable to understand condition due to confusion.      Problem: Psychosocial - Comfort Care  Goal: Spiritual and cultural needs incorporated into hospitalization  Outcome: Progressing  Goal: Patient's level of anxiety will decrease  Outcome: Progressing  Note: PT was giving ativan to help her relax which worked well.   Goal: Patient and family will demonstrate ability to cope with life altering diagnosis and/or procedure  Outcome: Progressing  Goal: Privacy will be maintained for patient and family  Outcome: Progressing     Problem: Discharge Planning - Comfort Care  Goal: Patient's continuum of care needs are met  Outcome: Progressing

## 2022-08-13 NOTE — PROGRESS NOTES
On initial assessment at around 2030 patient was at 85% SpO2 on 8L oxymask. O2 turned up to 15L and patient slowly came up to 90%. For the next 20-30 minutes her oxygen varied between 88%-92% at 15L oxymask. Pt has a frequent, weak cough. LSCTA besides LLL which was diminished - consistent with CXR and CT findings which were atelectasis/ collapse LLL and mucus plugging LLL, respectively. CNA checked BP and noted it to be 76/45. Checked manual BP with best reading of 80/40. Updated Linsey Wegener APRN on issue, orders for stat CXR. CXR obtained at 2206, results pending.

## 2022-08-13 NOTE — PROGRESS NOTES
NOC APRMICHELLE CROSS COVER NOTE      Contacted by primary RN for patient with clinical deterioration. Patient with increasing agitation, increased oxygen demand and hypotension.     Patient admitted for SOB and subsequently found to be COVID positive. Past medical history includes dementia, GERD, CKD, hypothyroidism, dyslipidemia and malnutrition.     Upon arrival at bedside, patient is agitated, crying out and moaning, refusing to keep oxygen and gown on. Patient is in obvious distress. Chest x-ray ordered demonstrates increased airspace opacities in the left lower lobe and lingula could be atelectasis or worsening infection.     Upon review of the chart, there was a discussion by day shift MD with patient's DPOA Ariella who had stated that if patient decompensated she would transition her to comfort care. I reached out and spoke with Ariella regarding patient's declining status. She has opted to make patient comfort care at this time. Ariella requests that we call her with updates. These orders have been placed.     I have spoken with the bedside RN, RRT RN, and charge RN.      Comfort Vicente ACNPC-AG, NOC Hospitalist ALEJANDRA

## 2022-08-13 NOTE — PROGRESS NOTES
"Rapid RN came to round on patient. Reviewed CXR results which stated \"increased airspace opacities in the left lower lobe and lingula could be atelectasis or worsening infection.\" Patient's IV infiltrated at this time, new IV placed. PT continues to have frequent wet cough. ROSANNE Christensen came to assess patient at bedside. Comfort ROBERTS then reportedly spoke with Mary (KAUSHIK) and informed of patient's deterioration, and discussed comfort care status. At 2254 orders were placed for comfort measures.   "

## 2022-08-26 NOTE — DOCUMENTATION QUERY
"                                                                         [facility]                                                                       Query Response Note      PATIENT:               NIYAH BANUELOS  ACCT #:                  [id number]  MRN:                     [id number]  :                      [id number]  ADMIT DATE:       2022 3:46 PM  DISCH DATE:        2022 7:59 AM  RESPONDING  PROVIDER #:        [id number]           QUERY TEXT:    Pneumonia due to COVID-19 virus is documented in the ED Provider Note however, is not documented in any other notes. Please clarify status of this condition:       The patient's Clinical Indicators include:    ED: Pneumonia due to COVID-19 virus  H&P: Acute respiratory failure d/t COVID-19; dementia; acute renal failure on CKD 3; acute cystitis  CXR: Opacity likely atelectasis/collapse of LLL. Sm. L pleural effusion not excluded.  CT-CTA-Chest: Consolidation LLL, bronchial plugging suggesting mucous plugging. Patchy L pulmonary infiltrates. Trace R pleural effusion w/ atelectasis R lung base.  SARS-CoV-2: Detected; Procalcitonin: 0.12; CRP: 12.47;      CXR: Opacities in LLL and lingula could be atelectasis or worsening infection.    Treatment: Decadron; rocephin; oxygen per oxymask; oximetry; lab/imaging  Risk Factors: COVID-19    Thank You,  Carine Michel RN  Clinical    Connect via [facility]  Options provided:   -- Pneumonia due to COVID-19 virus is ruled in   -- Pneumonia due to COVID-19 virus is ruled out   -- Other explanation, (please specify other explanation)   -- Unable to determine      Query created by: Carine Michel on 2022 3:40 PM    RESPONSE TEXT:    Pneumonia due to COVID-19 virus is ruled in       QUERY TEXT:    \"Cachectic\" is documented in the H&P and Progress Notes under the Physical Exam. Can this finding be further specified?    The patient's clinical indicators include:    H&P - Physical " Exam: Frail and cachectic appearing 89-year-old female  BMI: 16.83    8/12  PN - Physical Exam: She is cachectic    Treatment: SLP eval; monitor and replace electrolytes; RD eval order placed  Risk Factors: Advanced age; dysphagia; dementia; CKD 3; GERD    Thank You,  Carine Michel RN  Clinical    Connect via [employer]  Options provided:   -- Cachexia   -- Clinically insignificant finding   -- Other explanation, (please specify other explanation)   -- Unable to determine      Query created by: Carine Michel on 8/25/2022 3:40 PM    RESPONSE TEXT:    Cachexia          Electronically signed by:  NHUNG RIVERO MD 8/26/2022 7:20 AM

## 2022-09-26 NOTE — PROGRESS NOTES
Pt c/o chest pain, localized, non radiating. Worsens with movement and deep breathing. VS- /67, HR 67. 98% on RA. Ordered EKG stat and Trop per protocol. PAge hosp for updates, will wait for call back.     [Initial Consultation] : an initial consultation for [Foster Parents/Guardian] : /guardian [Medical Records] : medical records [Other: _____] : [unfilled]

## 2023-10-16 NOTE — PROGRESS NOTES
Pt report received and pt arrived to unit, vitals obtained and pt medicated for MRI as they stated they would be coming to get pt, also left message for speech therapy as the intent is to dc pt tomorrow and speech eval is pending.  Pt pleasantly confused and bed alarm placed on pt and functioning, pt bed also in view of nursing station.   Length To Time In Minutes Device Was In Place: 10 Normal vision: sees adequately in most situations; can see medication labels, newsprint

## 2024-11-05 NOTE — ED PROVIDER NOTES
ED Provider Note    Scribed for Zeke Wood by Vel Higginbotham. 2/21/2022  1:17 AM    Primary care provider: Matilde Garcia P.A.-C.  Means of arrival: EMS  History obtained from: Patient  History limited by: None    CHIEF COMPLAINT  Chief Complaint   Patient presents with   • GLF     VALDEZ FINLEY from [facility]. Pt was on the floor of her room. Pt has a lac to the bridge of her nose. Unknown LOC +ASA +head trauma pt only oriented to self at baseline     HPI  Lamar Dangelo is a 89 y.o. female with a history of hypertension who presents to the Emergency Department via EMS as a TBI activation for a ground level fall onset approximately an hour ago. Per EMS, the patient is from [facility] and was found by staff on the floor laying on her back. The patient states that she was in an office chair when she fell. She reports an associated laceration to the bridge of her nose and generalized body aches. No alleviating or exacerbating factors were identified. Per EMS, patient loss of consciousness was unknown.     Quality: Achy  Duration: 1 hour  Severity: Mild  Associated sx: Nose laceration and body aches    REVIEW OF SYSTEMS  As above, all other systems reviewed and are negative.   See HPI for further details.     PAST MEDICAL HISTORY   has a past medical history of Hyperlipidemia (7/2/2010) and Hypothyroid (7/2/2010).     SURGICAL HISTORY   has a past surgical history that includes tonsillectomy; appendectomy; colonoscopy - endo (N/A, 9/3/2017); gastroscopy-endo (N/A, 9/3/2017); and hip cannulated screw (Left, 3/24/2018).     SOCIAL HISTORY  Social History     Tobacco Use   • Smoking status: Never Smoker   • Smokeless tobacco: Never Used   Vaping Use   • Vaping Use: Never used   Substance Use Topics   • Alcohol use: No   • Drug use: No      Social History     Substance and Sexual Activity   Drug Use No     FAMILY HISTORY  Family History   Problem Relation Age of Onset   • Heart Disease Father    •  Hypertension Sister      CURRENT MEDICATIONS  Home Medications     Reviewed by Jael Dent R.N. (Registered Nurse) on 02/21/22 at 0120  Med List Status: <None>   Medication Last Dose Status   amLODIPine (NORVASC) 5 MG Tab  Active   artificial tears 1.4 % Solution  Active   ASPIRIN LOW DOSE 81 MG EC tablet  Active   carvedilol (COREG) 12.5 MG Tab  Active   Cyanocobalamin (B-12) 1000 MCG Tab CR  Active   Cyanocobalamin (VITAMIN B-12) 1000 MCG Tab  Active   docusate sodium (COLACE) 100 MG Cap  Active   ferrous sulfate 325 (65 Fe) MG tablet  Active   folic acid (FOLVITE) 1 MG Tab  Active   levothyroxine (SYNTHROID) 50 MCG Tab  Active   lisinopril (PRINIVIL) 10 MG Tab  Active   Multiple Vitamin (THEREMS) Tab  Active   omeprazole (PRILOSEC) 20 MG delayed-release capsule  Active   polyethylene glycol/lytes (MIRALAX) 17 g Pack  Active   potassium chloride SA (KDUR) 20 MEQ Tab CR  Active   QUEtiapine (SEROQUEL) 25 MG Tab  Active   sulfamethoxazole-trimethoprim (BACTRIM DS) 800-160 MG tablet  Active              ALLERGIES  Allergies   Allergen Reactions   • Nkda [No Known Drug Allergy]      PHYSICAL EXAM    VITAL SIGNS:   Vitals:    02/21/22 0125 02/21/22 0136 02/21/22 0141 02/21/22 0206   BP:  (!) 213/102 (!) 188/86 (!) 208/86   Pulse: 74 74 73 79   Resp: 18 15 15 16   Temp: 36.6 °C (97.9 °F)      TempSrc: Temporal      SpO2: 95% 95% 93% 94%   Weight:       Height:         Vitals: My interpretation: hypertensive, not tachycardic, afebrile, not hypoxic    Reinterpretation of vitals: Unchanged    Cardiac Monitor Interpretation: The cardiac monitor revealed normal Sinus Rhythm  as interpreted by me. The cardiac monitor was ordered secondary to the patient's history of fall and to monitor for dysrhythmia and/or tachycardia.    PE:   Constitutional: Well developed, Well nourished, No acute distress, Non-toxic appearance. Complains of pain everywhere in neck, back, chest, and abdomen, with no signs of trauma  HENT:  Normocephalic, Bilateral external ears normal, Oropharynx is clear mucous membranes are moist. No oral exudates or nasal discharge. Laceration across bridge of nose and small superficial laceration to the forehead  Eyes: Pupils are equal round and reactive, EOMI, Conjunctiva normal, No discharge.   Neck: Normal range of motion, No tenderness, Supple, No stridor. No meningismus.  Lymphatic: No lymphadenopathy noted.   Cardiovascular: Regular rate and rhythm without murmur rub or gallop.  Thorax & Lungs: Clear breath sounds bilaterally without wheezes, rhonchi or rales. There is no chest wall tenderness.   Abdomen: Soft non-tender non-distended. There is no rebound or guarding. No organomegaly is appreciated. Bowel sounds are normal.  Skin: Normal without rash.   Back: No step off or deformity to the spine. No CVA or spinal tenderness.   Extremities: Intact distal pulses, No edema, No tenderness, No cyanosis, No clubbing. Capillary refill is less than 2 seconds.  Musculoskeletal: Good range of motion in all major joints. No tenderness to palpation or major deformities noted.   Neurologic: Alert & Oriented to person only which is baseline, Normal motor function, Normal sensory function, No focal deficits noted. Reflexes are normal.  Psychiatric: Affect normal, Judgment normal, Mood normal. There is no suicidal ideation or patient reported hallucinations.     DIAGNOSTIC STUDIES / PROCEDURES    LABS     All labs reviewed by me. Significant for NA    RADIOLOGY  CT-TSPINE W/O PLUS RECONS   Final Result         1.  Anterior wedge deformities at T5, T8, T10, and T12 which appear likely chronic. No acute fracture is readily apparent.   2.  Osteopenia, compatible in appearance with osteoporosis.      CT-LSPINE W/O PLUS RECONS   Final Result         1.  No acute traumatic bony injury of the lumbar spine, exam overall stable since prior study.      CT-CHEST,ABDOMEN,PELVIS W/O   Final Result         1.  No significant abnormality  in thorax, abdomen and pelvis CT scan.   2.  Low-density lesions in the liver, appearance most compatible with hepatic cysts, similar compared to prior study.   3.  Atherosclerosis and atherosclerotic coronary artery disease      CT-HEAD W/O   Final Result         1.  No acute intracranial abnormality is identified, there are nonspecific white matter changes, commonly associated with small vessel ischemic disease.  Associated mild cerebral atrophy is noted.   2.  Atherosclerosis.         CT-MAXILLOFACIAL W/O PLUS RECONS   Final Result         1.  No acute traumatic facial bony injuries identified.      CT-CSPINE WITHOUT PLUS RECONS   Final Result         1.  Multilevel degenerative changes of the cervical spine limit diagnostic sensitivity of this examination, otherwise no acute traumatic bony injury of the cervical spine is apparent.   2.  Upper thoracic wedge compression deformity, see dedicated CT thoracic spine for further characterization.   3.  Atherosclerosis        The radiologist's interpretation of all radiological studies have been reviewed by me.    Laceration Repair Procedure Note    Indication: Laceration    Procedure: The patient was placed in the appropriate position and anesthesia around the lacerations were not performed at the patient's request. The area was then cleansed with betadine and draped in a sterile fashion. The 2cm forehead laceration was closed with Dermabond. A second 2cm nose laceration was closed with Dermabond. The wound area was then dressed with a sterile dressing.      Total repaired wound length: 4 cm.     Other Items: None    The patient tolerated the procedure well.    Complications: None    COURSE & MEDICAL DECISION MAKING  Nursing notes, VS, PMSFHx, labs, imaging, EKG reviewed in chart.     2:35 AM - Laceration repair procedure performed at this time by me as outlined above.    MDM: 1:17 AM Lamar Rameynson is a 89 y.o. female who presented with mechanical fall at nursing  "facility.  Presents with laceration across the bridge of her nose and small superficial laceration to the forehead.  Vital signs show hypertension but otherwise unremarkable.  She is pleasantly demented which is her baseline per report.  She underwent trauma pan scanning as she is not a reliable historian and stated that \"it hurts everywhere\".  No obvious step-offs in the C-spine or signs of trauma.  Pan scans negative for any acute findings.  Lacerations were closed with Dermabond, see laceration note.  Recommended close outpatient follow-up.  Patient has a nonfocal neurological exam and physical exam otherwise.  Return precautions were placed for the patient and the discharge summary.    Patient has had high blood pressure while in the emergency department, felt likely secondary to medical condition. Counseled patient to monitor blood pressure at home and follow up with primary care physician.      FINAL IMPRESSION  1. Facial laceration, initial encounter Acute   2. Fall, initial encounter Acute   3. Closed head injury, initial encounter Acute      Vel CULLEN (Scribtalat), am scribing for, and in the presence of, Zeke Wood.    Electronically signed by: Vel Higginbotham (Rebeca), 2/21/2022    IZeke personally performed the services described in this documentation, as scribed by Vel Higginbotham in my presence, and it is both accurate and complete. C.    The note accurately reflects work and decisions made by me.  Zeke Wood  2/21/2022  3:03 AM        " No

## 2024-11-14 NOTE — ED NOTES
Wounds irrigated with NS.    Detail Level: Detailed Sunscreen Recommendations: SPF 30 or greater (I.e. Cerave, Cetaphil, La roche posay, Elta MD) Sunscreen Recommendations: Cerave, Cetaphil, La Roche Posay, or Elta MD SPF 30 or above Nicotinamide Supplementation Recommendations: Heliocare or Nicotinamide 500mg BID

## (undated) DEVICE — ELECTRODE DUAL RETURN W/ CORD - (50/PK)

## (undated) DEVICE — PACK MAJOR BASIN - (2EA/CA)

## (undated) DEVICE — LACTATED RINGERS INJ 1000 ML - (14EA/CA 60CA/PF)

## (undated) DEVICE — SUCTION INSTRUMENT YANKAUER BULBOUS TIP W/O VENT (50EA/CA)

## (undated) DEVICE — SUTURE 1 VICRYL PLUS CTX - 36 INCH (36/BX)

## (undated) DEVICE — CANNULA W/ SUPPLY TUBING O2 - (50/CA)

## (undated) DEVICE — CANISTER SUCTION 3000ML MECHANICAL FILTER AUTO SHUTOFF MEDI-VAC NONSTERILE LF DISP  (40EA/CA)

## (undated) DEVICE — DRAPE C-ARM LARGE 41IN X 74 IN - (10/BX 2BX/CA)

## (undated) DEVICE — WIRE GUIDE SYN 2.8 300 CALIB. (2CSX10=20)

## (undated) DEVICE — GLOVE BIOGEL INDICATOR SZ 7.5 SURGICAL PF LTX - (50PR/BX 4BX/CA)

## (undated) DEVICE — NEPTUNE 4 PORT MANIFOLD - (20/PK)

## (undated) DEVICE — SYRINGE 6 CC 20 GA X 1 1/2 - NDL SAFETY  (50/BX)

## (undated) DEVICE — MASK ANESTHESIA ADULT  - (100/CA)

## (undated) DEVICE — CHLORAPREP 26 ML APPLICATOR - ORANGE TINT(25/CA)

## (undated) DEVICE — PROTECTOR ULNA NERVE - (36PR/CA)

## (undated) DEVICE — CON SEDATION/>5 YR 1ST 15 MIN

## (undated) DEVICE — FORCEP RADIAL JAW 4 STANDARD CAPACITY W/NEEDLE 240CM (40EA/BX)

## (undated) DEVICE — GOWN WARMING STANDARD FLEX - (30/CA)

## (undated) DEVICE — SOD. CHL 10CC SYRINGE PREFILL - W/10 CC (30/BX)

## (undated) DEVICE — CON SEDATION EA ADDL 15 MIN

## (undated) DEVICE — BASIN EMESIS DISP. - (250/CA)

## (undated) DEVICE — DRAPE C ARMOR (12EA/CA)

## (undated) DEVICE — DRAPE SURGICAL U 77X120 - (10/CA)

## (undated) DEVICE — SUTURE 2-0 VICRYL PLUS CT-1 36 (36PK/BX)"

## (undated) DEVICE — KIT ROOM DECONTAMINATION

## (undated) DEVICE — CONTAINER, SPECIMEN, STERILE

## (undated) DEVICE — MASK WITH FACE SHIELD (25/BX 4BX/CA)

## (undated) DEVICE — PAD LAP STERILE 18 X 18 - (5/PK 40PK/CA)

## (undated) DEVICE — SYRINGE 3 CC 22 GA X 1-1/2 - NDL SAFETY (50/BX 8BX/CA)

## (undated) DEVICE — BOVIE BLADE COATED - (50/PK)

## (undated) DEVICE — DRESSING LEUKOMED STERILE 11.75X4IN - (50/CA)

## (undated) DEVICE — BITE BLOCK ADULT 60FR (100EA/CA)

## (undated) DEVICE — STAPLER SKIN DISP - (6/BX 10BX/CA) VISISTAT

## (undated) DEVICE — CANISTER SUCTION RIGID RED 1500CC (40EA/CA)

## (undated) DEVICE — SET EXTENSION WITH 2 PORTS (48EA/CA) ***PART #2C8610 IS A SUBSTITUTE*****

## (undated) DEVICE — SUTURE GENERAL

## (undated) DEVICE — DRAPE SURG STERI-DRAPE 7X11OD - (40EA/CA)

## (undated) DEVICE — ELECTRODE 850 FOAM ADHESIVE - HYDROGEL RADIOTRNSPRNT (50/PK)

## (undated) DEVICE — SUTURE 0 VICRYL PLUS CT-1 - 36 INCH (36/BX)

## (undated) DEVICE — SET LEADWIRE 5 LEAD BEDSIDE DISPOSABLE ECG (1SET OF 5/EA)

## (undated) DEVICE — DRAPE LARGE 3 QUARTER - (20/CA)

## (undated) DEVICE — PROBE ERBE FIAPC 2.3MM (10EA/BX)

## (undated) DEVICE — DRAPE U ORTHOPEDIC - (10/BX)

## (undated) DEVICE — HEAD HOLDER JUNIOR/ADULT

## (undated) DEVICE — KIT PROCEDURE DOUBLE ENDO ONLY (5/CA)

## (undated) DEVICE — KIT ANESTHESIA W/CIRCUIT & 3/LT BAG W/FILTER (20EA/CA)

## (undated) DEVICE — DETERGENT RENUZYME PLUS 10 OZ PACKET (50/BX)

## (undated) DEVICE — TUBING CLEARLINK DUO-VENT - C-FLO (48EA/CA)

## (undated) DEVICE — SENSOR SPO2 NEO LNCS ADHESIVE (20/BX) SEE USER NOTES